# Patient Record
Sex: FEMALE | Race: BLACK OR AFRICAN AMERICAN | Employment: UNEMPLOYED | URBAN - METROPOLITAN AREA
[De-identification: names, ages, dates, MRNs, and addresses within clinical notes are randomized per-mention and may not be internally consistent; named-entity substitution may affect disease eponyms.]

---

## 2018-05-16 ENCOUNTER — APPOINTMENT (OUTPATIENT)
Dept: GENERAL RADIOLOGY | Age: 58
End: 2018-05-16

## 2018-05-16 ENCOUNTER — HOSPITAL ENCOUNTER (EMERGENCY)
Age: 58
Discharge: HOME | End: 2018-05-16
Attending: EMERGENCY MEDICINE

## 2018-05-16 VITALS
HEART RATE: 103 BPM | TEMPERATURE: 98.2 F | DIASTOLIC BLOOD PRESSURE: 99 MMHG | WEIGHT: 190 LBS | BODY MASS INDEX: 31.65 KG/M2 | SYSTOLIC BLOOD PRESSURE: 176 MMHG | RESPIRATION RATE: 18 BRPM | HEIGHT: 65 IN | OXYGEN SATURATION: 97 %

## 2018-05-16 DIAGNOSIS — J06.9 UPPER RESPIRATORY TRACT INFECTION, UNSPECIFIED TYPE: Primary | ICD-10-CM

## 2018-05-16 DIAGNOSIS — R05.9 COUGH: ICD-10-CM

## 2018-05-16 LAB
ANION GAP SERPL CALCULATED.3IONS-SCNC: 11 MMOL/L (ref 7–16)
BUN BLDV-MCNC: 20 MG/DL (ref 6–20)
CALCIUM SERPL-MCNC: 8.6 MG/DL (ref 8.6–10.2)
CHLORIDE BLD-SCNC: 102 MMOL/L (ref 98–107)
CO2: 25 MMOL/L (ref 22–29)
CREAT SERPL-MCNC: 0.9 MG/DL (ref 0.5–1)
GFR AFRICAN AMERICAN: >60
GFR NON-AFRICAN AMERICAN: >60 ML/MIN/1.73
GLUCOSE BLD-MCNC: 114 MG/DL (ref 74–109)
HCT VFR BLD CALC: 32.4 % (ref 34–48)
HEMOGLOBIN: 8.5 G/DL (ref 11.5–15.5)
LACTIC ACID: 1.2 MMOL/L (ref 0.5–2.2)
MCH RBC QN AUTO: 16.5 PG (ref 26–35)
MCHC RBC AUTO-ENTMCNC: 26.2 % (ref 32–34.5)
MCV RBC AUTO: 63 FL (ref 80–99.9)
PDW BLD-RTO: 23 FL (ref 11.5–15)
PLATELET # BLD: 413 E9/L (ref 130–450)
PMV BLD AUTO: 9.6 FL (ref 7–12)
POTASSIUM SERPL-SCNC: 3.8 MMOL/L (ref 3.5–5)
RBC # BLD: 5.14 E12/L (ref 3.5–5.5)
SODIUM BLD-SCNC: 138 MMOL/L (ref 132–146)
WBC # BLD: 6 E9/L (ref 4.5–11.5)

## 2018-05-16 PROCEDURE — 71045 X-RAY EXAM CHEST 1 VIEW: CPT

## 2018-05-16 PROCEDURE — 94664 DEMO&/EVAL PT USE INHALER: CPT

## 2018-05-16 PROCEDURE — 2580000003 HC RX 258: Performed by: PREVENTIVE MEDICINE

## 2018-05-16 PROCEDURE — 83605 ASSAY OF LACTIC ACID: CPT

## 2018-05-16 PROCEDURE — 85027 COMPLETE CBC AUTOMATED: CPT

## 2018-05-16 PROCEDURE — 6370000000 HC RX 637 (ALT 250 FOR IP): Performed by: PREVENTIVE MEDICINE

## 2018-05-16 PROCEDURE — 99284 EMERGENCY DEPT VISIT MOD MDM: CPT

## 2018-05-16 PROCEDURE — 36415 COLL VENOUS BLD VENIPUNCTURE: CPT

## 2018-05-16 PROCEDURE — 6360000002 HC RX W HCPCS: Performed by: PREVENTIVE MEDICINE

## 2018-05-16 PROCEDURE — 94640 AIRWAY INHALATION TREATMENT: CPT

## 2018-05-16 PROCEDURE — 80048 BASIC METABOLIC PNL TOTAL CA: CPT

## 2018-05-16 PROCEDURE — 96374 THER/PROPH/DIAG INJ IV PUSH: CPT

## 2018-05-16 RX ORDER — METHYLPREDNISOLONE SODIUM SUCCINATE 125 MG/2ML
125 INJECTION, POWDER, LYOPHILIZED, FOR SOLUTION INTRAMUSCULAR; INTRAVENOUS ONCE
Status: COMPLETED | OUTPATIENT
Start: 2018-05-16 | End: 2018-05-16

## 2018-05-16 RX ORDER — DOXYCYCLINE 100 MG/1
100 CAPSULE ORAL 2 TIMES DAILY
Qty: 20 CAPSULE | Refills: 0 | Status: SHIPPED | OUTPATIENT
Start: 2018-05-16 | End: 2018-05-26

## 2018-05-16 RX ORDER — BENZONATATE 100 MG/1
100 CAPSULE ORAL ONCE
Status: COMPLETED | OUTPATIENT
Start: 2018-05-16 | End: 2018-05-16

## 2018-05-16 RX ORDER — IPRATROPIUM BROMIDE AND ALBUTEROL SULFATE 2.5; .5 MG/3ML; MG/3ML
3 SOLUTION RESPIRATORY (INHALATION) ONCE
Status: COMPLETED | OUTPATIENT
Start: 2018-05-16 | End: 2018-05-16

## 2018-05-16 RX ORDER — 0.9 % SODIUM CHLORIDE 0.9 %
1000 INTRAVENOUS SOLUTION INTRAVENOUS ONCE
Status: COMPLETED | OUTPATIENT
Start: 2018-05-16 | End: 2018-05-16

## 2018-05-16 RX ORDER — PREDNISONE 50 MG/1
TABLET ORAL
Qty: 5 TABLET | Refills: 0 | Status: SHIPPED | OUTPATIENT
Start: 2018-05-16 | End: 2021-07-05

## 2018-05-16 RX ORDER — BENZONATATE 100 MG/1
100 CAPSULE ORAL 3 TIMES DAILY PRN
Qty: 21 CAPSULE | Refills: 0 | Status: SHIPPED | OUTPATIENT
Start: 2018-05-16 | End: 2018-05-23

## 2018-05-16 RX ADMIN — SODIUM CHLORIDE 1000 ML: 9 INJECTION, SOLUTION INTRAVENOUS at 01:48

## 2018-05-16 RX ADMIN — METHYLPREDNISOLONE SODIUM SUCCINATE 125 MG: 125 INJECTION, POWDER, FOR SOLUTION INTRAMUSCULAR; INTRAVENOUS at 01:48

## 2018-05-16 RX ADMIN — IPRATROPIUM BROMIDE AND ALBUTEROL SULFATE 3 AMPULE: 2.5; .5 SOLUTION RESPIRATORY (INHALATION) at 01:18

## 2018-05-16 RX ADMIN — BENZONATATE 100 MG: 100 CAPSULE ORAL at 02:35

## 2018-05-16 ASSESSMENT — ENCOUNTER SYMPTOMS
DIARRHEA: 0
CHEST TIGHTNESS: 1
NAUSEA: 0
SHORTNESS OF BREATH: 1
CONSTIPATION: 0
COUGH: 1
ABDOMINAL PAIN: 0
WHEEZING: 1
ALLERGIC/IMMUNOLOGIC NEGATIVE: 1
VOMITING: 0

## 2019-03-10 ENCOUNTER — HOSPITAL ENCOUNTER (EMERGENCY)
Age: 59
Discharge: HOME | End: 2019-03-10
Attending: EMERGENCY MEDICINE
Payer: MEDICAID

## 2019-03-10 ENCOUNTER — APPOINTMENT (OUTPATIENT)
Dept: GENERAL RADIOLOGY | Age: 59
End: 2019-03-10
Payer: MEDICAID

## 2019-03-10 VITALS
WEIGHT: 190 LBS | RESPIRATION RATE: 18 BRPM | BODY MASS INDEX: 31.65 KG/M2 | TEMPERATURE: 98 F | OXYGEN SATURATION: 96 % | HEART RATE: 86 BPM | SYSTOLIC BLOOD PRESSURE: 142 MMHG | HEIGHT: 65 IN | DIASTOLIC BLOOD PRESSURE: 124 MMHG

## 2019-03-10 DIAGNOSIS — N30.00 ACUTE CYSTITIS WITHOUT HEMATURIA: Primary | ICD-10-CM

## 2019-03-10 DIAGNOSIS — R06.2 WHEEZING: ICD-10-CM

## 2019-03-10 DIAGNOSIS — M77.8 TENDINITIS OF THUMB: ICD-10-CM

## 2019-03-10 LAB
ALBUMIN SERPL-MCNC: 3.8 G/DL (ref 3.5–5.2)
ALP BLD-CCNC: 72 U/L (ref 35–104)
ALT SERPL-CCNC: 18 U/L (ref 0–32)
ANION GAP SERPL CALCULATED.3IONS-SCNC: 8 MMOL/L (ref 7–16)
ANISOCYTOSIS: ABNORMAL
AST SERPL-CCNC: 53 U/L (ref 0–31)
BACTERIA: ABNORMAL /HPF
BASOPHILS ABSOLUTE: 0.06 E9/L (ref 0–0.2)
BASOPHILS RELATIVE PERCENT: 0.9 % (ref 0–2)
BILIRUB SERPL-MCNC: <0.2 MG/DL (ref 0–1.2)
BILIRUBIN URINE: NEGATIVE
BLOOD, URINE: NEGATIVE
BUN BLDV-MCNC: 10 MG/DL (ref 6–20)
CALCIUM SERPL-MCNC: 8.8 MG/DL (ref 8.6–10.2)
CHLORIDE BLD-SCNC: 102 MMOL/L (ref 98–107)
CLARITY: ABNORMAL
CO2: 28 MMOL/L (ref 22–29)
COLOR: YELLOW
CREAT SERPL-MCNC: 0.8 MG/DL (ref 0.5–1)
EOSINOPHILS ABSOLUTE: 0.85 E9/L (ref 0.05–0.5)
EOSINOPHILS RELATIVE PERCENT: 12.6 % (ref 0–6)
EPITHELIAL CELLS, UA: ABNORMAL /HPF
GFR AFRICAN AMERICAN: >60
GFR NON-AFRICAN AMERICAN: >60 ML/MIN/1.73
GLUCOSE BLD-MCNC: 97 MG/DL (ref 74–99)
GLUCOSE URINE: NEGATIVE MG/DL
HCG, URINE, POC: NEGATIVE
HCT VFR BLD CALC: 33.4 % (ref 34–48)
HEMOGLOBIN: 9.1 G/DL (ref 11.5–15.5)
HYPOCHROMIA: ABNORMAL
IMMATURE GRANULOCYTES #: 0.02 E9/L
IMMATURE GRANULOCYTES %: 0.3 % (ref 0–5)
KETONES, URINE: NEGATIVE MG/DL
LEUKOCYTE ESTERASE, URINE: ABNORMAL
LYMPHOCYTES ABSOLUTE: 2.41 E9/L (ref 1.5–4)
LYMPHOCYTES RELATIVE PERCENT: 35.8 % (ref 20–42)
Lab: NORMAL
MCH RBC QN AUTO: 17.9 PG (ref 26–35)
MCHC RBC AUTO-ENTMCNC: 27.2 % (ref 32–34.5)
MCV RBC AUTO: 65.6 FL (ref 80–99.9)
MONOCYTES ABSOLUTE: 0.53 E9/L (ref 0.1–0.95)
MONOCYTES RELATIVE PERCENT: 7.9 % (ref 2–12)
NEGATIVE QC PASS/FAIL: NORMAL
NEUTROPHILS ABSOLUTE: 2.87 E9/L (ref 1.8–7.3)
NEUTROPHILS RELATIVE PERCENT: 42.5 % (ref 43–80)
NITRITE, URINE: POSITIVE
OVALOCYTES: ABNORMAL
PDW BLD-RTO: 21.5 FL (ref 11.5–15)
PH UA: 6.5 (ref 5–9)
PLATELET # BLD: 437 E9/L (ref 130–450)
PMV BLD AUTO: 9.8 FL (ref 7–12)
POIKILOCYTES: ABNORMAL
POLYCHROMASIA: ABNORMAL
POSITIVE QC PASS/FAIL: NORMAL
POTASSIUM SERPL-SCNC: 5.2 MMOL/L (ref 3.5–5)
PRO-BNP: 61 PG/ML (ref 0–125)
PROTEIN UA: ABNORMAL MG/DL
RBC # BLD: 5.09 E12/L (ref 3.5–5.5)
RBC UA: ABNORMAL /HPF (ref 0–2)
SODIUM BLD-SCNC: 138 MMOL/L (ref 132–146)
SPECIFIC GRAVITY UA: 1.02 (ref 1–1.03)
TARGET CELLS: ABNORMAL
TOTAL PROTEIN: 7.7 G/DL (ref 6.4–8.3)
TROPONIN: <0.01 NG/ML (ref 0–0.03)
UROBILINOGEN, URINE: 0.2 E.U./DL
WBC # BLD: 6.7 E9/L (ref 4.5–11.5)
WBC UA: >20 /HPF (ref 0–5)

## 2019-03-10 PROCEDURE — 36415 COLL VENOUS BLD VENIPUNCTURE: CPT

## 2019-03-10 PROCEDURE — 94664 DEMO&/EVAL PT USE INHALER: CPT

## 2019-03-10 PROCEDURE — 85025 COMPLETE CBC W/AUTO DIFF WBC: CPT

## 2019-03-10 PROCEDURE — 81001 URINALYSIS AUTO W/SCOPE: CPT

## 2019-03-10 PROCEDURE — 84484 ASSAY OF TROPONIN QUANT: CPT

## 2019-03-10 PROCEDURE — 71046 X-RAY EXAM CHEST 2 VIEWS: CPT

## 2019-03-10 PROCEDURE — 99285 EMERGENCY DEPT VISIT HI MDM: CPT

## 2019-03-10 PROCEDURE — 94640 AIRWAY INHALATION TREATMENT: CPT

## 2019-03-10 PROCEDURE — 87088 URINE BACTERIA CULTURE: CPT

## 2019-03-10 PROCEDURE — 83880 ASSAY OF NATRIURETIC PEPTIDE: CPT

## 2019-03-10 PROCEDURE — 80053 COMPREHEN METABOLIC PANEL: CPT

## 2019-03-10 PROCEDURE — 87186 SC STD MICRODIL/AGAR DIL: CPT

## 2019-03-10 PROCEDURE — 87077 CULTURE AEROBIC IDENTIFY: CPT

## 2019-03-10 PROCEDURE — 6370000000 HC RX 637 (ALT 250 FOR IP): Performed by: EMERGENCY MEDICINE

## 2019-03-10 RX ORDER — NITROFURANTOIN 25; 75 MG/1; MG/1
100 CAPSULE ORAL 2 TIMES DAILY
Qty: 10 CAPSULE | Refills: 0 | Status: SHIPPED | OUTPATIENT
Start: 2019-03-10 | End: 2019-03-15

## 2019-03-10 RX ORDER — DEXAMETHASONE SODIUM PHOSPHATE 10 MG/ML
10 INJECTION INTRAMUSCULAR; INTRAVENOUS ONCE
Status: DISCONTINUED | OUTPATIENT
Start: 2019-03-10 | End: 2019-03-10

## 2019-03-10 RX ORDER — IPRATROPIUM BROMIDE AND ALBUTEROL SULFATE 2.5; .5 MG/3ML; MG/3ML
3 SOLUTION RESPIRATORY (INHALATION) ONCE
Status: COMPLETED | OUTPATIENT
Start: 2019-03-10 | End: 2019-03-10

## 2019-03-10 RX ORDER — IBUPROFEN 800 MG/1
800 TABLET ORAL EVERY 6 HOURS PRN
Qty: 21 TABLET | Refills: 0 | Status: SHIPPED | OUTPATIENT
Start: 2019-03-10 | End: 2021-07-08

## 2019-03-10 RX ORDER — ALBUTEROL SULFATE 90 UG/1
2 AEROSOL, METERED RESPIRATORY (INHALATION) EVERY 4 HOURS PRN
Qty: 1 INHALER | Refills: 0 | Status: SHIPPED | OUTPATIENT
Start: 2019-03-10 | End: 2021-07-05 | Stop reason: SDUPTHER

## 2019-03-10 RX ORDER — DEXAMETHASONE SODIUM PHOSPHATE 10 MG/ML
10 INJECTION INTRAMUSCULAR; INTRAVENOUS ONCE
Status: DISCONTINUED | OUTPATIENT
Start: 2019-03-10 | End: 2019-03-11 | Stop reason: HOSPADM

## 2019-03-10 RX ADMIN — IPRATROPIUM BROMIDE AND ALBUTEROL SULFATE 3 AMPULE: .5; 3 SOLUTION RESPIRATORY (INHALATION) at 21:30

## 2019-03-10 ASSESSMENT — PAIN DESCRIPTION - DESCRIPTORS: DESCRIPTORS: BURNING

## 2019-03-10 ASSESSMENT — ENCOUNTER SYMPTOMS
ABDOMINAL PAIN: 1
COUGH: 0
NAUSEA: 0
ABDOMINAL DISTENTION: 0
EYE DISCHARGE: 0
VOMITING: 0
COLOR CHANGE: 0
RHINORRHEA: 0
DIARRHEA: 0
EYE ITCHING: 0
CHEST TIGHTNESS: 0
SINUS PRESSURE: 0
CHOKING: 0
WHEEZING: 0
EYE REDNESS: 0
VOICE CHANGE: 0
SORE THROAT: 0
STRIDOR: 0
SHORTNESS OF BREATH: 1

## 2019-03-10 ASSESSMENT — PAIN DESCRIPTION - PAIN TYPE: TYPE: ACUTE PAIN

## 2019-03-10 ASSESSMENT — PAIN SCALES - GENERAL: PAINLEVEL_OUTOF10: 5

## 2019-03-11 NOTE — ED NOTES
Pt given urine cup and instructed on need for sample. Pt states she is unable to void at this time.      Altagracia Rodriguez, MARK  22[birth year] 0876

## 2019-03-11 NOTE — ED PROVIDER NOTES
Patient is a 59-year-old female, past medical history of asthma, tobacco use, who presents for dysuria and shortness of breath. The patient reports for the past week she's had recurring episodes of lower abdominal \"pressure\" which occurs after urination. She states she feels like she is on urinating and has to go again shortly afterwards. She reports is similar to previous urinary tract infections she has had. She also reports the past several months she has had recurring episodes of shortness of breath after walking up a flight of stairs. She reports it tends to be worsened with weather changes. She states she appears to diagnose asthma and prescribed an inhaler which she has not been using. In addition, she reports some tenderness in her left thenar eminence and thumb which is been ongoing for several days as well. She denies any traumas or injuries. She reports associated \"clicking\" with flexion and extension of her thumb. She denies any numbness or color changes or rashes. She denies any associated fevers, chills, cough, chest pain or pressure, palpitations, nausea vomiting, diarrhea, vaginal discharge. LMP 1 week ago. The history is provided by the patient. Review of Systems   Constitutional: Negative for chills, fever and unexpected weight change. HENT: Negative for congestion, rhinorrhea, sinus pressure, sore throat and voice change. Eyes: Negative for discharge, redness and itching. Respiratory: Positive for shortness of breath. Negative for cough, choking, chest tightness, wheezing and stridor. Cardiovascular: Negative for chest pain, palpitations and leg swelling. Gastrointestinal: Positive for abdominal pain (pressure - suprapubic). Negative for abdominal distention, diarrhea, nausea and vomiting. Genitourinary: Positive for dysuria. Negative for flank pain, hematuria, vaginal bleeding, vaginal discharge and vaginal pain. Musculoskeletal: Positive for arthralgias (left thumb). Negative for joint swelling and myalgias. Skin: Negative for color change, rash and wound. Allergic/Immunologic: Negative for environmental allergies, food allergies and immunocompromised state. Neurological: Negative for dizziness, light-headedness and headaches. Hematological: Negative for adenopathy. Psychiatric/Behavioral: The patient is not nervous/anxious. Physical Exam   Constitutional: She is oriented to person, place, and time. She appears well-developed and well-nourished. No distress. HENT:   Head: Normocephalic and atraumatic. Mouth/Throat: Oropharynx is clear and moist. No oropharyngeal exudate. Gold capped incisors   Eyes: Pupils are equal, round, and reactive to light. Conjunctivae and EOM are normal. Right eye exhibits no discharge. Left eye exhibits no discharge. No scleral icterus. Neck: Normal range of motion. Neck supple. No JVD present. No tracheal deviation present. No thyromegaly present. Cardiovascular: Normal rate, regular rhythm, normal heart sounds and intact distal pulses. Exam reveals no gallop and no friction rub. No murmur heard. Pulmonary/Chest: Effort normal. No stridor. No respiratory distress. She has wheezes. She has no rhonchi. She has no rales. She exhibits no tenderness. DEBI wheezes, diminished air entry   Abdominal: Soft. Bowel sounds are normal. She exhibits no distension and no mass. There is tenderness (Mild suprapubic). There is no rebound and no guarding. No hernia. Musculoskeletal: Normal range of motion. She exhibits tenderness. She exhibits no edema or deformity. Mild tenderness over the palmar surface and thenar eminence of the left thumb, full active and passive range of motion in: Negative Finkelstein test; there is a palpable clicking with flexion and extension of the flexor tendon - there is no pain with palpation or passive extension, no erythema or swelling   Lymphadenopathy:     She has no cervical adenopathy.    Neurological: She is alert and oriented to person, place, and time. Skin: Skin is warm and dry. Capillary refill takes less than 2 seconds. No rash noted. She is not diaphoretic. No pallor. Nursing note and vitals reviewed. Procedures    MDM    ED Course as of Mar 10 2304   Richelle Casillas Mar 10, 2019   2204 The patient is resting comfortably. On repeat examination her breath sounds are improved with mild wheezing but much better air entry. She is in no respiratory distress. She doesn't fact have a urinary tract infection. We'll send a urine culture, and based on previous urine cultures will treat with nitrofurantoin. Will discharge her with a refill for albuterol inhaler in addition to antibiotics and ibuprofen for her thumb pain. [JL]      ED Course User Index  [JL] Jose Alfredo Linder DO         EKG: This EKG is signed and interpreted by me. Rate: 83  Rhythm: Sinus  Interpretation: no acute changes  Comparison: stable as compared to patient's most recent EKG      --------------------------------------------- PAST HISTORY ---------------------------------------------  Past Medical History:  has no past medical history on file. Past Surgical History:  has a past surgical history that includes  section. Social History:  reports that she has been smoking cigarettes. She has been smoking about 0.50 packs per day. She has never used smokeless tobacco. She reports that she does not drink alcohol or use drugs. Family History: family history is not on file. The patients home medications have been reviewed.     Allergies: Ibuprofen    -------------------------------------------------- RESULTS -------------------------------------------------  Labs:  Results for orders placed or performed during the hospital encounter of 03/10/19   Troponin   Result Value Ref Range    Troponin <0.01 0.00 - 0.03 ng/mL   CBC Auto Differential   Result Value Ref Range    WBC 6.7 4.5 - 11.5 E9/L    RBC 5.09 3.50 - 5.50 E12/L Hemoglobin 9.1 (L) 11.5 - 15.5 g/dL    Hematocrit 33.4 (L) 34.0 - 48.0 %    MCV 65.6 (L) 80.0 - 99.9 fL    MCH 17.9 (L) 26.0 - 35.0 pg    MCHC 27.2 (L) 32.0 - 34.5 %    RDW 21.5 (H) 11.5 - 15.0 fL    Platelets 638 536 - 428 E9/L    MPV 9.8 7.0 - 12.0 fL    Neutrophils % 42.5 (L) 43.0 - 80.0 %    Immature Granulocytes % 0.3 0.0 - 5.0 %    Lymphocytes % 35.8 20.0 - 42.0 %    Monocytes % 7.9 2.0 - 12.0 %    Eosinophils % 12.6 (H) 0.0 - 6.0 %    Basophils % 0.9 0.0 - 2.0 %    Neutrophils # 2.87 1.80 - 7.30 E9/L    Immature Granulocytes # 0.02 E9/L    Lymphocytes # 2.41 1.50 - 4.00 E9/L    Monocytes # 0.53 0.10 - 0.95 E9/L    Eosinophils # 0.85 (H) 0.05 - 0.50 E9/L    Basophils # 0.06 0.00 - 0.20 E9/L    Anisocytosis 2+     Polychromasia 1+     Hypochromia 2+     Poikilocytes 1+     Ovalocytes 1+     Target Cells 1+    Comprehensive Metabolic Panel   Result Value Ref Range    Sodium 138 132 - 146 mmol/L    Potassium 5.2 (H) 3.5 - 5.0 mmol/L    Chloride 102 98 - 107 mmol/L    CO2 28 22 - 29 mmol/L    Anion Gap 8 7 - 16 mmol/L    Glucose 97 74 - 99 mg/dL    BUN 10 6 - 20 mg/dL    CREATININE 0.8 0.5 - 1.0 mg/dL    GFR Non-African American >60 >=60 mL/min/1.73    GFR African American >60     Calcium 8.8 8.6 - 10.2 mg/dL    Total Protein 7.7 6.4 - 8.3 g/dL    Alb 3.8 3.5 - 5.2 g/dL    Total Bilirubin <0.2 0.0 - 1.2 mg/dL    Alkaline Phosphatase 72 35 - 104 U/L    ALT 18 0 - 32 U/L    AST 53 (H) 0 - 31 U/L   Brain Natriuretic Peptide   Result Value Ref Range    Pro-BNP 61 0 - 125 pg/mL   Urinalysis   Result Value Ref Range    Color, UA Yellow Straw/Yellow    Clarity, UA SL CLOUDY Clear    Glucose, Ur Negative Negative mg/dL    Bilirubin Urine Negative Negative    Ketones, Urine Negative Negative mg/dL    Specific Gravity, UA 1.020 1.005 - 1.030    Blood, Urine Negative Negative    pH, UA 6.5 5.0 - 9.0    Protein, UA TRACE Negative mg/dL    Urobilinogen, Urine 0.2 <2.0 E.U./dL    Nitrite, Urine POSITIVE (A) Negative Leukocyte Esterase, Urine TRACE (A) Negative   Microscopic Urinalysis   Result Value Ref Range    WBC, UA >20 0 - 5 /HPF    RBC, UA NONE 0 - 2 /HPF    Epi Cells RARE /HPF    Bacteria, UA MANY (A) /HPF   POC Pregnancy Urine Qual   Result Value Ref Range    HCG, Urine, POC Negative Negative    Lot Number [id number]     Positive QC Pass/Fail Pass     Negative QC Pass/Fail Pass    EKG 12 Lead   Result Value Ref Range    Ventricular Rate 83 BPM    Atrial Rate 83 BPM    P-R Interval 142 ms    QRS Duration 80 ms    Q-T Interval 374 ms    QTc Calculation (Bazett) 439 ms    P Axis 37 degrees    R Axis -6 degrees    T Axis 38 degrees       Radiology:  XR CHEST STANDARD (2 VW)   Final Result   No acute cardiopulmonary pathology. ------------------------- NURSING NOTES AND VITALS REVIEWED ---------------------------  Date / Time Roomed:  3/10/2019  7:56 PM  ED Bed Assignment:  24/24    The nursing notes within the ED encounter and vital signs as below have been reviewed. BP (!) 142/124   Pulse 86   Temp 98 °F (36.7 °C)   Resp 18   Ht 5' 5\" (1.651 m)   Wt 190 lb (86.2 kg)   LMP 03/03/2019   SpO2 96%   BMI 31.62 kg/m²   Oxygen Saturation Interpretation: Normal      ------------------------------------------ PROGRESS NOTES ------------------------------------------  10:16 PM  I have spoken with the patient and discussed todays results, in addition to providing specific details for the plan of care and counseling regarding the diagnosis and prognosis. Their questions are answered at this time and they are agreeable with the plan. I discussed at length with them reasons for immediate return here for re evaluation. They will followup with their primary care physician by calling their office tomorrow.       --------------------------------- ADDITIONAL PROVIDER NOTES ---------------------------------  At this time the patient is without objective evidence of an acute process requiring hospitalization or inpatient management. They have remained hemodynamically stable throughout their entire ED visit and are stable for discharge with outpatient follow-up. The plan has been discussed in detail and they are aware of the specific conditions for emergent return, as well as the importance of follow-up. New Prescriptions    IBUPROFEN (ADVIL;MOTRIN) 800 MG TABLET    Take 1 tablet by mouth every 6 hours as needed for Pain    NITROFURANTOIN, MACROCRYSTAL-MONOHYDRATE, (MACROBID) 100 MG CAPSULE    Take 1 capsule by mouth 2 times daily for 5 days       Diagnosis:  1. Acute cystitis without hematuria    2. Wheezing    3. Tendinitis of thumb        Disposition:  Patient's disposition: Discharge to home  Patient's condition is stable.             Rosey Garcia,   Resident  03/10/19 1645

## 2019-03-12 LAB
ORGANISM: ABNORMAL
URINE CULTURE, ROUTINE: ABNORMAL
URINE CULTURE, ROUTINE: ABNORMAL

## 2019-03-15 LAB
EKG ATRIAL RATE: 83 BPM
EKG P AXIS: 37 DEGREES
EKG P-R INTERVAL: 142 MS
EKG Q-T INTERVAL: 374 MS
EKG QRS DURATION: 80 MS
EKG QTC CALCULATION (BAZETT): 439 MS
EKG R AXIS: -6 DEGREES
EKG T AXIS: 38 DEGREES
EKG VENTRICULAR RATE: 83 BPM

## 2019-04-19 ENCOUNTER — HOSPITAL ENCOUNTER (EMERGENCY)
Age: 59
Discharge: HOME | End: 2019-04-19
Attending: EMERGENCY MEDICINE
Payer: MEDICAID

## 2019-04-19 VITALS
BODY MASS INDEX: 32.44 KG/M2 | WEIGHT: 190 LBS | DIASTOLIC BLOOD PRESSURE: 98 MMHG | SYSTOLIC BLOOD PRESSURE: 147 MMHG | OXYGEN SATURATION: 98 % | HEIGHT: 64 IN | RESPIRATION RATE: 16 BRPM | TEMPERATURE: 96.9 F | HEART RATE: 98 BPM

## 2019-04-19 DIAGNOSIS — N39.0 URINARY TRACT INFECTION IN FEMALE: Primary | ICD-10-CM

## 2019-04-19 LAB
ALBUMIN SERPL-MCNC: 4 G/DL (ref 3.5–5.2)
ALP BLD-CCNC: 79 U/L (ref 35–104)
ALT SERPL-CCNC: 15 U/L (ref 0–32)
ANION GAP SERPL CALCULATED.3IONS-SCNC: 9 MMOL/L (ref 7–16)
AST SERPL-CCNC: 19 U/L (ref 0–31)
BACTERIA: ABNORMAL /HPF
BASOPHILS ABSOLUTE: 0.05 E9/L (ref 0–0.2)
BASOPHILS RELATIVE PERCENT: 0.7 % (ref 0–2)
BILIRUB SERPL-MCNC: 0.6 MG/DL (ref 0–1.2)
BILIRUBIN URINE: NEGATIVE
BLOOD, URINE: ABNORMAL
BUN BLDV-MCNC: 9 MG/DL (ref 6–20)
CALCIUM SERPL-MCNC: 8.9 MG/DL (ref 8.6–10.2)
CHLORIDE BLD-SCNC: 103 MMOL/L (ref 98–107)
CLARITY: CLEAR
CO2: 26 MMOL/L (ref 22–29)
COLOR: YELLOW
CREAT SERPL-MCNC: 0.7 MG/DL (ref 0.5–1)
EOSINOPHILS ABSOLUTE: 0.1 E9/L (ref 0.05–0.5)
EOSINOPHILS RELATIVE PERCENT: 1.3 % (ref 0–6)
EPITHELIAL CELLS, UA: ABNORMAL /HPF
GFR AFRICAN AMERICAN: >60
GFR NON-AFRICAN AMERICAN: >60 ML/MIN/1.73
GLUCOSE BLD-MCNC: 105 MG/DL (ref 74–99)
GLUCOSE URINE: NEGATIVE MG/DL
HCT VFR BLD CALC: 33.5 % (ref 34–48)
HEMOGLOBIN: 9.1 G/DL (ref 11.5–15.5)
IMMATURE GRANULOCYTES #: 0.03 E9/L
IMMATURE GRANULOCYTES %: 0.4 % (ref 0–5)
KETONES, URINE: NEGATIVE MG/DL
LACTIC ACID: 1 MMOL/L (ref 0.5–2.2)
LEUKOCYTE ESTERASE, URINE: ABNORMAL
LIPASE: 17 U/L (ref 13–60)
LYMPHOCYTES ABSOLUTE: 1.57 E9/L (ref 1.5–4)
LYMPHOCYTES RELATIVE PERCENT: 20.5 % (ref 20–42)
MCH RBC QN AUTO: 17.5 PG (ref 26–35)
MCHC RBC AUTO-ENTMCNC: 27.2 % (ref 32–34.5)
MCV RBC AUTO: 64.5 FL (ref 80–99.9)
MONOCYTES ABSOLUTE: 0.7 E9/L (ref 0.1–0.95)
MONOCYTES RELATIVE PERCENT: 9.1 % (ref 2–12)
NEUTROPHILS ABSOLUTE: 5.22 E9/L (ref 1.8–7.3)
NEUTROPHILS RELATIVE PERCENT: 68 % (ref 43–80)
NITRITE, URINE: POSITIVE
PDW BLD-RTO: 20.2 FL (ref 11.5–15)
PH UA: 6 (ref 5–9)
PLATELET # BLD: 336 E9/L (ref 130–450)
PMV BLD AUTO: 10.4 FL (ref 7–12)
POTASSIUM SERPL-SCNC: 3.9 MMOL/L (ref 3.5–5)
PROTEIN UA: ABNORMAL MG/DL
RBC # BLD: 5.19 E12/L (ref 3.5–5.5)
RBC UA: ABNORMAL /HPF (ref 0–2)
SODIUM BLD-SCNC: 138 MMOL/L (ref 132–146)
SPECIFIC GRAVITY UA: <=1.005 (ref 1–1.03)
TOTAL PROTEIN: 7.9 G/DL (ref 6.4–8.3)
UROBILINOGEN, URINE: 0.2 E.U./DL
WBC # BLD: 7.7 E9/L (ref 4.5–11.5)
WBC UA: ABNORMAL /HPF (ref 0–5)

## 2019-04-19 PROCEDURE — 36415 COLL VENOUS BLD VENIPUNCTURE: CPT

## 2019-04-19 PROCEDURE — 85025 COMPLETE CBC W/AUTO DIFF WBC: CPT

## 2019-04-19 PROCEDURE — 6370000000 HC RX 637 (ALT 250 FOR IP): Performed by: EMERGENCY MEDICINE

## 2019-04-19 PROCEDURE — 87186 SC STD MICRODIL/AGAR DIL: CPT

## 2019-04-19 PROCEDURE — 81001 URINALYSIS AUTO W/SCOPE: CPT

## 2019-04-19 PROCEDURE — 80053 COMPREHEN METABOLIC PANEL: CPT

## 2019-04-19 PROCEDURE — 87088 URINE BACTERIA CULTURE: CPT

## 2019-04-19 PROCEDURE — 83690 ASSAY OF LIPASE: CPT

## 2019-04-19 PROCEDURE — 99283 EMERGENCY DEPT VISIT LOW MDM: CPT

## 2019-04-19 PROCEDURE — 83605 ASSAY OF LACTIC ACID: CPT

## 2019-04-19 RX ORDER — NITROFURANTOIN 25; 75 MG/1; MG/1
100 CAPSULE ORAL 2 TIMES DAILY
Qty: 10 CAPSULE | Refills: 0 | Status: SHIPPED | OUTPATIENT
Start: 2019-04-19 | End: 2019-04-24

## 2019-04-19 RX ORDER — NITROFURANTOIN 25; 75 MG/1; MG/1
100 CAPSULE ORAL ONCE
Status: COMPLETED | OUTPATIENT
Start: 2019-04-19 | End: 2019-04-19

## 2019-04-19 RX ADMIN — NITROFURANTOIN MONOHYDRATE/MACROCRYSTALLINE 100 MG: 25; 75 CAPSULE ORAL at 21:47

## 2019-04-19 ASSESSMENT — PAIN SCALES - GENERAL: PAINLEVEL_OUTOF10: 9

## 2019-04-20 NOTE — ED PROVIDER NOTES
gallops, or rubs. 2+ distal pulses. Abdomen: Soft, non distended, mild suprapubic tenderness on palpation. No evidence of pyelonephritis. Extremities: Moves all extremities x 4. Warm and well perfused. Skin: warm and dry without rash. Neurologic: GCS 15, CN 2-12 grossly intact, no focal deficits, no meningeal signs  Psych: Normal Affect.  No signs or symptoms of depression or psychosis.    -------------------------------------------------- RESULTS -------------------------------------------------  All laboratory and radiology results have been personally reviewed by myself   LABS:  Results for orders placed or performed during the hospital encounter of 04/19/19   Urine Culture   Result Value Ref Range    Urine Culture, Routine      Organism Escherichia coli (A)     Urine Culture, Routine >100,000 CFU/ml  Sensitivity to follow      Comprehensive Metabolic Panel   Result Value Ref Range    Sodium 138 132 - 146 mmol/L    Potassium 3.9 3.5 - 5.0 mmol/L    Chloride 103 98 - 107 mmol/L    CO2 26 22 - 29 mmol/L    Anion Gap 9 7 - 16 mmol/L    Glucose 105 (H) 74 - 99 mg/dL    BUN 9 6 - 20 mg/dL    CREATININE 0.7 0.5 - 1.0 mg/dL    GFR Non-African American >60 >=60 mL/min/1.73    GFR African American >60     Calcium 8.9 8.6 - 10.2 mg/dL    Total Protein 7.9 6.4 - 8.3 g/dL    Alb 4.0 3.5 - 5.2 g/dL    Total Bilirubin 0.6 0.0 - 1.2 mg/dL    Alkaline Phosphatase 79 35 - 104 U/L    ALT 15 0 - 32 U/L    AST 19 0 - 31 U/L   CBC Auto Differential   Result Value Ref Range    WBC 7.7 4.5 - 11.5 E9/L    RBC 5.19 3.50 - 5.50 E12/L    Hemoglobin 9.1 (L) 11.5 - 15.5 g/dL    Hematocrit 33.5 (L) 34.0 - 48.0 %    MCV 64.5 (L) 80.0 - 99.9 fL    MCH 17.5 (L) 26.0 - 35.0 pg    MCHC 27.2 (L) 32.0 - 34.5 %    RDW 20.2 (H) 11.5 - 15.0 fL    Platelets 447 768 - 235 E9/L    MPV 10.4 7.0 - 12.0 fL    Neutrophils % 68.0 43.0 - 80.0 %    Immature Granulocytes % 0.4 0.0 - 5.0 %    Lymphocytes % 20.5 20.0 - 42.0 %    Monocytes % 9.1 2.0 - 12.0 % Eosinophils % 1.3 0.0 - 6.0 %    Basophils % 0.7 0.0 - 2.0 %    Neutrophils # 5.22 1.80 - 7.30 E9/L    Immature Granulocytes # 0.03 E9/L    Lymphocytes # 1.57 1.50 - 4.00 E9/L    Monocytes # 0.70 0.10 - 0.95 E9/L    Eosinophils # 0.10 0.05 - 0.50 E9/L    Basophils # 0.05 0.00 - 0.20 E9/L   Lipase   Result Value Ref Range    Lipase 17 13 - 60 U/L   Urinalysis   Result Value Ref Range    Color, UA Yellow Straw/Yellow    Clarity, UA Clear Clear    Glucose, Ur Negative Negative mg/dL    Bilirubin Urine Negative Negative    Ketones, Urine Negative Negative mg/dL    Specific Gravity, UA <=1.005 1.005 - 1.030    Blood, Urine MODERATE (A) Negative    pH, UA 6.0 5.0 - 9.0    Protein, UA TRACE Negative mg/dL    Urobilinogen, Urine 0.2 <2.0 E.U./dL    Nitrite, Urine POSITIVE (A) Negative    Leukocyte Esterase, Urine LARGE (A) Negative   Lactic acid, plasma   Result Value Ref Range    Lactic Acid 1.0 0.5 - 2.2 mmol/L   Microscopic Urinalysis   Result Value Ref Range    WBC, UA PACKED 0 - 5 /HPF    RBC, UA 1-3 0 - 2 /HPF    Epi Cells FEW /HPF    Bacteria, UA MANY (A) /HPF       RADIOLOGY:  Interpreted by Radiologist.  No orders to display       ------------------------- NURSING NOTES AND VITALS REVIEWED ---------------------------  The nursing notes within the ED encounter and vital signs as below have been reviewed. BP (!) 147/98   Pulse 98   Temp 96.9 °F (36.1 °C) (Temporal)   Resp 16   Ht 5' 4\" (1.626 m)   Wt 190 lb (86.2 kg)   LMP 04/11/2019   SpO2 98%   BMI 32.61 kg/m²   Oxygen Saturation Interpretation: Normal    ------------------------------- ED COURSE/MEDICAL DECISION MAKING----------------------  Medications   nitrofurantoin (macrocrystal-monohydrate) (MACROBID) capsule 100 mg (100 mg Oral Given 4/19/19 2147)     Medical Decision Making:   Pt presents with her typical UTI sx. Per previous labs, infection is sensitive to Macrobid (Enterococcus). Labs obtained and Macrobid given in the ED prior to discharge. All results reviewed and discussed with the pt. The patient is stable for discharge. The results of today's workup were reviewed and discussed with the patient, and any questions or concerns were answered. Emergency provider has shared the specific conditions for return, as well as the importance of follow-up with PCP. Pt discharged home with Rx Macrobid. Pt informed if sx persist and continue to return despite abx intervention, she may require follow up with Urology. Counseling: The emergency provider has spoken with the patient and discussed todays results, in addition to providing specific details for the plan of care and counseling regarding the diagnosis and prognosis. Questions are answered at this time and they are agreeable with the plan.      --------------------------------- IMPRESSION AND DISPOSITION ---------------------------------    IMPRESSION  1. Urinary tract infection in female        DISPOSITION  Disposition: Discharge to home  Patient condition is stable    SCRIBE ATTESTATION    4/19/19, 9:16 PM.    This note is prepared by Yonis Reyes, acting as Scribe for Marcio Hester MD.    Marcio Hester MD:  The scribe's documentation has been prepared under my direction and personally reviewed by me in its entirety. I confirm that the note above accurately reflects all work, treatment, procedures, and medical decision making performed by me. NOTE: This report was edited using voice recognition software. Every effort was made to ensure accuracy; however, inadvertent computerized transcription errors may be present.             Marcio Hester MD  04/21/19 2294

## 2019-04-21 LAB
ORGANISM: ABNORMAL
URINE CULTURE, ROUTINE: ABNORMAL
URINE CULTURE, ROUTINE: ABNORMAL

## 2019-05-26 ENCOUNTER — APPOINTMENT (OUTPATIENT)
Dept: CT IMAGING | Age: 59
End: 2019-05-26
Payer: MEDICAID

## 2019-05-26 ENCOUNTER — HOSPITAL ENCOUNTER (EMERGENCY)
Age: 59
Discharge: HOME | End: 2019-05-27
Attending: EMERGENCY MEDICINE
Payer: MEDICAID

## 2019-05-26 DIAGNOSIS — R05.9 COUGH: ICD-10-CM

## 2019-05-26 DIAGNOSIS — N39.0 URINARY TRACT INFECTION IN FEMALE: Primary | ICD-10-CM

## 2019-05-26 LAB
ALBUMIN SERPL-MCNC: 3.9 G/DL (ref 3.5–5.2)
ALP BLD-CCNC: 73 U/L (ref 35–104)
ALT SERPL-CCNC: <5 U/L (ref 0–32)
ANION GAP SERPL CALCULATED.3IONS-SCNC: 9 MMOL/L (ref 7–16)
ANISOCYTOSIS: ABNORMAL
AST SERPL-CCNC: 16 U/L (ref 0–31)
BACTERIA: ABNORMAL /HPF
BASOPHILS ABSOLUTE: 0.17 E9/L (ref 0–0.2)
BASOPHILS RELATIVE PERCENT: 2.6 % (ref 0–2)
BILIRUB SERPL-MCNC: 0.2 MG/DL (ref 0–1.2)
BILIRUBIN URINE: NEGATIVE
BLOOD, URINE: ABNORMAL
BUN BLDV-MCNC: 11 MG/DL (ref 6–20)
CALCIUM SERPL-MCNC: 8.8 MG/DL (ref 8.6–10.2)
CHLORIDE BLD-SCNC: 106 MMOL/L (ref 98–107)
CLARITY: ABNORMAL
CO2: 26 MMOL/L (ref 22–29)
COLOR: YELLOW
CREAT SERPL-MCNC: 0.7 MG/DL (ref 0.5–1)
EOSINOPHILS ABSOLUTE: 0.7 E9/L (ref 0.05–0.5)
EOSINOPHILS RELATIVE PERCENT: 10.4 % (ref 0–6)
EPITHELIAL CELLS, UA: ABNORMAL /HPF
GFR AFRICAN AMERICAN: >60
GFR NON-AFRICAN AMERICAN: >60 ML/MIN/1.73
GLUCOSE BLD-MCNC: 156 MG/DL (ref 74–99)
GLUCOSE URINE: NEGATIVE MG/DL
HCG, URINE, POC: NEGATIVE
HCT VFR BLD CALC: 32.7 % (ref 34–48)
HEMOGLOBIN: 8.8 G/DL (ref 11.5–15.5)
HYPOCHROMIA: ABNORMAL
KETONES, URINE: NEGATIVE MG/DL
LACTIC ACID: 1.5 MMOL/L (ref 0.5–2.2)
LEUKOCYTE ESTERASE, URINE: ABNORMAL
LIPASE: 25 U/L (ref 13–60)
LYMPHOCYTES ABSOLUTE: 2.35 E9/L (ref 1.5–4)
LYMPHOCYTES RELATIVE PERCENT: 34.8 % (ref 20–42)
Lab: NORMAL
MCH RBC QN AUTO: 17.1 PG (ref 26–35)
MCHC RBC AUTO-ENTMCNC: 26.9 % (ref 32–34.5)
MCV RBC AUTO: 63.4 FL (ref 80–99.9)
MONOCYTES ABSOLUTE: 0.34 E9/L (ref 0.1–0.95)
MONOCYTES RELATIVE PERCENT: 5.2 % (ref 2–12)
NEGATIVE QC PASS/FAIL: NORMAL
NEUTROPHILS ABSOLUTE: 3.15 E9/L (ref 1.8–7.3)
NEUTROPHILS RELATIVE PERCENT: 47 % (ref 43–80)
NITRITE, URINE: POSITIVE
OVALOCYTES: ABNORMAL
PDW BLD-RTO: 21.3 FL (ref 11.5–15)
PH UA: 6 (ref 5–9)
PLATELET # BLD: 417 E9/L (ref 130–450)
PMV BLD AUTO: 10.2 FL (ref 7–12)
POIKILOCYTES: ABNORMAL
POLYCHROMASIA: ABNORMAL
POSITIVE QC PASS/FAIL: NORMAL
POTASSIUM SERPL-SCNC: 3.9 MMOL/L (ref 3.5–5)
PROTEIN UA: ABNORMAL MG/DL
RBC # BLD: 5.16 E12/L (ref 3.5–5.5)
RBC UA: ABNORMAL /HPF (ref 0–2)
SCHISTOCYTES: ABNORMAL
SODIUM BLD-SCNC: 141 MMOL/L (ref 132–146)
SPECIFIC GRAVITY UA: 1.02 (ref 1–1.03)
TARGET CELLS: ABNORMAL
TEAR DROP CELLS: ABNORMAL
TOTAL PROTEIN: 7.5 G/DL (ref 6.4–8.3)
UROBILINOGEN, URINE: 0.2 E.U./DL
WBC # BLD: 6.7 E9/L (ref 4.5–11.5)
WBC UA: >20 /HPF (ref 0–5)

## 2019-05-26 PROCEDURE — 85025 COMPLETE CBC W/AUTO DIFF WBC: CPT

## 2019-05-26 PROCEDURE — 74176 CT ABD & PELVIS W/O CONTRAST: CPT

## 2019-05-26 PROCEDURE — 80053 COMPREHEN METABOLIC PANEL: CPT

## 2019-05-26 PROCEDURE — 83690 ASSAY OF LIPASE: CPT

## 2019-05-26 PROCEDURE — 83605 ASSAY OF LACTIC ACID: CPT

## 2019-05-26 PROCEDURE — 36415 COLL VENOUS BLD VENIPUNCTURE: CPT

## 2019-05-26 PROCEDURE — 99284 EMERGENCY DEPT VISIT MOD MDM: CPT

## 2019-05-26 PROCEDURE — 81001 URINALYSIS AUTO W/SCOPE: CPT

## 2019-05-26 RX ORDER — 0.9 % SODIUM CHLORIDE 0.9 %
1000 INTRAVENOUS SOLUTION INTRAVENOUS ONCE
Status: DISCONTINUED | OUTPATIENT
Start: 2019-05-26 | End: 2019-05-27 | Stop reason: HOSPADM

## 2019-05-26 ASSESSMENT — PAIN SCALES - GENERAL: PAINLEVEL_OUTOF10: 6

## 2019-05-26 ASSESSMENT — PAIN DESCRIPTION - LOCATION: LOCATION: FLANK

## 2019-05-27 VITALS
DIASTOLIC BLOOD PRESSURE: 81 MMHG | HEART RATE: 95 BPM | SYSTOLIC BLOOD PRESSURE: 164 MMHG | OXYGEN SATURATION: 97 % | TEMPERATURE: 98.8 F | RESPIRATION RATE: 16 BRPM

## 2019-05-27 PROCEDURE — 6370000000 HC RX 637 (ALT 250 FOR IP): Performed by: EMERGENCY MEDICINE

## 2019-05-27 PROCEDURE — 6360000002 HC RX W HCPCS: Performed by: EMERGENCY MEDICINE

## 2019-05-27 PROCEDURE — 94664 DEMO&/EVAL PT USE INHALER: CPT

## 2019-05-27 RX ORDER — CEPHALEXIN 500 MG/1
500 CAPSULE ORAL ONCE
Status: COMPLETED | OUTPATIENT
Start: 2019-05-27 | End: 2019-05-27

## 2019-05-27 RX ORDER — ALBUTEROL SULFATE 2.5 MG/3ML
2.5 SOLUTION RESPIRATORY (INHALATION) ONCE
Status: COMPLETED | OUTPATIENT
Start: 2019-05-27 | End: 2019-05-27

## 2019-05-27 RX ORDER — CEPHALEXIN 500 MG/1
500 CAPSULE ORAL 2 TIMES DAILY
Qty: 28 CAPSULE | Refills: 0 | Status: SHIPPED | OUTPATIENT
Start: 2019-05-27 | End: 2019-06-10

## 2019-05-27 RX ORDER — BENZONATATE 100 MG/1
200 CAPSULE ORAL 3 TIMES DAILY PRN
Qty: 21 CAPSULE | Refills: 0 | Status: SHIPPED | OUTPATIENT
Start: 2019-05-27 | End: 2019-06-03

## 2019-05-27 RX ADMIN — CEPHALEXIN 500 MG: 500 CAPSULE ORAL at 00:55

## 2019-05-27 RX ADMIN — ALBUTEROL SULFATE 2.5 MG: 2.5 SOLUTION RESPIRATORY (INHALATION) at 00:40

## 2019-05-27 NOTE — ED NOTES
Bed:  HA  Expected date:   Expected time:   Means of arrival:   Comments:  Triage       Jordy Garcia RN  05/26/19 8138

## 2019-05-27 NOTE — ED PROVIDER NOTES
HPI:   Warden Elkins is a 62 y.o. female presenting to the ED for right flank pain, beginning 1 month ago. The complaint has been persistent, moderate in severity, and worsened by nothing. Patient reports that she was diagnosed with a UTI on 2019. She was discharged from the Ed and was placed on Macrobid. She reports that her flank pain has not subsided and that her urine still smells foul. Patient denies any history on kidney stones. Patient denies dysuria, hematuria, nausea, emesis, fever, chills, diarrhea, CP, SOB, dizziness, trauma, abdominal pain, or any other pertinent complaints. ROS:   Pertinent positives and negatives are stated within HPI, all other systems reviewed and are negative.    --------------------------------------------- PAST HISTORY ---------------------------------------------  Past Medical History:  has no past medical history on file. Past Surgical History:  has a past surgical history that includes  section. Social History:  reports that she has been smoking cigarettes. She has been smoking about 0.50 packs per day. She has never used smokeless tobacco. She reports that she does not drink alcohol or use drugs. Family History: family history is not on file. The patients home medications have been reviewed. Allergies: Patient has no known allergies.     -------------------------------------------------- RESULTS -------------------------------------------------  All laboratory and radiology results have been personally reviewed by myself   LABS:  Results for orders placed or performed during the hospital encounter of 19   CBC Auto Differential   Result Value Ref Range    WBC 6.7 4.5 - 11.5 E9/L    RBC 5.16 3.50 - 5.50 E12/L    Hemoglobin 8.8 (L) 11.5 - 15.5 g/dL    Hematocrit 32.7 (L) 34.0 - 48.0 %    MCV 63.4 (L) 80.0 - 99.9 fL    MCH 17.1 (L) 26.0 - 35.0 pg    MCHC 26.9 (L) 32.0 - 34.5 %    RDW 21.3 (H) 11.5 - 15.0 fL    Platelets 223 775 - 376 E9/L MPV 10.2 7.0 - 12.0 fL    Neutrophils % 47.0 43.0 - 80.0 %    Lymphocytes % 34.8 20.0 - 42.0 %    Monocytes % 5.2 2.0 - 12.0 %    Eosinophils % 10.4 (H) 0.0 - 6.0 %    Basophils % 2.6 (H) 0.0 - 2.0 %    Neutrophils # 3.15 1.80 - 7.30 E9/L    Lymphocytes # 2.35 1.50 - 4.00 E9/L    Monocytes # 0.34 0.10 - 0.95 E9/L    Eosinophils # 0.70 (H) 0.05 - 0.50 E9/L    Basophils # 0.17 0.00 - 0.20 E9/L    Anisocytosis 2+     Polychromasia 1+     Hypochromia 1+     Poikilocytes 1+     Schistocytes 1+     Ovalocytes 1+     Target Cells 1+     Tear Drop Cells 1+    Comprehensive Metabolic Panel   Result Value Ref Range    Sodium 141 132 - 146 mmol/L    Potassium 3.9 3.5 - 5.0 mmol/L    Chloride 106 98 - 107 mmol/L    CO2 26 22 - 29 mmol/L    Anion Gap 9 7 - 16 mmol/L    Glucose 156 (H) 74 - 99 mg/dL    BUN 11 6 - 20 mg/dL    CREATININE 0.7 0.5 - 1.0 mg/dL    GFR Non-African American >60 >=60 mL/min/1.73    GFR African American >60     Calcium 8.8 8.6 - 10.2 mg/dL    Total Protein 7.5 6.4 - 8.3 g/dL    Alb 3.9 3.5 - 5.2 g/dL    Total Bilirubin 0.2 0.0 - 1.2 mg/dL    Alkaline Phosphatase 73 35 - 104 U/L    ALT <5 0 - 32 U/L    AST 16 0 - 31 U/L   Lactic Acid, Plasma   Result Value Ref Range    Lactic Acid 1.5 0.5 - 2.2 mmol/L   Lipase   Result Value Ref Range    Lipase 25 13 - 60 U/L   Urinalysis   Result Value Ref Range    Color, UA Yellow Straw/Yellow    Clarity, UA SL CLOUDY Clear    Glucose, Ur Negative Negative mg/dL    Bilirubin Urine Negative Negative    Ketones, Urine Negative Negative mg/dL    Specific Gravity, UA 1.025 1.005 - 1.030    Blood, Urine TRACE-INTACT Negative    pH, UA 6.0 5.0 - 9.0    Protein, UA TRACE Negative mg/dL    Urobilinogen, Urine 0.2 <2.0 E.U./dL    Nitrite, Urine POSITIVE (A) Negative    Leukocyte Esterase, Urine MODERATE (A) Negative   Microscopic Urinalysis   Result Value Ref Range    WBC, UA >20 0 - 5 /HPF    RBC, UA 1-3 0 - 2 /HPF    Epi Cells FEW /HPF    Bacteria, UA MANY (A) /HPF   POC Pregnancy Urine   Result Value Ref Range    HCG, Urine, POC Negative Negative    Lot Number [id number]     Positive QC Pass/Fail Pass     Negative QC Pass/Fail Pass        RADIOLOGY:  Interpreted by Radiologist.  CT ABDOMEN PELVIS WO CONTRAST Additional Contrast? None   Final Result     Questionable 3 mm stone dependently in the urinary bladder. This report has been electronically signed by Naty Eaton MD.          ------------------------- NURSING NOTES AND VITALS REVIEWED ---------------------------   The nursing notes within the ED encounter and vital signs as below have been reviewed. BP (!) 164/81   Pulse 95   Temp 98.8 °F (37.1 °C) (Oral)   Resp 16   LMP 05/01/2019   SpO2 97%   Oxygen Saturation Interpretation: Normal    ---------------------------------------------------PHYSICAL EXAM--------------------------------------    Constitutional/General: Alert and oriented x3, well appearing, non toxic in NAD  Head: NC/AT  Eyes: PERRL, EOMI  Mouth: Oropharynx clear, handling secretions, no trismus  Neck: Supple, full ROM  Pulmonary: Lungs clear to auscultation bilaterally, no wheezes, rales, or rhonchi. Not in respiratory distress  Cardiovascular:  Regular rate and rhythm, no murmurs, gallops, or rubs. 2+ distal pulses  Abdomen: Soft, non tender, non distended   Extremities: Moves all extremities x 4. Warm and well perfused  Skin: warm and dry without rash  : right CVA tenderness  Neurologic: GCS 15,  Psych: Normal Affect      ------------------------------ ED COURSE/MEDICAL DECISION MAKING----------------------  Medications   0.9 % sodium chloride bolus (1,000 mLs Intravenous Not Given 5/27/19 0056)   albuterol (PROVENTIL) nebulizer solution 2.5 mg (2.5 mg Nebulization Given 5/27/19 0040)   cephALEXin (KEFLEX) capsule 500 mg (500 mg Oral Given 5/27/19 0055)       Medical Decision Making:      Patient presents to the ED for right CVA pain. Patient examined.  Patient was diagnosed with a UTI last month

## 2019-10-13 ENCOUNTER — HOSPITAL ENCOUNTER (EMERGENCY)
Age: 59
Discharge: HOME | End: 2019-10-13
Payer: MEDICAID

## 2019-10-13 VITALS
OXYGEN SATURATION: 96 % | WEIGHT: 190 LBS | TEMPERATURE: 98.1 F | DIASTOLIC BLOOD PRESSURE: 98 MMHG | BODY MASS INDEX: 32.44 KG/M2 | HEART RATE: 88 BPM | SYSTOLIC BLOOD PRESSURE: 180 MMHG | HEIGHT: 64 IN | RESPIRATION RATE: 16 BRPM

## 2019-10-13 DIAGNOSIS — J40 BRONCHITIS: Primary | ICD-10-CM

## 2019-10-13 DIAGNOSIS — N30.00 ACUTE CYSTITIS WITHOUT HEMATURIA: ICD-10-CM

## 2019-10-13 LAB
BACTERIA: ABNORMAL /HPF
BILIRUBIN URINE: NEGATIVE
BLOOD, URINE: NEGATIVE
CLARITY: NORMAL
COLOR: YELLOW
GLUCOSE URINE: NEGATIVE MG/DL
KETONES, URINE: NEGATIVE MG/DL
LEUKOCYTE ESTERASE, URINE: NEGATIVE
NITRITE, URINE: NEGATIVE
PH UA: 8 (ref 5–9)
PROTEIN UA: NORMAL MG/DL
RBC UA: ABNORMAL /HPF (ref 0–2)
SPECIFIC GRAVITY UA: 1.01 (ref 1–1.03)
UROBILINOGEN, URINE: 1 E.U./DL
WBC UA: ABNORMAL /HPF (ref 0–5)

## 2019-10-13 PROCEDURE — 81001 URINALYSIS AUTO W/SCOPE: CPT

## 2019-10-13 PROCEDURE — 94664 DEMO&/EVAL PT USE INHALER: CPT

## 2019-10-13 PROCEDURE — 6370000000 HC RX 637 (ALT 250 FOR IP): Performed by: PHYSICIAN ASSISTANT

## 2019-10-13 PROCEDURE — 87088 URINE BACTERIA CULTURE: CPT

## 2019-10-13 PROCEDURE — 99283 EMERGENCY DEPT VISIT LOW MDM: CPT

## 2019-10-13 RX ORDER — NITROFURANTOIN 25; 75 MG/1; MG/1
100 CAPSULE ORAL ONCE
Status: DISCONTINUED | OUTPATIENT
Start: 2019-10-13 | End: 2019-10-13

## 2019-10-13 RX ORDER — CEPHALEXIN 500 MG/1
500 CAPSULE ORAL ONCE
Status: COMPLETED | OUTPATIENT
Start: 2019-10-13 | End: 2019-10-13

## 2019-10-13 RX ORDER — NITROFURANTOIN 25; 75 MG/1; MG/1
100 CAPSULE ORAL EVERY 12 HOURS SCHEDULED
Status: DISCONTINUED | OUTPATIENT
Start: 2019-10-13 | End: 2019-10-13

## 2019-10-13 RX ORDER — IPRATROPIUM BROMIDE AND ALBUTEROL SULFATE 2.5; .5 MG/3ML; MG/3ML
1 SOLUTION RESPIRATORY (INHALATION) ONCE
Status: COMPLETED | OUTPATIENT
Start: 2019-10-13 | End: 2019-10-13

## 2019-10-13 RX ORDER — CEPHALEXIN 500 MG/1
500 CAPSULE ORAL 2 TIMES DAILY
Qty: 14 CAPSULE | Refills: 0 | Status: SHIPPED | OUTPATIENT
Start: 2019-10-13 | End: 2019-10-20

## 2019-10-13 RX ORDER — NITROFURANTOIN 25; 75 MG/1; MG/1
100 CAPSULE ORAL 2 TIMES DAILY
Qty: 10 CAPSULE | Refills: 0 | Status: SHIPPED | OUTPATIENT
Start: 2019-10-13 | End: 2019-10-13

## 2019-10-13 RX ORDER — NITROFURANTOIN 25; 75 MG/1; MG/1
100 CAPSULE ORAL 2 TIMES DAILY
Qty: 10 CAPSULE | Refills: 0 | Status: SHIPPED | OUTPATIENT
Start: 2019-10-13 | End: 2019-10-13 | Stop reason: SDUPTHER

## 2019-10-13 RX ADMIN — CEPHALEXIN 500 MG: 500 CAPSULE ORAL at 21:03

## 2019-10-13 RX ADMIN — IPRATROPIUM BROMIDE AND ALBUTEROL SULFATE 1 AMPULE: .5; 3 SOLUTION RESPIRATORY (INHALATION) at 19:56

## 2019-10-15 LAB — URINE CULTURE, ROUTINE: NORMAL

## 2019-10-27 ENCOUNTER — APPOINTMENT (OUTPATIENT)
Dept: GENERAL RADIOLOGY | Age: 59
End: 2019-10-27
Payer: MEDICAID

## 2019-10-27 ENCOUNTER — APPOINTMENT (OUTPATIENT)
Dept: CT IMAGING | Age: 59
End: 2019-10-27
Payer: MEDICAID

## 2019-10-27 LAB
ALBUMIN SERPL-MCNC: 4 G/DL (ref 3.5–5.2)
ALP BLD-CCNC: 72 U/L (ref 35–104)
ALT SERPL-CCNC: 14 U/L (ref 0–32)
ANION GAP SERPL CALCULATED.3IONS-SCNC: 7 MMOL/L (ref 7–16)
ANISOCYTOSIS: ABNORMAL
AST SERPL-CCNC: 17 U/L (ref 0–31)
BASOPHILS ABSOLUTE: 0.06 E9/L (ref 0–0.2)
BASOPHILS RELATIVE PERCENT: 0.9 % (ref 0–2)
BILIRUB SERPL-MCNC: 0.2 MG/DL (ref 0–1.2)
BUN BLDV-MCNC: 10 MG/DL (ref 6–20)
CALCIUM SERPL-MCNC: 9.4 MG/DL (ref 8.6–10.2)
CHLORIDE BLD-SCNC: 103 MMOL/L (ref 98–107)
CO2: 30 MMOL/L (ref 22–29)
CREAT SERPL-MCNC: 0.8 MG/DL (ref 0.5–1)
EOSINOPHILS ABSOLUTE: 0.68 E9/L (ref 0.05–0.5)
EOSINOPHILS RELATIVE PERCENT: 10 % (ref 0–6)
GFR AFRICAN AMERICAN: >60
GFR NON-AFRICAN AMERICAN: >60 ML/MIN/1.73
GLUCOSE BLD-MCNC: 110 MG/DL (ref 74–99)
HCT VFR BLD CALC: 34.7 % (ref 34–48)
HEMOGLOBIN: 9 G/DL (ref 11.5–15.5)
HYPOCHROMIA: ABNORMAL
IMMATURE GRANULOCYTES #: 0.02 E9/L
IMMATURE GRANULOCYTES %: 0.3 % (ref 0–5)
LYMPHOCYTES ABSOLUTE: 2.39 E9/L (ref 1.5–4)
LYMPHOCYTES RELATIVE PERCENT: 35.2 % (ref 20–42)
MCH RBC QN AUTO: 16.7 PG (ref 26–35)
MCHC RBC AUTO-ENTMCNC: 25.9 % (ref 32–34.5)
MCV RBC AUTO: 64.5 FL (ref 80–99.9)
MONOCYTES ABSOLUTE: 0.59 E9/L (ref 0.1–0.95)
MONOCYTES RELATIVE PERCENT: 8.7 % (ref 2–12)
NEUTROPHILS ABSOLUTE: 3.05 E9/L (ref 1.8–7.3)
NEUTROPHILS RELATIVE PERCENT: 44.9 % (ref 43–80)
OVALOCYTES: ABNORMAL
PDW BLD-RTO: 22 FL (ref 11.5–15)
PLATELET # BLD: 299 E9/L (ref 130–450)
PMV BLD AUTO: 9.9 FL (ref 7–12)
POIKILOCYTES: ABNORMAL
POLYCHROMASIA: ABNORMAL
POTASSIUM SERPL-SCNC: 3.7 MMOL/L (ref 3.5–5)
RBC # BLD: 5.38 E12/L (ref 3.5–5.5)
SODIUM BLD-SCNC: 140 MMOL/L (ref 132–146)
TOTAL PROTEIN: 7.7 G/DL (ref 6.4–8.3)
TROPONIN: <0.01 NG/ML (ref 0–0.03)
WBC # BLD: 6.8 E9/L (ref 4.5–11.5)

## 2019-10-27 PROCEDURE — 71046 X-RAY EXAM CHEST 2 VIEWS: CPT

## 2019-10-27 PROCEDURE — 84484 ASSAY OF TROPONIN QUANT: CPT

## 2019-10-27 PROCEDURE — 85025 COMPLETE CBC W/AUTO DIFF WBC: CPT

## 2019-10-27 PROCEDURE — 80053 COMPREHEN METABOLIC PANEL: CPT

## 2019-10-27 PROCEDURE — 36415 COLL VENOUS BLD VENIPUNCTURE: CPT

## 2019-10-27 PROCEDURE — 70450 CT HEAD/BRAIN W/O DYE: CPT

## 2019-10-27 PROCEDURE — 93005 ELECTROCARDIOGRAM TRACING: CPT | Performed by: NURSE PRACTITIONER

## 2019-10-27 ASSESSMENT — PAIN DESCRIPTION - LOCATION: LOCATION: CHEST

## 2019-10-27 ASSESSMENT — PAIN SCALES - GENERAL: PAINLEVEL_OUTOF10: 10

## 2019-10-28 ENCOUNTER — HOSPITAL ENCOUNTER (EMERGENCY)
Age: 59
Discharge: HOME | End: 2019-10-28
Attending: EMERGENCY MEDICINE
Payer: MEDICAID

## 2019-10-28 VITALS
WEIGHT: 180 LBS | BODY MASS INDEX: 30.73 KG/M2 | HEART RATE: 80 BPM | TEMPERATURE: 98 F | SYSTOLIC BLOOD PRESSURE: 162 MMHG | HEIGHT: 64 IN | OXYGEN SATURATION: 98 % | DIASTOLIC BLOOD PRESSURE: 89 MMHG | RESPIRATION RATE: 18 BRPM

## 2019-10-28 DIAGNOSIS — R51.9 NONINTRACTABLE HEADACHE, UNSPECIFIED CHRONICITY PATTERN, UNSPECIFIED HEADACHE TYPE: ICD-10-CM

## 2019-10-28 DIAGNOSIS — I16.0 HYPERTENSIVE URGENCY: Primary | ICD-10-CM

## 2019-10-28 DIAGNOSIS — R07.9 CHEST PAIN, UNSPECIFIED TYPE: ICD-10-CM

## 2019-10-28 LAB
EKG ATRIAL RATE: 91 BPM
EKG P AXIS: 70 DEGREES
EKG P-R INTERVAL: 144 MS
EKG Q-T INTERVAL: 358 MS
EKG QRS DURATION: 76 MS
EKG QTC CALCULATION (BAZETT): 440 MS
EKG R AXIS: -12 DEGREES
EKG T AXIS: 52 DEGREES
EKG VENTRICULAR RATE: 91 BPM

## 2019-10-28 PROCEDURE — 96375 TX/PRO/DX INJ NEW DRUG ADDON: CPT

## 2019-10-28 PROCEDURE — 94664 DEMO&/EVAL PT USE INHALER: CPT

## 2019-10-28 PROCEDURE — 2580000003 HC RX 258: Performed by: STUDENT IN AN ORGANIZED HEALTH CARE EDUCATION/TRAINING PROGRAM

## 2019-10-28 PROCEDURE — 99285 EMERGENCY DEPT VISIT HI MDM: CPT

## 2019-10-28 PROCEDURE — 6370000000 HC RX 637 (ALT 250 FOR IP): Performed by: STUDENT IN AN ORGANIZED HEALTH CARE EDUCATION/TRAINING PROGRAM

## 2019-10-28 PROCEDURE — 93010 ELECTROCARDIOGRAM REPORT: CPT | Performed by: INTERNAL MEDICINE

## 2019-10-28 PROCEDURE — 94640 AIRWAY INHALATION TREATMENT: CPT

## 2019-10-28 PROCEDURE — 6360000002 HC RX W HCPCS: Performed by: STUDENT IN AN ORGANIZED HEALTH CARE EDUCATION/TRAINING PROGRAM

## 2019-10-28 PROCEDURE — 96374 THER/PROPH/DIAG INJ IV PUSH: CPT

## 2019-10-28 RX ORDER — METOCLOPRAMIDE HYDROCHLORIDE 5 MG/ML
10 INJECTION INTRAMUSCULAR; INTRAVENOUS ONCE
Status: COMPLETED | OUTPATIENT
Start: 2019-10-28 | End: 2019-10-28

## 2019-10-28 RX ORDER — IPRATROPIUM BROMIDE AND ALBUTEROL SULFATE 2.5; .5 MG/3ML; MG/3ML
1 SOLUTION RESPIRATORY (INHALATION)
Status: COMPLETED | OUTPATIENT
Start: 2019-10-28 | End: 2019-10-28

## 2019-10-28 RX ORDER — 0.9 % SODIUM CHLORIDE 0.9 %
500 INTRAVENOUS SOLUTION INTRAVENOUS ONCE
Status: COMPLETED | OUTPATIENT
Start: 2019-10-28 | End: 2019-10-28

## 2019-10-28 RX ORDER — DIPHENHYDRAMINE HYDROCHLORIDE 50 MG/ML
50 INJECTION INTRAMUSCULAR; INTRAVENOUS ONCE
Status: COMPLETED | OUTPATIENT
Start: 2019-10-28 | End: 2019-10-28

## 2019-10-28 RX ORDER — NITROGLYCERIN 0.4 MG/1
0.4 TABLET SUBLINGUAL EVERY 5 MIN PRN
Status: DISCONTINUED | OUTPATIENT
Start: 2019-10-28 | End: 2019-10-28 | Stop reason: HOSPADM

## 2019-10-28 RX ORDER — KETOROLAC TROMETHAMINE 30 MG/ML
15 INJECTION, SOLUTION INTRAMUSCULAR; INTRAVENOUS ONCE
Status: COMPLETED | OUTPATIENT
Start: 2019-10-28 | End: 2019-10-28

## 2019-10-28 RX ADMIN — KETOROLAC TROMETHAMINE 15 MG: 30 INJECTION, SOLUTION INTRAMUSCULAR; INTRAVENOUS at 00:58

## 2019-10-28 RX ADMIN — IPRATROPIUM BROMIDE AND ALBUTEROL SULFATE 1 AMPULE: .5; 3 SOLUTION RESPIRATORY (INHALATION) at 01:45

## 2019-10-28 RX ADMIN — DIPHENHYDRAMINE HYDROCHLORIDE 50 MG: 50 INJECTION, SOLUTION INTRAMUSCULAR; INTRAVENOUS at 00:58

## 2019-10-28 RX ADMIN — IPRATROPIUM BROMIDE AND ALBUTEROL SULFATE 1 AMPULE: .5; 3 SOLUTION RESPIRATORY (INHALATION) at 01:43

## 2019-10-28 RX ADMIN — SODIUM CHLORIDE 500 ML: 9 INJECTION, SOLUTION INTRAVENOUS at 00:56

## 2019-10-28 RX ADMIN — METOCLOPRAMIDE 10 MG: 5 INJECTION, SOLUTION INTRAMUSCULAR; INTRAVENOUS at 00:58

## 2019-10-28 RX ADMIN — IPRATROPIUM BROMIDE AND ALBUTEROL SULFATE 1 AMPULE: .5; 3 SOLUTION RESPIRATORY (INHALATION) at 01:42

## 2019-10-28 ASSESSMENT — ENCOUNTER SYMPTOMS
DIARRHEA: 0
CONSTIPATION: 0
NAUSEA: 0
COUGH: 0
WHEEZING: 0
COLOR CHANGE: 0
ABDOMINAL PAIN: 0
VOMITING: 0
SHORTNESS OF BREATH: 1

## 2019-10-28 ASSESSMENT — PAIN SCALES - GENERAL: PAINLEVEL_OUTOF10: 10

## 2020-02-06 ENCOUNTER — HOSPITAL ENCOUNTER (EMERGENCY)
Age: 60
Discharge: HOME | End: 2020-02-06
Attending: EMERGENCY MEDICINE
Payer: MEDICAID

## 2020-02-06 VITALS
TEMPERATURE: 98.1 F | RESPIRATION RATE: 18 BRPM | DIASTOLIC BLOOD PRESSURE: 94 MMHG | HEIGHT: 64 IN | OXYGEN SATURATION: 97 % | HEART RATE: 94 BPM | SYSTOLIC BLOOD PRESSURE: 164 MMHG | BODY MASS INDEX: 30.73 KG/M2 | WEIGHT: 180 LBS

## 2020-02-06 LAB
BACTERIA: ABNORMAL /HPF
BILIRUBIN URINE: NEGATIVE
BLOOD, URINE: ABNORMAL
CLARITY: CLEAR
COLOR: YELLOW
EPITHELIAL CELLS, UA: ABNORMAL /HPF
GLUCOSE URINE: NEGATIVE MG/DL
KETONES, URINE: NEGATIVE MG/DL
LEUKOCYTE ESTERASE, URINE: ABNORMAL
NITRITE, URINE: POSITIVE
PH UA: 5.5 (ref 5–9)
PROTEIN UA: ABNORMAL MG/DL
RBC UA: ABNORMAL /HPF (ref 0–2)
SPECIFIC GRAVITY UA: >=1.03 (ref 1–1.03)
TRICHOMONAS: ABNORMAL /HPF
UROBILINOGEN, URINE: 0.2 E.U./DL
WBC UA: ABNORMAL /HPF (ref 0–5)

## 2020-02-06 PROCEDURE — 87186 SC STD MICRODIL/AGAR DIL: CPT

## 2020-02-06 PROCEDURE — 99283 EMERGENCY DEPT VISIT LOW MDM: CPT

## 2020-02-06 PROCEDURE — 87077 CULTURE AEROBIC IDENTIFY: CPT

## 2020-02-06 PROCEDURE — 81001 URINALYSIS AUTO W/SCOPE: CPT

## 2020-02-06 PROCEDURE — 87088 URINE BACTERIA CULTURE: CPT

## 2020-02-06 RX ORDER — CEPHALEXIN 500 MG/1
500 CAPSULE ORAL 3 TIMES DAILY
Qty: 21 CAPSULE | Refills: 0 | Status: SHIPPED | OUTPATIENT
Start: 2020-02-06 | End: 2020-02-13

## 2020-02-06 RX ORDER — PHENAZOPYRIDINE HYDROCHLORIDE 100 MG/1
100 TABLET, FILM COATED ORAL 3 TIMES DAILY PRN
Qty: 9 TABLET | Refills: 0 | Status: SHIPPED | OUTPATIENT
Start: 2020-02-06 | End: 2020-02-09

## 2020-02-06 ASSESSMENT — PAIN DESCRIPTION - PAIN TYPE: TYPE: ACUTE PAIN

## 2020-02-06 ASSESSMENT — PAIN SCALES - GENERAL: PAINLEVEL_OUTOF10: 3

## 2020-02-06 ASSESSMENT — PAIN DESCRIPTION - DESCRIPTORS: DESCRIPTORS: PRESSURE

## 2020-02-06 NOTE — ED PROVIDER NOTES
HPI:  20,   Time: 7:44 AM       Megan Morin is a 61 y.o. female presenting to the ED for dyuria/pressure, beginning 1 day ago. The complaint has been intermittent, mild in severity, and worsened by urination. Hx uti, one recent, states didn't take abx exactly as directed, now with mild dysuria/pressure. No inc freq, no back pain, no n/v/d. No fever/chills/sweat. No relieving sx    Review of Systems:   Pertinent positives and negatives are stated within HPI, all other systems reviewed and are negative.          --------------------------------------------- PAST HISTORY ---------------------------------------------  Past Medical History:  has no past medical history on file. Past Surgical History:  has a past surgical history that includes  section. Social History:  reports that she has been smoking cigarettes. She has been smoking about 0.50 packs per day. She has never used smokeless tobacco. She reports that she does not drink alcohol or use drugs. Family History: family history is not on file. The patients home medications have been reviewed. Allergies: Patient has no known allergies. ---------------------------------------------------PHYSICAL EXAM--------------------------------------    Constitutional/General: Alert and oriented x3, well appearing, non toxic in NAD  Head: Normocephalic and atraumatic  Eyes: PERRL, EOMI, conjunctive normal, sclera non icteric  Mouth: Oropharynx clear, handling secretions, no trismus, no asymmetry of the posterior oropharynx or uvular edema  Neck: Supple, full ROM, non tender to palpation in the midline, no stridor, no crepitus, no meningeal signs  Respiratory: Lungs clear to auscultation bilaterally, no wheezes, rales, or rhonchi. Not in respiratory distress  Cardiovascular:  Regular rate. Regular rhythm. No murmurs, gallops, or rubs. 2+ distal pulses  Chest: No chest wall tenderness  GI:  Abdomen Soft, Non tender, Non distended. +BS.  No organomegaly, no palpable masses,  No rebound, guarding, or rigidity. Musculoskeletal: Moves all extremities x 4. Warm and well perfused, no clubbing, cyanosis, or edema. Capillary refill <3 seconds  Integument: skin warm and dry. No rashes. Lymphatic: no lymphadenopathy noted  Neurologic: GCS 15, no focal deficits, symmetric strength 5/5 in the upper and lower extremities bilaterally  Psychiatric: Normal Affect    -------------------------------------------------- RESULTS -------------------------------------------------  I have personally reviewed all laboratory and imaging results for this patient. Results are listed below. LABS:  Results for orders placed or performed during the hospital encounter of 02/06/20   Urinalysis   Result Value Ref Range    Color, UA Yellow Straw/Yellow    Clarity, UA Clear Clear    Glucose, Ur Negative Negative mg/dL    Bilirubin Urine Negative Negative    Ketones, Urine Negative Negative mg/dL    Specific Gravity, UA >=1.030 1.005 - 1.030    Blood, Urine MODERATE (A) Negative    pH, UA 5.5 5.0 - 9.0    Protein, UA TRACE Negative mg/dL    Urobilinogen, Urine 0.2 <2.0 E.U./dL    Nitrite, Urine POSITIVE (A) Negative    Leukocyte Esterase, Urine TRACE (A) Negative   Microscopic Urinalysis   Result Value Ref Range    WBC, UA 10-20 (A) 0 - 5 /HPF    RBC, UA 5-10 (A) 0 - 2 /HPF    Epi Cells MODERATE /HPF    Bacteria, UA MANY (A) /HPF    Trichomonas, UA RARE /HPF       RADIOLOGY:  Interpreted by Radiologist.  No orders to display             ------------------------- NURSING NOTES AND VITALS REVIEWED ---------------------------   The nursing notes within the ED encounter and vital signs as below have been reviewed by myself.   BP (!) 164/94   Pulse 94   Temp 98.1 °F (36.7 °C)   Resp 18   Ht 5' 4\" (1.626 m)   Wt 180 lb (81.6 kg)   SpO2 97%   BMI 30.90 kg/m²   Oxygen Saturation Interpretation: Normal    The patients available past medical records and past encounters were reviewed. ------------------------------ ED COURSE/MEDICAL DECISION MAKING----------------------  Medications - No data to display      ED COURSE:       Medical Decision Making:    uti on exam, pt non toxic, bp noted, pcp referals given, pt told to fu for such      This patient's ED course included: a personal history and physicial examination    This patient has remained hemodynamically stable during their ED course. Re-Evaluations:             Re-evaluation. Patients symptoms are improving    Re-examination  2/6/20   7:44 AM          Vital Signs:   Vitals:    02/06/20 0711 02/06/20 0729   BP:  (!) 164/94   Pulse: 94    Resp:  18   Temp:  98.1 °F (36.7 °C)   SpO2: 97%    Weight:  180 lb (81.6 kg)   Height:  5' 4\" (1.626 m)     Card/Pulm:  Rhythm: normal rate. Heart Sounds: no murmurs, gallops, or rubs. clear to auscultation, no wheezes or rales and unlabored breathing. Capillary Refill: normal.  Radial Pulse:  equal.  Skin:  Warm. Consultations:                 Critical Care:         Counseling: The emergency provider has spoken with the patient and discussed todays results, in addition to providing specific details for the plan of care and counseling regarding the diagnosis and prognosis. Questions are answered at this time and they are agreeable with the plan.       --------------------------------- IMPRESSION AND DISPOSITION ---------------------------------    IMPRESSION  1. Acute cystitis with hematuria        DISPOSITION  Disposition: Discharge to home  Patient condition is stable    NOTE: This report was transcribed using voice recognition software.  Every effort was made to ensure accuracy; however, inadvertent computerized transcription errors may be present        Papa Cash MD  02/06/20 4729

## 2020-02-08 LAB
ORGANISM: ABNORMAL
URINE CULTURE, ROUTINE: ABNORMAL

## 2021-04-14 ENCOUNTER — HOSPITAL ENCOUNTER (EMERGENCY)
Dept: HOSPITAL 87 - ER | Age: 61
Discharge: HOME | End: 2021-04-14
Payer: COMMERCIAL

## 2021-04-14 VITALS — SYSTOLIC BLOOD PRESSURE: 153 MMHG | DIASTOLIC BLOOD PRESSURE: 82 MMHG

## 2021-04-14 VITALS — BODY MASS INDEX: 36.73 KG/M2 | HEIGHT: 65 IN | WEIGHT: 220.46 LBS

## 2021-04-14 DIAGNOSIS — A59.9: ICD-10-CM

## 2021-04-14 DIAGNOSIS — N30.00: Primary | ICD-10-CM

## 2021-04-14 LAB
APPEARANCE UR: (no result)
COLOR UR: YELLOW
HGB UR QL STRIP: NEGATIVE
KETONES UR STRIP-MCNC: NEGATIVE MG/DL
LEUKOCYTE ESTERASE UR QL STRIP: (no result)
NITRITE UR QL STRIP: NEGATIVE
PH UR STRIP: 5.5 [PH] (ref 4.5–8)
PROT UR QL STRIP: (no result)
SP GR UR STRIP: 1.02 (ref 1–1.03)
UROBILINOGEN UR STRIP-MCNC: 0.2 E.U./DL (ref 0.2–1)

## 2021-04-14 PROCEDURE — 99283 EMERGENCY DEPT VISIT LOW MDM: CPT

## 2021-04-14 PROCEDURE — 87077 CULTURE AEROBIC IDENTIFY: CPT

## 2021-04-14 PROCEDURE — 87186 SC STD MICRODIL/AGAR DIL: CPT

## 2021-04-14 PROCEDURE — 81003 URINALYSIS AUTO W/O SCOPE: CPT

## 2021-07-05 ENCOUNTER — HOSPITAL ENCOUNTER (EMERGENCY)
Age: 61
Discharge: HOME | End: 2021-07-05
Payer: MEDICAID

## 2021-07-05 ENCOUNTER — APPOINTMENT (OUTPATIENT)
Dept: GENERAL RADIOLOGY | Age: 61
End: 2021-07-05
Payer: MEDICAID

## 2021-07-05 VITALS
HEART RATE: 92 BPM | OXYGEN SATURATION: 92 % | SYSTOLIC BLOOD PRESSURE: 169 MMHG | TEMPERATURE: 98 F | RESPIRATION RATE: 16 BRPM | DIASTOLIC BLOOD PRESSURE: 103 MMHG

## 2021-07-05 DIAGNOSIS — J40 BRONCHITIS: Primary | ICD-10-CM

## 2021-07-05 LAB
ALBUMIN SERPL-MCNC: 3.9 G/DL (ref 3.5–5.2)
ALP BLD-CCNC: 73 U/L (ref 35–104)
ALT SERPL-CCNC: 12 U/L (ref 0–32)
ANION GAP SERPL CALCULATED.3IONS-SCNC: 4 MMOL/L (ref 7–16)
ANISOCYTOSIS: ABNORMAL
AST SERPL-CCNC: 16 U/L (ref 0–31)
BACTERIA: ABNORMAL /HPF
BASOPHILS ABSOLUTE: 0.1 E9/L (ref 0–0.2)
BASOPHILS RELATIVE PERCENT: 1.7 % (ref 0–2)
BILIRUB SERPL-MCNC: 0.3 MG/DL (ref 0–1.2)
BILIRUBIN URINE: NEGATIVE
BLOOD, URINE: NEGATIVE
BUN BLDV-MCNC: 13 MG/DL (ref 6–23)
CALCIUM SERPL-MCNC: 8.9 MG/DL (ref 8.6–10.2)
CHLORIDE BLD-SCNC: 107 MMOL/L (ref 98–107)
CLARITY: CLEAR
CO2: 29 MMOL/L (ref 22–29)
COLOR: YELLOW
CREAT SERPL-MCNC: 0.8 MG/DL (ref 0.5–1)
EOSINOPHILS ABSOLUTE: 0.64 E9/L (ref 0.05–0.5)
EOSINOPHILS RELATIVE PERCENT: 11.3 % (ref 0–6)
GFR AFRICAN AMERICAN: >60
GFR NON-AFRICAN AMERICAN: >60 ML/MIN/1.73
GLUCOSE BLD-MCNC: 112 MG/DL (ref 74–99)
GLUCOSE URINE: NEGATIVE MG/DL
HCT VFR BLD CALC: 37.3 % (ref 34–48)
HEMOGLOBIN: 9.7 G/DL (ref 11.5–15.5)
HYPOCHROMIA: ABNORMAL
KETONES, URINE: NEGATIVE MG/DL
LEUKOCYTE ESTERASE, URINE: NEGATIVE
LYMPHOCYTES ABSOLUTE: 1.77 E9/L (ref 1.5–4)
LYMPHOCYTES RELATIVE PERCENT: 31.3 % (ref 20–42)
MCH RBC QN AUTO: 16.9 PG (ref 26–35)
MCHC RBC AUTO-ENTMCNC: 26 % (ref 32–34.5)
MCV RBC AUTO: 64.9 FL (ref 80–99.9)
MONOCYTES ABSOLUTE: 0.46 E9/L (ref 0.1–0.95)
MONOCYTES RELATIVE PERCENT: 7.8 % (ref 2–12)
NEUTROPHILS ABSOLUTE: 2.74 E9/L (ref 1.8–7.3)
NEUTROPHILS RELATIVE PERCENT: 47.8 % (ref 43–80)
NITRITE, URINE: NEGATIVE
OVALOCYTES: ABNORMAL
PDW BLD-RTO: 22.5 FL (ref 11.5–15)
PH UA: 7.5 (ref 5–9)
PLATELET # BLD: 349 E9/L (ref 130–450)
PMV BLD AUTO: 10 FL (ref 7–12)
POIKILOCYTES: ABNORMAL
POLYCHROMASIA: ABNORMAL
POTASSIUM SERPL-SCNC: 3.9 MMOL/L (ref 3.5–5)
PROTEIN UA: 30 MG/DL
RBC # BLD: 5.75 E12/L (ref 3.5–5.5)
RBC UA: ABNORMAL /HPF (ref 0–2)
SODIUM BLD-SCNC: 140 MMOL/L (ref 132–146)
SPECIFIC GRAVITY UA: 1.02 (ref 1–1.03)
TOTAL PROTEIN: 7.8 G/DL (ref 6.4–8.3)
UROBILINOGEN, URINE: 0.2 E.U./DL
WBC # BLD: 5.7 E9/L (ref 4.5–11.5)
WBC UA: ABNORMAL /HPF (ref 0–5)

## 2021-07-05 PROCEDURE — 80053 COMPREHEN METABOLIC PANEL: CPT

## 2021-07-05 PROCEDURE — 85025 COMPLETE CBC W/AUTO DIFF WBC: CPT

## 2021-07-05 PROCEDURE — 81001 URINALYSIS AUTO W/SCOPE: CPT

## 2021-07-05 PROCEDURE — 94664 DEMO&/EVAL PT USE INHALER: CPT

## 2021-07-05 PROCEDURE — 6370000000 HC RX 637 (ALT 250 FOR IP): Performed by: NURSE PRACTITIONER

## 2021-07-05 PROCEDURE — 71045 X-RAY EXAM CHEST 1 VIEW: CPT

## 2021-07-05 PROCEDURE — 99283 EMERGENCY DEPT VISIT LOW MDM: CPT

## 2021-07-05 PROCEDURE — 94640 AIRWAY INHALATION TREATMENT: CPT

## 2021-07-05 RX ORDER — METHYLPREDNISOLONE SODIUM SUCCINATE 125 MG/2ML
125 INJECTION, POWDER, LYOPHILIZED, FOR SOLUTION INTRAMUSCULAR; INTRAVENOUS ONCE
Status: DISCONTINUED | OUTPATIENT
Start: 2021-07-05 | End: 2021-07-05

## 2021-07-05 RX ORDER — BROMPHENIRAMINE MALEATE, PSEUDOEPHEDRINE HYDROCHLORIDE, AND DEXTROMETHORPHAN HYDROBROMIDE 2; 30; 10 MG/5ML; MG/5ML; MG/5ML
5 SYRUP ORAL 4 TIMES DAILY PRN
Qty: 118 ML | Refills: 0 | Status: SHIPPED | OUTPATIENT
Start: 2021-07-05 | End: 2021-11-16

## 2021-07-05 RX ORDER — PREDNISONE 20 MG/1
TABLET ORAL
Qty: 18 TABLET | Refills: 0 | Status: SHIPPED | OUTPATIENT
Start: 2021-07-05 | End: 2021-07-15

## 2021-07-05 RX ORDER — IPRATROPIUM BROMIDE AND ALBUTEROL SULFATE 2.5; .5 MG/3ML; MG/3ML
3 SOLUTION RESPIRATORY (INHALATION) ONCE
Status: COMPLETED | OUTPATIENT
Start: 2021-07-05 | End: 2021-07-05

## 2021-07-05 RX ORDER — ALBUTEROL SULFATE 90 UG/1
2 AEROSOL, METERED RESPIRATORY (INHALATION) EVERY 4 HOURS PRN
Qty: 1 INHALER | Refills: 0 | Status: SHIPPED | OUTPATIENT
Start: 2021-07-05 | End: 2021-07-08 | Stop reason: SDUPTHER

## 2021-07-05 RX ORDER — PREDNISONE 20 MG/1
60 TABLET ORAL ONCE
Status: COMPLETED | OUTPATIENT
Start: 2021-07-05 | End: 2021-07-05

## 2021-07-05 RX ADMIN — PREDNISONE 60 MG: 20 TABLET ORAL at 22:23

## 2021-07-05 RX ADMIN — IPRATROPIUM BROMIDE AND ALBUTEROL SULFATE 3 AMPULE: .5; 3 SOLUTION RESPIRATORY (INHALATION) at 21:37

## 2021-07-05 NOTE — ED NOTES
FIRST PROVIDER CONTACT ASSESSMENT NOTE        Department of Emergency Medicine            ED  First Provider Note            7/5/21  7:14 PM EDT    Chief Complaint: Urinary Frequency (PT STATES SHE FEELS LIKE IF SHE DOESNT GO TO THE RESTROOM RIGHT AWAY SHE WONT MAKE IT. ) and Shortness of Breath (PT STATES SHE FEELS LIKE SHE IS OUT OF HER BREATHING TX)      History of Present Illness:    Shannen Corrigan is a 61 y.o. female who presents to the emergency department for wheezing, patient presents emergency department states that he has been wheezing over the last 2 to 3 days. Patient reports that she does not have a primary care physician. She also reports a cough. Patient also reports urinary frequency and pressure. No fevers. Focused Screening Exam:  Constitutional:  Alert, appears stated age and is in no distress. *ALLERGIES*     Patient has no known allergies.      ED Triage Vitals   BP Temp Temp src Pulse Resp SpO2 Height Weight   07/05/21 1913 07/05/21 1913 -- 07/05/21 1848 07/05/21 1848 07/05/21 1848 -- --   (!) 169/103 98 °F (36.7 °C)  92 16 92 %          Initial Plan of Care:  Initiate Treatment-Testing, Proceed toTreatment Area When Bed Available for ED Attending/MLP to Continue Care    -----------------END OF FIRST PROVIDER CONTACT ASSESSMENT NOTE--------------  Electronically signed by MATT Stone CNP   DD: 7/5/21         MATT Stone CNP  07/05/21 1914

## 2021-07-06 NOTE — ED PROVIDER NOTES
Independent    HPI:  21, Time: 9:40 PM EDT         Leelee Henning is a 61 y.o. female presenting to the ED for wheezing, patient presents emergency department states that he has been wheezing over the last 2 to 3 days. Patient reports that she does not have a primary care physician. She also reports a cough. Patient also reports urinary frequency and pressure. No fevers. Patient reports that she does not have any history of asthma or even COPD just reports she has been diagnosed before the bronchitis. She admits she does not have a primary care physician. States that she does not like to use an albuterol inhaler because she does not know how to utilize it and only wants a prescription for albuterol nebulizer. Patient also reports that she has been having urinary incontinence because she cannot hold her urine fast enough to make it to the bathroom and would like meds to be treated for that. Patient otherwise reports normal state of health she denies any actual shortness of breath or chest pain. Denies any back or flank pain. Denies any fevers. Does report having a cough but states that it is harsh. Patient is a smoker. Symptoms mild in severity but persistent. Focused Screening Exam:    Review of Systems:   A complete review of systems was performed and pertinent positives and negatives are stated within HPI, all other systems reviewed and are negative.          --------------------------------------------- PAST HISTORY ---------------------------------------------  Past Medical History:  has no past medical history on file. Past Surgical History:  has a past surgical history that includes  section. Social History:  reports that she has been smoking cigarettes. She has been smoking about 0.50 packs per day. She has never used smokeless tobacco. She reports that she does not drink alcohol and does not use drugs. Family History: family history is not on file.      The patients home Negative mg/dL    Bilirubin Urine Negative Negative    Ketones, Urine Negative Negative mg/dL    Specific Gravity, UA 1.020 1.005 - 1.030    Blood, Urine Negative Negative    pH, UA 7.5 5.0 - 9.0    Protein, UA 30 (A) Negative mg/dL    Urobilinogen, Urine 0.2 <2.0 E.U./dL    Nitrite, Urine Negative Negative    Leukocyte Esterase, Urine Negative Negative   Microscopic Urinalysis   Result Value Ref Range    WBC, UA 0-1 0 - 5 /HPF    RBC, UA NONE 0 - 2 /HPF    Bacteria, UA FEW (A) None Seen /HPF       RADIOLOGY:  Interpreted by Radiologist.  XR CHEST PORTABLE   Final Result   No pneumonia or pleural effusion.             ------------------------- NURSING NOTES AND VITALS REVIEWED ---------------------------   The nursing notes within the ED encounter and vital signs as below have been reviewed. BP (!) 169/103   Pulse 92   Temp 98 °F (36.7 °C)   Resp 16   SpO2 92%   Oxygen Saturation Interpretation: Normal      ---------------------------------------------------PHYSICAL EXAM--------------------------------------      Constitutional/General: Alert and oriented x3, well appearing, non toxic in NAD  Head: Normocephalic and atraumatic  Eyes: PERRL, EOMI  Mouth: Oropharynx clear, handling secretions, no trismus  Neck: Supple, full ROM,   Pulmonary: Lungs with expiratory wheezing noted throughout all fields posteriorly. Provided with DuoNeb treatments, lungs now clear but diminished. Not in respiratory distress  Cardiovascular:  Regular rate and rhythm, no murmurs, gallops, or rubs. 2+ distal pulses  Abdomen: Soft, non tender, non distended,   Extremities: Moves all extremities x 4.  Warm and well perfused  Skin: warm and dry without rash  Neurologic: GCS 15,  Psych: Normal Affect      ------------------------------ ED COURSE/MEDICAL DECISION MAKING----------------------  Medications   ipratropium-albuterol (DUONEB) nebulizer solution 3 ampule (3 ampules Inhalation Given 7/5/21 5583)   predniSONE (DELTASONE) tablet 60 mg (60 mg Oral Given 7/5/21 2223)         ED COURSE:       Medical Decision Making:    Plan be for labs will also medicate for symptom relief will obtain imaging will out infectious etiology. CBC negative, chemistry panel negative, urinalysis negative, chest x-ray negative. Patient did receive 3 DuoNeb treatments. Lungs now clear. Patient will be discharged home with prescription for albuterol nebulized treatment, Bromfed cough syrup, prednisone and I also did provide her with an albuterol inhaler. Patient reports that she does not want an inhaler as she does not know how to utilize this. I did have respiratory come to patient's bedside and educate her on the use of an inhaler as well as the use of a spacer. Patient also requesting meds to help with bladder control. I did make her aware that she will need to see a primary care physician and/or urologist.  That she does not have any concerning infectious etiology today but I will not start her on any routine meds as she will need close follow-up. Patient does not appear to be a very reliable, she has never made attempts to get a primary care physician and during her visits routinely will get all of her prescriptions completed by the emergency department. I did spend extra time with patient making her aware that she must obtain a primary care physician and that she needs continued medical management regarding her bronchitis which is truly actually asthma or COPD as she is a smoker. Patient otherwise nontoxic, neurovascular intact, airway intact. Patient expressed understanding will discharge home. Patient provided with referral information for new primary care physician. Counseling: The emergency provider has spoken with the patient and discussed todays results, in addition to providing specific details for the plan of care and counseling regarding the diagnosis and prognosis.   Questions are answered at this time and they are agreeable with the plan.      --------------------------------- IMPRESSION AND DISPOSITION ---------------------------------    IMPRESSION  1. Bronchitis        DISPOSITION  Disposition: Discharge to home  Patient condition is good      NOTE: This report was transcribed using voice recognition software.  Every effort was made to ensure accuracy; however, inadvertent computerized transcription errors may be present     MATT Tafoya - ALEXIS  07/06/21 2478

## 2021-07-08 ENCOUNTER — OFFICE VISIT (OUTPATIENT)
Dept: FAMILY MEDICINE CLINIC | Age: 61
End: 2021-07-08
Payer: MEDICAID

## 2021-07-08 VITALS
SYSTOLIC BLOOD PRESSURE: 138 MMHG | HEART RATE: 95 BPM | TEMPERATURE: 97.9 F | OXYGEN SATURATION: 96 % | RESPIRATION RATE: 18 BRPM | WEIGHT: 232 LBS | BODY MASS INDEX: 38.65 KG/M2 | HEIGHT: 65 IN | DIASTOLIC BLOOD PRESSURE: 80 MMHG

## 2021-07-08 DIAGNOSIS — R35.0 INCREASED URINARY FREQUENCY: ICD-10-CM

## 2021-07-08 DIAGNOSIS — J40 BRONCHITIS: Primary | ICD-10-CM

## 2021-07-08 DIAGNOSIS — J44.9 CHRONIC OBSTRUCTIVE PULMONARY DISEASE, UNSPECIFIED COPD TYPE (HCC): ICD-10-CM

## 2021-07-08 LAB
BILIRUBIN, POC: NORMAL
BLOOD URINE, POC: NORMAL
CLARITY, POC: CLEAR
COLOR, POC: YELLOW
GLUCOSE URINE, POC: NORMAL
KETONES, POC: NORMAL
LEUKOCYTE EST, POC: NORMAL
NITRITE, POC: NORMAL
PH, POC: 5.5
PROTEIN, POC: NORMAL
SPECIFIC GRAVITY, POC: >=1.03
UROBILINOGEN, POC: NORMAL

## 2021-07-08 PROCEDURE — G8926 SPIRO NO PERF OR DOC: HCPCS | Performed by: INTERNAL MEDICINE

## 2021-07-08 PROCEDURE — G8427 DOCREV CUR MEDS BY ELIG CLIN: HCPCS | Performed by: INTERNAL MEDICINE

## 2021-07-08 PROCEDURE — G8417 CALC BMI ABV UP PARAM F/U: HCPCS | Performed by: INTERNAL MEDICINE

## 2021-07-08 PROCEDURE — 3023F SPIROM DOC REV: CPT | Performed by: INTERNAL MEDICINE

## 2021-07-08 PROCEDURE — 81002 URINALYSIS NONAUTO W/O SCOPE: CPT | Performed by: INTERNAL MEDICINE

## 2021-07-08 PROCEDURE — 3017F COLORECTAL CA SCREEN DOC REV: CPT | Performed by: INTERNAL MEDICINE

## 2021-07-08 PROCEDURE — 99203 OFFICE O/P NEW LOW 30 MIN: CPT | Performed by: INTERNAL MEDICINE

## 2021-07-08 PROCEDURE — 4004F PT TOBACCO SCREEN RCVD TLK: CPT | Performed by: INTERNAL MEDICINE

## 2021-07-08 RX ORDER — PHENAZOPYRIDINE HYDROCHLORIDE 200 MG/1
200 TABLET, FILM COATED ORAL 3 TIMES DAILY PRN
Qty: 20 TABLET | Refills: 0 | Status: SHIPPED | OUTPATIENT
Start: 2021-07-08 | End: 2021-07-15

## 2021-07-08 RX ORDER — ALBUTEROL SULFATE 90 UG/1
2 AEROSOL, METERED RESPIRATORY (INHALATION) EVERY 4 HOURS PRN
Qty: 1 INHALER | Refills: 5 | Status: SHIPPED | OUTPATIENT
Start: 2021-07-08 | End: 2022-07-14

## 2021-07-08 RX ORDER — ALBUTEROL SULFATE 2.5 MG/3ML
2.5 SOLUTION RESPIRATORY (INHALATION) EVERY 6 HOURS PRN
Qty: 1 PACKAGE | Refills: 5 | Status: SHIPPED
Start: 2021-07-08 | End: 2022-03-28 | Stop reason: SDUPTHER

## 2021-07-08 SDOH — ECONOMIC STABILITY: FOOD INSECURITY: WITHIN THE PAST 12 MONTHS, THE FOOD YOU BOUGHT JUST DIDN'T LAST AND YOU DIDN'T HAVE MONEY TO GET MORE.: NEVER TRUE

## 2021-07-08 SDOH — ECONOMIC STABILITY: FOOD INSECURITY: WITHIN THE PAST 12 MONTHS, YOU WORRIED THAT YOUR FOOD WOULD RUN OUT BEFORE YOU GOT MONEY TO BUY MORE.: NEVER TRUE

## 2021-07-08 ASSESSMENT — PATIENT HEALTH QUESTIONNAIRE - PHQ9
2. FEELING DOWN, DEPRESSED OR HOPELESS: 1
1. LITTLE INTEREST OR PLEASURE IN DOING THINGS: 1
SUM OF ALL RESPONSES TO PHQ9 QUESTIONS 1 & 2: 2
SUM OF ALL RESPONSES TO PHQ QUESTIONS 1-9: 2

## 2021-07-08 ASSESSMENT — SOCIAL DETERMINANTS OF HEALTH (SDOH): HOW HARD IS IT FOR YOU TO PAY FOR THE VERY BASICS LIKE FOOD, HOUSING, MEDICAL CARE, AND HEATING?: NOT HARD AT ALL

## 2021-07-08 NOTE — PATIENT INSTRUCTIONS
Start taking an iron supplement. You can find these over the counter at any pharmacy.      [email or web address]

## 2021-07-08 NOTE — PROGRESS NOTES
[facility]  [address] [place] [address]  [place] [address]  Dept: [phone]  Dept Fax: [phone]     NAME: Stef Walsh        :  1960        MRN:  <[id number]>    Chief Complaint   Patient presents with   Giana Malave Patient    ED Follow-up     Bronchitis / feeling much better / requesting aerosol machine    Vaginal Bleeding     questionable vag bleeding / noticed x 2 weeks ago at home ater walking up some steps / patient states that the ER could not do procedure and referred her to PCP. History of Present Illness  Stef Walsh is a 61 y.o. female who presents today to [facility]. Patient did not previously have a PCP and has been seen in the ED several times. She was recently treated for bronchitis and is feeling much better. She is requesting a nebulizer and the albuterol treatments as she feels her breathing is much better when she has the nebulized treatments to use as needed. She also has a long history of smoking and currently smokes about 0.5 ppd. There is likely a component of emphysema and COPD present. Patient has a history of anemia per review of lab work. Patient admits to chewing ice and eating baking soda frequently. She knows she is iron deficient but relates that she would prefer to treat this with diet before taking any supplements or medications. Patient also complains of recurrent symptoms of a UTI. She describes some pressure and increased frequency of urination. She also reports some burning present on her inner labia while urinating. She states that these have been fairly common symptoms for her and the pyridium does relieve them. Patient has never had and is refusing any routine health screenings such as mammograms, colonoscopy, or pap smear. Review of Systems  Please see HPI above. All bolded are positive.   Gen: fever, chills, fatigue, weakness, diaphoresis, or unintentional weight change  Head: headache, vision change, hearing loss  Chest: chest pain/heaviness, palpitations  Lungs: shortness of breath, wheezing, coughing, hemoptysis  Abdomen: abdominal pain, nausea, vomiting, diarrhea, constipation, melena, hematochezia, hematemesis, or loss of appetite  Extremities: lower extremity edema, myalgias, arthralgias  Urinary: dysuria, hematuria, weak flow, or increase in frequency  Neurologic: lightheadedness, dizziness, confusion, syncope  Endocrine: polydipsia, polyuria, heat or cold intolerance  Psychiatric: depression, suicidal ideation, or anxiety  Derm: Rashes, ulcers, burns    Medical History   History reviewed. No pertinent past medical history. Surgical History   Past Surgical History:   Procedure Laterality Date     SECTION         Family History  History reviewed. No pertinent family history. Social History  Social History     Tobacco Use    Smoking status: Current Some Day Smoker     Packs/day: 0.50     Years: 13.00     Pack years: 6.50     Types: Cigarettes    Smokeless tobacco: Never Used    Tobacco comment: quit x 14 yrs and started up again    Substance Use Topics    Alcohol use: No       Home Medications  Current Outpatient Medications   Medication Sig Dispense Refill    albuterol (PROVENTIL) (2.5 MG/3ML) 0.083% nebulizer solution Take 3 mLs by nebulization every 6 hours as needed for Wheezing or Shortness of Breath 1 Package 5    albuterol sulfate HFA (PROVENTIL HFA) 108 (90 Base) MCG/ACT inhaler Inhale 2 puffs into the lungs every 4 hours as needed for Wheezing 1 Inhaler 5    phenazopyridine (PYRIDIUM) 200 MG tablet Take 1 tablet by mouth 3 times daily as needed for Pain 20 tablet 0    brompheniramine-pseudoephedrine-DM (BROMFED DM) 2-30-10 MG/5ML syrup Take 5 mLs by mouth 4 times daily as needed for Cough 118 mL 0    predniSONE (DELTASONE) 20 MG tablet Sig.: Take 60mg  Po qd x 3 days, then 40mg po qd x3 days, then 20mg po qd x 3 days.   QS x 9 days 18 tablet 0    Spacer/Aero-Holding Zeb Godoy 1 Device by Does not apply route daily 1 Device 0    Compression Stockings MISC by Does not apply route 2 each 0     No current facility-administered medications for this visit. Allergies  No Known Allergies    Objective  Vitals:    07/08/21 1414   BP: 138/80   Pulse: 95   Resp: 18   Temp: 97.9 °F (36.6 °C)   TempSrc: Temporal   SpO2: 96%   Weight: 232 lb (105.2 kg)   Height: 5' 4.75\" (1.645 m)        Physical Exam:  General: Awake, alert, and oriented to person, place, time, and purpose, appears stated age and cooperative, No acute distress  Head: Normocephalic, atraumatic  Eyes: conjunctivae/corneas clear, EOM's intact. Mouth: Mucous membranes moist with no pharyngeal exudate or erythema  Neck: no JVD, no adenopathy, no carotid bruit, supple, symmetrical, trachea midline  Back: symmetric, ROM normal, No CVA tenderness. Lungs: diminished bilaterally with expiratory wheezes bilaterally, no rales, or rhonchi  Heart: regular rate and rhythm, S1, S2 normal, no murmur, click, rub or gallop  Abdomen: soft, non-tender; bowel sounds normal; no masses,  no organomegaly  Extremities: atraumatic, no cyanosis, no edema, 2+ pulses palpated in all 4 extremities  Skin: color, texture, turgor within normal limits. No rashes or lesions or normal  Neurologic: speech appropriate, moves all 4 extremities, normal muscle strength and tone, CN 2-12 grossly intact    Labs  Lab Results   Component Value Date    WBC 5.7 07/05/2021    HGB 9.7 (L) 07/05/2021    HCT 37.3 07/05/2021     07/05/2021     07/05/2021    K 3.9 07/05/2021     07/05/2021    CREATININE 0.8 07/05/2021    BUN 13 07/05/2021    CO2 29 07/05/2021    GLUCOSE 112 (H) 07/05/2021    ALT 12 07/05/2021    AST 16 07/05/2021     No results found for: TSH  No results found for: TRIG  No results found for: HDL  No results found for: LDLCALC  No results found for: LABA1C  No results found for: INR, PROTIME   *All recent labs were reviewed. Please see electronic chart for a more comprehensive set of labs    Radiology  XR CHEST PORTABLE    Result Date: 7/5/2021  EXAMINATION: ONE XRAY VIEW OF THE CHEST 7/5/2021 7:39 pm COMPARISON: October 27, 2019 HISTORY: ORDERING SYSTEM PROVIDED HISTORY: cough, wheezing TECHNOLOGIST PROVIDED HISTORY: Reason for exam:->cough, wheezing What reading provider will be dictating this exam?->CRC FINDINGS: No airspace opacity or pleural effusion. The heart is normal size. No pneumothorax. No free air beneath the hemidiaphragms. No pneumonia or pleural effusion. Health Maintenance Due   Topic Date Due    Hepatitis C screen  Never done    Pneumococcal 0-64 years Vaccine (1 of 2 - PPSV23) Never done    COVID-19 Vaccine (1) Never done    HIV screen  Never done    DTaP/Tdap/Td vaccine (1 - Tdap) Never done    Cervical cancer screen  Never done    Lipid screen  Never done    Diabetes screen  Never done    Colon cancer screen colonoscopy  Never done    Breast cancer screen  Never done    Shingles Vaccine (1 of 2) Never done         Assessment and Plan  Shannen Corrigan presents today to establish care. Layla Carson was seen today for new patient, ed follow-up and vaginal bleeding. Diagnoses and all orders for this visit:    Bronchitis  -     DME Order for Nebulizer as OP    Chronic obstructive pulmonary disease, unspecified COPD type ([facility] [address]  -     DME Order for Nebulizer as OP  -     albuterol (PROVENTIL) (2.5 MG/3ML) 0.083% nebulizer solution; Take 3 mLs by nebulization every 6 hours as needed for Wheezing or Shortness of Breath  -     albuterol sulfate HFA (PROVENTIL HFA) 108 (90 Base) MCG/ACT inhaler; Inhale 2 puffs into the lungs every 4 hours as needed for Wheezing    Increased urinary frequency  -     POCT Urinalysis no Micro  -     phenazopyridine (PYRIDIUM) 200 MG tablet;  Take 1 tablet by mouth 3 times daily as needed for Pain    Patient was unable to provide a urine sample today.    Educational materials and/or home exercises printed for patient's review and were included in patient instructions on his/her After Visit Summary and given to patient at the end of visit. Counseled regarding above diagnosis, including possible risks and complications, especially if left uncontrolled. Counseled regarding the possible side effects, risks, benefits and alternatives to treatment; patient and/or guardian verbalizes understanding, agrees, feels comfortable with and wishes to proceed with above treatment plan. Advised patient to call [facility] Cordon new medication issues, and read all Rx info from pharmacy to assure aware of all possible risks and side effects of medication before taking. Reviewed age and gender appropriate health screening exams and vaccinations. Advised patient regarding importance of keeping up with recommended health maintenance and to schedule as soon as possible if overdue, as this is important in assessing for undiagnosed pathology, especially cancer, as well as protecting against potentially harmful/life threatening disease. Patient verbalizes understanding and agrees with above counseling, assessment and plan. All questions answered.     Charan Carroll DO  7/15/2021  11:02 AM

## 2021-07-14 ENCOUNTER — OFFICE VISIT (OUTPATIENT)
Dept: FAMILY MEDICINE CLINIC | Age: 61
End: 2021-07-14
Payer: MEDICAID

## 2021-07-14 VITALS
HEART RATE: 85 BPM | WEIGHT: 234.6 LBS | OXYGEN SATURATION: 97 % | RESPIRATION RATE: 16 BRPM | DIASTOLIC BLOOD PRESSURE: 80 MMHG | TEMPERATURE: 98 F | HEIGHT: 64 IN | BODY MASS INDEX: 40.05 KG/M2 | SYSTOLIC BLOOD PRESSURE: 130 MMHG

## 2021-07-14 DIAGNOSIS — R60.0 BILATERAL LOWER EXTREMITY EDEMA: Primary | ICD-10-CM

## 2021-07-14 PROCEDURE — 4004F PT TOBACCO SCREEN RCVD TLK: CPT | Performed by: INTERNAL MEDICINE

## 2021-07-14 PROCEDURE — G8427 DOCREV CUR MEDS BY ELIG CLIN: HCPCS | Performed by: INTERNAL MEDICINE

## 2021-07-14 PROCEDURE — 99213 OFFICE O/P EST LOW 20 MIN: CPT | Performed by: INTERNAL MEDICINE

## 2021-07-14 PROCEDURE — G8417 CALC BMI ABV UP PARAM F/U: HCPCS | Performed by: INTERNAL MEDICINE

## 2021-07-14 PROCEDURE — 3017F COLORECTAL CA SCREEN DOC REV: CPT | Performed by: INTERNAL MEDICINE

## 2021-07-14 NOTE — PROGRESS NOTES
solution Take 3 mLs by nebulization every 6 hours as needed for Wheezing or Shortness of Breath 1 Package 5    albuterol sulfate HFA (PROVENTIL HFA) 108 (90 Base) MCG/ACT inhaler Inhale 2 puffs into the lungs every 4 hours as needed for Wheezing 1 Inhaler 5    brompheniramine-pseudoephedrine-DM (BROMFED DM) 2-30-10 MG/5ML syrup Take 5 mLs by mouth 4 times daily as needed for Cough 118 mL 0    Spacer/Aero-Holding Chambers ABHIJIT 1 Device by Does not apply route daily 1 Device 0     No current facility-administered medications for this visit. Allergies:  No Known Allergies    Objective     Vitals:    07/14/21 1605   BP: 130/80   Site: Left Upper Arm   Pulse: 85   Resp: 16   Temp: 98 °F (36.7 °C)   TempSrc: Temporal   SpO2: 97%   Weight: 234 lb 9.6 oz (106.4 kg)   Height: 5' 4\" (1.626 m)        Physical Exam  General: Awake, alert, and oriented to person, place, time, and purpose, appears stated age and cooperative, No acute distress  Head: Normocephalic, atraumatic  Eyes: conjunctivae/corneas clear, EOMI  Mouth: Mucous membranes moist with no pharyngeal exudate or erythema  Neck: no JVD, no adenopathy, no carotid bruit, supple, symmetrical, trachea midline  Back: symmetric, ROM normal, No CVA tenderness. Lungs: clear to auscultation bilaterally without wheezes, rales, or rhonchi  Heart: regular rate and rhythm, S1, S2 normal, no murmur, click, rub or gallop  Abdomen: soft, non-tender; bowel sounds normal; no masses,  no organomegaly  Extremities: atraumatic, no cyanosis, 1+ pitting edema to bilateral feet, 2+ pulses palpated in all 4 extremities  Skin: color, texture, turgor within normal limits. No rashes or lesions or normal  Neurologic:speech appropriate, moves all 4 extremities, normal muscle strength and tone, CN 2-12 grossly intact      Assessment and Plan     Patient is a 61 y.o. female who presented to the office with an acute complaint of foot swelling bilaterally.      Jayla Triana was seen today for edema.    Diagnoses and all orders for this visit:    Bilateral lower extremity edema    Other orders  -     Compression Stockings MISC; by Does not apply route        Educational materials and/or home exercises printed for patient's review and were included in patient instructions on his/her After Visit Summary and given to patient at the end of visit. Counseled regarding above diagnosis, including possible risks and complications, especially if left uncontrolled or untreated. Advised to present to the Emergency Department if symptoms worsen. Counseled regarding the possible side effects, risks, benefits and alternatives to treatment; patient and/or guardian verbalizes understanding, agrees, feels comfortable with and wishes to proceed with above treatment plan. Advised patient to call Tushar Wells new medication issues, and read all Rx info from pharmacy to assure aware of all possible risks and side effects of any medication before taking. Patient verbalizes understanding and agrees with above counseling, assessment and plan. All questions answered.     Tanya Moss DO   7/16/2021  4:44 PM

## 2021-07-14 NOTE — PATIENT INSTRUCTIONS
[facility] [address] [place] [address]. [address]  Phone: [phone]    Showroom Hours:  Mon-Fri: 9:00 A. M. - 5:00 P. M.   Saturday: Closed  Sunday: Closed

## 2021-08-13 ENCOUNTER — HOSPITAL ENCOUNTER (EMERGENCY)
Age: 61
Discharge: HOME | End: 2021-08-13
Attending: EMERGENCY MEDICINE
Payer: MEDICAID

## 2021-08-13 ENCOUNTER — APPOINTMENT (OUTPATIENT)
Dept: GENERAL RADIOLOGY | Age: 61
End: 2021-08-13
Payer: MEDICAID

## 2021-08-13 VITALS
BODY MASS INDEX: 40.12 KG/M2 | SYSTOLIC BLOOD PRESSURE: 163 MMHG | WEIGHT: 235 LBS | RESPIRATION RATE: 20 BRPM | DIASTOLIC BLOOD PRESSURE: 108 MMHG | HEART RATE: 107 BPM | OXYGEN SATURATION: 95 % | TEMPERATURE: 97.5 F | HEIGHT: 64 IN

## 2021-08-13 DIAGNOSIS — M79.601 RIGHT ARM PAIN: ICD-10-CM

## 2021-08-13 DIAGNOSIS — S52.091A OTHER CLOSED FRACTURE OF PROXIMAL END OF RIGHT ULNA, INITIAL ENCOUNTER: Primary | ICD-10-CM

## 2021-08-13 PROCEDURE — 73090 X-RAY EXAM OF FOREARM: CPT

## 2021-08-13 PROCEDURE — 99283 EMERGENCY DEPT VISIT LOW MDM: CPT

## 2021-08-13 RX ORDER — IBUPROFEN 400 MG/1
600 TABLET ORAL ONCE
Status: DISCONTINUED | OUTPATIENT
Start: 2021-08-13 | End: 2021-08-13 | Stop reason: HOSPADM

## 2021-08-13 RX ORDER — NAPROXEN 500 MG/1
500 TABLET ORAL 2 TIMES DAILY
Qty: 14 TABLET | Refills: 0 | Status: ON HOLD | OUTPATIENT
Start: 2021-08-13 | End: 2021-11-18 | Stop reason: ALTCHOICE

## 2021-08-13 ASSESSMENT — ENCOUNTER SYMPTOMS
EYE DISCHARGE: 0
WHEEZING: 0
EYE PAIN: 0
SHORTNESS OF BREATH: 0
DIARRHEA: 0
SORE THROAT: 0
NAUSEA: 0
EYE REDNESS: 0
VOMITING: 0
SINUS PRESSURE: 0
BACK PAIN: 0
ABDOMINAL DISTENTION: 0
COUGH: 0

## 2021-08-13 ASSESSMENT — PAIN DESCRIPTION - LOCATION: LOCATION: ARM

## 2021-08-13 ASSESSMENT — PAIN DESCRIPTION - ORIENTATION: ORIENTATION: RIGHT

## 2021-08-13 ASSESSMENT — PAIN SCALES - GENERAL: PAINLEVEL_OUTOF10: 10

## 2021-08-13 ASSESSMENT — PAIN DESCRIPTION - PAIN TYPE: TYPE: ACUTE PAIN
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
## 2021-10-07 ENCOUNTER — TELEPHONE (OUTPATIENT)
Dept: FAMILY MEDICINE CLINIC | Age: 61
End: 2021-10-07

## 2021-10-07 NOTE — TELEPHONE ENCOUNTER
----- Message from Veronicasolangeasiya Darling sent at 10/5/2021  3:55 PM EDT -----  Subject: Refill Request    QUESTIONS  Name of Medication? albuterol sulfate HFA (PROVENTIL HFA) 108 (90 Base)   MCG/ACT inhaler  Patient-reported dosage and instructions? Inhale 2 puffs into the lungs   every 4 hours as needed for Wheezing  How many days do you have left? 0  [facility] [address]  phone number (if available)? [phone]  ---------------------------------------------------------------------------  --------------  CALL BACK INFO  What is the best way for the office to contact you? OK to leave message on   voicemail  Preferred Call Back Phone Number?  [phone]

## 2021-11-10 ENCOUNTER — HOSPITAL ENCOUNTER (OUTPATIENT)
Age: 61
Discharge: HOME | End: 2021-11-12
Payer: MEDICAID

## 2021-11-10 ENCOUNTER — OFFICE VISIT (OUTPATIENT)
Dept: FAMILY MEDICINE CLINIC | Age: 61
End: 2021-11-10
Payer: MEDICAID

## 2021-11-10 ENCOUNTER — HOSPITAL ENCOUNTER (OUTPATIENT)
Dept: GENERAL RADIOLOGY | Age: 61
Discharge: HOME | End: 2021-11-12
Payer: MEDICAID

## 2021-11-10 VITALS
DIASTOLIC BLOOD PRESSURE: 92 MMHG | TEMPERATURE: 98.2 F | WEIGHT: 227 LBS | OXYGEN SATURATION: 95 % | HEART RATE: 95 BPM | RESPIRATION RATE: 16 BRPM | HEIGHT: 64 IN | BODY MASS INDEX: 38.76 KG/M2 | SYSTOLIC BLOOD PRESSURE: 160 MMHG

## 2021-11-10 DIAGNOSIS — I10 PRIMARY HYPERTENSION: Primary | ICD-10-CM

## 2021-11-10 DIAGNOSIS — J43.8 OTHER EMPHYSEMA (HCC): ICD-10-CM

## 2021-11-10 DIAGNOSIS — M79.602 LEFT ARM PAIN: ICD-10-CM

## 2021-11-10 PROCEDURE — 73060 X-RAY EXAM OF HUMERUS: CPT

## 2021-11-10 PROCEDURE — G8427 DOCREV CUR MEDS BY ELIG CLIN: HCPCS | Performed by: INTERNAL MEDICINE

## 2021-11-10 PROCEDURE — 99214 OFFICE O/P EST MOD 30 MIN: CPT | Performed by: INTERNAL MEDICINE

## 2021-11-10 PROCEDURE — G8926 SPIRO NO PERF OR DOC: HCPCS | Performed by: INTERNAL MEDICINE

## 2021-11-10 PROCEDURE — 3023F SPIROM DOC REV: CPT | Performed by: INTERNAL MEDICINE

## 2021-11-10 PROCEDURE — G8417 CALC BMI ABV UP PARAM F/U: HCPCS | Performed by: INTERNAL MEDICINE

## 2021-11-10 PROCEDURE — 4004F PT TOBACCO SCREEN RCVD TLK: CPT | Performed by: INTERNAL MEDICINE

## 2021-11-10 PROCEDURE — 73090 X-RAY EXAM OF FOREARM: CPT

## 2021-11-10 PROCEDURE — 3017F COLORECTAL CA SCREEN DOC REV: CPT | Performed by: INTERNAL MEDICINE

## 2021-11-10 PROCEDURE — G8484 FLU IMMUNIZE NO ADMIN: HCPCS | Performed by: INTERNAL MEDICINE

## 2021-11-10 RX ORDER — AMLODIPINE BESYLATE 5 MG/1
5 TABLET ORAL DAILY
Qty: 30 TABLET | Refills: 3 | Status: ON HOLD
Start: 2021-11-10 | End: 2021-11-18 | Stop reason: HOSPADM

## 2021-11-10 RX ORDER — BLOOD PRESSURE TEST KIT
1 KIT MISCELLANEOUS DAILY
Qty: 1 KIT | Refills: 0 | Status: SHIPPED
Start: 2021-11-10 | End: 2021-11-11 | Stop reason: SDUPTHER
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
## 2021-11-10 NOTE — PATIENT INSTRUCTIONS
Choose fruit more often than fruit juice. · Eat 4 to 5 servings of vegetables each day. A serving is 1 cup of lettuce or raw leafy vegetables, ½ cup of chopped or cooked vegetables, or 4 ounces (½ cup) of vegetable juice. Choose vegetables more often than vegetable juice. · Get 2 to 3 servings of low-fat and fat-free dairy each day. A serving is 8 ounces of milk, 1 cup of yogurt, or 1 ½ ounces of cheese. · Eat 6 to 8 servings of grains each day. A serving is 1 slice of bread, 1 ounce of dry cereal, or ½ cup of cooked rice, pasta, or cooked cereal. Try to choose whole-grain products as much as possible. · Limit lean meat, poultry, and fish to 2 servings each day. A serving is 3 ounces, about the size of a deck of cards. · Eat 4 to 5 servings of nuts, seeds, and legumes (cooked dried beans, lentils, and split peas) each week. A serving is 1/3 cup of nuts, 2 tablespoons of seeds, or ½ cup of cooked beans or peas. · Limit fats and oils to 2 to 3 servings each day. A serving is 1 teaspoon of vegetable oil or 2 tablespoons of salad dressing. · Limit sweets and added sugars to 5 servings or less a week. A serving is 1 tablespoon jelly or jam, ½ cup sorbet, or 1 cup of lemonade. · Eat less than 2,300 milligrams (mg) of sodium a day. If you limit your sodium to 1,500 mg a day, you can lower your blood pressure even more. · Be aware that all of these are the suggested number of servings for people who eat 1,800 to 2,000 calories a day. Your recommended number of servings may be different if you need more or fewer calories. Tips for success  · Start small. Do not try to make dramatic changes to your diet all at once. You might feel that you are missing out on your favorite foods and then be more likely to not follow the plan. Make small changes, and stick with them. Once those changes become habit, add a few more changes. · Try some of the following:  ?  Make it a goal to eat a fruit or vegetable at every meal and at snacks. This will make it easy to get the recommended amount of fruits and vegetables each day. ? Try yogurt topped with fruit and nuts for a snack or healthy dessert. ? Add lettuce, tomato, cucumber, and onion to sandwiches. ? Combine a ready-made pizza crust with low-fat mozzarella cheese and lots of vegetable toppings. Try using tomatoes, squash, spinach, broccoli, carrots, cauliflower, and onions. ? Have a variety of cut-up vegetables with a low-fat dip as an appetizer instead of chips and dip. ? Sprinkle sunflower seeds or chopped almonds over salads. Or try adding chopped walnuts or almonds to cooked vegetables. ? Try some vegetarian meals using beans and peas. Add garbanzo or kidney beans to salads. Make burritos and tacos with mashed rush beans or black beans. Where can you learn more? Go to https://[employer] - The Simple, Fast and Elegant Tablet Sales App".[employer]. org and sign in to your [employer] account. Enter K605 in the [facility] box to learn more about \"DASH Diet: Care Instructions. \"     If you do not have an account, please click on the \"Sign Up Now\" link. Current as of: April 29, 2021               Content Version: 13.0  © 5220-1074 Healthwise, Incorporated. Care instructions adapted under license by [facility] ([facility]). If you have questions about a medical condition or this instruction, always ask your healthcare professional. [place] [address] any warranty or liability for your use of this information.

## 2021-11-11 ENCOUNTER — TELEPHONE (OUTPATIENT)
Dept: FAMILY MEDICINE CLINIC | Age: 61
End: 2021-11-11

## 2021-11-11 DIAGNOSIS — I10 HYPERTENSION, UNSPECIFIED TYPE: Primary | ICD-10-CM

## 2021-11-11 RX ORDER — MEDICAL SUPPLY, MISCELLANEOUS
1 EACH MISCELLANEOUS 2 TIMES DAILY
Qty: 1 EACH | Refills: 0 | Status: CANCELLED | OUTPATIENT
Start: 2021-11-11

## 2021-11-11 RX ORDER — BLOOD PRESSURE TEST KIT
1 KIT MISCELLANEOUS DAILY
Qty: 1 KIT | Refills: 0 | Status: SHIPPED | OUTPATIENT
Start: 2021-11-11

## 2021-11-16 PROBLEM — J43.8 OTHER EMPHYSEMA (HCC): Status: ACTIVE | Noted: 2021-11-16

## 2021-11-17 ENCOUNTER — APPOINTMENT (OUTPATIENT)
Dept: GENERAL RADIOLOGY | Age: 61
End: 2021-11-17
Payer: MEDICAID

## 2021-11-17 ENCOUNTER — TELEPHONE (OUTPATIENT)
Dept: FAMILY MEDICINE CLINIC | Age: 61
End: 2021-11-17

## 2021-11-17 ENCOUNTER — HOSPITAL ENCOUNTER (OUTPATIENT)
Age: 61
Setting detail: OBSERVATION
Discharge: HOME | End: 2021-11-18
Attending: STUDENT IN AN ORGANIZED HEALTH CARE EDUCATION/TRAINING PROGRAM | Admitting: FAMILY MEDICINE
Payer: MEDICAID

## 2021-11-17 ENCOUNTER — APPOINTMENT (OUTPATIENT)
Dept: CT IMAGING | Age: 61
End: 2021-11-17
Payer: MEDICAID

## 2021-11-17 DIAGNOSIS — R51.9 ACUTE NONINTRACTABLE HEADACHE, UNSPECIFIED HEADACHE TYPE: ICD-10-CM

## 2021-11-17 DIAGNOSIS — R07.9 CHEST PAIN, UNSPECIFIED TYPE: Primary | ICD-10-CM

## 2021-11-17 DIAGNOSIS — R94.31 ACUTE ELECTROCARDIOGRAM CHANGES: ICD-10-CM

## 2021-11-17 LAB
ALBUMIN SERPL-MCNC: 4.1 G/DL (ref 3.5–5.2)
ALP BLD-CCNC: 86 U/L (ref 35–104)
ALT SERPL-CCNC: 14 U/L (ref 0–32)
ANION GAP SERPL CALCULATED.3IONS-SCNC: 12 MMOL/L (ref 7–16)
ANISOCYTOSIS: ABNORMAL
AST SERPL-CCNC: 17 U/L (ref 0–31)
ATYPICAL LYMPHOCYTE RELATIVE PERCENT: 0.9 % (ref 0–4)
BASOPHILS ABSOLUTE: 0.12 E9/L (ref 0–0.2)
BASOPHILS RELATIVE PERCENT: 1.7 % (ref 0–2)
BILIRUB SERPL-MCNC: 0.3 MG/DL (ref 0–1.2)
BUN BLDV-MCNC: 12 MG/DL (ref 6–23)
CALCIUM SERPL-MCNC: 9.5 MG/DL (ref 8.6–10.2)
CHLORIDE BLD-SCNC: 104 MMOL/L (ref 98–107)
CO2: 25 MMOL/L (ref 22–29)
CREAT SERPL-MCNC: 0.8 MG/DL (ref 0.5–1)
EOSINOPHILS ABSOLUTE: 0.18 E9/L (ref 0.05–0.5)
EOSINOPHILS RELATIVE PERCENT: 2.6 % (ref 0–6)
GFR AFRICAN AMERICAN: >60
GFR NON-AFRICAN AMERICAN: >60 ML/MIN/1.73
GLUCOSE BLD-MCNC: 112 MG/DL (ref 74–99)
HCT VFR BLD CALC: 42.7 % (ref 34–48)
HEMOGLOBIN: 12 G/DL (ref 11.5–15.5)
LYMPHOCYTES ABSOLUTE: 2.79 E9/L (ref 1.5–4)
LYMPHOCYTES RELATIVE PERCENT: 40 % (ref 20–42)
MCH RBC QN AUTO: 19.1 PG (ref 26–35)
MCHC RBC AUTO-ENTMCNC: 28.1 % (ref 32–34.5)
MCV RBC AUTO: 67.9 FL (ref 80–99.9)
MONOCYTES ABSOLUTE: 0.27 E9/L (ref 0.1–0.95)
MONOCYTES RELATIVE PERCENT: 3.5 % (ref 2–12)
NEUTROPHILS ABSOLUTE: 3.47 E9/L (ref 1.8–7.3)
NEUTROPHILS RELATIVE PERCENT: 51.3 % (ref 43–80)
OVALOCYTES: ABNORMAL
PDW BLD-RTO: 22.2 FL (ref 11.5–15)
PLATELET # BLD: 384 E9/L (ref 130–450)
PMV BLD AUTO: 10.1 FL (ref 7–12)
POIKILOCYTES: ABNORMAL
POTASSIUM SERPL-SCNC: 4.1 MMOL/L (ref 3.5–5)
RBC # BLD: 6.29 E12/L (ref 3.5–5.5)
SODIUM BLD-SCNC: 141 MMOL/L (ref 132–146)
TOTAL PROTEIN: 7.8 G/DL (ref 6.4–8.3)
TROPONIN, HIGH SENSITIVITY: <6 NG/L (ref 0–9)
TROPONIN, HIGH SENSITIVITY: <6 NG/L (ref 0–9)
WBC # BLD: 6.8 E9/L (ref 4.5–11.5)

## 2021-11-17 PROCEDURE — 6370000000 HC RX 637 (ALT 250 FOR IP): Performed by: PHYSICIAN ASSISTANT

## 2021-11-17 PROCEDURE — 99284 EMERGENCY DEPT VISIT MOD MDM: CPT

## 2021-11-17 PROCEDURE — 70450 CT HEAD/BRAIN W/O DYE: CPT

## 2021-11-17 PROCEDURE — 6360000002 HC RX W HCPCS: Performed by: STUDENT IN AN ORGANIZED HEALTH CARE EDUCATION/TRAINING PROGRAM

## 2021-11-17 PROCEDURE — 94664 DEMO&/EVAL PT USE INHALER: CPT

## 2021-11-17 PROCEDURE — 93005 ELECTROCARDIOGRAM TRACING: CPT | Performed by: PHYSICIAN ASSISTANT

## 2021-11-17 PROCEDURE — 71046 X-RAY EXAM CHEST 2 VIEWS: CPT

## 2021-11-17 PROCEDURE — 84484 ASSAY OF TROPONIN QUANT: CPT

## 2021-11-17 PROCEDURE — 80053 COMPREHEN METABOLIC PANEL: CPT

## 2021-11-17 PROCEDURE — 6370000000 HC RX 637 (ALT 250 FOR IP): Performed by: STUDENT IN AN ORGANIZED HEALTH CARE EDUCATION/TRAINING PROGRAM

## 2021-11-17 PROCEDURE — G0378 HOSPITAL OBSERVATION PER HR: HCPCS

## 2021-11-17 PROCEDURE — 85025 COMPLETE CBC W/AUTO DIFF WBC: CPT

## 2021-11-17 PROCEDURE — 96374 THER/PROPH/DIAG INJ IV PUSH: CPT

## 2021-11-17 PROCEDURE — 6370000000 HC RX 637 (ALT 250 FOR IP): Performed by: FAMILY MEDICINE

## 2021-11-17 RX ORDER — HYDRALAZINE HYDROCHLORIDE 20 MG/ML
10 INJECTION INTRAMUSCULAR; INTRAVENOUS EVERY 4 HOURS PRN
Status: DISCONTINUED | OUTPATIENT
Start: 2021-11-17 | End: 2021-11-18 | Stop reason: HOSPADM

## 2021-11-17 RX ORDER — AMLODIPINE BESYLATE 5 MG/1
5 TABLET ORAL DAILY
Status: DISCONTINUED | OUTPATIENT
Start: 2021-11-18 | End: 2021-11-18 | Stop reason: HOSPADM

## 2021-11-17 RX ORDER — SODIUM CHLORIDE 0.9 % (FLUSH) 0.9 %
5-40 SYRINGE (ML) INJECTION EVERY 12 HOURS SCHEDULED
Status: DISCONTINUED | OUTPATIENT
Start: 2021-11-17 | End: 2021-11-18 | Stop reason: HOSPADM

## 2021-11-17 RX ORDER — SODIUM CHLORIDE 0.9 % (FLUSH) 0.9 %
5-40 SYRINGE (ML) INJECTION PRN
Status: DISCONTINUED | OUTPATIENT
Start: 2021-11-17 | End: 2021-11-18 | Stop reason: HOSPADM

## 2021-11-17 RX ORDER — ONDANSETRON 4 MG/1
4 TABLET, ORALLY DISINTEGRATING ORAL EVERY 8 HOURS PRN
Status: DISCONTINUED | OUTPATIENT
Start: 2021-11-17 | End: 2021-11-18 | Stop reason: HOSPADM

## 2021-11-17 RX ORDER — POLYETHYLENE GLYCOL 3350 17 G/17G
17 POWDER, FOR SOLUTION ORAL DAILY PRN
Status: DISCONTINUED | OUTPATIENT
Start: 2021-11-17 | End: 2021-11-18 | Stop reason: HOSPADM

## 2021-11-17 RX ORDER — SODIUM CHLORIDE 9 MG/ML
25 INJECTION, SOLUTION INTRAVENOUS PRN
Status: DISCONTINUED | OUTPATIENT
Start: 2021-11-17 | End: 2021-11-18 | Stop reason: HOSPADM

## 2021-11-17 RX ORDER — IPRATROPIUM BROMIDE AND ALBUTEROL SULFATE 2.5; .5 MG/3ML; MG/3ML
1 SOLUTION RESPIRATORY (INHALATION) ONCE
Status: COMPLETED | OUTPATIENT
Start: 2021-11-17 | End: 2021-11-17

## 2021-11-17 RX ORDER — ALBUTEROL SULFATE 2.5 MG/3ML
2.5 SOLUTION RESPIRATORY (INHALATION) EVERY 6 HOURS PRN
Status: DISCONTINUED | OUTPATIENT
Start: 2021-11-17 | End: 2021-11-18 | Stop reason: HOSPADM

## 2021-11-17 RX ORDER — ASPIRIN 81 MG/1
81 TABLET, CHEWABLE ORAL DAILY
Status: DISCONTINUED | OUTPATIENT
Start: 2021-11-18 | End: 2021-11-18 | Stop reason: HOSPADM

## 2021-11-17 RX ORDER — ACETAMINOPHEN 650 MG/1
650 SUPPOSITORY RECTAL EVERY 6 HOURS PRN
Status: DISCONTINUED | OUTPATIENT
Start: 2021-11-17 | End: 2021-11-18 | Stop reason: HOSPADM

## 2021-11-17 RX ORDER — ACETAMINOPHEN 325 MG/1
650 TABLET ORAL EVERY 6 HOURS PRN
Status: DISCONTINUED | OUTPATIENT
Start: 2021-11-17 | End: 2021-11-18 | Stop reason: HOSPADM

## 2021-11-17 RX ORDER — HYDRALAZINE HYDROCHLORIDE 20 MG/ML
10 INJECTION INTRAMUSCULAR; INTRAVENOUS ONCE
Status: COMPLETED | OUTPATIENT
Start: 2021-11-17 | End: 2021-11-17

## 2021-11-17 RX ORDER — ATORVASTATIN CALCIUM 40 MG/1
40 TABLET, FILM COATED ORAL NIGHTLY
Status: DISCONTINUED | OUTPATIENT
Start: 2021-11-17 | End: 2021-11-18 | Stop reason: HOSPADM

## 2021-11-17 RX ORDER — ONDANSETRON 2 MG/ML
4 INJECTION INTRAMUSCULAR; INTRAVENOUS EVERY 6 HOURS PRN
Status: DISCONTINUED | OUTPATIENT
Start: 2021-11-17 | End: 2021-11-18 | Stop reason: HOSPADM

## 2021-11-17 RX ORDER — BUTALBITAL, ACETAMINOPHEN AND CAFFEINE 50; 325; 40 MG/1; MG/1; MG/1
1 TABLET ORAL EVERY 4 HOURS PRN
Status: DISCONTINUED | OUTPATIENT
Start: 2021-11-17 | End: 2021-11-18 | Stop reason: HOSPADM

## 2021-11-17 RX ADMIN — IPRATROPIUM BROMIDE AND ALBUTEROL SULFATE 1 AMPULE: .5; 2.5 SOLUTION RESPIRATORY (INHALATION) at 15:48

## 2021-11-17 RX ADMIN — BUTALBITAL, ACETAMINOPHEN, AND CAFFEINE 1 TABLET: 50; 325; 40 TABLET ORAL at 22:18

## 2021-11-17 RX ADMIN — HYDRALAZINE HYDROCHLORIDE 10 MG: 20 INJECTION INTRAMUSCULAR; INTRAVENOUS at 21:23

## 2021-11-17 RX ADMIN — ASPIRIN 325 MG: 325 TABLET, COATED ORAL at 22:18

## 2021-11-17 ASSESSMENT — PAIN SCALES - GENERAL
PAINLEVEL_OUTOF10: 10
PAINLEVEL_OUTOF10: 10

## 2021-11-17 ASSESSMENT — PAIN DESCRIPTION - FREQUENCY: FREQUENCY: CONTINUOUS

## 2021-11-17 ASSESSMENT — PAIN DESCRIPTION - DESCRIPTORS: DESCRIPTORS: ACHING;THROBBING

## 2021-11-17 ASSESSMENT — PAIN DESCRIPTION - PAIN TYPE: TYPE: ACUTE PAIN

## 2021-11-17 ASSESSMENT — PAIN DESCRIPTION - LOCATION: LOCATION: HEAD

## 2021-11-17 NOTE — TELEPHONE ENCOUNTER
Have her take a second one today for a total of 10 mg. Have her recheck her blood pressure in a couple hours. If the headache is severe she should go to the ED.

## 2021-11-17 NOTE — ED PROVIDER NOTES
Department of Emergency Medicine   ED  Provider Note  Admit Date/RoomTime: 2021  4:03 PM  ED Room: 8210/8210-B          History of Present Illness:  21, Time: 4:15 PM EST  Chief Complaint   Patient presents with    Hypertension     181/104 at home, ERIBERTO Keene is a 64 y.o. female presenting to the ED for hypertension, beginning today. The complaint has been persistent, moderate in severity, and worsened by nothing. The patient is a 80-year-old female with a history of hypertension who presents to the emergency department complaining of elevated blood pressure and headache. The patient symptoms were sudden in onset today, has been persistent, moderate in severity, and nothing makes it better or worse. She states that she has been taking her medications as prescribed. She states that her blood pressure was 181/104 at home. She also complains of a headache. Patient does complain of pain on the left side of her chest that she states is going down her left arm. She describes as an aching sensation and nonradiating. Denies any blurry vision, double vision, numbness, tingling, shortness of breath, nausea, vomiting, abdominal pain, recent change in medications, recent hospitalization, recent illness, or other acute symptoms or concerns. She states that she does not follow-up with a cardiologist regularly. Review of Systems:   A complete review of systems was performed and pertinent positives and negatives are stated within HPI, all other systems reviewed and are negative.        --------------------------------------------- PAST HISTORY ---------------------------------------------  Past Medical History:  has no past medical history on file. Past Surgical History:  has a past surgical history that includes  section. Social History:  reports that she has been smoking cigarettes. She has a 6.50 pack-year smoking history.  She has never used smokeless tobacco. She reports that she does not drink alcohol and does not use drugs. Family History: family history is not on file. . Unless otherwise noted, family history is non contributory    The patients home medications have been reviewed. Allergies: Patient has no known allergies. I have reviewed the past medical history, past surgical history, social history, and family history    ---------------------------------------------------PHYSICAL EXAM--------------------------------------    Constitutional/General: Alert and oriented x3, obese and chronically ill-appearing but no acute distress  Head: Normocephalic and atraumatic  Eyes:  EOMI, sclera non icteric  ENT: Oropharynx clear, handling secretions, no trismus, no asymmetry of the posterior oropharynx or uvular edema  Neck: Supple, full ROM, no stridor, no meningeal signs  Respiratory: Lungs clear to auscultation bilaterally, no wheezes, rales, or rhonchi. Not in respiratory distress  Cardiovascular:  Regular rate. Regular rhythm. No murmurs, no gallops, no rubs. Gastrointestinal:  Abdomen Soft, Non tender, Non distended. No rebound, guarding, or rigidity. No pulsatile masses. Musculoskeletal: Moves all extremities x 4. Warm and well perfused, no clubbing, no cyanosis, no edema. Capillary refill <3 seconds  Skin: skin warm and dry. No rashes. Neurologic: GCS 15, no focal deficits, symmetric strength 5/5 in the upper and lower extremities bilaterally, sensation intact and equal to bilateral upper and lower extremities, no ataxia  Psychiatric: Normal Affect    -------------------------------------------------- RESULTS -------------------------------------------------  I have personally reviewed all laboratory and imaging results for this patient. Results are listed below.      LABS: (Lab results interpreted by me)  Results for orders placed or performed during the hospital encounter of 11/17/21   CBC Auto Differential   Result Value Ref Range    WBC 6.8 4.5 - 11.5 E9/L    RBC 6.29 (H) 3.50 - 5.50 E12/L    Hemoglobin 12.0 11.5 - 15.5 g/dL    Hematocrit 42.7 34.0 - 48.0 %    MCV 67.9 (L) 80.0 - 99.9 fL    MCH 19.1 (L) 26.0 - 35.0 pg    MCHC 28.1 (L) 32.0 - 34.5 %    RDW 22.2 (H) 11.5 - 15.0 fL    Platelets 240 508 - 777 E9/L    MPV 10.1 7.0 - 12.0 fL    Neutrophils % 51.3 43.0 - 80.0 %    Lymphocytes % 40.0 20.0 - 42.0 %    Monocytes % 3.5 2.0 - 12.0 %    Eosinophils % 2.6 0.0 - 6.0 %    Basophils % 1.7 0.0 - 2.0 %    Neutrophils Absolute 3.47 1.80 - 7.30 E9/L    Lymphocytes Absolute 2.79 1.50 - 4.00 E9/L    Monocytes Absolute 0.27 0.10 - 0.95 E9/L    Eosinophils Absolute 0.18 0.05 - 0.50 E9/L    Basophils Absolute 0.12 0.00 - 0.20 E9/L    Atypical Lymphocytes Relative 0.9 0.0 - 4.0 %    Anisocytosis 3+     Poikilocytes 1+     Ovalocytes 1+    Comprehensive Metabolic Panel   Result Value Ref Range    Sodium 141 132 - 146 mmol/L    Potassium 4.1 3.5 - 5.0 mmol/L    Chloride 104 98 - 107 mmol/L    CO2 25 22 - 29 mmol/L    Anion Gap 12 7 - 16 mmol/L    Glucose 112 (H) 74 - 99 mg/dL    BUN 12 6 - 23 mg/dL    CREATININE 0.8 0.5 - 1.0 mg/dL    GFR Non-African American >60 >=60 mL/min/1.73    GFR African American >60     Calcium 9.5 8.6 - 10.2 mg/dL    Total Protein 7.8 6.4 - 8.3 g/dL    Albumin 4.1 3.5 - 5.2 g/dL    Total Bilirubin 0.3 0.0 - 1.2 mg/dL    Alkaline Phosphatase 86 35 - 104 U/L    ALT 14 0 - 32 U/L    AST 17 0 - 31 U/L   Troponin   Result Value Ref Range    Troponin, High Sensitivity <6 0 - 9 ng/L   Troponin   Result Value Ref Range    Troponin, High Sensitivity <6 0 - 9 ng/L   Troponin   Result Value Ref Range    Troponin, High Sensitivity <6 0 - 9 ng/L   Troponin   Result Value Ref Range    Troponin, High Sensitivity <6 0 - 9 ng/L   CBC   Result Value Ref Range    WBC 5.8 4.5 - 11.5 E9/L    RBC 5.66 (H) 3.50 - 5.50 E12/L    Hemoglobin 10.7 (L) 11.5 - 15.5 g/dL    Hematocrit 37.6 34.0 - 48.0 %    MCV 66.4 (L) 80.0 - 99.9 fL    MCH 18.9 (L) 26.0 - 35.0 pg    MCHC 28.5 (L) 32.0 - 34.5 % RDW 21.8 (H) 11.5 - 15.0 fL    Platelets 543 176 - 778 E9/L    MPV 10.4 7.0 - 12.0 fL   Comprehensive Metabolic Panel w/ Reflex to MG   Result Value Ref Range    Sodium 139 132 - 146 mmol/L    Potassium reflex Magnesium 3.9 3.5 - 5.0 mmol/L    Chloride 104 98 - 107 mmol/L    CO2 24 22 - 29 mmol/L    Anion Gap 11 7 - 16 mmol/L    Glucose 100 (H) 74 - 99 mg/dL    BUN 16 6 - 23 mg/dL    CREATININE 0.9 0.5 - 1.0 mg/dL    GFR Non-African American >60 >=60 mL/min/1.73    GFR African American >60     Calcium 9.1 8.6 - 10.2 mg/dL    Total Protein 7.2 6.4 - 8.3 g/dL    Albumin 4.0 3.5 - 5.2 g/dL    Total Bilirubin <0.2 0.0 - 1.2 mg/dL    Alkaline Phosphatase 75 35 - 104 U/L    ALT 16 0 - 32 U/L    AST 21 0 - 31 U/L   Lipid panel - fasting   Result Value Ref Range    Cholesterol, Total 140 0 - 199 mg/dL    Triglycerides 81 0 - 149 mg/dL    HDL 37 >40 mg/dL    LDL Calculated 87 0 - 99 mg/dL    VLDL Cholesterol Calculated 16 mg/dL   D-dimer, quantitative   Result Value Ref Range    D-Dimer, Quant <200 ng/mL DDU   EKG 12 Lead   Result Value Ref Range    Ventricular Rate 88 BPM    Atrial Rate 88 BPM    P-R Interval 138 ms    QRS Duration 80 ms    Q-T Interval 376 ms    QTc Calculation (Bazett) 454 ms    P Axis 81 degrees    R Axis 24 degrees    T Axis -22 degrees   EKG 12 lead   Result Value Ref Range    Ventricular Rate 65 BPM    Atrial Rate 65 BPM    P-R Interval 156 ms    QRS Duration 86 ms    Q-T Interval 416 ms    QTc Calculation (Bazett) 432 ms    P Axis 76 degrees    R Axis -25 degrees    T Axis 27 degrees   ,       RADIOLOGY:  Interpreted by Radiologist unless otherwise specified  NM Cardiac Stress Test Nuclear Imaging   Final Result   The myocardial perfusion imaging was probably normal showing   attenuation artifact involving the inferior wall but with no   convincing evidence of scars or stress-induced ischemia   Overall left ventricular systolic function was normal with no regional   wall motion abnormality   Low risk pharmacologic stress  test.                  CT HEAD WO CONTRAST   Final Result   No acute intracranial abnormality. XR CHEST (2 VW)   Final Result   No acute process. NM Cardiac Stress Test Nuclear Imaging    (Results Pending)         EKG Interpretation  Interpreted by emergency department physician, Dr. Amber Pelayo    EKG: This EKG is signed and interpreted by me. Rate: 88  Rhythm: Sinus  Interpretation: Normal sinus rhythm, normal axis, T wave inversions in the high lateral leads, no acute elevations, QTC is 454  Comparison: changes compared to previous EKG       ------------------------- NURSING NOTES AND VITALS REVIEWED ---------------------------   The nursing notes within the ED encounter and vital signs as below have been reviewed by myself  BP (!) 152/78   Pulse 88   Temp 97 °F (36.1 °C) (Temporal)   Resp 18   Ht 5' 4\" (1.626 m)   Wt 230 lb (104.3 kg)   SpO2 97%   BMI 39.48 kg/m²     Oxygen Saturation Interpretation: Normal    The patients available past medical records and past encounters were reviewed.         ------------------------------ ED COURSE/MEDICAL DECISION MAKING----------------------  Medications   ipratropium-albuterol (DUONEB) nebulizer solution 1 ampule (1 ampule Inhalation Given 11/17/21 1548)   hydrALAZINE (APRESOLINE) injection 10 mg (10 mg IntraVENous Given 11/17/21 2123)   aspirin EC tablet 325 mg (325 mg Oral Given 11/17/21 2218)   technetium sestamibi (CARDIOLITE) injection 12 millicurie (12 millicuries IntraVENous Given 11/18/21 0832)   technetium sestamibi (CARDIOLITE) injection 35 millicurie (35 millicuries IntraVENous Given 11/18/21 1045)   regadenoson (LEXISCAN) injection 0.4 mg (0.4 mg IntraVENous Given 11/18/21 1042)   morphine (PF) injection 1 mg (1 mg IntraVENous Given 11/18/21 1130)   hydrALAZINE (APRESOLINE) injection 5 mg (5 mg IntraVENous Given 11/18/21 1130)           The cardiac monitor revealed NSR with a heart rate in the 90s as interpreted emergency provider has spoken with the patient and discussed todays results, in addition to providing specific details for the plan of care and counseling regarding the diagnosis and prognosis. Questions are answered at this time and they are agreeable with the plan.       --------------------------------- IMPRESSION AND DISPOSITION ---------------------------------    IMPRESSION  1. Chest pain, unspecified type    2. Acute electrocardiogram changes    3. Acute nonintractable headache, unspecified headache type        DISPOSITION  Disposition: Admit to telemetry  Patient condition is stable        NOTE: This report was transcribed using voice recognition software.  Every effort was made to ensure accuracy; however, inadvertent computerized transcription errors may be present       Anna Maza DO  11/24/21 0134

## 2021-11-17 NOTE — ED NOTES
FIRST PROVIDER CONTACT ASSESSMENT NOTE      Department of Emergency Medicine   11/17/21  2:52 PM EST    Chief Complaint: No chief complaint on file. History of Present Illness:    Rosario Kemp is a 64 y.o. female who presents to the ED by private car for HA, HTN. Pt states she also needs a breathing tx due to increased wheezing. Focused Screening Exam:  Constitutional:  Alert, appears stated age and is in no distress. *ALLERGIES*     Patient has no known allergies.      ED Triage Vitals [11/17/21 1425]   BP Temp Temp src Pulse Resp SpO2 Height Weight   -- -- -- 92 -- 95 % -- --        Initial Plan of Care:  Initiate Treatment-Testing, Proceed toTreatment Area When Bed Available for ED Attending/MLP to Continue Care    -----------------[address] ASSESSMENT NOTE--------------  Electronically signed by Mario Haddad PA-C   DD: 11/17/21     Mario Haddad PA-C  11/17/21 6363 Matias Mosley PA-C  11/17/21 4114

## 2021-11-17 NOTE — TELEPHONE ENCOUNTER
Araceli Myers called and said that she took her BP today and it was 181/104 she says that she was seated for a little while prior to taking it. She says she has a bad headache. Don't know what to do should she be worried?

## 2021-11-18 ENCOUNTER — APPOINTMENT (OUTPATIENT)
Dept: NUCLEAR MEDICINE | Age: 61
End: 2021-11-18
Payer: MEDICAID

## 2021-11-18 ENCOUNTER — APPOINTMENT (OUTPATIENT)
Dept: NON INVASIVE DIAGNOSTICS | Age: 61
End: 2021-11-18
Payer: MEDICAID

## 2021-11-18 VITALS
SYSTOLIC BLOOD PRESSURE: 152 MMHG | HEART RATE: 88 BPM | BODY MASS INDEX: 39.27 KG/M2 | HEIGHT: 64 IN | DIASTOLIC BLOOD PRESSURE: 78 MMHG | WEIGHT: 230 LBS | RESPIRATION RATE: 18 BRPM | OXYGEN SATURATION: 97 % | TEMPERATURE: 97 F

## 2021-11-18 LAB
ALBUMIN SERPL-MCNC: 4 G/DL (ref 3.5–5.2)
ALP BLD-CCNC: 75 U/L (ref 35–104)
ALT SERPL-CCNC: 16 U/L (ref 0–32)
ANION GAP SERPL CALCULATED.3IONS-SCNC: 11 MMOL/L (ref 7–16)
AST SERPL-CCNC: 21 U/L (ref 0–31)
BILIRUB SERPL-MCNC: <0.2 MG/DL (ref 0–1.2)
BUN BLDV-MCNC: 16 MG/DL (ref 6–23)
CALCIUM SERPL-MCNC: 9.1 MG/DL (ref 8.6–10.2)
CHLORIDE BLD-SCNC: 104 MMOL/L (ref 98–107)
CHOLESTEROL, TOTAL: 140 MG/DL (ref 0–199)
CO2: 24 MMOL/L (ref 22–29)
CREAT SERPL-MCNC: 0.9 MG/DL (ref 0.5–1)
D DIMER: <200 NG/ML DDU
EKG ATRIAL RATE: 65 BPM
EKG ATRIAL RATE: 88 BPM
EKG P AXIS: 76 DEGREES
EKG P AXIS: 81 DEGREES
EKG P-R INTERVAL: 138 MS
EKG P-R INTERVAL: 156 MS
EKG Q-T INTERVAL: 376 MS
EKG Q-T INTERVAL: 416 MS
EKG QRS DURATION: 80 MS
EKG QRS DURATION: 86 MS
EKG QTC CALCULATION (BAZETT): 432 MS
EKG QTC CALCULATION (BAZETT): 454 MS
EKG R AXIS: -25 DEGREES
EKG R AXIS: 24 DEGREES
EKG T AXIS: -22 DEGREES
EKG T AXIS: 27 DEGREES
EKG VENTRICULAR RATE: 65 BPM
EKG VENTRICULAR RATE: 88 BPM
GFR AFRICAN AMERICAN: >60
GFR NON-AFRICAN AMERICAN: >60 ML/MIN/1.73
GLUCOSE BLD-MCNC: 100 MG/DL (ref 74–99)
HCT VFR BLD CALC: 37.6 % (ref 34–48)
HDLC SERPL-MCNC: 37 MG/DL
HEMOGLOBIN: 10.7 G/DL (ref 11.5–15.5)
LDL CHOLESTEROL CALCULATED: 87 MG/DL (ref 0–99)
LV EF: 77 %
LVEF MODALITY: NORMAL
MCH RBC QN AUTO: 18.9 PG (ref 26–35)
MCHC RBC AUTO-ENTMCNC: 28.5 % (ref 32–34.5)
MCV RBC AUTO: 66.4 FL (ref 80–99.9)
PDW BLD-RTO: 21.8 FL (ref 11.5–15)
PLATELET # BLD: 324 E9/L (ref 130–450)
PMV BLD AUTO: 10.4 FL (ref 7–12)
POTASSIUM REFLEX MAGNESIUM: 3.9 MMOL/L (ref 3.5–5)
RBC # BLD: 5.66 E12/L (ref 3.5–5.5)
SODIUM BLD-SCNC: 139 MMOL/L (ref 132–146)
TOTAL PROTEIN: 7.2 G/DL (ref 6.4–8.3)
TRIGL SERPL-MCNC: 81 MG/DL (ref 0–149)
TROPONIN, HIGH SENSITIVITY: <6 NG/L (ref 0–9)
TROPONIN, HIGH SENSITIVITY: <6 NG/L (ref 0–9)
VLDLC SERPL CALC-MCNC: 16 MG/DL
WBC # BLD: 5.8 E9/L (ref 4.5–11.5)

## 2021-11-18 PROCEDURE — 6360000002 HC RX W HCPCS: Performed by: INTERNAL MEDICINE

## 2021-11-18 PROCEDURE — G0378 HOSPITAL OBSERVATION PER HR: HCPCS

## 2021-11-18 PROCEDURE — 80061 LIPID PANEL: CPT

## 2021-11-18 PROCEDURE — 93010 ELECTROCARDIOGRAM REPORT: CPT | Performed by: INTERNAL MEDICINE

## 2021-11-18 PROCEDURE — A9500 TC99M SESTAMIBI: HCPCS | Performed by: RADIOLOGY

## 2021-11-18 PROCEDURE — 3430000000 HC RX DIAGNOSTIC RADIOPHARMACEUTICAL: Performed by: RADIOLOGY

## 2021-11-18 PROCEDURE — 85027 COMPLETE CBC AUTOMATED: CPT

## 2021-11-18 PROCEDURE — 78452 HT MUSCLE IMAGE SPECT MULT: CPT

## 2021-11-18 PROCEDURE — 6370000000 HC RX 637 (ALT 250 FOR IP): Performed by: FAMILY MEDICINE

## 2021-11-18 PROCEDURE — 84484 ASSAY OF TROPONIN QUANT: CPT

## 2021-11-18 PROCEDURE — 36415 COLL VENOUS BLD VENIPUNCTURE: CPT

## 2021-11-18 PROCEDURE — 6360000002 HC RX W HCPCS: Performed by: FAMILY MEDICINE

## 2021-11-18 PROCEDURE — 93018 CV STRESS TEST I&R ONLY: CPT | Performed by: INTERNAL MEDICINE

## 2021-11-18 PROCEDURE — 85378 FIBRIN DEGRADE SEMIQUANT: CPT

## 2021-11-18 PROCEDURE — 93016 CV STRESS TEST SUPVJ ONLY: CPT | Performed by: INTERNAL MEDICINE

## 2021-11-18 PROCEDURE — 93005 ELECTROCARDIOGRAM TRACING: CPT | Performed by: FAMILY MEDICINE

## 2021-11-18 PROCEDURE — 96375 TX/PRO/DX INJ NEW DRUG ADDON: CPT

## 2021-11-18 PROCEDURE — 96376 TX/PRO/DX INJ SAME DRUG ADON: CPT

## 2021-11-18 PROCEDURE — 80053 COMPREHEN METABOLIC PANEL: CPT

## 2021-11-18 PROCEDURE — 93017 CV STRESS TEST TRACING ONLY: CPT

## 2021-11-18 PROCEDURE — 99999 PR OFFICE/OUTPT VISIT,PROCEDURE ONLY: CPT | Performed by: INTERNAL MEDICINE

## 2021-11-18 PROCEDURE — 2580000003 HC RX 258: Performed by: FAMILY MEDICINE

## 2021-11-18 PROCEDURE — 78452 HT MUSCLE IMAGE SPECT MULT: CPT | Performed by: INTERNAL MEDICINE

## 2021-11-18 RX ORDER — MORPHINE SULFATE 2 MG/ML
1 INJECTION, SOLUTION INTRAMUSCULAR; INTRAVENOUS ONCE
Status: COMPLETED | OUTPATIENT
Start: 2021-11-18 | End: 2021-11-18

## 2021-11-18 RX ORDER — HYDROCODONE BITARTRATE AND ACETAMINOPHEN 5; 325 MG/1; MG/1
1 TABLET ORAL EVERY 6 HOURS PRN
Qty: 8 TABLET | Refills: 0 | Status: SHIPPED | OUTPATIENT
Start: 2021-11-18 | End: 2021-11-20

## 2021-11-18 RX ORDER — ATORVASTATIN CALCIUM 40 MG/1
40 TABLET, FILM COATED ORAL NIGHTLY
Qty: 30 TABLET | Refills: 0 | Status: SHIPPED | OUTPATIENT
Start: 2021-11-18 | End: 2022-02-01 | Stop reason: SDUPTHER

## 2021-11-18 RX ORDER — LABETALOL HYDROCHLORIDE 5 MG/ML
10 INJECTION, SOLUTION INTRAVENOUS EVERY 4 HOURS PRN
Status: DISCONTINUED | OUTPATIENT
Start: 2021-11-18 | End: 2021-11-18 | Stop reason: HOSPADM

## 2021-11-18 RX ORDER — HYDRALAZINE HYDROCHLORIDE 20 MG/ML
5 INJECTION INTRAMUSCULAR; INTRAVENOUS ONCE
Status: COMPLETED | OUTPATIENT
Start: 2021-11-18 | End: 2021-11-18

## 2021-11-18 RX ORDER — ASPIRIN 81 MG/1
81 TABLET, CHEWABLE ORAL DAILY
Qty: 30 TABLET | Refills: 0 | Status: SHIPPED | OUTPATIENT
Start: 2021-11-19 | End: 2022-02-01 | Stop reason: SDUPTHER

## 2021-11-18 RX ADMIN — AMLODIPINE BESYLATE 5 MG: 5 TABLET ORAL at 12:38

## 2021-11-18 RX ADMIN — Medication 35 MILLICURIE: at 10:45

## 2021-11-18 RX ADMIN — Medication 12 MILLICURIE: at 08:32

## 2021-11-18 RX ADMIN — REGADENOSON 0.4 MG: 0.08 INJECTION, SOLUTION INTRAVENOUS at 10:42

## 2021-11-18 RX ADMIN — ASPIRIN 81 MG: 81 TABLET, CHEWABLE ORAL at 12:38

## 2021-11-18 RX ADMIN — MORPHINE SULFATE 1 MG: 2 INJECTION, SOLUTION INTRAMUSCULAR; INTRAVENOUS at 11:30

## 2021-11-18 RX ADMIN — ACETAMINOPHEN 650 MG: 325 TABLET ORAL at 01:10

## 2021-11-18 RX ADMIN — Medication 10 ML: at 10:43

## 2021-11-18 RX ADMIN — HYDRALAZINE HYDROCHLORIDE 5 MG: 20 INJECTION INTRAMUSCULAR; INTRAVENOUS at 11:30

## 2021-11-18 RX ADMIN — ATORVASTATIN CALCIUM 40 MG: 40 TABLET, FILM COATED ORAL at 01:10

## 2021-11-18 ASSESSMENT — PAIN DESCRIPTION - DESCRIPTORS: DESCRIPTORS: HEADACHE

## 2021-11-18 ASSESSMENT — PAIN SCALES - GENERAL
PAINLEVEL_OUTOF10: 7
PAINLEVEL_OUTOF10: 0
PAINLEVEL_OUTOF10: 6
PAINLEVEL_OUTOF10: 10

## 2021-11-18 ASSESSMENT — PAIN DESCRIPTION - PAIN TYPE: TYPE: ACUTE PAIN

## 2021-11-18 NOTE — CARE COORDINATION
Patient presented to the emergency department with complaints of headache and high blood pressure. Reviewing the ER documentation the highest her pressure recorded was 195/99. Attempted to meet with patient who is off unit for stress test. Cm/sw to follow. Electronically signed by Ryann Suarez RN on 11/18/2021 at 10:51 AM    Discharge order noted. Met with patient with lives with her family in 2 story home with 4 steps to enter. She does drive. Her PCP is  Dr Morgan Modi and uses [facility]. Has a \"breathing machine\" but no other DME. No History of HHC or Rehab. Patient has her car in the parking lot. Electronically signed by Ryann Suarez RN on 11/18/2021 at 3:14 PM

## 2021-11-18 NOTE — DISCHARGE INSTR - COC
Continuity of Care Form    Patient Name: Daniel Higginbotham   :  1960  MRN:  [id number]    Admit date:  2021  Discharge date:  ***    Code Status Order: Full Code   Advance Directives:      Admitting Physician:  Kalyan Mares DO  PCP: Tere High DO    Discharging Nurse: [facility] Unit/Room#: 8210/8210-B  Discharging Unit Phone Number: ***    Emergency Contact:   Extended Emergency Contact Information  Primary Emergency Contact: Princess Chance  Address: [address] [place] [address] [address]  Mobile Phone: [phone]  Relation: Child    Past Surgical History:  Past Surgical History:   Procedure Laterality Date     SECTION         Immunization History: There is no immunization history on file for this patient. Active Problems:  Patient Active Problem List   Diagnosis Code    Primary hypertension I10    Other emphysema ([facility] [address] J43.8    Chest pain R07.9       Isolation/Infection:   Isolation            No Isolation          Patient Infection Status       None to display            Nurse Assessment:  Last Vital Signs: BP (!) 152/78   Pulse 88   Temp 97 °F (36.1 °C) (Temporal)   Resp 18   Ht 5' 4\" (1.626 m)   Wt 230 lb (104.3 kg)   SpO2 97%   BMI 39.48 kg/m²     Last documented pain score (0-10 scale): Pain Level: 6  Last Weight:   Wt Readings from Last 1 Encounters:   21 230 lb (104.3 kg)     Mental Status:  {IP PT MENTAL STATUS:23353}    IV Access:  { CHANDA IV ACCESS:457289293}    Nursing Mobility/ADLs:  Walking   {CHP DME PIRY:855879810}  Transfer  {CHP DME BHYP:821753564}  Bathing  {CHP DME MVAW:159155110}  Dressing  {CHP DME ULIU:472936856}  Toileting  {CHP DME YVXH:715159929}  Feeding  {CHP DME COIW:887267784}  Med Admin  {CHP DME QKPH:080956752}  Med Delivery   { CHANDA MED Delivery:508973228}    Wound Care Documentation and Therapy:        Elimination:  Continence:    Bowel: {YES / IM:55143}  Bladder: {YES / OL:75199}  Urinary Catheter: {Urinary Catheter:224288233}   Colostomy/Ileostomy/Ileal Conduit: {YES / HS:51147}       Date of Last BM: ***  No intake or output data in the 24 hours ending 21 1326  No intake/output data recorded.     Safety Concerns:     [address] Safety Concerns:358718350}    Impairments/Disabilities:      [address] CHANDA Impairments/Disabilities:414193185}    Nutrition Therapy:  Current Nutrition Therapy:   [address] CHANDA Diet List:844779588}    Routes of Feeding: {CHP DME Other Feedings:281517433}  Liquids: {Slp liquid thickness:87907}  Daily Fluid Restriction: {CHP DME Yes amt example:556792641}  Last Modified Barium Swallow with Video (Video Swallowing Test): {Done Not Done KCWE:577583875}    Treatments at the Time of Hospital Discharge:   Respiratory Treatments: ***  Oxygen Therapy:  {Therapy; copd oxygen:88442}  Ventilator:    { CC Vent COJS:724164776}    Rehab Therapies: {THERAPEUTIC INTERVENTION:9809630373}  Weight Bearing Status/Restrictions: [address]  Weight Bearin}  Other Medical Equipment (for information only, NOT a DME order):  {EQUIPMENT:490980939}  Other Treatments: ***    Patient's personal belongings (please select all that are sent with patient):  {CHP DME Belongings:058129271}    RN SIGNATURE:  {Esignature:327660736}    CASE MANAGEMENT/SOCIAL WORK SECTION    Inpatient Status Date: ***    Readmission Risk Assessment Score:  Readmission Risk              Risk of Unplanned Readmission:  0           Discharging to Facility/ Agency   Name:   Address:  Phone:  Fax:    Dialysis Facility (if applicable)   Name:  Address:  Dialysis Schedule:  Phone:  Fax:    / signature: {Esignature:604124168}    PHYSICIAN SECTION    Prognosis: {Prognosis:5618355662}    Condition at Discharge: [address] Patient Condition:326353675}    Rehab Potential (if transferring to Rehab): {Prognosis:7921535169}    Recommended Labs or Other Treatments After Discharge: ***    Physician Certification: I certify the above information and transfer of Flora Mejia  is necessary for the continuing treatment of the diagnosis listed and that she requires {Admit to Appropriate Level of Care:46854} for {GREATER/LESS:958691281} 30 days.      Update Admission H&P: {VIKTORIYA DME Changes in [facility]:657853266}    PHYSICIAN SIGNATURE:  Electronically signed by Mily Scott MD on 11/18/21 at 1:26 PM EST

## 2021-11-18 NOTE — PROCEDURES
Lexiscan MPS ( treadmill stress test ordered but was converted to the pharmacologic stress test because of uncontrolled BP at baseline )  No CP  No ischemic ECG changes  Nuclear images reported separately    Electronically signed by Brenda Spangler MD on 11/18/2021 at 10:48 AM

## 2021-11-18 NOTE — DISCHARGE INSTR - DIET
Good nutrition is important when healing from an illness, injury, or surgery. Follow any nutrition recommendations given to you during your hospital stay. If you were given an oral nutrition supplement while in the hospital, continue to take this supplement at home. You can take it with meals, in-between meals, and/or before bedtime. These supplements can be purchased at most local grocery stores, pharmacies, and chain [employer]-stores. If you have any questions about your diet or nutrition, call the hospital and ask for the dietitian.     Cardiac diet

## 2021-11-18 NOTE — DISCHARGE SUMMARY
Hospitalist Discharge Summary    Patient ID: Chucho Martinez   Patient : 1960  Patient's PCP: Marty Jordan DO    Admit Date: 2021   Admitting Physician: Yehuda Adames DO    Discharge Date:  2021  Discharge Physician: Roddy Voss MD   Discharge Condition: Stable  Discharge Disposition: Home    History of presenting illness:  Ms. Chucho Martinez, a 64y.o. year old female  who  has no past medical history on file.      Patient presented to the emergency department with complaints of headache and high blood pressure. Reviewing the ER documentation the highest her pressure recorded was 195/99. EKG shows some nonspecific changes. Chest x-ray and laboratory studies including troponin is unremarkable. Apparently at some point the patient stated that she had chest pain. When I talked to her she cannot give me a specific complaint. She does state that she has a headache. She vaguely kind of mentions some chest discomfort but that is not her true concern. Patient was given hydralazine and pressure dropped to 143/64. Hospital course in brief:  (Please refer to daily progress notes for a comprehensive review of the hospitalization by requesting medical records)  Patient was admitted to the hospital with chest pain. She was evaluated by cardiology. Cardiac enzymes were negative. We are obtaining a D-dimer. She had a stress test which was unremarkable. She has headache which she attributes to being started on Norvasc. Head CT was negative for acute findings. Chest pain has resolved. She denies shortness of breath. If D-dimer is negative she will be discharged home to follow-up with PCP and cardiology. Norvasc was discontinued, metoprolol prescription was sent to outpatient pharmacy. Mitchell was prescribed for poorly controlled headache. Consults:   IP CONSULT TO HOSPITALIST    Discharge Diagnoses:  1. Chest pain unspecified etiology  2. Headache, unspecified etiology  3. Obesity BMI 39.4  4. Essential hypertension    Discharge Instructions / Follow up:    Continued appropriate risk factor modification of blood pressure, diabetes and serum lipids will remain essential to reducing risk of future atherosclerotic development    Activity: activity as tolerated    Significant labs:  CBC:   Recent Labs     11/17/21 1510 11/18/21 0423   WBC 6.8 5.8   RBC 6.29* 5.66*   HGB 12.0 10.7*   HCT 42.7 37.6   MCV 67.9* 66.4*   RDW 22.2* 21.8*    324     BMP:   Recent Labs     11/17/21  1510 11/18/21  0423    139   K 4.1 3.9    104   CO2 25 24   BUN 12 16   CREATININE 0.8 0.9     LFT:  Recent Labs     11/17/21 1510 11/18/21 0423   PROT 7.8 7.2   ALKPHOS 86 75   ALT 14 16   AST 17 21   BILITOT 0.3 <0.2     PT/INR: No results for input(s): INR, APTT in the last 72 hours. BNP: No results for input(s): BNP in the last 72 hours. Hgb A1C: No results found for: LABA1C  Folate and B12: No results found for: [id number], No results found for: FOLATE  Thyroid Studies: No results found for: TSH, X3BZLAP, A6EIDML, THYROIDAB    Urinalysis:    Lab Results   Component Value Date    NITRU Negative 07/05/2021    WBCUA 0-1 07/05/2021    BACTERIA FEW 07/05/2021    RBCUA NONE 07/05/2021    BLOODU neg 07/08/2021    BLOODU Negative 07/05/2021    SPECGRAV >=1.030 07/08/2021    SPECGRAV 1.020 07/05/2021    GLUCOSEU neg 07/08/2021    GLUCOSEU Negative 07/05/2021       Imaging:  XR CHEST (2 VW)    Result Date: 11/17/2021  EXAMINATION: TWO XRAY VIEWS OF THE CHEST 11/17/2021 4:13 pm COMPARISON: 07/05/2021 HISTORY: ORDERING SYSTEM PROVIDED HISTORY: HTN TECHNOLOGIST PROVIDED HISTORY: Reason for exam:->HTN What reading provider will be dictating this exam?->CRC FINDINGS: The lungs are without acute focal process. There is no effusion or pneumothorax. The cardiomediastinal silhouette is without acute process. The osseous structures are without acute process. No acute process.      XR HUMERUS LEFT (MIN 2 TECHNOLOGIST PROVIDED HISTORY: Has a \"code stroke\" or \"stroke alert\" been called? ->No Reason for exam:->HA Decision Support Exception - unselect if not a suspected or confirmed emergency medical condition->Emergency Medical Condition (MA) What reading provider will be dictating this exam?->CRC FINDINGS: BRAIN/VENTRICLES: There is no acute intracranial hemorrhage, mass effect or midline shift. No abnormal extra-axial fluid collection. The gray-white differentiation is maintained without evidence of an acute infarct. There is no evidence of hydrocephalus. ORBITS: The visualized portion of the orbits demonstrate no acute abnormality. SINUSES: The visualized paranasal sinuses and mastoid air cells demonstrate no acute abnormality. SOFT TISSUES/SKULL:  No acute abnormality of the visualized skull or soft tissues. No acute intracranial abnormality. NM Cardiac Stress Test Nuclear Imaging    Result Date: 11/18/2021  Indication:  Chest Pain. Clinical History:   Patient has no known previous historyof coronary artery disease. IMAGING: Myocardial perfusion imaging was performed at rest 30-35 minutes following the intravenous injection of 12 mCi of (Tc-Sestamibi) followed by 10 ml of Normal Saline. At peak exercise, the patient was injected intravenously with 35mCi of (Tc-Sestamibi) followed by 10 ml of Normal Saline. Gated post-stress tomographic imaging was performed 20-25 minutes after stress. FINDINGS: The overall quality of the study was good. Left ventricular cavity size was noted to be normal. Rotational analog analysis demonstrated increase in radiotracer uptake activity in the abdominal organs A a mild to moderatedefect was present in the inferior wall(s) that was small to moderatesized by quantification. The resting images showed no change Gated SPECT left ventricular ejection fraction was calculated to be 77%, with Myocardial wall motion.      The myocardial perfusion imaging was probably normal [id number]    Phone: [phone]   · aspirin 81 MG chewable tablet  · atorvastatin 40 MG tablet  · HYDROcodone-acetaminophen 5-325 MG per tablet  · metoprolol tartrate 25 MG tablet         Time Spent on discharge is more than 35 minutes in the examination, evaluation, counseling and review of medications and discharge plan.    +++++++++++++++++++++++++++++++++++++++++++++++++  Prasanna Hernandez MD  [address]  +++++++++++++++++++++++++++++++++++++++++++++++++  NOTE: This report was transcribed using voice recognition software. Every effort was made to ensure accuracy; however, inadvertent computerized transcription errors may be present.

## 2021-11-18 NOTE — PROGRESS NOTES
CLINICAL PHARMACY NOTE: MEDS TO BEDS    Total # of Prescriptions Filled: 4   The following medications were delivered to the patient:  · Aspirin low dose chew 81mg  · Metoprolol tart 25mg  · lipitor 40mg  · norco 5-325mg    Additional Documentation:    Delivered to patient 11/18 @3:19pm

## 2022-02-01 ENCOUNTER — OFFICE VISIT (OUTPATIENT)
Dept: FAMILY MEDICINE CLINIC | Age: 62
End: 2022-02-01
Payer: MEDICAID

## 2022-02-01 VITALS
DIASTOLIC BLOOD PRESSURE: 84 MMHG | SYSTOLIC BLOOD PRESSURE: 140 MMHG | OXYGEN SATURATION: 95 % | WEIGHT: 226 LBS | BODY MASS INDEX: 38.58 KG/M2 | RESPIRATION RATE: 16 BRPM | HEART RATE: 86 BPM | HEIGHT: 64 IN | TEMPERATURE: 97.9 F

## 2022-02-01 DIAGNOSIS — E78.2 MIXED HYPERLIPIDEMIA: ICD-10-CM

## 2022-02-01 DIAGNOSIS — I10 PRIMARY HYPERTENSION: ICD-10-CM

## 2022-02-01 DIAGNOSIS — I10 HYPERTENSION, UNSPECIFIED TYPE: ICD-10-CM

## 2022-02-01 DIAGNOSIS — R05.9 COUGH: ICD-10-CM

## 2022-02-01 DIAGNOSIS — R32 URINARY INCONTINENCE, UNSPECIFIED TYPE: Primary | ICD-10-CM

## 2022-02-01 DIAGNOSIS — J43.8 OTHER EMPHYSEMA (HCC): ICD-10-CM

## 2022-02-01 DIAGNOSIS — J44.9 CHRONIC OBSTRUCTIVE PULMONARY DISEASE, UNSPECIFIED COPD TYPE (HCC): ICD-10-CM

## 2022-02-01 LAB
BILIRUBIN, POC: NORMAL
BLOOD URINE, POC: NORMAL
CLARITY, POC: CLEAR
COLOR, POC: YELLOW
GLUCOSE URINE, POC: NORMAL
KETONES, POC: NORMAL
LEUKOCYTE EST, POC: NORMAL
NITRITE, POC: NORMAL
PH, POC: 7
PROTEIN, POC: 30
SPECIFIC GRAVITY, POC: 1.02
UROBILINOGEN, POC: 0.2

## 2022-02-01 PROCEDURE — 3023F SPIROM DOC REV: CPT | Performed by: INTERNAL MEDICINE

## 2022-02-01 PROCEDURE — G8484 FLU IMMUNIZE NO ADMIN: HCPCS | Performed by: INTERNAL MEDICINE

## 2022-02-01 PROCEDURE — G8427 DOCREV CUR MEDS BY ELIG CLIN: HCPCS | Performed by: INTERNAL MEDICINE

## 2022-02-01 PROCEDURE — 99213 OFFICE O/P EST LOW 20 MIN: CPT | Performed by: INTERNAL MEDICINE

## 2022-02-01 PROCEDURE — 4004F PT TOBACCO SCREEN RCVD TLK: CPT | Performed by: INTERNAL MEDICINE

## 2022-02-01 PROCEDURE — 81002 URINALYSIS NONAUTO W/O SCOPE: CPT | Performed by: INTERNAL MEDICINE

## 2022-02-01 PROCEDURE — G8417 CALC BMI ABV UP PARAM F/U: HCPCS | Performed by: INTERNAL MEDICINE

## 2022-02-01 PROCEDURE — 3017F COLORECTAL CA SCREEN DOC REV: CPT | Performed by: INTERNAL MEDICINE

## 2022-02-01 RX ORDER — ATORVASTATIN CALCIUM 40 MG/1
40 TABLET, FILM COATED ORAL NIGHTLY
Qty: 30 TABLET | Refills: 3 | Status: SHIPPED | OUTPATIENT
Start: 2022-02-01

## 2022-02-01 RX ORDER — BROMPHENIRAMINE MALEATE, PSEUDOEPHEDRINE HYDROCHLORIDE, AND DEXTROMETHORPHAN HYDROBROMIDE 2; 30; 10 MG/5ML; MG/5ML; MG/5ML
5 SYRUP ORAL 4 TIMES DAILY PRN
Qty: 118 ML | Refills: 0 | Status: SHIPPED
Start: 2022-02-01 | End: 2022-06-13 | Stop reason: SDUPTHER

## 2022-02-01 RX ORDER — ASPIRIN 81 MG/1
81 TABLET, CHEWABLE ORAL DAILY
Qty: 30 TABLET | Refills: 3 | Status: SHIPPED
Start: 2022-02-01 | End: 2022-10-19

## 2022-02-01 RX ORDER — METHYLPREDNISOLONE 4 MG/1
TABLET ORAL
Qty: 1 KIT | Refills: 0 | Status: SHIPPED | OUTPATIENT
Start: 2022-02-01 | End: 2022-02-07

## 2022-02-01 NOTE — PROGRESS NOTES
[facility]  [address] [address]  Dept: [phone]  Dept Fax: [phone]     NAME: Hafsa Stover        :  1960        MRN:  [id number]    Chief Complaint   Patient presents with    Referral - General     She would like to get a referral to urology. She stated that she can't get to the bathroom without wetting herself.  Shortness of Breath     Has had for the past week. Would like a steroid.  Cough     She would like to get a cough medicine. She thinks she has bronchitis    Incontinence       Subjective     History of Present Illness  Hafsa Stover is a 64 y.o. female who presents today for routine follow up and medication refill. No longer taking the norvasc, it was discontinued while she was in the hospital in 2021. Acutely patient states that she has had a cough and some shortness of breath for the last week. She states the cough is mildly productive of a whitish colored sputum. She states that it feels like she has bronchitis and she has had very similar symptoms in the past.  Patient reports that the symptoms usually resolve with steroids. She denies any sick contacts. Patient also complains of urinary incontinence that has been ongoing for a while now. She states that she will leak urine if she does not make it to the bathroom in time. She also notes that even after urinating she has continued leaking of urine when she gets up. Patient requesting referral to urology. She does not follow with OB/GYN and states that she does not need to and that she needs to see a urinary specialist.      Review of Systems  Please see HPI above. All bolded are positive.   Gen: fever, chills, fatigue, weakness, diaphoresis, unintentional weight change  Head: headache, vision change, hearing loss  Chest: chest pain/heaviness, palpitations  Lungs: shortness of breath, wheezing, coughing, hemoptysis  Abdomen: abdominal pain, nausea, vomiting, diarrhea, constipation, melena, hematochezia, hematemesis, loss of appetite  Extremities: lower extremity edema, myalgias, arthralgias  Urinary: dysuria, hematuria, weak flow, increase in frequency, incontinence   Neurologic: lightheadedness, dizziness, confusion, syncope  Endocrine: polydipsia, polyuria, heat or cold intolerance  Psychiatric: depression, suicidal ideation, anxiety  Derm: Rashes, ulcers, burns    Past Medical Hx:  No past medical history on file. Past Surgical Hx:  Past Surgical History:   Procedure Laterality Date     SECTION         Family Hx:  No family history on file. Social Hx:  Social History     Tobacco Use    Smoking status: Current Some Day Smoker     Packs/day: 0.50     Years: 13.00     Pack years: 6.50     Types: Cigarettes    Smokeless tobacco: Never Used    Tobacco comment: quit x 14 yrs and started up again    Substance Use Topics    Alcohol use: No       Home Medications:  Current Outpatient Medications   Medication Sig Dispense Refill    TOBRADEX 0.3-0.1 % ophthalmic suspension instill 1 drop into both eyes four times a day for 7 days      aspirin 81 MG chewable tablet Take 1 tablet by mouth daily 30 tablet 3    metoprolol tartrate (LOPRESSOR) 25 MG tablet Take 0.5 tablets by mouth 2 times daily 30 tablet 3    atorvastatin (LIPITOR) 40 MG tablet Take 1 tablet by mouth nightly 30 tablet 3    methylPREDNISolone (MEDROL DOSEPACK) 4 MG tablet Take by mouth.  1 kit 0    brompheniramine-pseudoephedrine-DM (BROMFED DM) 2-30-10 MG/5ML syrup Take 5 mLs by mouth 4 times daily as needed for Cough 118 mL 0    Blood Pressure KIT 1 kit by Does not apply route daily 1 kit 0    albuterol (PROVENTIL) (2.5 MG/3ML) 0.083% nebulizer solution Take 3 mLs by nebulization every 6 hours as needed for Wheezing or Shortness of Breath 1 Package 5    albuterol sulfate HFA (PROVENTIL HFA) 108 (90 Base) MCG/ACT inhaler Inhale 2 puffs into the lungs every 4 hours as needed for Wheezing 1 Inhaler 5    Spacer/Aero-Holding Chambers ABHIJIT 1 Device by Does not apply route daily 1 Device 0    Compression Stockings MISC by Does not apply route (Patient not taking: Reported on 11/10/2021) 2 each 0     No current facility-administered medications for this visit. Allergies:  No Known Allergies    Objective     Vitals:    02/01/22 1217   BP: (!) 140/84   Site: Left Upper Arm   Pulse: 86   Resp: 16   Temp: 97.9 °F (36.6 °C)   TempSrc: Temporal   SpO2: 95%   Weight: 226 lb (102.5 kg)   Height: 5' 4\" (1.626 m)        Physical Exam  General: Awake, alert, and oriented to person, place, time, and purpose, appears stated age and cooperative, No acute distress  Head: Normocephalic, atraumatic  Eyes: conjunctivae/corneas clear, EOMI  Mouth: Mucous membranes moist with no pharyngeal exudate or erythema  Neck: no JVD, no adenopathy, no carotid bruit, supple, symmetrical, trachea midline  Back: symmetric, ROM normal, No CVA tenderness. Lungs: clear to auscultation bilaterally without wheezes, rales, or rhonchi  Heart: regular rate and rhythm, S1, S2 normal, no murmur, click, rub or gallop  Abdomen: soft, non-tender; bowel sounds normal; no masses,  no organomegaly  Extremities: atraumatic, no cyanosis, no edema, 2+ pulses palpated in all 4 extremities  Skin: color, texture, turgor within normal limits.  No rashes or lesions or normal  Neurologic:speech appropriate, moves all 4 extremities, normal muscle strength and tone, CN 2-12 grossly intact    Labs:  Lab Results   Component Value Date    WBC 5.8 11/18/2021    HGB 10.7 (L) 11/18/2021    HCT 37.6 11/18/2021     11/18/2021     11/18/2021    K 3.9 11/18/2021     11/18/2021    CREATININE 0.9 11/18/2021    BUN 16 11/18/2021    CO2 24 11/18/2021    GLUCOSE 100 (H) 11/18/2021    ALT 16 11/18/2021    AST 21 11/18/2021     No results found for: TSH  Lab Results   Component Value Date    TRIG 81 11/18/2021     Lab Results Component Value Date    HDL 37 11/18/2021     Lab Results   Component Value Date    LDLCALC 87 11/18/2021     No results found for: LABA1C  No results found for: INR, PROTIME   *All recent labs were reviewed. Please see electronic chart for a more comprehensive set of labs    Radiology:  No results found. Assessment and Plan     Patient is a 64 y.o. female who presented to the office for follow up. Full problem list is as follows:  Patient Active Problem List   Diagnosis    Primary hypertension    Other emphysema ([address]    Chest pain       Shanti Desai was seen today for referral - general, shortness of breath, cough and incontinence. Diagnoses and all orders for this visit:    Urinary incontinence, unspecified type  -     POCT Urinalysis no Micro  -     Christelle Griffin MD, OB/GYN, Ovi Sanders MD, Urology, [facility]    Primary hypertension  -     aspirin 81 MG chewable tablet; Take 1 tablet by mouth daily  -     metoprolol tartrate (LOPRESSOR) 25 MG tablet; Take 0.5 tablets by mouth 2 times daily    Other emphysema (HCC)    Hypertension, unspecified type    Chronic obstructive pulmonary disease, unspecified COPD type ([address]  -     methylPREDNISolone (MEDROL DOSEPACK) 4 MG tablet; Take by mouth. Mixed hyperlipidemia  -     atorvastatin (LIPITOR) 40 MG tablet; Take 1 tablet by mouth nightly    Cough  -     brompheniramine-pseudoephedrine-DM (BROMFED DM) 2-30-10 MG/5ML syrup; Take 5 mLs by mouth 4 times daily as needed for Cough      Patient refusing ob/gyn at this time stating she needs a urinary specialist.   She does not believe she has a pelvic floor issue, even after explaining to her this is typically what causing the urinary symptoms she is describing. Educational materials and/or home exercises printed for patient's review and were included in patient instructions on his/her After Visit Summary and given to patient at the end of visit.

## 2022-02-10 ENCOUNTER — TELEPHONE (OUTPATIENT)
Dept: FAMILY MEDICINE CLINIC | Age: 62
End: 2022-02-10

## 2022-03-21 ENCOUNTER — OFFICE VISIT (OUTPATIENT)
Dept: FAMILY MEDICINE CLINIC | Age: 62
End: 2022-03-21
Payer: MEDICAID

## 2022-03-21 VITALS
HEIGHT: 65 IN | BODY MASS INDEX: 36.99 KG/M2 | TEMPERATURE: 97.3 F | DIASTOLIC BLOOD PRESSURE: 86 MMHG | WEIGHT: 222 LBS | RESPIRATION RATE: 16 BRPM | HEART RATE: 85 BPM | SYSTOLIC BLOOD PRESSURE: 136 MMHG | OXYGEN SATURATION: 96 %

## 2022-03-21 DIAGNOSIS — E78.49 OTHER HYPERLIPIDEMIA: ICD-10-CM

## 2022-03-21 DIAGNOSIS — I10 PRIMARY HYPERTENSION: ICD-10-CM

## 2022-03-21 DIAGNOSIS — H40.1123 PRIMARY OPEN ANGLE GLAUCOMA (POAG) OF LEFT EYE, SEVERE STAGE: Primary | ICD-10-CM

## 2022-03-21 DIAGNOSIS — J43.8 OTHER EMPHYSEMA (HCC): ICD-10-CM

## 2022-03-21 PROCEDURE — G8427 DOCREV CUR MEDS BY ELIG CLIN: HCPCS | Performed by: INTERNAL MEDICINE

## 2022-03-21 PROCEDURE — 3017F COLORECTAL CA SCREEN DOC REV: CPT | Performed by: INTERNAL MEDICINE

## 2022-03-21 PROCEDURE — G8417 CALC BMI ABV UP PARAM F/U: HCPCS | Performed by: INTERNAL MEDICINE

## 2022-03-21 PROCEDURE — G8484 FLU IMMUNIZE NO ADMIN: HCPCS | Performed by: INTERNAL MEDICINE

## 2022-03-21 PROCEDURE — 99214 OFFICE O/P EST MOD 30 MIN: CPT | Performed by: INTERNAL MEDICINE

## 2022-03-21 PROCEDURE — 3023F SPIROM DOC REV: CPT | Performed by: INTERNAL MEDICINE

## 2022-03-21 PROCEDURE — 4004F PT TOBACCO SCREEN RCVD TLK: CPT | Performed by: INTERNAL MEDICINE

## 2022-03-21 RX ORDER — LATANOPROST 50 UG/ML
SOLUTION/ DROPS OPHTHALMIC
COMMUNITY
Start: 2022-02-18

## 2022-03-21 RX ORDER — DORZOLAMIDE HCL 20 MG/ML
SOLUTION/ DROPS OPHTHALMIC
COMMUNITY
Start: 2022-03-05

## 2022-03-21 RX ORDER — OXYBUTYNIN CHLORIDE 5 MG/1
TABLET, EXTENDED RELEASE ORAL
COMMUNITY
Start: 2022-02-11 | End: 2022-07-14 | Stop reason: ALTCHOICE

## 2022-03-21 RX ORDER — PREDNISOLONE ACETATE 10 MG/ML
SUSPENSION/ DROPS OPHTHALMIC
COMMUNITY
Start: 2022-03-08 | End: 2022-07-14 | Stop reason: ALTCHOICE

## 2022-03-21 RX ORDER — ACETAZOLAMIDE 500 MG/1
CAPSULE, EXTENDED RELEASE ORAL
Status: ON HOLD | COMMUNITY
Start: 2022-03-05 | End: 2022-03-31 | Stop reason: HOSPADM

## 2022-03-21 RX ORDER — ATROPINE SULFATE 10 MG/ML
SOLUTION/ DROPS OPHTHALMIC
Status: ON HOLD | COMMUNITY
Start: 2022-03-09 | End: 2022-03-31 | Stop reason: HOSPADM

## 2022-03-21 RX ORDER — HYDROCODONE BITARTRATE AND ACETAMINOPHEN 5; 325 MG/1; MG/1
TABLET ORAL
COMMUNITY
Start: 2021-11-18 | End: 2022-05-02 | Stop reason: ALTCHOICE

## 2022-03-21 RX ORDER — PILOCARPINE HYDROCHLORIDE 20 MG/ML
SOLUTION/ DROPS OPHTHALMIC
COMMUNITY
Start: 2022-02-16

## 2022-03-21 NOTE — PROGRESS NOTES
[facility] [place] [facility]  [address] [place] [address]  [place] [address]  Dept: [phone]  Dept Fax: [phone]     NAME: Joyce Serna        :  1960        MRN:  [id number]    Chief Complaint   Patient presents with    Pre-op Exam       Subjective     History of Present Illness  Joyce Serna is a 64 y.o. female who presents today for routine follow up and pre-op clearance. Patient is scheduled on 3/25 for eye surgery due to glaucoma. She is currently doing well and has no complaints. She is nervous for the upcoming surgery and has an appointment coming up before surgery to further discuss everything with the surgeon. Review of Systems  Please see HPI above. All bolded are positive. Gen: fever, chills, fatigue, weakness, diaphoresis, unintentional weight change  Head: headache, vision change, hearing loss  Chest: chest pain/heaviness, palpitations  Lungs: shortness of breath, wheezing, coughing, hemoptysis  Abdomen: abdominal pain, nausea, vomiting, diarrhea, constipation, melena, hematochezia, hematemesis, loss of appetite  Extremities: lower extremity edema, myalgias, arthralgias  Urinary: dysuria, hematuria, weak flow, increase in frequency  Neurologic: lightheadedness, dizziness, confusion, syncope  Endocrine: polydipsia, polyuria, heat or cold intolerance  Psychiatric: depression, suicidal ideation, anxiety  Derm: Rashes, ulcers, burns    Past Medical Hx:  No past medical history on file. Past Surgical Hx:  Past Surgical History:   Procedure Laterality Date     SECTION         Family Hx:  No family history on file.     Social Hx:  Social History     Tobacco Use    Smoking status: Current Some Day Smoker     Packs/day: 0.50     Years: 13.00     Pack years: 6.50     Types: Cigarettes    Smokeless tobacco: Never Used    Tobacco comment: quit x 14 yrs and started up again    Substance Use Topics    Alcohol use: No       Home Medications:  Current Outpatient Medications   Medication Sig Dispense Refill    acetaZOLAMIDE (DIAMOX) 500 MG extended release capsule take 1 capsule by mouth twice a day IN THE MORNING and IN THE EVENING      atropine 1 % ophthalmic solution instill 1 drop into left eye twice a day AFTER SURGERY UNTIL GONE THEN STOP      dorzolamide (TRUSOPT) 2 % ophthalmic solution instill 1 drop into left eye twice a day      HYDROcodone-acetaminophen (NORCO) 5-325 MG per tablet       latanoprost (XALATAN) 0.005 % ophthalmic solution instill 1 drop into left eye at bedtime      oxybutynin (DITROPAN-XL) 5 MG extended release tablet take 1 tablet by mouth once daily      pilocarpine (PILOCAR) 2 % ophthalmic solution instill 1 drop into left eye twice a day      prednisoLONE acetate (PRED FORTE) 1 % ophthalmic suspension instill 1 drop into left eye every 2 hours AFTER SURGERY      TOBRADEX 0.3-0.1 % ophthalmic suspension instill 1 drop into both eyes four times a day for 7 days      aspirin 81 MG chewable tablet Take 1 tablet by mouth daily 30 tablet 3    metoprolol tartrate (LOPRESSOR) 25 MG tablet Take 0.5 tablets by mouth 2 times daily 30 tablet 3    atorvastatin (LIPITOR) 40 MG tablet Take 1 tablet by mouth nightly 30 tablet 3    brompheniramine-pseudoephedrine-DM (BROMFED DM) 2-30-10 MG/5ML syrup Take 5 mLs by mouth 4 times daily as needed for Cough 118 mL 0    Blood Pressure KIT 1 kit by Does not apply route daily 1 kit 0    albuterol (PROVENTIL) (2.5 MG/3ML) 0.083% nebulizer solution Take 3 mLs by nebulization every 6 hours as needed for Wheezing or Shortness of Breath 1 Package 5    albuterol sulfate HFA (PROVENTIL HFA) 108 (90 Base) MCG/ACT inhaler Inhale 2 puffs into the lungs every 4 hours as needed for Wheezing 1 Inhaler 5    Spacer/Aero-Holding Chambers ABHIJIT 1 Device by Does not apply route daily 1 Device 0     No current facility-administered medications for this visit.         Allergies:  No Known Allergies    Objective     Vitals:    03/21/22 0841   BP: 136/86   Pulse: 85   Resp: 16   Temp: 97.3 °F (36.3 °C)   TempSrc: Infrared   SpO2: 96%   Weight: 222 lb (100.7 kg)   Height: 5' 5\" (1.651 m)        Physical Exam  General: Awake, alert, and oriented to person, place, time, and purpose, appears stated age and cooperative, No acute distress  Head: Normocephalic, atraumatic  Eyes: conjunctivae/corneas clear, EOMI  Mouth: Mucous membranes moist with no pharyngeal exudate or erythema  Neck: no JVD, no adenopathy, no carotid bruit, supple, symmetrical, trachea midline  Back: symmetric, ROM normal, No CVA tenderness. Lungs: clear to auscultation bilaterally without wheezes, rales, or rhonchi  Heart: regular rate and rhythm, S1, S2 normal, no murmur, click, rub or gallop  Abdomen: soft, non-tender; bowel sounds normal; no masses,  no organomegaly  Extremities: atraumatic, no cyanosis, no edema, 2+ pulses palpated in all 4 extremities  Skin: color, texture, turgor within normal limits. No rashes or lesions   Neurologic:speech appropriate, moves all 4 extremities, normal muscle strength and tone, CN 2-12 grossly intact    Labs:  Lab Results   Component Value Date    WBC 5.8 11/18/2021    HGB 10.7 (L) 11/18/2021    HCT 37.6 11/18/2021     11/18/2021     11/18/2021    K 3.9 11/18/2021     11/18/2021    CREATININE 0.9 11/18/2021    BUN 16 11/18/2021    CO2 24 11/18/2021    GLUCOSE 100 (H) 11/18/2021    ALT 16 11/18/2021    AST 21 11/18/2021     No results found for: TSH  Lab Results   Component Value Date    TRIG 81 11/18/2021     Lab Results   Component Value Date    HDL 37 11/18/2021     Lab Results   Component Value Date    LDLCALC 87 11/18/2021     No results found for: LABA1C  No results found for: INR, PROTIME   *All recent labs were reviewed. Please see electronic chart for a more comprehensive set of labs    Radiology:  No results found.     Assessment and Plan     Patient is a 64 y.o. female who presented to the office for follow up. Full problem list is as follows:  Patient Active Problem List   Diagnosis    Primary hypertension    Other emphysema ([address]    Other hyperlipidemia       Avani Monson was seen today for pre-op exam.    Diagnoses and all orders for this visit:    Primary open angle glaucoma (POAG) of left eye, severe stage    Other emphysema ([address]    Primary hypertension    Other hyperlipidemia    - all chronic conditions are stable. - medically cleared for surgery at this time. Educational materials and/or home exercises printed for patient's review and were included in patient instructions on his/her After Visit Summary and given to patient at the end of visit. Counseled regarding above diagnosis, including possible risks and complications, especially if left uncontrolled. Counseled regarding the possible side effects, risks, benefits and alternatives to treatment; patient and/or guardian verbalizes understanding, agrees, feels comfortable with and wishes to proceed with above treatment plan. Advised patient to call [facility] new medication issues, and read all Rx info from pharmacy to assure aware of all possible risks and side effects of any medication before taking. Patient verbalizes understanding and agrees with above counseling, assessment and plan. All questions answered.     Kai Goldmann, DO

## 2022-03-28 DIAGNOSIS — J44.9 CHRONIC OBSTRUCTIVE PULMONARY DISEASE, UNSPECIFIED COPD TYPE (HCC): ICD-10-CM

## 2022-03-28 RX ORDER — NEBULIZER ACCESSORIES
1 KIT MISCELLANEOUS DAILY PRN
Qty: 1 KIT | Refills: 2 | Status: SHIPPED | OUTPATIENT
Start: 2022-03-28

## 2022-03-28 RX ORDER — ALBUTEROL SULFATE 2.5 MG/3ML
SOLUTION RESPIRATORY (INHALATION)
Qty: 360 ML | Refills: 2 | Status: ON HOLD
Start: 2022-03-28 | End: 2022-03-31 | Stop reason: HOSPADM

## 2022-03-28 NOTE — TELEPHONE ENCOUNTER
Last Appointment:  3/21/2022  Future Appointments   Date Time Provider Ruben Mata   5/2/2022 12:30 PM Major Canales  [address]

## 2022-03-29 ENCOUNTER — APPOINTMENT (OUTPATIENT)
Dept: CT IMAGING | Age: 62
DRG: 190 | End: 2022-03-29
Payer: MEDICAID

## 2022-03-29 ENCOUNTER — HOSPITAL ENCOUNTER (INPATIENT)
Age: 62
LOS: 1 days | Discharge: HOME | DRG: 190 | End: 2022-03-31
Attending: STUDENT IN AN ORGANIZED HEALTH CARE EDUCATION/TRAINING PROGRAM | Admitting: FAMILY MEDICINE
Payer: MEDICAID

## 2022-03-29 ENCOUNTER — APPOINTMENT (OUTPATIENT)
Dept: GENERAL RADIOLOGY | Age: 62
DRG: 190 | End: 2022-03-29
Payer: MEDICAID

## 2022-03-29 DIAGNOSIS — R09.02 HYPOXIA: ICD-10-CM

## 2022-03-29 DIAGNOSIS — R05.9 COUGH: ICD-10-CM

## 2022-03-29 DIAGNOSIS — I21.4 NSTEMI (NON-ST ELEVATED MYOCARDIAL INFARCTION) (HCC): Primary | ICD-10-CM

## 2022-03-29 DIAGNOSIS — J40 BRONCHITIS: ICD-10-CM

## 2022-03-29 LAB
ACETAMINOPHEN LEVEL: <5 MCG/ML (ref 10–30)
ALBUMIN SERPL-MCNC: 4 G/DL (ref 3.5–5.2)
ALP BLD-CCNC: 95 U/L (ref 35–104)
ALT SERPL-CCNC: 22 U/L (ref 0–32)
AMPHETAMINE SCREEN, URINE: NOT DETECTED
ANION GAP SERPL CALCULATED.3IONS-SCNC: 12 MMOL/L (ref 7–16)
ANISOCYTOSIS: ABNORMAL
APTT: 27.5 SEC (ref 24.5–35.1)
AST SERPL-CCNC: 24 U/L (ref 0–31)
BACTERIA: ABNORMAL /HPF
BARBITURATE SCREEN URINE: NOT DETECTED
BASOPHILS ABSOLUTE: 0 E9/L (ref 0–0.2)
BASOPHILS RELATIVE PERCENT: 0.5 % (ref 0–2)
BENZODIAZEPINE SCREEN, URINE: NOT DETECTED
BILIRUB SERPL-MCNC: 0.3 MG/DL (ref 0–1.2)
BILIRUBIN URINE: NEGATIVE
BLOOD, URINE: ABNORMAL
BUN BLDV-MCNC: 18 MG/DL (ref 6–23)
BURR CELLS: ABNORMAL
CALCIUM SERPL-MCNC: 9.1 MG/DL (ref 8.6–10.2)
CANNABINOID SCREEN URINE: NOT DETECTED
CHLORIDE BLD-SCNC: 104 MMOL/L (ref 98–107)
CLARITY: ABNORMAL
CO2: 21 MMOL/L (ref 22–29)
COCAINE METABOLITE SCREEN URINE: NOT DETECTED
COLOR: YELLOW
CREAT SERPL-MCNC: 1 MG/DL (ref 0.5–1)
EOSINOPHILS ABSOLUTE: 0.66 E9/L (ref 0.05–0.5)
EOSINOPHILS RELATIVE PERCENT: 4.4 % (ref 0–6)
ETHANOL: <10 MG/DL (ref 0–0.08)
FENTANYL SCREEN, URINE: NOT DETECTED
GFR AFRICAN AMERICAN: >60
GFR NON-AFRICAN AMERICAN: >60 ML/MIN/1.73
GLUCOSE BLD-MCNC: 109 MG/DL (ref 74–99)
GLUCOSE URINE: NEGATIVE MG/DL
HCT VFR BLD CALC: 44.6 % (ref 34–48)
HCT VFR BLD CALC: 45.7 % (ref 34–48)
HEMOGLOBIN: 13.5 G/DL (ref 11.5–15.5)
HEMOGLOBIN: 13.5 G/DL (ref 11.5–15.5)
KETONES, URINE: NEGATIVE MG/DL
LEUKOCYTE ESTERASE, URINE: NEGATIVE
LYMPHOCYTES ABSOLUTE: 2.83 E9/L (ref 1.5–4)
LYMPHOCYTES RELATIVE PERCENT: 18.6 % (ref 20–42)
Lab: NORMAL
MCH RBC QN AUTO: 22.1 PG (ref 26–35)
MCH RBC QN AUTO: 22.4 PG (ref 26–35)
MCHC RBC AUTO-ENTMCNC: 29.5 % (ref 32–34.5)
MCHC RBC AUTO-ENTMCNC: 30.3 % (ref 32–34.5)
MCV RBC AUTO: 74 FL (ref 80–99.9)
MCV RBC AUTO: 74.8 FL (ref 80–99.9)
METAMYELOCYTES RELATIVE PERCENT: 0.9 % (ref 0–1)
METHADONE SCREEN, URINE: NOT DETECTED
MONOCYTES ABSOLUTE: 0 E9/L (ref 0.1–0.95)
MONOCYTES RELATIVE PERCENT: 4 % (ref 2–12)
MYELOCYTE PERCENT: 0.9 % (ref 0–0)
NEUTROPHILS ABSOLUTE: 11.47 E9/L (ref 1.8–7.3)
NEUTROPHILS RELATIVE PERCENT: 75.2 % (ref 43–80)
NITRITE, URINE: NEGATIVE
OPIATE SCREEN URINE: NOT DETECTED
OVALOCYTES: ABNORMAL
OXYCODONE URINE: NOT DETECTED
PDW BLD-RTO: 20.1 FL (ref 11.5–15)
PDW BLD-RTO: 20.3 FL (ref 11.5–15)
PH UA: 6 (ref 5–9)
PHENCYCLIDINE SCREEN URINE: NOT DETECTED
PLATELET # BLD: 338 E9/L (ref 130–450)
PLATELET # BLD: 365 E9/L (ref 130–450)
PMV BLD AUTO: 10.1 FL (ref 7–12)
PMV BLD AUTO: 10.2 FL (ref 7–12)
POIKILOCYTES: ABNORMAL
POLYCHROMASIA: ABNORMAL
POTASSIUM SERPL-SCNC: 4.5 MMOL/L (ref 3.5–5)
PRO-BNP: 67 PG/ML (ref 0–125)
PROTEIN UA: 100 MG/DL
RBC # BLD: 6.03 E12/L (ref 3.5–5.5)
RBC # BLD: 6.11 E12/L (ref 3.5–5.5)
RBC UA: ABNORMAL /HPF (ref 0–2)
SALICYLATE, SERUM: 1.9 MG/DL (ref 0–30)
SODIUM BLD-SCNC: 137 MMOL/L (ref 132–146)
SPECIFIC GRAVITY UA: 1.01 (ref 1–1.03)
TOTAL PROTEIN: 8 G/DL (ref 6.4–8.3)
TRICYCLIC ANTIDEPRESSANTS SCREEN SERUM: NEGATIVE NG/ML
TROPONIN, HIGH SENSITIVITY: 133 NG/L (ref 0–9)
TROPONIN, HIGH SENSITIVITY: 154 NG/L (ref 0–9)
UROBILINOGEN, URINE: 0.2 E.U./DL
WBC # BLD: 12.2 E9/L (ref 4.5–11.5)
WBC # BLD: 14.9 E9/L (ref 4.5–11.5)
WBC UA: ABNORMAL /HPF (ref 0–5)

## 2022-03-29 PROCEDURE — 6360000004 HC RX CONTRAST MEDICATION: Performed by: RADIOLOGY

## 2022-03-29 PROCEDURE — 81001 URINALYSIS AUTO W/SCOPE: CPT

## 2022-03-29 PROCEDURE — 94640 AIRWAY INHALATION TREATMENT: CPT

## 2022-03-29 PROCEDURE — 2700000000 HC OXYGEN THERAPY PER DAY

## 2022-03-29 PROCEDURE — 93005 ELECTROCARDIOGRAM TRACING: CPT | Performed by: NURSE PRACTITIONER

## 2022-03-29 PROCEDURE — 71045 X-RAY EXAM CHEST 1 VIEW: CPT

## 2022-03-29 PROCEDURE — 83880 ASSAY OF NATRIURETIC PEPTIDE: CPT

## 2022-03-29 PROCEDURE — 82077 ASSAY SPEC XCP UR&BREATH IA: CPT

## 2022-03-29 PROCEDURE — 84484 ASSAY OF TROPONIN QUANT: CPT

## 2022-03-29 PROCEDURE — 94664 DEMO&/EVAL PT USE INHALER: CPT

## 2022-03-29 PROCEDURE — 93005 ELECTROCARDIOGRAM TRACING: CPT | Performed by: STUDENT IN AN ORGANIZED HEALTH CARE EDUCATION/TRAINING PROGRAM

## 2022-03-29 PROCEDURE — 6370000000 HC RX 637 (ALT 250 FOR IP): Performed by: NURSE PRACTITIONER

## 2022-03-29 PROCEDURE — 71275 CT ANGIOGRAPHY CHEST: CPT

## 2022-03-29 PROCEDURE — 85025 COMPLETE CBC W/AUTO DIFF WBC: CPT

## 2022-03-29 PROCEDURE — 80053 COMPREHEN METABOLIC PANEL: CPT

## 2022-03-29 PROCEDURE — 85027 COMPLETE CBC AUTOMATED: CPT

## 2022-03-29 PROCEDURE — 80143 DRUG ASSAY ACETAMINOPHEN: CPT

## 2022-03-29 PROCEDURE — 80179 DRUG ASSAY SALICYLATE: CPT

## 2022-03-29 PROCEDURE — 36415 COLL VENOUS BLD VENIPUNCTURE: CPT

## 2022-03-29 PROCEDURE — 99284 EMERGENCY DEPT VISIT MOD MDM: CPT

## 2022-03-29 PROCEDURE — 80307 DRUG TEST PRSMV CHEM ANLYZR: CPT

## 2022-03-29 PROCEDURE — 70450 CT HEAD/BRAIN W/O DYE: CPT

## 2022-03-29 PROCEDURE — 85730 THROMBOPLASTIN TIME PARTIAL: CPT

## 2022-03-29 PROCEDURE — 0202U NFCT DS 22 TRGT SARS-COV-2: CPT

## 2022-03-29 RX ORDER — HEPARIN SODIUM 1000 [USP'U]/ML
4000 INJECTION, SOLUTION INTRAVENOUS; SUBCUTANEOUS PRN
Status: DISCONTINUED | OUTPATIENT
Start: 2022-03-29 | End: 2022-03-31 | Stop reason: HOSPADM

## 2022-03-29 RX ORDER — ASPIRIN 81 MG/1
324 TABLET, CHEWABLE ORAL ONCE
Status: COMPLETED | OUTPATIENT
Start: 2022-03-29 | End: 2022-03-29

## 2022-03-29 RX ORDER — HEPARIN SODIUM 10000 [USP'U]/100ML
5-30 INJECTION, SOLUTION INTRAVENOUS CONTINUOUS
Status: DISCONTINUED | OUTPATIENT
Start: 2022-03-29 | End: 2022-03-31

## 2022-03-29 RX ORDER — IPRATROPIUM BROMIDE AND ALBUTEROL SULFATE 2.5; .5 MG/3ML; MG/3ML
3 SOLUTION RESPIRATORY (INHALATION) ONCE
Status: COMPLETED | OUTPATIENT
Start: 2022-03-29 | End: 2022-03-29

## 2022-03-29 RX ORDER — ASPIRIN 81 MG/1
324 TABLET, CHEWABLE ORAL ONCE
Status: DISCONTINUED | OUTPATIENT
Start: 2022-03-29 | End: 2022-03-29

## 2022-03-29 RX ORDER — HEPARIN SODIUM 1000 [USP'U]/ML
4000 INJECTION, SOLUTION INTRAVENOUS; SUBCUTANEOUS ONCE
Status: COMPLETED | OUTPATIENT
Start: 2022-03-29 | End: 2022-03-30

## 2022-03-29 RX ORDER — HEPARIN SODIUM 1000 [USP'U]/ML
2000 INJECTION, SOLUTION INTRAVENOUS; SUBCUTANEOUS PRN
Status: DISCONTINUED | OUTPATIENT
Start: 2022-03-29 | End: 2022-03-31 | Stop reason: HOSPADM

## 2022-03-29 RX ADMIN — ASPIRIN 324 MG: 81 TABLET, CHEWABLE ORAL at 19:30

## 2022-03-29 RX ADMIN — IPRATROPIUM BROMIDE AND ALBUTEROL SULFATE 3 AMPULE: .5; 2.5 SOLUTION RESPIRATORY (INHALATION) at 20:14

## 2022-03-29 RX ADMIN — IOPAMIDOL 75 ML: 755 INJECTION, SOLUTION INTRAVENOUS at 22:12

## 2022-03-29 ASSESSMENT — PAIN SCALES - GENERAL: PAINLEVEL_OUTOF10: 4

## 2022-03-29 NOTE — ED NOTES
Department of Emergency Medicine  FIRST PROVIDER TRIAGE NOTE             Independent MLP           3/29/22  7:18 PM EDT    Date of Encounter: 3/29/22   MRN: [id number]      HPI: Diana Omalley is a 64 y.o. female who presents to the ED for Chest Pain (Patient states she has no radiating chest pain that started yesterday, patient states she is also short of breath and has \"poop\" coming out of her nose. Patient has poop sample with her in bag. )      ROS: Negative for urinary complaints or rash. PE: Gen Appearance/Constitutional: alert  Pulm: abnormal breath sounds auscultated     Initial Plan of Care: All treatment areas with department are currently occupied. Plan to order/Initiate the following while awaiting opening in ED: labs, EKG and imaging studies.   Initiate Treatment-Testing, Proceed toTreatment Area When Bed Available for ED Attending/MLP to Continue Care    Electronically signed by MATT [facility] CNP   DD: 3/29/22         MATT [facility] CNP  03/29/22 0194

## 2022-03-29 NOTE — ED NOTES
Patient states she had incontinent urine from coughing. Given clean dry linen, gown, underwear and wipes.      Favian Bloom RN  03/29/22 1944

## 2022-03-29 NOTE — FLOWSHEET NOTE
Patient states she has no radiating chest pain that started yesterday, patient states she is also short of breath and has \"poop\" coming out of her nose. Patient has poop sample with her in bag.

## 2022-03-30 PROBLEM — I21.4 NSTEMI (NON-ST ELEVATED MYOCARDIAL INFARCTION) (HCC): Status: ACTIVE | Noted: 2022-03-30

## 2022-03-30 LAB
ABO/RH: NORMAL
ADENOVIRUS BY PCR: NOT DETECTED
ANION GAP SERPL CALCULATED.3IONS-SCNC: 9 MMOL/L (ref 7–16)
ANTIBODY SCREEN: NORMAL
APTT: 48.8 SEC (ref 24.5–35.1)
APTT: 72.2 SEC (ref 24.5–35.1)
APTT: 99.3 SEC (ref 24.5–35.1)
BORDETELLA PARAPERTUSSIS BY PCR: NOT DETECTED
BORDETELLA PERTUSSIS BY PCR: NOT DETECTED
BUN BLDV-MCNC: 17 MG/DL (ref 6–23)
CALCIUM SERPL-MCNC: 9.1 MG/DL (ref 8.6–10.2)
CHLAMYDOPHILIA PNEUMONIAE BY PCR: NOT DETECTED
CHLORIDE BLD-SCNC: 104 MMOL/L (ref 98–107)
CHOLESTEROL, TOTAL: 149 MG/DL (ref 0–199)
CO2: 28 MMOL/L (ref 22–29)
CORONAVIRUS 229E BY PCR: NOT DETECTED
CORONAVIRUS HKU1 BY PCR: NOT DETECTED
CORONAVIRUS NL63 BY PCR: NOT DETECTED
CORONAVIRUS OC43 BY PCR: NOT DETECTED
CREAT SERPL-MCNC: 1 MG/DL (ref 0.5–1)
EKG ATRIAL RATE: 103 BPM
EKG ATRIAL RATE: 72 BPM
EKG ATRIAL RATE: 83 BPM
EKG P AXIS: 63 DEGREES
EKG P AXIS: 69 DEGREES
EKG P AXIS: 75 DEGREES
EKG P-R INTERVAL: 148 MS
EKG P-R INTERVAL: 148 MS
EKG P-R INTERVAL: 152 MS
EKG Q-T INTERVAL: 358 MS
EKG Q-T INTERVAL: 394 MS
EKG Q-T INTERVAL: 452 MS
EKG QRS DURATION: 82 MS
EKG QRS DURATION: 84 MS
EKG QRS DURATION: 88 MS
EKG QTC CALCULATION (BAZETT): 462 MS
EKG QTC CALCULATION (BAZETT): 468 MS
EKG QTC CALCULATION (BAZETT): 494 MS
EKG R AXIS: -16 DEGREES
EKG R AXIS: -24 DEGREES
EKG R AXIS: -42 DEGREES
EKG T AXIS: 36 DEGREES
EKG T AXIS: 52 DEGREES
EKG T AXIS: 81 DEGREES
EKG VENTRICULAR RATE: 103 BPM
EKG VENTRICULAR RATE: 72 BPM
EKG VENTRICULAR RATE: 83 BPM
GFR AFRICAN AMERICAN: >60
GFR NON-AFRICAN AMERICAN: >60 ML/MIN/1.73
GLUCOSE BLD-MCNC: 97 MG/DL (ref 74–99)
HBA1C MFR BLD: 6.3 % (ref 4–5.6)
HCT VFR BLD CALC: 43.8 % (ref 34–48)
HDLC SERPL-MCNC: 40 MG/DL
HEMOGLOBIN: 13 G/DL (ref 11.5–15.5)
HUMAN METAPNEUMOVIRUS BY PCR: NOT DETECTED
HUMAN RHINOVIRUS/ENTEROVIRUS BY PCR: NOT DETECTED
INFLUENZA A BY PCR: NOT DETECTED
INFLUENZA B BY PCR: NOT DETECTED
LDL CHOLESTEROL CALCULATED: 95 MG/DL (ref 0–99)
MCH RBC QN AUTO: 22.1 PG (ref 26–35)
MCHC RBC AUTO-ENTMCNC: 29.7 % (ref 32–34.5)
MCV RBC AUTO: 74.6 FL (ref 80–99.9)
MYCOPLASMA PNEUMONIAE BY PCR: NOT DETECTED
PARAINFLUENZA VIRUS 1 BY PCR: NOT DETECTED
PARAINFLUENZA VIRUS 2 BY PCR: NOT DETECTED
PARAINFLUENZA VIRUS 3 BY PCR: NOT DETECTED
PARAINFLUENZA VIRUS 4 BY PCR: NOT DETECTED
PDW BLD-RTO: 19.8 FL (ref 11.5–15)
PLATELET # BLD: 317 E9/L (ref 130–450)
PMV BLD AUTO: 10.6 FL (ref 7–12)
POTASSIUM REFLEX MAGNESIUM: 3.8 MMOL/L (ref 3.5–5)
RBC # BLD: 5.87 E12/L (ref 3.5–5.5)
REASON FOR REJECTION: NORMAL
REJECTED TEST: NORMAL
RESPIRATORY SYNCYTIAL VIRUS BY PCR: NOT DETECTED
SARS-COV-2, PCR: NOT DETECTED
SODIUM BLD-SCNC: 141 MMOL/L (ref 132–146)
TRIGL SERPL-MCNC: 68 MG/DL (ref 0–149)
TROPONIN, HIGH SENSITIVITY: 134 NG/L (ref 0–9)
VLDLC SERPL CALC-MCNC: 14 MG/DL
WBC # BLD: 9.7 E9/L (ref 4.5–11.5)

## 2022-03-30 PROCEDURE — 2580000003 HC RX 258: Performed by: FAMILY MEDICINE

## 2022-03-30 PROCEDURE — 93005 ELECTROCARDIOGRAM TRACING: CPT | Performed by: NURSE PRACTITIONER

## 2022-03-30 PROCEDURE — 6360000002 HC RX W HCPCS: Performed by: STUDENT IN AN ORGANIZED HEALTH CARE EDUCATION/TRAINING PROGRAM

## 2022-03-30 PROCEDURE — 2140000000 HC CCU INTERMEDIATE R&B

## 2022-03-30 PROCEDURE — 93010 ELECTROCARDIOGRAM REPORT: CPT | Performed by: INTERNAL MEDICINE

## 2022-03-30 PROCEDURE — 6360000002 HC RX W HCPCS: Performed by: FAMILY MEDICINE

## 2022-03-30 PROCEDURE — 85027 COMPLETE CBC AUTOMATED: CPT

## 2022-03-30 PROCEDURE — 80048 BASIC METABOLIC PNL TOTAL CA: CPT

## 2022-03-30 PROCEDURE — 85730 THROMBOPLASTIN TIME PARTIAL: CPT

## 2022-03-30 PROCEDURE — 6360000002 HC RX W HCPCS: Performed by: NURSE PRACTITIONER

## 2022-03-30 PROCEDURE — 86850 RBC ANTIBODY SCREEN: CPT

## 2022-03-30 PROCEDURE — 6370000000 HC RX 637 (ALT 250 FOR IP)

## 2022-03-30 PROCEDURE — 86901 BLOOD TYPING SEROLOGIC RH(D): CPT

## 2022-03-30 PROCEDURE — 83036 HEMOGLOBIN GLYCOSYLATED A1C: CPT

## 2022-03-30 PROCEDURE — 36415 COLL VENOUS BLD VENIPUNCTURE: CPT

## 2022-03-30 PROCEDURE — 94640 AIRWAY INHALATION TREATMENT: CPT

## 2022-03-30 PROCEDURE — 80061 LIPID PANEL: CPT

## 2022-03-30 PROCEDURE — 84484 ASSAY OF TROPONIN QUANT: CPT

## 2022-03-30 PROCEDURE — 99255 IP/OBS CONSLTJ NEW/EST HI 80: CPT | Performed by: INTERNAL MEDICINE

## 2022-03-30 PROCEDURE — 2580000003 HC RX 258: Performed by: STUDENT IN AN ORGANIZED HEALTH CARE EDUCATION/TRAINING PROGRAM

## 2022-03-30 PROCEDURE — 86900 BLOOD TYPING SEROLOGIC ABO: CPT

## 2022-03-30 PROCEDURE — 6370000000 HC RX 637 (ALT 250 FOR IP): Performed by: INTERNAL MEDICINE

## 2022-03-30 PROCEDURE — 6370000000 HC RX 637 (ALT 250 FOR IP): Performed by: FAMILY MEDICINE

## 2022-03-30 PROCEDURE — 6370000000 HC RX 637 (ALT 250 FOR IP): Performed by: NURSE PRACTITIONER

## 2022-03-30 RX ORDER — HYDRALAZINE HYDROCHLORIDE 20 MG/ML
10 INJECTION INTRAMUSCULAR; INTRAVENOUS EVERY 6 HOURS PRN
Status: DISCONTINUED | OUTPATIENT
Start: 2022-03-30 | End: 2022-03-31 | Stop reason: HOSPADM

## 2022-03-30 RX ORDER — SODIUM CHLORIDE 0.9 % (FLUSH) 0.9 %
5-40 SYRINGE (ML) INJECTION PRN
Status: DISCONTINUED | OUTPATIENT
Start: 2022-03-30 | End: 2022-03-31 | Stop reason: HOSPADM

## 2022-03-30 RX ORDER — ACETAMINOPHEN 325 MG/1
650 TABLET ORAL EVERY 6 HOURS PRN
Status: DISCONTINUED | OUTPATIENT
Start: 2022-03-30 | End: 2022-03-31 | Stop reason: HOSPADM

## 2022-03-30 RX ORDER — ONDANSETRON 4 MG/1
4 TABLET, ORALLY DISINTEGRATING ORAL EVERY 8 HOURS PRN
Status: DISCONTINUED | OUTPATIENT
Start: 2022-03-30 | End: 2022-03-31 | Stop reason: HOSPADM

## 2022-03-30 RX ORDER — ACETAMINOPHEN 650 MG/1
650 SUPPOSITORY RECTAL EVERY 6 HOURS PRN
Status: DISCONTINUED | OUTPATIENT
Start: 2022-03-30 | End: 2022-03-31 | Stop reason: HOSPADM

## 2022-03-30 RX ORDER — BUDESONIDE 0.5 MG/2ML
500 INHALANT ORAL 2 TIMES DAILY
Status: DISCONTINUED | OUTPATIENT
Start: 2022-03-30 | End: 2022-03-31 | Stop reason: HOSPADM

## 2022-03-30 RX ORDER — POLYETHYLENE GLYCOL 3350 17 G/17G
17 POWDER, FOR SOLUTION ORAL DAILY PRN
Status: DISCONTINUED | OUTPATIENT
Start: 2022-03-30 | End: 2022-03-31 | Stop reason: HOSPADM

## 2022-03-30 RX ORDER — LOSARTAN POTASSIUM 25 MG/1
25 TABLET ORAL DAILY
Status: DISCONTINUED | OUTPATIENT
Start: 2022-03-30 | End: 2022-03-31

## 2022-03-30 RX ORDER — ALBUTEROL SULFATE 2.5 MG/3ML
2.5 SOLUTION RESPIRATORY (INHALATION) EVERY 4 HOURS PRN
Status: DISCONTINUED | OUTPATIENT
Start: 2022-03-30 | End: 2022-03-31 | Stop reason: HOSPADM

## 2022-03-30 RX ORDER — SODIUM CHLORIDE 0.9 % (FLUSH) 0.9 %
5-40 SYRINGE (ML) INJECTION EVERY 12 HOURS SCHEDULED
Status: DISCONTINUED | OUTPATIENT
Start: 2022-03-30 | End: 2022-03-31 | Stop reason: HOSPADM

## 2022-03-30 RX ORDER — METOPROLOL TARTRATE 5 MG/5ML
5 INJECTION INTRAVENOUS EVERY 5 MIN PRN
Status: DISCONTINUED | OUTPATIENT
Start: 2022-03-30 | End: 2022-03-31 | Stop reason: HOSPADM

## 2022-03-30 RX ORDER — NICOTINE 21 MG/24HR
1 PATCH, TRANSDERMAL 24 HOURS TRANSDERMAL DAILY
Status: DISCONTINUED | OUTPATIENT
Start: 2022-03-30 | End: 2022-03-31 | Stop reason: HOSPADM

## 2022-03-30 RX ORDER — METOPROLOL TARTRATE 50 MG/1
100 TABLET, FILM COATED ORAL
Status: ACTIVE | OUTPATIENT
Start: 2022-03-30 | End: 2022-03-30

## 2022-03-30 RX ORDER — DILTIAZEM HYDROCHLORIDE 5 MG/ML
10 INJECTION INTRAVENOUS
Status: ACTIVE | OUTPATIENT
Start: 2022-03-30 | End: 2022-03-30

## 2022-03-30 RX ORDER — LANOLIN ALCOHOL/MO/W.PET/CERES
3 CREAM (GRAM) TOPICAL NIGHTLY PRN
Status: DISCONTINUED | OUTPATIENT
Start: 2022-03-30 | End: 2022-03-31 | Stop reason: HOSPADM

## 2022-03-30 RX ORDER — ARFORMOTEROL TARTRATE 15 UG/2ML
15 SOLUTION RESPIRATORY (INHALATION) 2 TIMES DAILY
Status: DISCONTINUED | OUTPATIENT
Start: 2022-03-30 | End: 2022-03-31 | Stop reason: HOSPADM

## 2022-03-30 RX ORDER — ATORVASTATIN CALCIUM 40 MG/1
40 TABLET, FILM COATED ORAL NIGHTLY
Status: DISCONTINUED | OUTPATIENT
Start: 2022-03-30 | End: 2022-03-31 | Stop reason: HOSPADM

## 2022-03-30 RX ORDER — IPRATROPIUM BROMIDE AND ALBUTEROL SULFATE 2.5; .5 MG/3ML; MG/3ML
1 SOLUTION RESPIRATORY (INHALATION) EVERY 4 HOURS PRN
Status: DISCONTINUED | OUTPATIENT
Start: 2022-03-30 | End: 2022-03-31 | Stop reason: HOSPADM

## 2022-03-30 RX ORDER — ONDANSETRON 2 MG/ML
4 INJECTION INTRAMUSCULAR; INTRAVENOUS EVERY 6 HOURS PRN
Status: DISCONTINUED | OUTPATIENT
Start: 2022-03-30 | End: 2022-03-31 | Stop reason: HOSPADM

## 2022-03-30 RX ORDER — IPRATROPIUM BROMIDE AND ALBUTEROL SULFATE 2.5; .5 MG/3ML; MG/3ML
1 SOLUTION RESPIRATORY (INHALATION)
Status: DISCONTINUED | OUTPATIENT
Start: 2022-03-30 | End: 2022-03-31 | Stop reason: HOSPADM

## 2022-03-30 RX ORDER — NITROGLYCERIN 0.4 MG/1
0.8 TABLET SUBLINGUAL ONCE
Status: COMPLETED | OUTPATIENT
Start: 2022-03-30 | End: 2022-03-31

## 2022-03-30 RX ORDER — SODIUM CHLORIDE 9 MG/ML
INJECTION, SOLUTION INTRAVENOUS PRN
Status: DISCONTINUED | OUTPATIENT
Start: 2022-03-30 | End: 2022-03-31 | Stop reason: HOSPADM

## 2022-03-30 RX ORDER — ASPIRIN 81 MG/1
81 TABLET, CHEWABLE ORAL DAILY
Status: DISCONTINUED | OUTPATIENT
Start: 2022-03-31 | End: 2022-03-31 | Stop reason: HOSPADM

## 2022-03-30 RX ADMIN — HEPARIN SODIUM 12 UNITS/KG/HR: 10000 INJECTION, SOLUTION INTRAVENOUS; SUBCUTANEOUS at 07:31

## 2022-03-30 RX ADMIN — IPRATROPIUM BROMIDE AND ALBUTEROL SULFATE 1 AMPULE: .5; 2.5 SOLUTION RESPIRATORY (INHALATION) at 17:18

## 2022-03-30 RX ADMIN — HEPARIN SODIUM 10 UNITS/KG/HR: 10000 INJECTION, SOLUTION INTRAVENOUS; SUBCUTANEOUS at 00:29

## 2022-03-30 RX ADMIN — IPRATROPIUM BROMIDE AND ALBUTEROL SULFATE 1 AMPULE: .5; 2.5 SOLUTION RESPIRATORY (INHALATION) at 10:08

## 2022-03-30 RX ADMIN — HEPARIN SODIUM 10 UNITS/KG/HR: 10000 INJECTION, SOLUTION INTRAVENOUS; SUBCUTANEOUS at 15:06

## 2022-03-30 RX ADMIN — METOPROLOL TARTRATE 12.5 MG: 25 TABLET, FILM COATED ORAL at 21:59

## 2022-03-30 RX ADMIN — SODIUM CHLORIDE, PRESERVATIVE FREE 10 ML: 5 INJECTION INTRAVENOUS at 22:07

## 2022-03-30 RX ADMIN — METOPROLOL TARTRATE 12.5 MG: 25 TABLET, FILM COATED ORAL at 10:18

## 2022-03-30 RX ADMIN — HEPARIN SODIUM 10 UNITS/KG/HR: 10000 INJECTION, SOLUTION INTRAVENOUS; SUBCUTANEOUS at 22:13

## 2022-03-30 RX ADMIN — LOSARTAN POTASSIUM 25 MG: 25 TABLET, FILM COATED ORAL at 09:18

## 2022-03-30 RX ADMIN — IPRATROPIUM BROMIDE AND ALBUTEROL SULFATE 1 AMPULE: .5; 2.5 SOLUTION RESPIRATORY (INHALATION) at 12:48

## 2022-03-30 RX ADMIN — ATORVASTATIN CALCIUM 40 MG: 40 TABLET, FILM COATED ORAL at 21:59

## 2022-03-30 RX ADMIN — IPRATROPIUM BROMIDE AND ALBUTEROL SULFATE 1 AMPULE: .5; 2.5 SOLUTION RESPIRATORY (INHALATION) at 19:41

## 2022-03-30 RX ADMIN — HEPARIN SODIUM 2000 UNITS: 1000 INJECTION INTRAVENOUS; SUBCUTANEOUS at 07:31

## 2022-03-30 RX ADMIN — BUDESONIDE 500 MCG: 0.5 SUSPENSION RESPIRATORY (INHALATION) at 19:41

## 2022-03-30 RX ADMIN — ARFORMOTEROL TARTRATE 15 MCG: 15 SOLUTION RESPIRATORY (INHALATION) at 19:41

## 2022-03-30 RX ADMIN — HEPARIN SODIUM 4000 UNITS: 1000 INJECTION INTRAVENOUS; SUBCUTANEOUS at 00:29

## 2022-03-30 ASSESSMENT — PAIN SCALES - GENERAL
PAINLEVEL_OUTOF10: 0

## 2022-03-30 ASSESSMENT — ENCOUNTER SYMPTOMS
COUGH: 1
SHORTNESS OF BREATH: 1

## 2022-03-30 NOTE — ED PROVIDER NOTES
Nathaly Kamara is a 64year old female who presented to ED with chest pain and shortness of breath that started while patient was out shopping and then had chest pain and shortness of breath start suddenly. Patient presented to ED for symptoms. Patient also reported productive cough and was concerned that she had possible feces coming out of her nose. Patient denies nausea or vomiting. Patient states she feels ill with URI symptoms also, patient seems confused and HPI and ROS limited, patient pausing during HPI and does not complete sentences. Patient also crying at times. HPI and ROS limited due to acuity of condition, could not reach family for collateral information. Patient not on home oxygen and was 89% on RA at time of arrival to ED    The history is provided by the patient and medical records. Review of Systems   Constitutional: Positive for chills and fatigue. Respiratory: Positive for cough and shortness of breath. Cardiovascular: Positive for chest pain. Physical Exam  Vitals and nursing note reviewed. Constitutional:       General: She is not in acute distress. Appearance: She is ill-appearing. HENT:      Head: Normocephalic and atraumatic. Eyes:      General: No scleral icterus. Conjunctiva/sclera: Conjunctivae normal.      Pupils: Pupils are equal, round, and reactive to light. Cardiovascular:      Rate and Rhythm: Regular rhythm. Tachycardia present. Pulmonary:      Effort: Pulmonary effort is normal.      Comments: Diminished breath sounds no wheezes  Abdominal:      General: Bowel sounds are normal. There is no distension. Palpations: Abdomen is soft. Tenderness: There is no abdominal tenderness. There is no guarding or rebound. Musculoskeletal:      Cervical back: Normal range of motion and neck supple. No rigidity or tenderness. No muscular tenderness. Right lower leg: No edema. Left lower leg: No edema.    Skin:     General: Skin is warm and dry.      Capillary Refill: Capillary refill takes less than 2 seconds. Coloration: Skin is not pale. Findings: No erythema or rash. Neurological:      General: No focal deficit present. Mental Status: She is alert. Cranial Nerves: No cranial nerve deficit. Sensory: No sensory deficit. Motor: No weakness. Coordination: Coordination normal.      Gait: Gait normal.      Comments: Oriented to self and plance   Psychiatric:         Mood and Affect: Mood normal.          Procedures     MDM  Number of Diagnoses or Management Options  Diagnosis management comments: Sunshine Mccarthy is a 64year old female who presented to ED with concern for chest pain, patient was confused with NIH 0  CT head unremarkable  Patient was found to have raising troponin CTA negative  Chest pain resolved without intervention repeat EKG stable  Patient given asa and started on heparin, no chest pain at re-evaluation, cardiology consult ordered, patient will be admitted for NSTEMI       ED Course as of 03/30/22 0327   Tue Mar 29, 2022   2329 EKG: This EKG is signed and interpreted by me. Rate: 83  Rhythm: Sinus  Interpretation: non-specific EKG, no ST elevation or depression, left axis,   Comparison: changes compared to previous EKG   [SS]      ED Course User Index  [SS] Timothy Alfonso MD        ED Course as of 03/30/22 0327   Tue Mar 29, 2022   2329 EKG: This EKG is signed and interpreted by me. Rate: 83  Rhythm: Sinus  Interpretation: non-specific EKG, no ST elevation or depression, left axis,   Comparison: changes compared to previous EKG   [SS]      ED Course User Index  [SS] Timothy Alfonso MD       --------------------------------------------- PAST HISTORY ---------------------------------------------  Past Medical History:  has no past medical history on file. Past Surgical History:  has a past surgical history that includes  section.     Social History:  reports that she has been smoking cigarettes. She has a 6.50 pack-year smoking history. She has never used smokeless tobacco. She reports that she does not drink alcohol and does not use drugs. Family History: family history is not on file. The patients home medications have been reviewed. Allergies: Patient has no known allergies.     -------------------------------------------------- RESULTS -------------------------------------------------    LABS:  Results for orders placed or performed during the hospital encounter of 03/29/22   Respiratory Panel, Molecular, with COVID-19 (Restricted: peds pts or suitable admitted adults)    Specimen: Nasopharyngeal   Result Value Ref Range    Adenovirus by PCR Not Detected Not Detected    Bordetella parapertussis by PCR Not Detected Not Detected    Bordetella pertussis by PCR Not Detected Not Detected    Chlamydophilia pneumoniae by PCR Not Detected Not Detected    Coronavirus 229E by PCR Not Detected Not Detected    Coronavirus HKU1 by PCR Not Detected Not Detected    Coronavirus NL63 by PCR Not Detected Not Detected    Coronavirus OC43 by PCR Not Detected Not Detected    SARS-CoV-2, PCR Not Detected Not Detected    Human Metapneumovirus by PCR Not Detected Not Detected    Human Rhinovirus/Enterovirus by PCR Not Detected Not Detected    Influenza A by PCR Not Detected Not Detected    Influenza B by PCR Not Detected Not Detected    Mycoplasma pneumoniae by PCR Not Detected Not Detected    Parainfluenza Virus 1 by PCR Not Detected Not Detected    Parainfluenza Virus 2 by PCR Not Detected Not Detected    Parainfluenza Virus 3 by PCR Not Detected Not Detected    Parainfluenza Virus 4 by PCR Not Detected Not Detected    Respiratory Syncytial Virus by PCR Not Detected Not Detected   Troponin   Result Value Ref Range    Troponin, High Sensitivity 133 (H) 0 - 9 ng/L   CBC with Auto Differential   Result Value Ref Range    WBC 14.9 (H) 4.5 - 11.5 E9/L    RBC 6.11 (H) 3.50 - 5.50 E12/L    Hemoglobin 13.5 11.5 - 15.5 g/dL    Hematocrit 45.7 34.0 - 48.0 %    MCV 74.8 (L) 80.0 - 99.9 fL    MCH 22.1 (L) 26.0 - 35.0 pg    MCHC 29.5 (L) 32.0 - 34.5 %    RDW 20.3 (H) 11.5 - 15.0 fL    Platelets 805 630 - 908 E9/L    MPV 10.2 7.0 - 12.0 fL    Neutrophils % 75.2 43.0 - 80.0 %    Lymphocytes % 18.6 (L) 20.0 - 42.0 %    Monocytes % 4.0 2.0 - 12.0 %    Eosinophils % 4.4 0.0 - 6.0 %    Basophils % 0.5 0.0 - 2.0 %    Neutrophils Absolute 11.47 (H) 1.80 - 7.30 E9/L    Lymphocytes Absolute 2.83 1.50 - 4.00 E9/L    Monocytes Absolute 0.00 (L) 0.10 - 0.95 E9/L    Eosinophils Absolute 0.66 (H) 0.05 - 0.50 E9/L    Basophils Absolute 0.00 0.00 - 0.20 E9/L    Metamyelocytes Relative 0.9 0.0 - 1.0 %    Myelocyte Percent 0.9 0 - 0 %    Anisocytosis 1+     Polychromasia 1+     Poikilocytes 1+     Wiley Cells 1+     Ovalocytes 1+    Comprehensive Metabolic Panel   Result Value Ref Range    Sodium 137 132 - 146 mmol/L    Potassium 4.5 3.5 - 5.0 mmol/L    Chloride 104 98 - 107 mmol/L    CO2 21 (L) 22 - 29 mmol/L    Anion Gap 12 7 - 16 mmol/L    Glucose 109 (H) 74 - 99 mg/dL    BUN 18 6 - 23 mg/dL    CREATININE 1.0 0.5 - 1.0 mg/dL    GFR Non-African American >60 >=60 mL/min/1.73    GFR African American >60     Calcium 9.1 8.6 - 10.2 mg/dL    Total Protein 8.0 6.4 - 8.3 g/dL    Albumin 4.0 3.5 - 5.2 g/dL    Total Bilirubin 0.3 0.0 - 1.2 mg/dL    Alkaline Phosphatase 95 35 - 104 U/L    ALT 22 0 - 32 U/L    AST 24 0 - 31 U/L   Brain Natriuretic Peptide   Result Value Ref Range    Pro-BNP 67 0 - 125 pg/mL   Urinalysis   Result Value Ref Range    Color, UA Yellow Straw/Yellow    Clarity, UA SL CLOUDY Clear    Glucose, Ur Negative Negative mg/dL    Bilirubin Urine Negative Negative    Ketones, Urine Negative Negative mg/dL    Specific Gravity, UA 1.015 1.005 - 1.030    Blood, Urine TRACE-INTACT Negative    pH, UA 6.0 5.0 - 9.0    Protein,  (A) Negative mg/dL    Urobilinogen, Urine 0.2 <2.0 E.U./dL    Nitrite, Urine Negative Negative Leukocyte Esterase, Urine Negative Negative   Troponin   Result Value Ref Range    Troponin, High Sensitivity 154 (H) 0 - 9 ng/L   Serum Drug Screen   Result Value Ref Range    Ethanol Lvl <10 mg/dL    Acetaminophen Level <5.0 (L) 10.0 - 25.9 mcg/mL    Salicylate, Serum 1.9 0.0 - 30.0 mg/dL    TCA Scrn NEGATIVE Cutoff:300 ng/mL   Urine Drug Screen   Result Value Ref Range    Amphetamine Screen, Urine NOT DETECTED Negative <1000 ng/mL    Barbiturate Screen, Ur NOT DETECTED Negative < 200 ng/mL    Benzodiazepine Screen, Urine NOT DETECTED Negative < 200 ng/mL    Cannabinoid Scrn, Ur NOT DETECTED Negative < 50ng/mL    Cocaine Metabolite Screen, Urine NOT DETECTED Negative < 300 ng/mL    Opiate Scrn, Ur NOT DETECTED Negative < 300ng/mL    PCP Screen, Urine NOT DETECTED Negative < 25 ng/mL    Methadone Screen, Urine NOT DETECTED Negative <300 ng/mL    Oxycodone Urine NOT DETECTED Negative <100 ng/mL    FENTANYL SCREEN, URINE NOT DETECTED Negative <1 ng/mL    Drug Screen Comment: see below    CBC   Result Value Ref Range    WBC 12.2 (H) 4.5 - 11.5 E9/L    RBC 6.03 (H) 3.50 - 5.50 E12/L    Hemoglobin 13.5 11.5 - 15.5 g/dL    Hematocrit 44.6 34.0 - 48.0 %    MCV 74.0 (L) 80.0 - 99.9 fL    MCH 22.4 (L) 26.0 - 35.0 pg    MCHC 30.3 (L) 32.0 - 34.5 %    RDW 20.1 (H) 11.5 - 15.0 fL    Platelets 697 559 - 159 E9/L    MPV 10.1 7.0 - 12.0 fL   APTT   Result Value Ref Range    aPTT 27.5 24.5 - 35.1 sec   Microscopic Urinalysis   Result Value Ref Range    WBC, UA 1-3 0 - 5 /HPF    RBC, UA 0-1 0 - 2 /HPF    Bacteria, UA FEW (A) None Seen /HPF   Troponin   Result Value Ref Range    Troponin, High Sensitivity 134 (H) 0 - 9 ng/L   SPECIMEN REJECTION   Result Value Ref Range    Rejected Test [id number]     Reason for Rejection see below        RADIOLOGY:  CT HEAD WO CONTRAST   Final Result   No acute intracranial abnormality.       RECOMMENDATIONS:   Unavailable         CTA CHEST W CONTRAST   Final Result   No evidence of pulmonary embolism or acute pulmonary abnormality. XR CHEST PORTABLE   Final Result   Atherosclerotic disease. No additional evidence of active cardiopulmonary   pathology. ------------------------- NURSING NOTES AND VITALS REVIEWED ---------------------------  Date / Time Roomed:  3/29/2022  7:29 PM  ED Bed Assignment:  24/24    The nursing notes within the ED encounter and vital signs as below have been reviewed. Patient Vitals for the past 24 hrs:   BP Temp Temp src Pulse Resp SpO2 Height Weight   03/29/22 2352 (!) 154/93 -- -- 77 18 93 % -- --   03/29/22 2330 134/66 -- -- -- -- -- -- --   03/29/22 2038 -- -- -- -- 16 95 % -- --   03/29/22 1917 (!) 158/96 98.1 °F (36.7 °C) -- 105 16 95 % 5' 5\" (1.651 m) 220 lb (99.8 kg)   03/29/22 1854 -- 97.8 °F (36.6 °C) Oral 105 18 (!) 89 % -- --       Oxygen Saturation Interpretation: Normal    ------------------------------------------ PROGRESS NOTES ------------------------------------------  Re-evaluation(s):  Time: 10pm  Patients symptoms are improving  Repeat physical examination is improved    Counseling:  I have spoken with the patient and discussed todays results, in addition to providing specific details for the plan of care and counseling regarding the diagnosis and prognosis. Their questions are answered at this time and they are agreeable with the plan of admission.    --------------------------------- ADDITIONAL PROVIDER NOTES ---------------------------------  Consultations:  Spoke with Dr. Gisella Chapman. Discussed case. They will admit the patient. This patient's ED course included: a personal history and physicial examination, re-evaluation prior to disposition, multiple bedside re-evaluations, IV medications and cardiac monitoring    This patient has remained hemodynamically stable during their ED course.     Please note that the withdrawal or failure to initiate urgent interventions for this patient would likely result in a life threatening deterioration or permanent disability. Accordingly this patient received 30 minutes of critical care time, excluding separately billable procedures. Diagnosis:  1. NSTEMI (non-ST elevated myocardial infarction) ([facility] [address]    2. Hypoxia    3. Cough        Disposition:  Patient's disposition: Admit to telemetry  Patient's condition is stable.          Wilda Nance MD  03/30/22 0077

## 2022-03-30 NOTE — CONSULTS
Maria De Jesus Christine M.D.,[facility]  Martha London D.O., F.A.C.O.I., Jaime Hart M.D. Eliazar Rodgres M.D. Roman Eden D.O. Patient:  Mily Bloom 64 y.o. female MRN: [id number]     Date of Service: 3/30/2022      PULMONARY CONSULTATION    Reason for Consultation: Shortness of breath, cardiac cath later today  Referring Physician:     Communication with the referring physician will be sent via the electronic medical record. Chief Complaint: Shortness of breath    CODE STATUS: Full    SUBJECTIVE:  HPI:  Mily Bloom is a 64 y.o. AA female who we are asked to evaluate for shortness of breath, cardiac cath later today. She has not been previously seen by her pulmonary practice. Her past medical history includes ongoing nicotine dependence, COPD, hypertension, hypercholesterolemia, obesity, urinary incontinence, glaucoma, GERD, obesity BMI 36.61. Her home pulmonary regimen includes Proventil via nebulizer and HFA for rescue. She presented to the ED at [facility] on 3/29/2022 with complaint of chest pain and shortness of breath, sudden onset. She was out shopping and noticed symptoms starting. Also endorses chills fatigue, cough-no mucus production. She also presented with altered mentation, ill-appearing. Shortness of breath going on for a while at home, overusing inhaler and nebulizer medication. Radiology review-chest x-ray-no acute process. CTA chest no evidence of PE or acute cardiopulmonary pathology. No mediastinal lymphadenopathy. Mild bronchial wall thickening correlating with tobacco abuse. CT brain no acute intracranial abnormality. Twelve-lead EKG-normal sinus rhythm. Lab testing-tox screen negative, respiratory viral panel negative. High-sensitivity troponin elevated 134, sodium 141, potassium 3.8, BUN 17 creatinine 1.0, WBCs 14.9, down to 9.7, hemoglobin 13.0, proBNP 67.  She has been diagnosed with NSTEMI, scheduled for cardiac catheterization today.   She initially required oxygen 2 L nasal cannula, saturation 89% on room air placed on 2 L recovered to 95%, now weaned off again 96% at rest on room air. No fevers documented. History reviewed. No pertinent past medical history. Past Surgical History:   Procedure Laterality Date     SECTION         History reviewed. No pertinent family history. Social History:   Social History     Socioeconomic History    Marital status: Single     Spouse name: Not on file    Number of children: Not on file    Years of education: Not on file    Highest education level: Not on file   Occupational History    Not on file   Tobacco Use    Smoking status: Current Some Day Smoker     Packs/day: 0.50     Years: 13.00     Pack years: 6.50     Types: Cigarettes    Smokeless tobacco: Never Used    Tobacco comment: quit x 14 yrs and started up again    Vaping Use    Vaping Use: Never used   Substance and Sexual Activity    Alcohol use: No    Drug use: No    Sexual activity: Yes     Partners: Male   Other Topics Concern    Not on file   Social History Narrative    Not on file     Social Determinants of Health     Financial Resource Strain: Low Risk     Difficulty of Paying Living Expenses: Not hard at all   Food Insecurity: No Food Insecurity    Worried About Running Out of Food in the Last Year: Never true    Akssy of Food in the Last Year: Never true   Transportation Needs:     Lack of Transportation (Medical): Not on file    Lack of Transportation (Non-Medical):  Not on file   Physical Activity:     Days of Exercise per Week: Not on file    Minutes of Exercise per Session: Not on file   Stress:     Feeling of Stress : Not on file   Social Connections:     Frequency of Communication with Friends and Family: Not on file    Frequency of Social Gatherings with Friends and Family: Not on file    Attends Yazidism Services: Not on file    Active Member of Clubs or Organizations: Not on file    Attends Club or Organization Meetings: Not on file    Marital Status: Not on file   Intimate Partner Violence:     Fear of Current or Ex-Partner: Not on file    Emotionally Abused: Not on file    Physically Abused: Not on file    Sexually Abused: Not on file   Housing Stability:     Unable to Pay for Housing in the Last Year: Not on file    Number of [place] in the Last Year: Not on file    Unstable Housing in the Last Year: Not on file     Smoking history: The patient is a active smoker of cigarettes half pack per day for over 13 years    ETOH:   reports no history of alcohol use. Exposures: There is no history of TB or asbestos exposure. No recent travel history. No known sick contact exposure. No hot tub exposure. No exotic animals turtles or birds at home. No IV or recreational drug use. Vaccines: There is no immunization history on file for this patient.      Home Meds: Medications Prior to Admission: albuterol (PROVENTIL) (2.5 MG/3ML) 0.083% nebulizer solution, Use 1 vial per nebulizer every 6 hours as needed for wheezing / shortness of breath  Respiratory Therapy Supplies (NEBULIZER/TUBING/MOUTHPIECE) KIT, 1 kit by Does not apply route daily as needed (as needed for wheezing / shortness of breath)  acetaZOLAMIDE (DIAMOX) 500 MG extended release capsule, take 1 capsule by mouth twice a day IN THE MORNING and IN THE EVENING  atropine 1 % ophthalmic solution, instill 1 drop into left eye twice a day AFTER SURGERY UNTIL GONE THEN STOP  dorzolamide (TRUSOPT) 2 % ophthalmic solution, instill 1 drop into left eye twice a day  HYDROcodone-acetaminophen (NORCO) 5-325 MG per tablet,   latanoprost (XALATAN) 0.005 % ophthalmic solution, instill 1 drop into left eye at bedtime  oxybutynin (DITROPAN-XL) 5 MG extended release tablet, take 1 tablet by mouth once daily  pilocarpine (PILOCAR) 2 % ophthalmic solution, instill 1 drop into left eye twice a day  prednisoLONE acetate (PRED FORTE) 1 % ophthalmic suspension, instill 1 drop into left eye every 2 hours AFTER SURGERY  TOBRADEX 0.3-0.1 % ophthalmic suspension, instill 1 drop into both eyes four times a day for 7 days  aspirin 81 MG chewable tablet, Take 1 tablet by mouth daily  metoprolol tartrate (LOPRESSOR) 25 MG tablet, Take 0.5 tablets by mouth 2 times daily  atorvastatin (LIPITOR) 40 MG tablet, Take 1 tablet by mouth nightly  brompheniramine-pseudoephedrine-DM (BROMFED DM) 2-30-10 MG/5ML syrup, Take 5 mLs by mouth 4 times daily as needed for Cough  Blood Pressure KIT, 1 kit by Does not apply route daily  albuterol sulfate HFA (PROVENTIL HFA) 108 (90 Base) MCG/ACT inhaler, Inhale 2 puffs into the lungs every 4 hours as needed for Wheezing  Spacer/Aero-Holding Chambers ABHIJIT, 1 Device by Does not apply route daily    CURRENT MEDS :  Scheduled Meds:   sodium chloride flush  5-40 mL IntraVENous 2 times per day    [START ON 3/31/2022] aspirin  81 mg Oral Daily    atorvastatin  40 mg Oral Nightly    metoprolol tartrate  12.5 mg Oral BID    losartan  25 mg Oral Daily       Continuous Infusions:   sodium chloride      heparin (PORCINE) Infusion 12 Units/kg/hr (03/30/22 0731)       No Known Allergies    REVIEW OF SYSTEMS:  Constitutional: Denies fever, weight loss, night sweats, weakness and fatigue  Skin: Denies pigmentation, dark lesions, and rashes   HEENT: Denies hearing loss, tinnitus, ear drainage, epistaxis, sore throat, and hoarseness. Cardiovascular: Chest pain, elevated troponin.   Respiratory: Cough, shortness of breath  Gastrointestinal: Denies nausea, vomiting, poor appetite, diarrhea, heartburn or reflux  Genitourinary: Denies dysuria, frequency, urgency or hematuria  Musculoskeletal: Denies myalgias, muscle weakness, and bone pain  Neurological: Denies dizziness, vertigo, headache, and focal weakness altered mentation on arrival  Psychological: Denies anxiety and depression  Endocrine: Denies heat intolerance and cold intolerance  Hematopoietic/Lymphatic: Denies bleeding problems and blood transfusions    OBJECTIVE:   /80   Pulse 82   Temp 97 °F (36.1 °C) (Temporal)   Resp 18   Ht 5' 5\" (1.651 m)   Wt 220 lb (99.8 kg)   SpO2 96%   BMI 36.61 kg/m²   SpO2 Readings from Last 1 Encounters:   03/30/22 96%        I/O:  No intake or output data in the 24 hours ending 03/30/22 1358  Vent Information  SpO2: 96 %                Physical Exam:  General: The patient is lying in bed comfortably without any distress.   Breathing is not labored  HEENT: Pupils are equal round and reactive to light, there are no oral lesions and no post-nasal drip   Neck: supple without adenopathy  Cardiovascular: regular rate and rhythm without murmur or gallop  Respiratory: faint Expiratory wheeze to auscultation bilaterally   Air entry is symmetric  Abdomen: soft, non-tender, non-distended, normal bowel sounds  Extremities: warm, no edema, no clubbing  Skin: no rash or lesion  Neurologic: CN II-XII grossly intact, no focal deficits    Pulmonary Function Testing none on file      Imaging personally reviewed:  CTA chest  HISTORY:   ORDERING SYSTEM PROVIDED HISTORY: evaluate for PE   TECHNOLOGIST PROVIDED HISTORY:   Reason for exam:->evaluate for PE   Decision Support Exception - unselect if not a suspected or confirmed   emergency medical condition->Emergency Medical Condition (MA)   What reading provider will be dictating this exam?->CRC       FINDINGS:   Pulmonary Arteries: Pulmonary arteries are adequately opacified for   evaluation.  No evidence of intraluminal filling defect to suggest pulmonary   embolism.  Main pulmonary artery is normal in caliber.       Mediastinum: No evidence of mediastinal lymphadenopathy.  The heart and   pericardium demonstrate no acute abnormality.  There is no acute abnormality   of the thoracic aorta.  There appear to be subcentimeter thyroid nodules.  No   routine follow-up imaging is recommended.     Lungs/pleura: The lungs are without acute process.  No focal consolidation or   pulmonary edema.  No evidence of pleural effusion or pneumothorax.  Mild   bronchial wall thickening probably correlates with smoking history.  No lung   nodule meeting criteria for further evaluation.  There is minimal pulmonary   emphysema.       Upper Abdomen: Hepatic steatosis suggested       Soft Tissues/Bones: No acute bone or soft tissue abnormality.           Impression   No evidence of pulmonary embolism or acute pulmonary abnormality.               Echo:  Not on file  Stress test 11/18/2021  Lexiscan MPS ( treadmill stress test ordered but was converted to the pharmacologic stress test because of uncontrolled BP at baseline )  No CP  No ischemic ECG changes  Nuclear images reported separately  The myocardial perfusion imaging was probably normal showing   attenuation artifact involving the inferior wall but with no   convincing evidence of scars or stress-induced ischemia   Overall left ventricular systolic function was normal with no regional   wall motion abnormality   Low risk pharmacologic stress  test.               Labs:  Lab Results   Component Value Date    WBC 9.7 03/30/2022    HGB 13.0 03/30/2022    HCT 43.8 03/30/2022    MCV 74.6 03/30/2022    MCH 22.1 03/30/2022    MCHC 29.7 03/30/2022    RDW 19.8 03/30/2022     03/30/2022    MPV 10.6 03/30/2022     Lab Results   Component Value Date     03/30/2022    K 3.8 03/30/2022     03/30/2022    CO2 28 03/30/2022    BUN 17 03/30/2022    CREATININE 1.0 03/30/2022    LABALBU 4.0 03/29/2022    CALCIUM 9.1 03/30/2022    GFRAA >60 03/30/2022    LABGLOM >60 03/30/2022     No results found for: PROTIME, INR  Recent Labs     03/29/22  1924   PROBNP 67     No results for input(s): TROPONINI in the last 72 hours. No results for input(s): PROCAL in the last 72 hours. This SmartLink has not been configured with any valid records.        Micro:  No results for input(s): CULTRESP in the last 72 hours. No results for input(s): LABGRAM in the last 72 hours. No results for input(s): LEGUR in the last 72 hours. No results for input(s): STREPNEUMAGU in the last 72 hours. No results for input(s): LP1UAG in the last 72 hours. Assessment:  Acute respiratory insufficiency with hypoxia-resolved  NSTEMI  Dyspnea- improved  Ongoing nicotine dependence  COPD with mild exacerbation  Obesity BMI 36.61  Leukocytosis-resolved  Hypertension  GERD    Plan:  Oxygen weaned off  CTA chest no evidence of pneumonia, no pulmonary edema, no pleural effusion  Cardiology following for management of NSTEMI, plan for left heart catheterization  Continue heparin infusion, cycle enzymes  Albuterol as needed, add laba, ics bid  DVT, GI prophylaxis  Recommend outpatient PFT testing when recovered  Tobacco cessation counseling add nicoderm patch      Thank you for allowing me to participate in the care of [address] [place] [address]. Please feel free to call with questions. This plan of care was reviewed in collaboration with Dr. Paco Paul    Electronically signed by MATT Acuña CNP on 3/30/2022 at 1:58 PM      Note: This report was completed utilizing computer voice recognition software. Every effort has been made to ensure accuracy, however; inadvertent computerized transcription errors may be present        I personally saw, examined and provided care for the patient. Radiographs, labs and medication list were reviewed by me independently. I spoke with bedside nursing, therapists and consultants. The case was discussed in detail and plans for care were established. Review of CNP documentation was conducted and revisions were made as appropriate. I agree with the above documented exam, problem list and plan of care.   Garett Llanes MD

## 2022-03-30 NOTE — CONSULTS
Yury Roth  1960  Date of Service: 3/30/2022    Reason for Consultation: We were asked to see Yury Roth by Dr. Brennan Felty, DO  regarding chest pain, non-ST elevation microinfarction. Sid Ryan History of Chief Complaint: This is a 64 y.o. female known is not previously known to our group. .  They have a history of hypertension, obesity, hypercholesterolemia, COPD, smoking, incontinence, and glaucoma. She also has been noncompliant with her medications. She states that for the past 3 days she has been having a \"indigestion\" feeling in her chest and she goes upstairs. However, she also states that it is always after she eats she goes up the stairs and feels this discomfort. She has been having increasing dyspnea with stairs over the past few days as well. Yesterday she went up the stairs and dyspnea was severe. She tried a nebulizer which she states did help some but did not completely resolve her symptoms. She has also been coughing. She is currently pain-free. She denies any orthopnea/PND. She denies any palpitations, or syncope, or near syncope. REVIEW OF SYSTEMS:   Heart: as above   Lungs: as above   Eyes: denies changes in vision or discharge. Ears: denies changes in hearing or pain. Nose: denies epistaxis or masses   Throat: denies sore throat or trouble swallowing. Neuro: denies numbness, tingling, tremors. Skin: denies rashes or itching. : denies hematuria, dysuria   GI: denies vomiting, diarrhea   Psych: denies mood changed, anxiety, depression.     CURRENT MEDICATIONS:  Current Facility-Administered Medications   Medication Dose Route Frequency Provider Last Rate Last Admin    sodium chloride flush 0.9 % injection 5-40 mL  5-40 mL IntraVENous 2 times per day Shravan Doty, DO        sodium chloride flush 0.9 % injection 5-40 mL  5-40 mL IntraVENous PRN Shravan Doty, DO        0.9 % sodium chloride infusion   IntraVENous PRN Shravan Doty DO        ondansetron (ZOFRAN-ODT) disintegrating tablet 4 mg  4 mg Oral Q8H PRN [place] Gale, DO        Or    ondansetron [facility]) injection 4 mg  4 mg IntraVENous Q6H PRN [place] Gale, DO        acetaminophen (TYLENOL) tablet 650 mg  650 mg Oral Q6H PRN Sabi Gale, DO        Or    acetaminophen (TYLENOL) suppository 650 mg  650 mg Rectal Q6H PRN [place] Gale, DO        polyethylene glycol (GLYCOLAX) packet 17 g  17 g Oral Daily PRN Sabi Gale, DO        [START ON 3/31/2022] aspirin chewable tablet 81 mg  81 mg Oral Daily [place] Gale, DO        atorvastatin (LIPITOR) tablet 40 mg  40 mg Oral Nightly Sabi Gale, DO        metoprolol tartrate (LOPRESSOR) tablet 12.5 mg  12.5 mg Oral BID Sabi Gale, DO   12.5 mg at 22 1018    losartan (COZAAR) tablet 25 mg  25 mg Oral Daily [place] Gale, DO   25 mg at 22 8313    ipratropium-albuterol (DUONEB) nebulizer solution 1 ampule  1 ampule Inhalation Q4H PRN Gordon Mon, DO   1 ampule at 22 1248    hydrALAZINE (APRESOLINE) injection 10 mg  10 mg IntraVENous Q6H PRN Rhoda Claros, DO        melatonin tablet 3 mg  3 mg Oral Nightly PRN Gordon Mon, DO        heparin (porcine) injection 4,000 Units  4,000 Units IntraVENous PRN [place] Gale, DO        heparin (porcine) injection 2,000 Units  2,000 Units IntraVENous PRN Sabi Gale, DO   2,000 Units at 22 0731    heparin 25,000 units in dextrose 5% 250 mL (premix) infusion  5-30 Units/kg/hr IntraVENous Continuous Sabi Gale, DO 12 mL/hr at 22 0731 12 Units/kg/hr at 22 0731        ALLERGIES:  No Known Allergies    MEDICAL HISTORY:  No past medical history on file. SURGICAL HISTORY:  Past Surgical History:   Procedure Laterality Date     SECTION         FAMILY HISTORY:  No family history on file.     SOCIAL HISTORY:  Social History     Socioeconomic History    Marital status: Single     Spouse name: Not on file    Number of children: Not on file    Years 03/30/22 1248   BP: (!) 143/77  134/80    Pulse: 86  82    Resp: 18  18    Temp: 97 °F (36.1 °C)  97 °F (36.1 °C)    TempSrc: Temporal  Temporal    SpO2: 96% 92% 92% 96%   Weight:       Height:           GENERAL:  He is alert and oriented x 3, communicates well, in no distress. NECK:  No masses, trachea is mid position. Supple, full ROM, no JVD or bruits. No palpable thyromegaly or lymphadenopathy. HEART: Distant to auscultation. Regular rate and rhythm. Normal S1 and S2. There is no abnormal murmur. No heaves. LUNGS: Poor air movement. Diffuse bilateral wheezing. No use of accessory muscles. ABDOMEN:  Soft, non-tender. Normal bowel sounds. EXTREMITIES:  Full ROM x4. No bilateral lower extremity edema. Good distal pulses. EYES:  PERRL, normal lids & conjunctiva. No icterus. ENT: no external masses, no bleeding. Moist mucosa. Normal lips formation. NEURO: Full ROM x 4, EOMI, no tremors. SKIN:  Warm, dry and intact. Normal turgor, no petechia. Phych: alert & oriented x3. Normal  Judgement & insight. Currently not agitated or anxious. EKG: Sinus tachycardia, 103 bpm, left axis, nonspecific ST - T wave changes. LVH. Labs:  Recent Labs     03/29/22 1924 03/29/22 2301 03/30/22  0606   WBC 14.9* 12.2* 9.7   HGB 13.5 13.5 13.0   HCT 45.7 44.6 43.8   MCV 74.8* 74.0* 74.6*    338 317     Recent Labs     03/29/22 1924 03/30/22  0606    141   K 4.5 3.8    104   CO2 21* 28   BUN 18 17   CREATININE 1.0 1.0     No results for input(s): PROTIME, INR in the last 72 hours. No results for input(s): CKTOTAL, CKMB, CKMBINDEX, TROPONINI in the last 72 hours. No results for input(s): BNP in the last 72 hours. Recent Labs     03/30/22  0606   CHOL 149   HDL 40   TRIG 68     Recent Labs     03/29/22 1924 03/29/22 2055 03/30/22  0105   TROPHS 133* 154* 134*       Assessment:   1. Exertional chest discomfort and dyspnea concerning for cardiac etiology.   2. Abnormal troponin consistent with a non-ST elevation myocardial infarction. 3. Exacerbation COPD. Improving but still wheezing. 4. Hypertension  5. Obesity  6. Noncompliance  7. Smoking  8. Incontinence  9. Glaucoma        Recommendations:  1. Aspirin  2. Nitropaste  3. I discussed with her and recommended a cardiac catheterization possible PCI. After I discussed the procedure and its risks and benefits she is uncertain if she wishes to proceed. I therefore discussed a coronary CTA in order a stress test and the fact that these are noninvasive but they are also not 100% sensitive as a heart catheterization is. I discussed with her that her enzymes indicate that this is cardiac and I would recommend a cardiac catheterization. She still is not sure she wishes to proceed. Therefore, she wishes to discuss this further with her family and she will let us know what she decides. Thank you for allowing me to participate in your patient's care. [address] [facility], [address] [facility]  Interventional Cardiology    Note: This report was completed using computerized voice recognition software. Every effort has been made to ensure accuracy, however; and invert and computerized transcription errors may be present.

## 2022-03-30 NOTE — PROGRESS NOTES
Patient admitted after midnight for chest pain concerning for cardiac etiology  Being evaluated by cardiology and pulmonology.   Recommending cardiac catheterization, patient discussing with family  CTA coronary    Electronically signed by Karime Wagoner DO on 3/30/2022 at 3:47 PM

## 2022-03-30 NOTE — CARE COORDINATION
3/30/22 Transition of Care: Patient alert and oriented. States she is here due to chest pain and pressure. She has had indigestion for couple of days. She has shortness of breath and used her nebulizer without help. She is seen by cardiology and a cath is recommended/vs cta coronaries. Patient wishes to think about it and discuss with family. She is a MI. She is currently on iv heparin drip. She follows with Ainsley Shoemaker and her pharmacy is [facility]. She does reside alone but is independent. She does not use any assistive devices. She will make a determination regarding her plan of care.  Electronically signed by Mai Mccracken RN CM on 3/30/2022 at 2:51 PM

## 2022-03-30 NOTE — PROGRESS NOTES
Patient's jewelry removed and placed in an envelope. Envelope given to patient. Patient left CT department with jewelry.    One earring

## 2022-03-30 NOTE — PATIENT CARE CONFERENCE
[facility] Quality Flow/Interdisciplinary Rounds Progress Note        Quality Flow Rounds held on March 30, 2022    Disciplines Attending:  Bedside Nurse, ,  and Nursing Unit Leadership    Leta Leventhal was admitted on 3/29/2022  7:29 PM    Anticipated Discharge Date:       Disposition:    Teodoro Score:  Teodoro Scale Score: 21    Readmission Risk              Risk of Unplanned Readmission:  11           Discussed patient goal for the day, patient clinical progression, and barriers to discharge.   The following Goal(s) of the Day/Commitment(s) have been identified:  Diagnostics - Report Results      Sabrina Mccarthy RN  March 30, 2022

## 2022-03-30 NOTE — ED NOTES
Refusing st. Cath. Informed this nurse she has been up to urinate multiple times however did not give urine sample despite being instructed to do so.       Rudi Fatima, MARCUS  03/29/22 6079

## 2022-03-30 NOTE — H&P
Hospitalist History & Physical      PCP: César Alcaraz DO    Date of Service: Pt seen/examined on 3/30/2022     Chief Complaint:  had concerns including Chest Pain (Patient states she has no radiating chest pain that started yesterday, patient states she is also short of breath and has \"poop\" coming out of her nose. Patient has poop sample with her in bag. ). History Of Present Illness:    Ms. Hany Dai, a 64y.o. year old female  who  has no past medical history on file. Patient presented to the emergency department with complaints of chest pain that started yesterday. She is also reporting some shortness of breath and congestion. Vital signs are within normal limits and stable although she was 89% on room air on arrival.  She is currently 93% on room air. Patient is afebrile. Laboratory studies demonstrate glucose 109, troponin of 133 with repeat of 154, WBC 12.2. CT chest and CT head were unremarkable. Patient was started on a heparin infusion and medicine was consulted for admission. No past medical history on file. Past Surgical History:   Procedure Laterality Date     SECTION         Prior to Admission medications    Medication Sig Start Date End Date Taking?  Authorizing Provider   albuterol (PROVENTIL) (2.5 MG/3ML) 0.083% nebulizer solution Use 1 vial per nebulizer every 6 hours as needed for wheezing / shortness of breath 3/28/22   César Alcaraz DO   Respiratory Therapy Supplies (NEBULIZER/TUBING/MOUTHPIECE) KIT 1 kit by Does not apply route daily as needed (as needed for wheezing / shortness of breath) 3/28/22   César Alcaraz DO   acetaZOLAMIDE (DIAMOX) 500 MG extended release capsule take 1 capsule by mouth twice a day IN THE MORNING and IN THE EVENING 3/5/22   Historical Provider, MD   atropine 1 % ophthalmic solution instill 1 drop into left eye twice a day AFTER SURGERY UNTIL GONE THEN STOP 3/9/22   Historical Provider, MD   dorzolamide (TRUSOPT) 2 % ophthalmic solution instill 1 drop into left eye twice a day 3/5/22   Historical Provider, MD   HYDROcodone-acetaminophen ([address]) 5-325 MG per tablet  11/18/21   Historical Provider, MD   latanoprost (XALATAN) 0.005 % ophthalmic solution instill 1 drop into left eye at bedtime 2/18/22   Historical Provider, MD   oxybutynin (DITROPAN-XL) 5 MG extended release tablet take 1 tablet by mouth once daily 2/11/22   Historical Provider, MD   pilocarpine (PILOCAR) 2 % ophthalmic solution instill 1 drop into left eye twice a day 2/16/22   Historical Provider, MD   prednisoLONE acetate (PRED FORTE) 1 % ophthalmic suspension instill 1 drop into left eye every 2 hours AFTER SURGERY 3/8/22   Historical Provider, MD   TOBRADEX 0.3-0.1 % ophthalmic suspension instill 1 drop into both eyes four times a day for 7 days 1/26/22   Historical Provider, MD   aspirin 81 MG chewable tablet Take 1 tablet by mouth daily 2/1/22   Audrey Mccarthy, DO   metoprolol tartrate (LOPRESSOR) 25 MG tablet Take 0.5 tablets by mouth 2 times daily 2/1/22   Audrey Mccarthy, DO   atorvastatin (LIPITOR) 40 MG tablet Take 1 tablet by mouth nightly 2/1/22   Audrey Mccarthy, DO   brompheniramine-pseudoephedrine-DM (BROMFED DM) 2-30-10 MG/5ML syrup Take 5 mLs by mouth 4 times daily as needed for Cough 2/1/22   Audrey Mccarthy, DO   Blood Pressure KIT 1 kit by Does not apply route daily 11/11/21   Audrey Mccarthy, DO   albuterol sulfate HFA (PROVENTIL HFA) 108 (90 Base) MCG/ACT inhaler Inhale 2 puffs into the lungs every 4 hours as needed for Wheezing 7/8/21 7/8/22  Audrey Mccarthy, DO   Spacer/Aero-Holding Chambers ABHIJIT 1 Device by Does not apply route daily 7/5/21   Kj Lung, APRN - CNP         Allergies:  Patient has no known allergies. Social History:    TOBACCO:   reports that she has been smoking cigarettes. She has a 6.50 pack-year smoking history.  She has never used smokeless tobacco.  ETOH:   reports no history of alcohol use. Family History:    Reviewed in detail and negative for DM, CAD, Cancer, CVA. Positive as follows\"  No family history on file. REVIEW OF SYSTEMS:   Pertinent positives as noted in the HPI. All other systems reviewed and negative. PHYSICAL EXAM:  BP (!) 154/93   Pulse 77   Temp 98.1 °F (36.7 °C)   Resp 18   Ht 5' 5\" (1.651 m)   Wt 220 lb (99.8 kg)   SpO2 93%   BMI 36.61 kg/m²   General appearance: No apparent distress, appears stated age and cooperative. HEENT: Normal cephalic, atraumatic without obvious deformity. Pupils equal, round, and reactive to light. Extra ocular muscles intact. Conjunctivae/corneas clear. Neck: Supple, with full range of motion. No jugular venous distention. Trachea midline. Respiratory: CTA  Cardiovascular: RRR  Abdomen: S/NT/ND  Musculoskeletal: No clubbing, cyanosis, edema of bilateral lower extremities. Brisk capillary refill. Skin: Normal skin color. No rashes or lesions. Neurologic:  Neurovascularly intact without any focal sensory/motor deficits. Cranial nerves: II-XII intact, grossly non-focal.  Psychiatric: Alert and oriented, thought content appropriate, normal insight    Reviewed EKG and CXR personally      CBC:   Recent Labs     03/29/22 1924 03/29/22 2301   WBC 14.9* 12.2*   RBC 6.11* 6.03*   HGB 13.5 13.5   HCT 45.7 44.6   MCV 74.8* 74.0*   RDW 20.3* 20.1*    338     BMP:   Recent Labs     03/29/22 1924      K 4.5      CO2 21*   BUN 18   CREATININE 1.0     LFT:  Recent Labs     03/29/22 1924   PROT 8.0   ALKPHOS 95   ALT 22   AST 24   BILITOT 0.3     CE:  No results for input(s): Jadene Moh in the last 72 hours. PT/INR:   Recent Labs     03/29/22 2301   APTT 27.5     BNP: No results for input(s): BNP in the last 72 hours.   ESR: No results found for: SEDRATE  CRP: No results found for: CRP  D Dimer:   Lab Results   Component Value Date    DDIMER <200 11/18/2021      Folate and B12: No results found for: Ricardo Marx, No results found for: FOLATE  Lactic Acid:   Lab Results   Component Value Date    LACTA 1.5 05/26/2019     Thyroid Studies: No results found for: TSH, Shruthi Inoue    Oupatient labs:  Lab Results   Component Value Date    CHOL 140 11/18/2021    TRIG 81 11/18/2021    HDL 37 11/18/2021    LDLCALC 87 11/18/2021       Urinalysis:    Lab Results   Component Value Date    NITRU Negative 03/29/2022    WBCUA 1-3 03/29/2022    BACTERIA FEW 03/29/2022    RBCUA 0-1 03/29/2022    BLOODU TRACE-INTACT 03/29/2022    SPECGRAV 1.015 03/29/2022    GLUCOSEU Negative 03/29/2022       Imaging:  CT HEAD WO CONTRAST    Result Date: 3/29/2022  EXAMINATION: CT OF THE HEAD WITHOUT CONTRAST  3/29/2022 9:52 pm TECHNIQUE: CT of the head was performed without the administration of intravenous contrast. Dose modulation, iterative reconstruction, and/or weight based adjustment of the mA/kV was utilized to reduce the radiation dose to as low as reasonably achievable. COMPARISON: 11/17/2021 HISTORY: ORDERING SYSTEM PROVIDED HISTORY: Evaluate intracranial abnormality TECHNOLOGIST PROVIDED HISTORY: Has a \"code stroke\" or \"stroke alert\" been called? ->No Reason for exam:->Evaluate intracranial abnormality Decision Support Exception - unselect if not a suspected or confirmed emergency medical condition->Emergency Medical Condition (MA) What reading provider will be dictating this exam?->CRC FINDINGS: BRAIN/VENTRICLES: There is no acute intracranial hemorrhage, mass effect or midline shift. No abnormal extra-axial fluid collection. The gray-white differentiation is maintained without evidence of an acute infarct. There is no evidence of hydrocephalus. ORBITS: The visualized portion of the orbits demonstrate no acute abnormality. SINUSES: The visualized paranasal sinuses and mastoid air cells demonstrate no acute abnormality. SOFT TISSUES/SKULL:  No acute abnormality of the visualized skull or soft tissues.      No acute intracranial abnormality. RECOMMENDATIONS: Unavailable     XR CHEST PORTABLE    Result Date: 3/29/2022  EXAMINATION: ONE XRAY VIEW OF THE CHEST 3/29/2022 8:00 pm COMPARISON: Chest series from November 17, 2021 HISTORY: ORDERING SYSTEM PROVIDED HISTORY: chest pain TECHNOLOGIST PROVIDED HISTORY: Reason for exam:->chest pain What reading provider will be dictating this exam?->CRC FINDINGS: Adequate and symmetric aeration of the lungs. There are no formed consolidations, pleural effusions, or pneumothoraces. Trachea and central mainstem bronchi appear clear. Minimal atherosclerotic disease in the aortic arch. The remaining cardiomediastinal silhouette and pulmonary vascularity appear within normal limits. Osseous and thoracic soft tissue structures demonstrate no acute findings. Atherosclerotic disease. No additional evidence of active cardiopulmonary pathology. CTA CHEST W CONTRAST    Result Date: 3/29/2022  EXAMINATION: CTA OF THE CHEST 3/29/2022 9:52 pm TECHNIQUE: CTA of the chest was performed after the administration of intravenous contrast.  Multiplanar reformatted images are provided for review. MIP images are provided for review. Dose modulation, iterative reconstruction, and/or weight based adjustment of the mA/kV was utilized to reduce the radiation dose to as low as reasonably achievable. COMPARISON: None. HISTORY: ORDERING SYSTEM PROVIDED HISTORY: evaluate for PE TECHNOLOGIST PROVIDED HISTORY: Reason for exam:->evaluate for PE Decision Support Exception - unselect if not a suspected or confirmed emergency medical condition->Emergency Medical Condition (MA) What reading provider will be dictating this exam?->CRC FINDINGS: Pulmonary Arteries: Pulmonary arteries are adequately opacified for evaluation. No evidence of intraluminal filling defect to suggest pulmonary embolism. Main pulmonary artery is normal in caliber. Mediastinum: No evidence of mediastinal lymphadenopathy.   The heart and pericardium demonstrate no acute abnormality. There is no acute abnormality of the thoracic aorta. There appear to be subcentimeter thyroid nodules. No routine follow-up imaging is recommended. Lungs/pleura: The lungs are without acute process. No focal consolidation or pulmonary edema. No evidence of pleural effusion or pneumothorax. Mild bronchial wall thickening probably correlates with smoking history. No lung nodule meeting criteria for further evaluation. There is minimal pulmonary emphysema. Upper Abdomen: Hepatic steatosis suggested Soft Tissues/Bones: No acute bone or soft tissue abnormality. No evidence of pulmonary embolism or acute pulmonary abnormality. ASSESSMENT:  -NSTEMI  -Chest pain  -Acute respiratory failure with hypoxia  -Elevated troponin  -Leukocytosis  -Hyperlipidemia  -Hypertension      PLAN:  -Admit to medicine  -Consult cardiology  -Heparin infusion  -N.p.o. now  -Cycle serial troponins  -Aspirin, statin, losartan  -Check lipid panel and A1c        Diet: No diet orders on file  Code Status: Prior  Surrogate decision maker confirmed with patient:   Extended Emergency Contact Information  Primary Emergency Contact: Princess Chance  Address: [address] [place] [address]  Mobile Phone: [phone]  Relation: Child    DVT Prophylaxis: []Lovenox []Heparin []PCD [] [address] []Encouraged ambulation  Disposition: []Med/Surg [] Intermediate [] ICU/CCU  Admit status: [] Observation [] Inpatient     +++++++++++++++++++++++++++++++++++++++++++++++++  Jorge A Madrigal,   +++++++++++++++++++++++++++++++++++++++++++++++++  NOTE: This report was transcribed using voice recognition software. Every effort was made to ensure accuracy; however, inadvertent computerized transcription errors may be present.

## 2022-03-31 ENCOUNTER — APPOINTMENT (OUTPATIENT)
Dept: CT IMAGING | Age: 62
DRG: 190 | End: 2022-03-31
Payer: MEDICAID

## 2022-03-31 VITALS
TEMPERATURE: 97.3 F | OXYGEN SATURATION: 91 % | HEART RATE: 68 BPM | SYSTOLIC BLOOD PRESSURE: 136 MMHG | RESPIRATION RATE: 19 BRPM | BODY MASS INDEX: 36.65 KG/M2 | DIASTOLIC BLOOD PRESSURE: 120 MMHG | HEIGHT: 65 IN | WEIGHT: 220 LBS

## 2022-03-31 LAB
ANION GAP SERPL CALCULATED.3IONS-SCNC: 10 MMOL/L (ref 7–16)
APTT: 47.4 SEC (ref 24.5–35.1)
BUN BLDV-MCNC: 18 MG/DL (ref 6–23)
CALCIUM SERPL-MCNC: 8.9 MG/DL (ref 8.6–10.2)
CHLORIDE BLD-SCNC: 103 MMOL/L (ref 98–107)
CO2: 27 MMOL/L (ref 22–29)
CREAT SERPL-MCNC: 0.8 MG/DL (ref 0.5–1)
EKG ATRIAL RATE: 75 BPM
EKG P AXIS: 71 DEGREES
EKG P-R INTERVAL: 148 MS
EKG Q-T INTERVAL: 472 MS
EKG QRS DURATION: 88 MS
EKG QTC CALCULATION (BAZETT): 527 MS
EKG R AXIS: -11 DEGREES
EKG T AXIS: 73 DEGREES
EKG VENTRICULAR RATE: 75 BPM
GFR AFRICAN AMERICAN: >60
GFR NON-AFRICAN AMERICAN: >60 ML/MIN/1.73
GLUCOSE BLD-MCNC: 95 MG/DL (ref 74–99)
HCT VFR BLD CALC: 42.3 % (ref 34–48)
HEMOGLOBIN: 12.6 G/DL (ref 11.5–15.5)
MCH RBC QN AUTO: 22.4 PG (ref 26–35)
MCHC RBC AUTO-ENTMCNC: 29.8 % (ref 32–34.5)
MCV RBC AUTO: 75.3 FL (ref 80–99.9)
PDW BLD-RTO: 19.9 FL (ref 11.5–15)
PLATELET # BLD: 330 E9/L (ref 130–450)
PMV BLD AUTO: 11.1 FL (ref 7–12)
POTASSIUM SERPL-SCNC: 3.7 MMOL/L (ref 3.5–5)
RBC # BLD: 5.62 E12/L (ref 3.5–5.5)
SODIUM BLD-SCNC: 140 MMOL/L (ref 132–146)
WBC # BLD: 7.4 E9/L (ref 4.5–11.5)

## 2022-03-31 PROCEDURE — 94375 RESPIRATORY FLOW VOLUME LOOP: CPT

## 2022-03-31 PROCEDURE — 6360000002 HC RX W HCPCS: Performed by: FAMILY MEDICINE

## 2022-03-31 PROCEDURE — 94760 N-INVAS EAR/PLS OXIMETRY 1: CPT

## 2022-03-31 PROCEDURE — 99232 SBSQ HOSP IP/OBS MODERATE 35: CPT | Performed by: PHYSICIAN ASSISTANT

## 2022-03-31 PROCEDURE — 85027 COMPLETE CBC AUTOMATED: CPT

## 2022-03-31 PROCEDURE — 93010 ELECTROCARDIOGRAM REPORT: CPT | Performed by: INTERNAL MEDICINE

## 2022-03-31 PROCEDURE — 94640 AIRWAY INHALATION TREATMENT: CPT

## 2022-03-31 PROCEDURE — 6370000000 HC RX 637 (ALT 250 FOR IP): Performed by: INTERNAL MEDICINE

## 2022-03-31 PROCEDURE — 6370000000 HC RX 637 (ALT 250 FOR IP)

## 2022-03-31 PROCEDURE — 2580000003 HC RX 258: Performed by: INTERNAL MEDICINE

## 2022-03-31 PROCEDURE — 94729 DIFFUSING CAPACITY: CPT

## 2022-03-31 PROCEDURE — 80048 BASIC METABOLIC PNL TOTAL CA: CPT

## 2022-03-31 PROCEDURE — 6370000000 HC RX 637 (ALT 250 FOR IP): Performed by: NURSE PRACTITIONER

## 2022-03-31 PROCEDURE — 93005 ELECTROCARDIOGRAM TRACING: CPT | Performed by: FAMILY MEDICINE

## 2022-03-31 PROCEDURE — 75574 CT ANGIO HRT W/3D IMAGE: CPT | Performed by: INTERNAL MEDICINE

## 2022-03-31 PROCEDURE — 6370000000 HC RX 637 (ALT 250 FOR IP): Performed by: FAMILY MEDICINE

## 2022-03-31 PROCEDURE — 36415 COLL VENOUS BLD VENIPUNCTURE: CPT

## 2022-03-31 PROCEDURE — 85730 THROMBOPLASTIN TIME PARTIAL: CPT

## 2022-03-31 PROCEDURE — 2580000003 HC RX 258: Performed by: FAMILY MEDICINE

## 2022-03-31 PROCEDURE — 6360000002 HC RX W HCPCS: Performed by: NURSE PRACTITIONER

## 2022-03-31 PROCEDURE — 6360000004 HC RX CONTRAST MEDICATION: Performed by: RADIOLOGY

## 2022-03-31 PROCEDURE — 75574 CT ANGIO HRT W/3D IMAGE: CPT

## 2022-03-31 RX ORDER — DOXYCYCLINE HYCLATE 100 MG
100 TABLET ORAL 2 TIMES DAILY
Qty: 14 TABLET | Refills: 0 | Status: SHIPPED | OUTPATIENT
Start: 2022-03-31 | End: 2022-04-07

## 2022-03-31 RX ORDER — LOSARTAN POTASSIUM 50 MG/1
50 TABLET ORAL DAILY
Qty: 30 TABLET | Refills: 0 | Status: SHIPPED | OUTPATIENT
Start: 2022-04-01 | End: 2022-05-02 | Stop reason: SDUPTHER

## 2022-03-31 RX ORDER — AZITHROMYCIN 250 MG/1
250 TABLET, FILM COATED ORAL SEE ADMIN INSTRUCTIONS
Qty: 6 TABLET | Refills: 0 | Status: SHIPPED | OUTPATIENT
Start: 2022-03-31 | End: 2022-03-31 | Stop reason: HOSPADM

## 2022-03-31 RX ORDER — BUDESONIDE 0.5 MG/2ML
500 INHALANT ORAL 2 TIMES DAILY
Qty: 60 EACH | Refills: 0 | Status: SHIPPED | OUTPATIENT
Start: 2022-03-31 | End: 2022-03-31 | Stop reason: HOSPADM

## 2022-03-31 RX ORDER — METHYLPREDNISOLONE 4 MG/1
TABLET ORAL
Qty: 1 KIT | Refills: 0 | Status: SHIPPED | OUTPATIENT
Start: 2022-03-31 | End: 2022-04-06

## 2022-03-31 RX ORDER — LOSARTAN POTASSIUM 50 MG/1
50 TABLET ORAL DAILY
Status: DISCONTINUED | OUTPATIENT
Start: 2022-04-01 | End: 2022-03-31 | Stop reason: HOSPADM

## 2022-03-31 RX ORDER — PILOCARPINE HYDROCHLORIDE 20 MG/ML
1 SOLUTION/ DROPS OPHTHALMIC 2 TIMES DAILY
Status: DISCONTINUED | OUTPATIENT
Start: 2022-03-31 | End: 2022-03-31 | Stop reason: HOSPADM

## 2022-03-31 RX ORDER — ARFORMOTEROL TARTRATE 15 UG/2ML
15 SOLUTION RESPIRATORY (INHALATION) 2 TIMES DAILY
Qty: 120 ML | Refills: 0 | Status: SHIPPED | OUTPATIENT
Start: 2022-03-31 | End: 2022-03-31 | Stop reason: HOSPADM

## 2022-03-31 RX ORDER — OXYBUTYNIN CHLORIDE 5 MG/1
5 TABLET, EXTENDED RELEASE ORAL NIGHTLY
Status: DISCONTINUED | OUTPATIENT
Start: 2022-03-31 | End: 2022-03-31 | Stop reason: HOSPADM

## 2022-03-31 RX ORDER — NICOTINE 21 MG/24HR
1 PATCH, TRANSDERMAL 24 HOURS TRANSDERMAL DAILY
Qty: 30 PATCH | Refills: 0 | Status: SHIPPED | OUTPATIENT
Start: 2022-04-01 | End: 2022-06-20

## 2022-03-31 RX ORDER — BROMPHENIRAMINE MALEATE, PSEUDOEPHEDRINE HYDROCHLORIDE, AND DEXTROMETHORPHAN HYDROBROMIDE 2; 30; 10 MG/5ML; MG/5ML; MG/5ML
5 SYRUP ORAL 4 TIMES DAILY PRN
Status: DISCONTINUED | OUTPATIENT
Start: 2022-03-31 | End: 2022-03-31

## 2022-03-31 RX ORDER — DORZOLAMIDE HCL 20 MG/ML
1 SOLUTION/ DROPS OPHTHALMIC 2 TIMES DAILY
Status: DISCONTINUED | OUTPATIENT
Start: 2022-03-31 | End: 2022-03-31 | Stop reason: HOSPADM

## 2022-03-31 RX ORDER — FLUTICASONE FUROATE, UMECLIDINIUM BROMIDE AND VILANTEROL TRIFENATATE 100; 62.5; 25 UG/1; UG/1; UG/1
1 POWDER RESPIRATORY (INHALATION) DAILY
Qty: 1 EACH | Refills: 5 | Status: SHIPPED | OUTPATIENT
Start: 2022-03-31

## 2022-03-31 RX ORDER — IPRATROPIUM BROMIDE AND ALBUTEROL SULFATE 2.5; .5 MG/3ML; MG/3ML
3 SOLUTION RESPIRATORY (INHALATION) EVERY 4 HOURS PRN
Qty: 360 ML | Refills: 0 | Status: SHIPPED | OUTPATIENT
Start: 2022-03-31

## 2022-03-31 RX ORDER — 0.9 % SODIUM CHLORIDE 0.9 %
1000 INTRAVENOUS SOLUTION INTRAVENOUS ONCE
Status: COMPLETED | OUTPATIENT
Start: 2022-03-31 | End: 2022-03-31

## 2022-03-31 RX ORDER — LATANOPROST 50 UG/ML
1 SOLUTION/ DROPS OPHTHALMIC NIGHTLY
Status: DISCONTINUED | OUTPATIENT
Start: 2022-03-31 | End: 2022-03-31 | Stop reason: HOSPADM

## 2022-03-31 RX ORDER — PREDNISOLONE ACETATE 10 MG/ML
1 SUSPENSION/ DROPS OPHTHALMIC
Status: DISCONTINUED | OUTPATIENT
Start: 2022-03-31 | End: 2022-03-31 | Stop reason: HOSPADM

## 2022-03-31 RX ORDER — PANTOPRAZOLE SODIUM 40 MG/1
40 TABLET, DELAYED RELEASE ORAL
Qty: 60 TABLET | Refills: 0 | Status: SHIPPED | OUTPATIENT
Start: 2022-03-31 | End: 2022-03-31 | Stop reason: HOSPADM

## 2022-03-31 RX ADMIN — SODIUM CHLORIDE, PRESERVATIVE FREE 10 ML: 5 INJECTION INTRAVENOUS at 09:03

## 2022-03-31 RX ADMIN — HEPARIN SODIUM 2000 UNITS: 1000 INJECTION INTRAVENOUS; SUBCUTANEOUS at 09:17

## 2022-03-31 RX ADMIN — PILOCARPINE HYDROCHLORIDE 1 DROP: 20 SOLUTION/ DROPS OPHTHALMIC at 12:19

## 2022-03-31 RX ADMIN — PREDNISOLONE ACETATE 1 DROP: 10 SUSPENSION/ DROPS OPHTHALMIC at 12:19

## 2022-03-31 RX ADMIN — NITROGLYCERIN 0.8 MG: 0.4 TABLET SUBLINGUAL at 11:37

## 2022-03-31 RX ADMIN — IVABRADINE 15 MG: 5 TABLET, FILM COATED ORAL at 09:25

## 2022-03-31 RX ADMIN — IPRATROPIUM BROMIDE AND ALBUTEROL SULFATE 1 AMPULE: .5; 2.5 SOLUTION RESPIRATORY (INHALATION) at 08:59

## 2022-03-31 RX ADMIN — ASPIRIN 81 MG 81 MG: 81 TABLET ORAL at 09:02

## 2022-03-31 RX ADMIN — SODIUM CHLORIDE 1000 ML: 9 INJECTION, SOLUTION INTRAVENOUS at 09:19

## 2022-03-31 RX ADMIN — DORZOLAMIDE HYDROCHLORIDE 1 DROP: 20 SOLUTION/ DROPS OPHTHALMIC at 12:19

## 2022-03-31 RX ADMIN — LOSARTAN POTASSIUM 25 MG: 25 TABLET, FILM COATED ORAL at 09:02

## 2022-03-31 RX ADMIN — METOPROLOL TARTRATE 75 MG: 50 TABLET, FILM COATED ORAL at 09:02

## 2022-03-31 RX ADMIN — BUDESONIDE 500 MCG: 0.5 SUSPENSION RESPIRATORY (INHALATION) at 09:00

## 2022-03-31 RX ADMIN — IPRATROPIUM BROMIDE AND ALBUTEROL SULFATE 1 AMPULE: .5; 2.5 SOLUTION RESPIRATORY (INHALATION) at 12:37

## 2022-03-31 RX ADMIN — IOPAMIDOL 60 ML: 755 INJECTION, SOLUTION INTRAVENOUS at 12:01

## 2022-03-31 RX ADMIN — ARFORMOTEROL TARTRATE 15 MCG: 15 SOLUTION RESPIRATORY (INHALATION) at 09:00

## 2022-03-31 ASSESSMENT — PAIN SCALES - GENERAL: PAINLEVEL_OUTOF10: 0

## 2022-03-31 NOTE — PROGRESS NOTES
Renetta Irby M.D.,[facility]  Sadaf Chong D.O., F.A.C.O.I., Devin Simmons M.D. Crispin Pettit M.D. Sunny Franklin D.O. Daily Pulmonary Progress Note    Patient:  Diane Paris 64 y.o. female MRN: [id number]     Date of Service: 3/31/2022      Synopsis     We are following patient for shortness of breath    \"CC\" shortness of breath    Code status: FULL      Subjective      Patient was seen and examined. Standing at bedside getting dressed. On room air without increased work of breathing. She states her breathing feels much better. Audible wheezing heard. Patient states her test today went well and she is waiting to go home. Review of Systems:  Constitutional: Denies fever, weight loss, night sweats, and fatigue  Skin: Denies pigmentation, dark lesions, and rashes   HEENT: Denies hearing loss, tinnitus, ear drainage, epistaxis, sore throat, and hoarseness. Cardiovascular: Denies palpitations, chest pain, and chest pressure. Respiratory: Denies cough, dyspnea at rest, hemoptysis, apnea, and choking.   Gastrointestinal: Denies nausea, vomiting, poor appetite, diarrhea, heartburn or reflux  Genitourinary: Denies dysuria, frequency, urgency or hematuria  Musculoskeletal: Denies myalgias, muscle weakness, and bone pain  Neurological: Denies dizziness, vertigo, headache, and focal weakness  Psychological: Denies anxiety and depression  Endocrine: Denies heat intolerance and cold intolerance  Hematopoietic/Lymphatic: Denies bleeding problems and blood transfusions    24-hour events:  CTA coronary arteries    Objective   Vitals: BP (!) 136/120   Pulse 68   Temp 97.3 °F (36.3 °C)   Resp 19   Ht 5' 5\" (1.651 m)   Wt 220 lb (99.8 kg)   SpO2 91%   BMI 36.61 kg/m²     I/O:    Intake/Output Summary (Last 24 hours) at 3/31/2022 1625  Last data filed at 3/31/2022 1615  Gross per 24 hour   Intake 1464.25 ml   Output --   Net 1464.25 ml       Vent Information  SpO2: 91 %    CURRENT MEDS :  Scheduled Meds:   pilocarpine  1 drop Left Eye BID    oxybutynin  5 mg Oral Nightly    latanoprost  1 drop Left Eye Nightly    dorzolamide  1 drop Left Eye BID    prednisoLONE acetate  1 drop Left Eye Q4H While awake    [START ON 4/1/2022] losartan  50 mg Oral Daily    sodium chloride flush  5-40 mL IntraVENous 2 times per day    aspirin  81 mg Oral Daily    atorvastatin  40 mg Oral Nightly    metoprolol tartrate  12.5 mg Oral BID    Arformoterol Tartrate  15 mcg Nebulization BID    budesonide  500 mcg Nebulization BID    nicotine  1 patch TransDERmal Daily    ipratropium-albuterol  1 ampule Inhalation Q4H WA       Physical Exam:  General Appearance: appears comfortable in no acute distress. HEENT: Normocephalic atraumatic without obvious abnormality   Neck: Lips, mucosa, and tongue normal. Supple, symmetrical, trachea midline, no adenopathy;thyroid: no enlargement/tenderness/nodules or JVD. Lung: Breath sounds faint exp wheeze/diminished bases. Respirations unlabored. Symmetrical expansion. Heart: RRR, normal S1, S2. No MRG  Abdomen: Soft, NT, ND. BS present x 4 quadrants. No bruit or organomegaly. Extremities: Pedal pulses 2+ symmetric b/l. Extremities normal, no cyanosis, clubbing, or edema. Musculokeletal: No joint swelling, no muscle tenderness. ROM normal in all joints of extremities. Neurologic: Mental status: Alert and Oriented X3    Pertinent/ New Labs and Imaging Studies     Imaging Personally Reviewed:  No new imaging     ECHO none on file    CTA coronary arteries 3/31  1. The left main coronary artery is free of atherosclerotic disease       2. The LAD has 10 to 20% stenosis in the midsegment and rest of the LAD has   no significant stenosis       3. The left circumflex artery has no significant stenosis       4. The right coronary artery has 10 to 20% stenosis in the proximal segment.    The PDA and posterolateral ventricular branches are small caliber vessels and   distal segments of this vessels are not well visualized. 5. Normal LV size. 6. Total Calcium score of 0 with minimal plaque burden. CAD-RADS: 1. Maximal stenosis 1 to 24%. Interpretation, minimal   nonobstructive CAD. Labs:  Lab Results   Component Value Date    WBC 7.4 03/31/2022    HGB 12.6 03/31/2022    HCT 42.3 03/31/2022    MCV 75.3 03/31/2022    MCH 22.4 03/31/2022    MCHC 29.8 03/31/2022    RDW 19.9 03/31/2022     03/31/2022    MPV 11.1 03/31/2022     Lab Results   Component Value Date     03/31/2022    K 3.7 03/31/2022    K 3.8 03/30/2022     03/31/2022    CO2 27 03/31/2022    BUN 18 03/31/2022    CREATININE 0.8 03/31/2022    LABALBU 4.0 03/29/2022    CALCIUM 8.9 03/31/2022    GFRAA >60 03/31/2022    LABGLOM >60 03/31/2022     No results found for: PROTIME, INR  Recent Labs     03/29/22  1924   PROBNP 67     No results for input(s): PROCAL in the last 72 hours. This SmartLink has not been configured with any valid records. Micro:  3/29 respiratory panel negative     Assessment:    Acute respiratory insufficiency with hypoxia-resolved  NSTEMI  Stage III COPD  Dyspnea- improved  Ongoing nicotine dependence  COPD with mild exacerbation  Obesity BMI 36.61  Leukocytosis-resolved  Hypertension  GERD      Plan:   Monitor on room air  Albuterol as needed, laba, ics bid  DVT, GI prophylaxis  PFTs reviewed and dictated, stage III COPD. Patient will need trelegy for home  Tobacco cessation counseling add nicoderm patch  Okay to discharge home from pulmonary POV when okay with others. Follow up message routed [employer]      This plan of care was reviewed in collaboration with Dr. Oliverio Brito  Electronically signed by MATT Pandya CNP on 3/31/2022 at 4:25 PM    Addendum:    PFT reviewed, please see full interpretation. Patient with stage III copd will start trelegy . Patient will have follow up in office in 2 weeks with NP. I personally saw, examined and provided care for the patient. Radiographs, labs and medication list were reviewed by me independently. I spoke with bedside nursing, therapists and consultants. The case was discussed in detail and plans for care were established. Review of CNP documentation was conducted and revisions were made as appropriate. I agree with the above documented exam, problem list and plan of care.    Mona Baca MD

## 2022-03-31 NOTE — PROGRESS NOTES
Patient arrived via cart to CT for Coronary CTA to be administered per protocol. Medical history, labs, and medications have been reviewed. PT placed onto table, /83, HR 62. Nitro given at 1139, patient scanned 3 minutes after dose. Post BP taken at 1144: 147/71. HR 70. Report called to MARCUS Bowers.

## 2022-03-31 NOTE — PROGRESS NOTES
Medications:   pilocarpine  1 drop Left Eye BID    oxybutynin  5 mg Oral Nightly    latanoprost  1 drop Left Eye Nightly    dorzolamide  1 drop Left Eye BID    prednisoLONE acetate  1 drop Left Eye Q4H While awake    metoprolol tartrate  75 mg Oral Once    sodium chloride flush  5-40 mL IntraVENous 2 times per day    aspirin  81 mg Oral Daily    atorvastatin  40 mg Oral Nightly    metoprolol tartrate  12.5 mg Oral BID    losartan  25 mg Oral Daily    Arformoterol Tartrate  15 mcg Nebulization BID    budesonide  500 mcg Nebulization BID    nitroGLYCERIN  0.8 mg SubLINGual Once    nicotine  1 patch TransDERmal Daily    ipratropium-albuterol  1 ampule Inhalation Q4H WA       IV Infusions (if any):   sodium chloride      heparin (PORCINE) Infusion 12 Units/kg/hr (03/31/22 0908)       DIAGNOSTIC/ LABORATORY DATA:  Labs:   CBC:   Recent Labs     03/30/22  0606 03/31/22  0614   WBC 9.7 7.4   HGB 13.0 12.6   HCT 43.8 42.3    330     BMP:   Recent Labs     03/30/22  0606 03/31/22  0614    140   K 3.8 3.7   CO2 28 27   BUN 17 18   CREATININE 1.0 0.8   LABGLOM >60 >60   CALCIUM 9.1 8.9     HgA1c:   Lab Results   Component Value Date    LABA1C 6.3 (H) 03/30/2022     APTT:  Recent Labs     03/30/22 2049 03/31/22  0614   APTT 72.2* 47.4*     FASTING LIPID PANEL:  Lab Results   Component Value Date    CHOL 149 03/30/2022    HDL 40 03/30/2022    LDLCALC 95 03/30/2022    TRIG 68 03/30/2022     LIVER PROFILE:  Recent Labs     03/29/22  1924   AST 24   ALT 22   LABALBU 4.0       CXr 329/2022   NO ACUTE PATHOLOGY     Telemetry: SR     Lexiscan MPS 11/2021 :   The myocardial perfusion imaging was probably normal showing attenuation artifact involving the inferior wall but with no convincing evidence of scars or stress-induced ischemia Overall left ventricular systolic function was normal with no regional wall  motion abnormality  Low risk pharmacologic stress  test.    Coronary CTA 3/31/2022  1.  The left main coronary artery is free of atherosclerotic disease   2. The LAD has 10 to 20% stenosis in the midsegment and rest of the LAD   has no significant stenosis   3. The left circumflex artery has no significant stenosis   4. The right coronary artery has 10 to 20% stenosis in the proximal   segment.  The PDA and posterolateral ventricular branches are small   caliber vessels and distal segments of this vessels are not well   visualized. 5. Normal LV size. 6. Total Calcium score of 0 with minimal plaque burden.         CAD-RADS: 1.  Maximal stenosis 1 to 24%.  Interpretation, minimal   nonobstructive CAD. Assessment:  1. Atypical CP / NSTEMI  -- coronary CTA with minimal nonobstructive CAD   2. COPD exacerbation, resolved. Continued tobacco abuse  3. HTN  4. HLD  5. Prediabetes   6. Glaucoma   7.  Obesity, BMI 36.61        Plan:  · Continue ASA / statin   · Continue Toprol XL   · Increase Losartan 50 mg daily   · Aggressive risk factor modification including diet, exercise and weight loss   · Tobacco cessation discussed with the patient   · Rest as per primary service and other consultants   · Recommend PPI at discharge   · May be discharged from cardiology stand point   · Follow up with Dr Jayme Cotter in the office     Electronically signed by Elva Navarrete on 3/31/2022 at 10:52 AM

## 2022-03-31 NOTE — DISCHARGE SUMMARY
Discharge Summary    Admit date: 3/29/2022    Discharge date and time: No discharge date for patient encounter. Admitting Physician: John Paul Sifuentes DO     Consultants: Cardiology, Pulmonology    Admission Diagnoses:  Chest pain    Discharge Diagnoses AND Hospital Course:  · Chest pain/NSTEMI- Coronary CTA with minimal nonobstructive CAD. Continue ASA / statin. Continue Toprol XL. Increase Losartan 50 mg daily. Aggressive risk factor modification including diet, exercise and weight loss. Follow up with Dr Simón Massey in the office. · COPD exacerbation-   Pulm following. Outpatient PFT. · Acute respiratory insufficiency with hypoxia- resolved. CTA chest no evidence of pneumonia, no pulmonary edema, no pleural effusion. Albuterol as needed, add laba, ics bid  · Hypertension  · Obesity- BMI 36  · Smoking- tobacco cessation  · Incontinence  · Glaucoma- continue prescribed eye drops from ophtho  · Prolonged Qtc- avoid qtc prolonging medications      Discharge Exam:  Vitals:    03/31/22 1453   BP: (!) 136/120   Pulse: 68   Resp:    Temp: 97.3 °F (36.3 °C)   SpO2: 91%       General appearance:  awake, alert, and oriented to person, place, time, and purpose; appears stated age and cooperative; no apparent distress no labored breathing  HEENT:  Conjunctivae/corneas clear. Neck: Supple. No jugular venous distention. Respiratory: symmetrical; clear to auscultation bilaterally; no wheezes; no rhonchi; no rales  Cardiovascular: rhythm regular; rate controlled; no murmurs  Abdomen: Soft, nontender, nondistended  Extremities:  peripheral pulses present; no peripheral edema; no ulcers  Musculoskeletal: No clubbing, cyanosis, no bilateral lower extremity edema. Brisk capillary refill. Skin:  No rashes  on visible skin  Neurologic: awake, alert and following commands     Disposition: home  The patient's condition is fair.   At this time the patient is without objective evidence of an acute process requiring continuing hospitalization or inpatient management. They are stable for discharge with outpatient follow-up. I have spoken with the patient and discussed the results of the current hospitalization, in addition to providing specific details for the plan of care and counseling regarding the diagnosis and prognosis. The plan has been discussed in detail and they are aware of the specific conditions for emergent return, as well as the importance of follow-up. Their questions are answered at this time and they are agreeable with the plan for discharge to home     Patient Instructions: Follow up with PCP within 7 days. Follow up with cardiology and pulmonology as directed  Future Appointments   Date Time Provider Ruben Mata   5/2/2022 12:30 PM Milady Summers  [address]       Discharge Medications:     Medication List      START taking these medications    Arformoterol Tartrate 15 MCG/2ML Nebu  Commonly known as: BROVANA  Take 2 mLs by nebulization 2 times daily     budesonide 0.5 MG/2ML nebulizer suspension  Commonly known as: PULMICORT  Take 2 mLs by nebulization 2 times daily     doxycycline hyclate 100 MG tablet  Commonly known as: VIBRA-TABS  Take 1 tablet by mouth 2 times daily for 7 days     ipratropium-albuterol 0.5-2.5 (3) MG/3ML Soln nebulizer solution  Commonly known as: DUONEB  Inhale 3 mLs into the lungs every 4 hours as needed for Shortness of Breath     losartan 50 MG tablet  Commonly known as: COZAAR  Take 1 tablet by mouth daily  Start taking on: April 1, 2022     methylPREDNISolone 4 MG tablet  Commonly known as: MEDROL DOSEPACK  Take by mouth.  Follow box instructions     nicotine 14 MG/24HR  Commonly known as: NICODERM CQ  Place 1 patch onto the skin daily  Start taking on: April 1, 2022        CHANGE how you take these medications    albuterol sulfate  (90 Base) MCG/ACT inhaler  Commonly known as: Proventil HFA  Inhale 2 puffs into the lungs every 4 hours as needed for Wheezing  What changed: Another medication with the same name was removed. Continue taking this medication, and follow the directions you see here.         CONTINUE taking these medications    aspirin 81 MG chewable tablet  Take 1 tablet by mouth daily     atorvastatin 40 MG tablet  Commonly known as: LIPITOR  Take 1 tablet by mouth nightly     Blood Pressure Kit  1 kit by Does not apply route daily     brompheniramine-pseudoephedrine-DM 2-30-10 MG/5ML syrup  Commonly known as: Bromfed DM  Take 5 mLs by mouth 4 times daily as needed for Cough     dorzolamide 2 % ophthalmic solution  Commonly known as: TRUSOPT     HYDROcodone-acetaminophen 5-325 MG per tablet  Commonly known as: NORCO     latanoprost 0.005 % ophthalmic solution  Commonly known as: XALATAN     metoprolol tartrate 25 MG tablet  Commonly known as: LOPRESSOR  Take 0.5 tablets by mouth 2 times daily     Nebulizer/Tubing/Mouthpiece Kit  1 kit by Does not apply route daily as needed (as needed for wheezing / shortness of breath)     oxybutynin 5 MG extended release tablet  Commonly known as: DITROPAN-XL     pilocarpine 2 % ophthalmic solution  Commonly known as: PILOCAR     prednisoLONE acetate 1 % ophthalmic suspension  Commonly known as: PRED FORTE     Spacer/Aero-Holding Chambers Norma  1 Device by Does not apply route daily        STOP taking these medications    acetaZOLAMIDE 500 MG extended release capsule  Commonly known as: DIAMOX     atropine 1 % ophthalmic solution     TobraDex 0.3-0.1 % ophthalmic suspension  Generic drug: tobramycin-dexamethasone           Where to Get Your Medications      These medications were sent to [address] - P [phone] - F [phone]  [address] [facility] [address]    Phone: [phone]   · Arformoterol Tartrate 15 MCG/2ML Nebu  · budesonide 0.5 MG/2ML nebulizer suspension  · doxycycline hyclate 100 MG tablet  · ipratropium-albuterol 0.5-2.5 (3) MG/3ML Soln nebulizer solution  · losartan 50 MG tablet  · methylPREDNISolone 4 MG tablet  · nicotine 14 MG/24HR         Activity: activity as tolerated    Diet: cardiac diet    Wound Care: none needed    Follow-up:     · This patient is instructed to follow-up with her primary care physician. · Patient is instructed to follow-up with the consults listed above as directed by them. · They are instructed to resume home medications and take new medications as indicated in the list above. · If the patient has a recurrence of symptoms, they are instructed to go to the ED. Preparing for this patient's discharge, including paperwork, orders, instructions, and meeting with patient did require > 30 minutes.     Addy Eagle DO   4:25 PM  3/31/2022

## 2022-03-31 NOTE — PROGRESS NOTES
Spoke to MARCUS Bowers and gave instruction for cardiac CT including 20 G diffusic in the right [facility], and to call Dr. Chet Irby for orders. Also for her to call [phone] once heart rate is less than 60. Please bring nitro tabs with patient.      Electronically signed by Eduarda Valdez RN on 3/31/2022 at 7:42 AM

## 2022-04-01 NOTE — PROCEDURES
[address]                  [address] [facility],  [facility]                               PULMONARY FUNCTION    PATIENT NAME: Julianna Prabhakar                        :        1960  MED REC NO:   [id number]                            ROOM:       6316  ACCOUNT NO:   [de-identified]                           ADMIT DATE: 2022  PROVIDER:     Ag Armstrong MD    DATE OF PROCEDURE:  2022    PULMONARY FUNCTION TEST    FINDINGS:  As follows:  FVC was 1.79 liters, which was 67% of predicted. FEV1 was 0.83 liters, which was 39% of predicted. FEV1/FVC was 46. Minute volume ventilation was severely reduced at 38 liters per minute  which was 42% of predicted, and diffusion capacity then was 7.81 mL per  minute per mmHg, which was 32% of predicted. IMPRESSION:  1. Severe obstruction. 2.  Severe decrement in minute volume ventilation. 3.  Severe decrement in diffusion capacity.         Emma Jeffries MD    D: 2022 16:41:45       T: 2022 17:42:16     [email or web address]  Job#: [id number]     Doc#: [id number]    CC:

## 2022-04-05 ENCOUNTER — TELEPHONE (OUTPATIENT)
Dept: CARDIOLOGY CLINIC | Age: 62
End: 2022-04-05

## 2022-04-05 NOTE — PROCEDURES
[address]                  [address] [place],  [facility]                               PULMONARY FUNCTION    PATIENT NAME: LUCIE BLOUNT                        :        1960  MED REC NO:   [id number]                            ROOM:       7122  ACCOUNT NO:   [de-identified]                           ADMIT DATE: 2022  PROVIDER:     Rhett Sommers DO    DATE OF PROCEDURE:  2022    The patient's height is 64 inches. Weight 220 pounds. DIAGNOSES:  1. Cough. 2.  Dyspnea after any exertion. TOBACCO USE:  The patient smoked a half-pack a day for 14 years. SPIROMETRY:  FVC is 1.79 liters which is 67% of predicted. FEV1 is 0.83  liters which is 39% of predicted. FEV1 to FVC ratio is 46. MVV is 38  which is 42% of predicted. LUNG VOLUMES:  FVC is 1.88 which is 70% of predicted. ERV is 0.03 which is 5% of predicted. DIFFUSION:  Diffusion is 7.81 which is 32% of predicted, corrected for  alveolar ventilation is 75% of predicted. FLOW VOLUME LOOP:  Flow volume loop is consistent with both restriction  and obstruction. OVERALL INTERPRETATION:  Pulmonary function tests are consistent with  severe obstruction. There was no bronchodilator given. MVV is  significantly reduced which may be secondary to deconditioning versus  neuromuscular weakness versus difficulty with maneuver. Lung volumes  and flow volume loops are also consistent with the restriction. Based  on the ERV of only 5% of predicted and the patient's weight of 220  pounds and height of 64 inches, this may be secondary to body habitus;  however, other causes such as _____ pulmonary fibrosis cannot be ruled  out. Diffusion is severely reduced, but partially corrects for alveolar  ventilation. Please clinically correlate.         Tato Walters DO    D: 2022 11:10:29       T: 2022 11:12:52     [email or web address]  Job#: [id number]     Doc#: [id number]    CC:

## 2022-05-02 ENCOUNTER — OFFICE VISIT (OUTPATIENT)
Dept: FAMILY MEDICINE CLINIC | Age: 62
End: 2022-05-02
Payer: MEDICAID

## 2022-05-02 VITALS
WEIGHT: 229 LBS | HEIGHT: 65 IN | OXYGEN SATURATION: 95 % | HEART RATE: 90 BPM | TEMPERATURE: 97.7 F | BODY MASS INDEX: 38.15 KG/M2 | SYSTOLIC BLOOD PRESSURE: 168 MMHG | DIASTOLIC BLOOD PRESSURE: 98 MMHG | RESPIRATION RATE: 20 BRPM

## 2022-05-02 DIAGNOSIS — M79.601 RIGHT ARM PAIN: ICD-10-CM

## 2022-05-02 DIAGNOSIS — J43.8 OTHER EMPHYSEMA (HCC): ICD-10-CM

## 2022-05-02 DIAGNOSIS — I10 PRIMARY HYPERTENSION: Primary | ICD-10-CM

## 2022-05-02 DIAGNOSIS — Z09 HOSPITAL DISCHARGE FOLLOW-UP: ICD-10-CM

## 2022-05-02 DIAGNOSIS — E78.49 OTHER HYPERLIPIDEMIA: ICD-10-CM

## 2022-05-02 PROCEDURE — 99214 OFFICE O/P EST MOD 30 MIN: CPT | Performed by: INTERNAL MEDICINE

## 2022-05-02 PROCEDURE — 3023F SPIROM DOC REV: CPT | Performed by: INTERNAL MEDICINE

## 2022-05-02 PROCEDURE — 1036F TOBACCO NON-USER: CPT | Performed by: INTERNAL MEDICINE

## 2022-05-02 PROCEDURE — 3017F COLORECTAL CA SCREEN DOC REV: CPT | Performed by: INTERNAL MEDICINE

## 2022-05-02 PROCEDURE — G8427 DOCREV CUR MEDS BY ELIG CLIN: HCPCS | Performed by: INTERNAL MEDICINE

## 2022-05-02 PROCEDURE — G8417 CALC BMI ABV UP PARAM F/U: HCPCS | Performed by: INTERNAL MEDICINE

## 2022-05-02 RX ORDER — LOSARTAN POTASSIUM 50 MG/1
50 TABLET ORAL DAILY
Qty: 30 TABLET | Refills: 5 | Status: SHIPPED
Start: 2022-05-02 | End: 2022-06-06 | Stop reason: SINTOL
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
## 2022-06-03 ENCOUNTER — OFFICE VISIT (OUTPATIENT)
Dept: PAIN MANAGEMENT | Age: 62
End: 2022-06-03
Payer: MEDICAID

## 2022-06-03 VITALS
RESPIRATION RATE: 16 BRPM | OXYGEN SATURATION: 94 % | DIASTOLIC BLOOD PRESSURE: 75 MMHG | SYSTOLIC BLOOD PRESSURE: 188 MMHG | HEART RATE: 86 BPM | TEMPERATURE: 98.4 F

## 2022-06-03 DIAGNOSIS — G89.21 CHRONIC PAIN DUE TO TRAUMA: Primary | ICD-10-CM

## 2022-06-03 DIAGNOSIS — M79.601 RIGHT ARM PAIN: ICD-10-CM

## 2022-06-03 DIAGNOSIS — S52.001K: ICD-10-CM

## 2022-06-03 PROCEDURE — 3017F COLORECTAL CA SCREEN DOC REV: CPT | Performed by: PAIN MEDICINE

## 2022-06-03 PROCEDURE — 1036F TOBACCO NON-USER: CPT | Performed by: PAIN MEDICINE

## 2022-06-03 PROCEDURE — G8417 CALC BMI ABV UP PARAM F/U: HCPCS | Performed by: PAIN MEDICINE

## 2022-06-03 PROCEDURE — 99204 OFFICE O/P NEW MOD 45 MIN: CPT | Performed by: PAIN MEDICINE

## 2022-06-03 PROCEDURE — 99204 OFFICE O/P NEW MOD 45 MIN: CPT

## 2022-06-03 PROCEDURE — G8427 DOCREV CUR MEDS BY ELIG CLIN: HCPCS | Performed by: PAIN MEDICINE
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
## 2022-06-03 NOTE — PROGRESS NOTES
Do you currently have any of the following:    Fever: No  Headache:  No  Cough: No  Shortness of breath: No  Exposed to anyone with these symptoms: No         Rosalino Pennington presents to the [facility] on 6[facility]3/[facility]22. Miguel A Zhou is complaining of pain in her right arm and neck. The pain is constant. The pain is described as aching, throbbing, shooting and nagging. Pain is rated on her best day at a 10, on her worst day at a 10, and on average at a 10 on the VAS scale. Any procedures since your last visit: No    Pacemaker or defibrillator: No     She is not on NSAIDS and is  on anticoagulation medications to include ASA and is managed by Annetta Aden DO  .     Medication Contract and Consent for Opioid Use Documents Filed      No documents found                BP (!) 188/75   Pulse 86   Temp 98.4 °F (36.9 °C) (Oral)   Resp 16   SpO2 94%      No LMP recorded.  Patient is premenopausal.

## 2022-06-06 ENCOUNTER — OFFICE VISIT (OUTPATIENT)
Dept: FAMILY MEDICINE CLINIC | Age: 62
End: 2022-06-06
Payer: MEDICAID

## 2022-06-06 VITALS
HEIGHT: 65 IN | RESPIRATION RATE: 20 BRPM | BODY MASS INDEX: 38.99 KG/M2 | WEIGHT: 234 LBS | HEART RATE: 70 BPM | SYSTOLIC BLOOD PRESSURE: 148 MMHG | TEMPERATURE: 97.6 F | OXYGEN SATURATION: 95 % | DIASTOLIC BLOOD PRESSURE: 88 MMHG

## 2022-06-06 DIAGNOSIS — I10 PRIMARY HYPERTENSION: ICD-10-CM

## 2022-06-06 DIAGNOSIS — Z01.818 PRE-OP EXAM: Primary | ICD-10-CM

## 2022-06-06 DIAGNOSIS — M79.601 RIGHT ARM PAIN: ICD-10-CM

## 2022-06-06 DIAGNOSIS — J44.9 STAGE 3 SEVERE COPD BY GOLD CLASSIFICATION (HCC): ICD-10-CM

## 2022-06-06 PROCEDURE — 3017F COLORECTAL CA SCREEN DOC REV: CPT | Performed by: INTERNAL MEDICINE

## 2022-06-06 PROCEDURE — 93000 ELECTROCARDIOGRAM COMPLETE: CPT | Performed by: INTERNAL MEDICINE

## 2022-06-06 PROCEDURE — G8427 DOCREV CUR MEDS BY ELIG CLIN: HCPCS | Performed by: INTERNAL MEDICINE

## 2022-06-06 PROCEDURE — 99215 OFFICE O/P EST HI 40 MIN: CPT | Performed by: INTERNAL MEDICINE

## 2022-06-06 PROCEDURE — G8417 CALC BMI ABV UP PARAM F/U: HCPCS | Performed by: INTERNAL MEDICINE

## 2022-06-06 PROCEDURE — 3023F SPIROM DOC REV: CPT | Performed by: INTERNAL MEDICINE

## 2022-06-06 PROCEDURE — 1036F TOBACCO NON-USER: CPT | Performed by: INTERNAL MEDICINE

## 2022-06-06 RX ORDER — LIDOCAINE 50 MG/G
OINTMENT TOPICAL
Qty: 240 G | Refills: 5 | Status: SHIPPED | OUTPATIENT
Start: 2022-06-06

## 2022-06-06 ASSESSMENT — PATIENT HEALTH QUESTIONNAIRE - PHQ9
SUM OF ALL RESPONSES TO PHQ QUESTIONS 1-9: 2
SUM OF ALL RESPONSES TO PHQ QUESTIONS 1-9: 2
2. FEELING DOWN, DEPRESSED OR HOPELESS: 1
SUM OF ALL RESPONSES TO PHQ9 QUESTIONS 1 & 2: 2
SUM OF ALL RESPONSES TO PHQ QUESTIONS 1-9: 2
SUM OF ALL RESPONSES TO PHQ QUESTIONS 1-9: 2
1. LITTLE INTEREST OR PLEASURE IN DOING THINGS: 1

## 2022-06-06 NOTE — PATIENT INSTRUCTIONS
[facility] - Josseline Wilkerson MD  [address] [facility]  [facility]  [phone]
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
## 2022-06-08 ENCOUNTER — TELEPHONE (OUTPATIENT)
Dept: FAMILY MEDICINE CLINIC | Age: 62
End: 2022-06-08

## 2022-06-08 ENCOUNTER — OFFICE VISIT (OUTPATIENT)
Dept: FAMILY MEDICINE CLINIC | Age: 62
End: 2022-06-08
Payer: MEDICAID

## 2022-06-08 VITALS
DIASTOLIC BLOOD PRESSURE: 80 MMHG | BODY MASS INDEX: 39.15 KG/M2 | RESPIRATION RATE: 18 BRPM | SYSTOLIC BLOOD PRESSURE: 138 MMHG | HEART RATE: 92 BPM | TEMPERATURE: 97.7 F | OXYGEN SATURATION: 98 % | WEIGHT: 235 LBS | HEIGHT: 65 IN

## 2022-06-08 DIAGNOSIS — N76.0 ACUTE VAGINITIS: Primary | ICD-10-CM

## 2022-06-08 DIAGNOSIS — N76.0 ACUTE VAGINITIS: ICD-10-CM

## 2022-06-08 PROBLEM — E66.01 CLASS 2 SEVERE OBESITY DUE TO EXCESS CALORIES WITH SERIOUS COMORBIDITY AND BODY MASS INDEX (BMI) OF 39.0 TO 39.9 IN ADULT (HCC): Status: ACTIVE | Noted: 2022-06-08

## 2022-06-08 LAB
BILIRUBIN, POC: NORMAL
BLOOD URINE, POC: NORMAL
CLARITY, POC: CLEAR
COLOR, POC: YELLOW
GLUCOSE URINE, POC: NEGATIVE
KETONES, POC: NEGATIVE
LEUKOCYTE EST, POC: NEGATIVE
NITRITE, POC: NEGATIVE
PH, POC: 6
PROTEIN, POC: NORMAL
SPECIFIC GRAVITY, POC: >=1.03
UROBILINOGEN, POC: NORMAL

## 2022-06-08 PROCEDURE — 81002 URINALYSIS NONAUTO W/O SCOPE: CPT

## 2022-06-08 PROCEDURE — 99213 OFFICE O/P EST LOW 20 MIN: CPT

## 2022-06-08 RX ORDER — FLUCONAZOLE 150 MG/1
TABLET ORAL
Qty: 2 TABLET | Refills: 0 | Status: SHIPPED
Start: 2022-06-08 | End: 2022-06-20

## 2022-06-08 NOTE — PROGRESS NOTES
Chief Complaint       Vaginitis (Possible yeast infection / some vaginal irritation since yesterday /Tried Neosporin and Cortizone because she thought it was related to use of a pad that she usually doesn't use.)    History of Present Illness   Source of history provided by:  patient. Nikos Monroe is a 64 y.o. old female presenting to the walk in clinic for evaluation of vaginal irritation which began 3 days ago. Patient states she used a new sanitary pad that irritated her externally and she placed neosporin and cortisporin in the area and believes she caused a yeast infection. Since onset the symptoms have progressively improved. Denies possible STD exposure. Denies any fever, chills, pain with intercourse, N/V/D, back pain, possibility of pregnancy, or lethargy. No LMP recorded. Patient is premenopausal.    ROS    Unless otherwise stated in this report or unable to obtain because of the patient's clinical or mental status as evidenced by the medical record, this patients's positive and negative responses for Review of Systems, constitutional, psych, eyes, ENT, cardiovascular, respiratory, gastrointestinal, neurological, genitourinary, musculoskeletal, integument systems and systems related to the presenting problem are either stated in the preceding or were not pertinent or were negative for the symptoms and/or complaints related to the medical problem. Physical Exam         VS:  /80   Pulse 92   Temp 97.7 °F (36.5 °C) (Temporal)   Resp 18   Ht 5' 5\" (1.651 m)   Wt 235 lb (106.6 kg)   SpO2 98%   BMI 39.11 kg/m²    Oxygen Saturation Interpretation: Normal.    Constitutional:  A&Ox3, development consistent with age, NAD. CV: Heart RRR, no murmurs, rubs, or gallops. Lungs: CTAB without wheezing, rales, or rhonchi  Abdomen:  General Appearance: No rashes, bruising, or abrasions noted. Bowel sounds: BS+x4. Distension:  None. Tenderness: None.          Liver/Spleen: Non-tender and no hepatosplenomegaly. Back: CVA Tenderness: None bilaterally. Skin:  Normal turgor. Warm, dry, without visible rash, unless noted elsewhere. Neurological:  Orientation age-appropriate. Motor functions intact. Lab / Imaging Results   (All laboratory and radiology results have been personally reviewed by myself)  Labs:  Results for orders placed or performed in visit on 06/08/22   POCT Urinalysis no Micro   Result Value Ref Range    Color, UA yellow     Clarity, UA clear     Glucose, UA POC negative     Bilirubin, UA neagtive     Ketones, UA negative     Spec Grav, UA >=1.030     Blood, UA POC trace     pH, UA 6.0     Protein, UA POC 00 mg/dl     Urobilinogen, UA 0.2 E.U./dl     Leukocytes, UA negative     Nitrite, UA negative        Imaging: All Radiology results interpreted by Radiologist unless otherwise noted. Assessment / Plan     Impression(s):  Eddie Cobian was seen today for vaginitis. Diagnoses and all orders for this visit:    Acute vaginitis  -     POCT Urinalysis no Micro  -     Culture, Genital; Future  -     fluconazole (DIFLUCAN) 150 MG tablet; 1 tab po x 1 dose, may repeat in 72 hrs if still symptomatic      Disposition:  Disposition: Discharge to home. I am going to treat patient for suspected yeast infection based on her positive clinical picture. Script written for Diflucan, side effects discussed. Swabs obtained and pending for genital culture, will call with results once available. Advised to avoid sexual contact until results are confirmed. F/u PCP or GYN in 1 week if symptoms persist. ED sooner if symptoms worsen or change. ED immediately with the development of fever, pelvic pain, body aches, shaking chills, severe/worsening abdominal pain, dyspareunia, CP, or SOB. Pt is in agreement with this care plan. All questions answered. FELECIA Acevedo    **This report was transcribed using voice recognition software.  Every effort was made to ensure accuracy; however, inadvertent computerized transcription errors may be present.

## 2022-06-12 LAB — GENITAL CULTURE, ROUTINE: NORMAL

## 2022-06-13 ENCOUNTER — OFFICE VISIT (OUTPATIENT)
Dept: FAMILY MEDICINE CLINIC | Age: 62
End: 2022-06-13
Payer: MEDICAID

## 2022-06-13 VITALS
SYSTOLIC BLOOD PRESSURE: 136 MMHG | HEART RATE: 94 BPM | TEMPERATURE: 97.7 F | HEIGHT: 65 IN | WEIGHT: 239 LBS | DIASTOLIC BLOOD PRESSURE: 88 MMHG | BODY MASS INDEX: 39.82 KG/M2 | RESPIRATION RATE: 16 BRPM | OXYGEN SATURATION: 95 %

## 2022-06-13 DIAGNOSIS — L98.9 SKIN LESION: Primary | ICD-10-CM

## 2022-06-13 DIAGNOSIS — R05.9 COUGH: ICD-10-CM

## 2022-06-13 PROCEDURE — 99213 OFFICE O/P EST LOW 20 MIN: CPT | Performed by: INTERNAL MEDICINE

## 2022-06-13 PROCEDURE — 3017F COLORECTAL CA SCREEN DOC REV: CPT | Performed by: INTERNAL MEDICINE

## 2022-06-13 PROCEDURE — G8427 DOCREV CUR MEDS BY ELIG CLIN: HCPCS | Performed by: INTERNAL MEDICINE

## 2022-06-13 PROCEDURE — 1036F TOBACCO NON-USER: CPT | Performed by: INTERNAL MEDICINE

## 2022-06-13 PROCEDURE — G8417 CALC BMI ABV UP PARAM F/U: HCPCS | Performed by: INTERNAL MEDICINE

## 2022-06-13 RX ORDER — BROMPHENIRAMINE MALEATE, PSEUDOEPHEDRINE HYDROCHLORIDE, AND DEXTROMETHORPHAN HYDROBROMIDE 2; 30; 10 MG/5ML; MG/5ML; MG/5ML
5 SYRUP ORAL 4 TIMES DAILY PRN
Qty: 118 ML | Refills: 0 | Status: SHIPPED | OUTPATIENT
Start: 2022-06-13

## 2022-06-13 NOTE — PATIENT INSTRUCTIONS
[id number] Levi Rojas MD   [address] [facility], [address] [facility] [address]   Phone: Bel Uriostegui MD  [address] [facility]  [phone]      Call your insurance for a dermatologist

## 2022-06-13 NOTE — PROGRESS NOTES
[facility]  [address] [place] [address]  [place] [address]  Dept: [phone]  Dept Fax: [phone]     NAME: Grecia Price        :  1960        MRN:  [id number]    Chief Complaint   Patient presents with    Referral - General     Would like to discuss getting referrals to dermatology for a dark spot on her face and to weight management.  Cough     She has had on and off for awhile. She would like to get a refill on a cough med that she got in Feb.        Subjective     History of Present Illness  Grecia Price is a 64 y.o. female who presents today for an acute visit requesting referral to dermatology. She has a dark spot on her face that has been present for a while. She is wanting to see dermatology. She also continues to complain of a chronic cough. She has a cough syrup prescribed in February that worked well for her. Patient also bring up wanting to loose weight and see a specialist to help her do so. She had previously asked to see a nutritionist, now she wants a physician. Review of Systems  Please see HPI above. All bolded are positive.   Gen: fever, chills, fatigue, weakness, diaphoresis, unintentional weight change  Head: headache, vision change, hearing loss  Chest: chest pain/heaviness, palpitations  Lungs: shortness of breath, wheezing, coughing, hemoptysis  Abdomen: abdominal pain, nausea, vomiting, diarrhea, constipation, melena, hematochezia, hematemesis, loss of appetite  Extremities: lower extremity edema, myalgias, arthralgias  Urinary: dysuria, hematuria, weak flow, increase in frequency  Neurologic: lightheadedness, dizziness, confusion, syncope  Endocrine: polydipsia, polyuria, heat or cold intolerance  Psychiatric: depression, suicidal ideation, anxiety  Derm: Rashes, ulcers, burns, lesion    Home Medications:  Current Outpatient Medications   Medication Sig Dispense Refill    brompheniramine-pseudoephedrine-DM (BROMFED DM) 2-30-10 MG/5ML syrup Take 5 mLs by mouth 4 times daily as needed for Cough 118 mL 0    metoprolol tartrate (LOPRESSOR) 25 MG tablet Take 1 tablet by mouth 2 times daily 60 tablet 3    lidocaine (XYLOCAINE) 5 % ointment Apply topically as needed.  240 g 5    ipratropium-albuterol (DUONEB) 0.5-2.5 (3) MG/3ML SOLN nebulizer solution Inhale 3 mLs into the lungs every 4 hours as needed for Shortness of Breath 360 mL 0    fluticasone-umeclidin-vilant (TRELEGY ELLIPTA) 100-62.5-25 MCG/INH AEPB Inhale 1 puff into the lungs daily 1 each 5    Respiratory Therapy Supplies (NEBULIZER/TUBING/MOUTHPIECE) KIT 1 kit by Does not apply route daily as needed (as needed for wheezing / shortness of breath) 1 kit 2    dorzolamide (TRUSOPT) 2 % ophthalmic solution instill 1 drop into left eye twice a day      latanoprost (XALATAN) 0.005 % ophthalmic solution instill 1 drop into left eye at bedtime      oxybutynin (DITROPAN-XL) 5 MG extended release tablet take 1 tablet by mouth once daily      pilocarpine (PILOCAR) 2 % ophthalmic solution instill 1 drop into left eye twice a day      prednisoLONE acetate (PRED FORTE) 1 % ophthalmic suspension instill 1 drop into left eye every 2 hours AFTER SURGERY      aspirin 81 MG chewable tablet Take 1 tablet by mouth daily 30 tablet 3    atorvastatin (LIPITOR) 40 MG tablet Take 1 tablet by mouth nightly 30 tablet 3    Blood Pressure KIT 1 kit by Does not apply route daily 1 kit 0    albuterol sulfate HFA (PROVENTIL HFA) 108 (90 Base) MCG/ACT inhaler Inhale 2 puffs into the lungs every 4 hours as needed for Wheezing 1 Inhaler 5    Spacer/Aero-Holding Chambers ABHIJIT 1 Device by Does not apply route daily 1 Device 0    fluconazole (DIFLUCAN) 150 MG tablet 1 tab po x 1 dose, may repeat in 72 hrs if still symptomatic (Patient not taking: Reported on 6/13/2022) 2 tablet 0    nicotine (NICODERM CQ) 14 MG/24HR Place 1 patch onto the skin daily (Patient not taking: Reported on 6/13/2022) 30 patch 0     No current facility-administered medications for this visit. Allergies:  No Known Allergies    Objective     Vitals:    06/13/22 1253   BP: 136/88   Pulse: 94   Resp: 16   Temp: 97.7 °F (36.5 °C)   TempSrc: Temporal   SpO2: 95%   Weight: 239 lb (108.4 kg)   Height: 5' 5\" (1.651 m)        Physical Exam  General: Awake, alert, and oriented to person, place, time, and purpose, appears stated age and cooperative, No acute distress  Head: Normocephalic, atraumatic  Eyes: conjunctivae/corneas clear, EOMI  Mouth: Mucous membranes moist with no pharyngeal exudate or erythema  Neck: no JVD, no adenopathy, no carotid bruit, supple, symmetrical, trachea midline  Back: symmetric, ROM normal, No CVA tenderness. Lungs: clear to auscultation bilaterally without wheezes, rales, or rhonchi  Heart: regular rate and rhythm, S1, S2 normal, no murmur, click, rub or gallop  Abdomen: soft, non-tender; bowel sounds normal; no masses,  no organomegaly  Extremities: atraumatic, no cyanosis, no edema, 2+ pulses palpated in all 4 extremities  Skin: dark lesion to face, flat and non concerning appearing  Neurologic:speech appropriate, moves all 4 extremities, normal muscle strength and tone, CN 2-12 grossly intact      Assessment and Plan     Patient is a 64 y.o. female who presented to the office for an acute visit. Renetta Chopra was seen today for referral - general and cough. Diagnoses and all orders for this visit:    Skin lesion  -     External Referral To Dermatology    Cough  -     brompheniramine-pseudoephedrine-DM (BROMFED DM) 2-30-10 MG/5ML syrup; Take 5 mLs by mouth 4 times daily as needed for Cough    BMI 39.0-39.9,adult  -     Eliceo Herring MD, Bariatrics, [facility]        Educational materials and/or home exercises printed for patient's review and were included in patient instructions on his/her After Visit Summary and given to patient at the end of visit. Counseled regarding above diagnosis, including possible risks and complications, especially if left uncontrolled or untreated. Advised to present to the Emergency Department if symptoms worsen. Counseled regarding the possible side effects, risks, benefits and alternatives to treatment; patient and/or guardian verbalizes understanding, agrees, feels comfortable with and wishes to proceed with above treatment plan. Advised patient to call [facility] new medication issues, and read all Rx info from pharmacy to assure aware of all possible risks and side effects of any medication before taking. Patient verbalizes understanding and agrees with above counseling, assessment and plan. All questions answered.     Noe Mccormack, DO

## 2022-06-20 ENCOUNTER — PREP FOR PROCEDURE (OUTPATIENT)
Dept: UROLOGY | Age: 62
End: 2022-06-20

## 2022-06-20 RX ORDER — SODIUM CHLORIDE 0.9 % (FLUSH) 0.9 %
5-40 SYRINGE (ML) INJECTION PRN
Status: CANCELLED | OUTPATIENT
Start: 2022-06-20

## 2022-06-20 RX ORDER — SODIUM CHLORIDE 0.9 % (FLUSH) 0.9 %
5-40 SYRINGE (ML) INJECTION EVERY 12 HOURS SCHEDULED
Status: CANCELLED | OUTPATIENT
Start: 2022-06-20

## 2022-06-20 RX ORDER — SODIUM CHLORIDE 9 MG/ML
INJECTION, SOLUTION INTRAVENOUS CONTINUOUS
Status: CANCELLED | OUTPATIENT
Start: 2022-06-20

## 2022-06-20 RX ORDER — SODIUM CHLORIDE 9 MG/ML
INJECTION, SOLUTION INTRAVENOUS PRN
Status: CANCELLED | OUTPATIENT
Start: 2022-06-20

## 2022-06-20 NOTE — PROGRESS NOTES
[facility] PRE-ADMISSION TESTING INSTRUCTIONS    The Preadmission Testing patient is instructed accordingly using the following criteria (check applicable):    ARRIVAL INSTRUCTIONS:  [x] Parking the day of Surgery is located in the Main Entrance lot. Upon entering the door, make an immediate right to the surgery reception desk    [x] Bring photo ID and insurance card    [] Bring in a copy of Living will or Durable Power of  papers. [x] Please be sure to arrange for responsible adult to provide transportation to and from the hospital    [] Please arrange for responsible adult to be with you for the 24 hour period post procedure due to having anesthesia    [x] If you awake am of surgery not feeling well or have temperature >100 please call [phone]    GENERAL INSTRUCTIONS:    [x] Nothing by mouth after midnight, including gum, candy, mints or water    [x] You may brush your teeth, but do not swallow any water    [x] Take medications as instructed with 1-2 oz of water    [] Stop herbal supplements and vitamins 5 days prior to procedure    [] Follow preop dosing of blood thinners per physician instructions    [] Take 1/2 dose of evening insulin, but no insulin after midnight    [] No oral diabetic medications after midnight    [] If diabetic and have low blood sugar or feel symptomatic, take 1-2oz apple juice only    [] Bring inhalers day of surgery    [] Bring C-PAP/ Bi-Pap day of surgery    [x] Bring urine specimen day of surgery    [x] Shower or bath with soap, lather and rinse well, AM of Surgery, no lotion, powders or creams to surgical site    [] Follow bowel prep as instructed per surgeon    [] No tobacco products within 24 hours of surgery     [x] No alcohol or illegal drug use within 24 hours of surgery.     [x] Jewelry, body piercing's, eyeglasses, contact lenses and dentures are not permitted into surgery (bring cases)      [x] Please do not wear any nail polish, make up or hair products on the day of surgery    [x] You can expect a call the business day prior to procedure to notify you if your arrival time changes    [x] If you receive a survey after surgery we would greatly appreciate your comments    [] Parent/guardian of a minor must accompany their child and remain on the premises  the entire time they are under our care     [] Pediatric patients may bring favorite toy, blanket or comfort item with them    [] A caregiver or family member must remain with the patient during their stay if they are mentally handicapped, have dementia, disoriented or unable to use a call light or would be a safety concern if left unattended    [x] Please notify surgeon if you develop any illness between now and time of surgery (cold, cough, sore throat, fever, nausea, vomiting) or any signs of infections  including skin, wounds, and dental.    [x]  The Outpatient Pharmacy is available to fill your prescription here on your day of surgery, ask your preop nurse for details    [] Other instructions    EDUCATIONAL MATERIALS PROVIDED:    [] PAT Preoperative Education Packet/Booklet     [] Medication List    [] Transfusion bracelet applied with instructions    [] Shower with soap, lather and rinse well, and use CHG wipes provided the evening before surgery as instructed    [] Incentive spirometer with instructions

## 2022-06-22 ENCOUNTER — HOSPITAL ENCOUNTER (OUTPATIENT)
Age: 62
Setting detail: OUTPATIENT SURGERY
Discharge: HOME | End: 2022-06-22
Attending: UROLOGY | Admitting: UROLOGY
Payer: MEDICAID

## 2022-06-22 ENCOUNTER — ANESTHESIA (OUTPATIENT)
Dept: OPERATING ROOM | Age: 62
End: 2022-06-22
Payer: MEDICAID

## 2022-06-22 ENCOUNTER — ANESTHESIA EVENT (OUTPATIENT)
Dept: OPERATING ROOM | Age: 62
End: 2022-06-22
Payer: MEDICAID

## 2022-06-22 VITALS
TEMPERATURE: 97.2 F | SYSTOLIC BLOOD PRESSURE: 174 MMHG | OXYGEN SATURATION: 93 % | BODY MASS INDEX: 40.29 KG/M2 | RESPIRATION RATE: 18 BRPM | DIASTOLIC BLOOD PRESSURE: 87 MMHG | HEART RATE: 71 BPM | HEIGHT: 64 IN | WEIGHT: 236 LBS

## 2022-06-22 LAB
HCG, URINE, POC: NEGATIVE
Lab: NORMAL
NEGATIVE QC PASS/FAIL: NORMAL
POSITIVE QC PASS/FAIL: NORMAL

## 2022-06-22 PROCEDURE — 3700000000 HC ANESTHESIA ATTENDED CARE: Performed by: UROLOGY

## 2022-06-22 PROCEDURE — 3700000001 HC ADD 15 MINUTES (ANESTHESIA): Performed by: UROLOGY

## 2022-06-22 PROCEDURE — 2580000003 HC RX 258: Performed by: NURSE ANESTHETIST, CERTIFIED REGISTERED

## 2022-06-22 PROCEDURE — 3600000012 HC SURGERY LEVEL 2 ADDTL 15MIN: Performed by: UROLOGY

## 2022-06-22 PROCEDURE — 2500000003 HC RX 250 WO HCPCS: Performed by: NURSE ANESTHETIST, CERTIFIED REGISTERED

## 2022-06-22 PROCEDURE — 6360000002 HC RX W HCPCS: Performed by: NURSE ANESTHETIST, CERTIFIED REGISTERED

## 2022-06-22 PROCEDURE — 6360000002 HC RX W HCPCS: Performed by: UROLOGY

## 2022-06-22 PROCEDURE — 6360000002 HC RX W HCPCS: Performed by: NURSE PRACTITIONER

## 2022-06-22 PROCEDURE — 7100000011 HC PHASE II RECOVERY - ADDTL 15 MIN: Performed by: UROLOGY

## 2022-06-22 PROCEDURE — 7100000010 HC PHASE II RECOVERY - FIRST 15 MIN: Performed by: UROLOGY

## 2022-06-22 PROCEDURE — 3600000002 HC SURGERY LEVEL 2 BASE: Performed by: UROLOGY

## 2022-06-22 PROCEDURE — 2709999900 HC NON-CHARGEABLE SUPPLY: Performed by: UROLOGY

## 2022-06-22 RX ORDER — SODIUM CHLORIDE 0.9 % (FLUSH) 0.9 %
5-40 SYRINGE (ML) INJECTION PRN
Status: CANCELLED | OUTPATIENT
Start: 2022-06-22

## 2022-06-22 RX ORDER — TRAMADOL HYDROCHLORIDE 50 MG/1
100 TABLET ORAL PRN
Status: CANCELLED | OUTPATIENT
Start: 2022-06-22 | End: 2022-06-22

## 2022-06-22 RX ORDER — SODIUM CHLORIDE 0.9 % (FLUSH) 0.9 %
5-40 SYRINGE (ML) INJECTION PRN
Status: DISCONTINUED | OUTPATIENT
Start: 2022-06-22 | End: 2022-06-22 | Stop reason: HOSPADM

## 2022-06-22 RX ORDER — SODIUM CHLORIDE 0.9 % (FLUSH) 0.9 %
5-40 SYRINGE (ML) INJECTION EVERY 12 HOURS SCHEDULED
Status: CANCELLED | OUTPATIENT
Start: 2022-06-22

## 2022-06-22 RX ORDER — TRAMADOL HYDROCHLORIDE 50 MG/1
50 TABLET ORAL PRN
Status: CANCELLED | OUTPATIENT
Start: 2022-06-22 | End: 2022-06-22

## 2022-06-22 RX ORDER — PROPOFOL 10 MG/ML
INJECTION, EMULSION INTRAVENOUS CONTINUOUS PRN
Status: DISCONTINUED | OUTPATIENT
Start: 2022-06-22 | End: 2022-06-22 | Stop reason: SDUPTHER

## 2022-06-22 RX ORDER — SODIUM CHLORIDE 0.9 % (FLUSH) 0.9 %
5-40 SYRINGE (ML) INJECTION EVERY 12 HOURS SCHEDULED
Status: DISCONTINUED | OUTPATIENT
Start: 2022-06-22 | End: 2022-06-22 | Stop reason: HOSPADM

## 2022-06-22 RX ORDER — SODIUM CHLORIDE 9 MG/ML
INJECTION, SOLUTION INTRAVENOUS PRN
Status: CANCELLED | OUTPATIENT
Start: 2022-06-22

## 2022-06-22 RX ORDER — MIDAZOLAM HYDROCHLORIDE 1 MG/ML
INJECTION INTRAMUSCULAR; INTRAVENOUS PRN
Status: DISCONTINUED | OUTPATIENT
Start: 2022-06-22 | End: 2022-06-22 | Stop reason: SDUPTHER

## 2022-06-22 RX ORDER — SODIUM CHLORIDE 9 MG/ML
INJECTION, SOLUTION INTRAVENOUS CONTINUOUS
Status: DISCONTINUED | OUTPATIENT
Start: 2022-06-22 | End: 2022-06-22 | Stop reason: HOSPADM

## 2022-06-22 RX ORDER — SODIUM CHLORIDE 9 MG/ML
INJECTION, SOLUTION INTRAVENOUS PRN
Status: DISCONTINUED | OUTPATIENT
Start: 2022-06-22 | End: 2022-06-22 | Stop reason: HOSPADM

## 2022-06-22 RX ORDER — FENTANYL CITRATE 50 UG/ML
INJECTION, SOLUTION INTRAMUSCULAR; INTRAVENOUS PRN
Status: DISCONTINUED | OUTPATIENT
Start: 2022-06-22 | End: 2022-06-22 | Stop reason: SDUPTHER

## 2022-06-22 RX ORDER — ONDANSETRON 2 MG/ML
INJECTION INTRAMUSCULAR; INTRAVENOUS PRN
Status: DISCONTINUED | OUTPATIENT
Start: 2022-06-22 | End: 2022-06-22 | Stop reason: SDUPTHER

## 2022-06-22 RX ORDER — SODIUM CHLORIDE 9 MG/ML
INJECTION, SOLUTION INTRAVENOUS CONTINUOUS PRN
Status: DISCONTINUED | OUTPATIENT
Start: 2022-06-22 | End: 2022-06-22 | Stop reason: SDUPTHER

## 2022-06-22 RX ORDER — GLYCOPYRROLATE 0.2 MG/ML
INJECTION INTRAMUSCULAR; INTRAVENOUS PRN
Status: DISCONTINUED | OUTPATIENT
Start: 2022-06-22 | End: 2022-06-22 | Stop reason: SDUPTHER

## 2022-06-22 RX ADMIN — Medication 2000 MG: at 10:34

## 2022-06-22 RX ADMIN — FENTANYL CITRATE 50 MCG: 50 INJECTION, SOLUTION INTRAMUSCULAR; INTRAVENOUS at 10:39

## 2022-06-22 RX ADMIN — SODIUM CHLORIDE: 9 INJECTION, SOLUTION INTRAVENOUS at 09:30

## 2022-06-22 RX ADMIN — PROPOFOL 75 MCG/KG/MIN: 10 INJECTION, EMULSION INTRAVENOUS at 10:30

## 2022-06-22 RX ADMIN — ONDANSETRON 4 MG: 2 INJECTION INTRAMUSCULAR; INTRAVENOUS at 10:23

## 2022-06-22 RX ADMIN — FENTANYL CITRATE 50 MCG: 50 INJECTION, SOLUTION INTRAMUSCULAR; INTRAVENOUS at 10:30

## 2022-06-22 RX ADMIN — GLYCOPYRROLATE 0.2 MG: 0.2 INJECTION, SOLUTION INTRAMUSCULAR; INTRAVENOUS at 10:23

## 2022-06-22 RX ADMIN — MIDAZOLAM 2 MG: 1 INJECTION INTRAMUSCULAR; INTRAVENOUS at 10:23

## 2022-06-22 ASSESSMENT — PAIN - FUNCTIONAL ASSESSMENT
PAIN_FUNCTIONAL_ASSESSMENT: NONE - DENIES PAIN
PAIN_FUNCTIONAL_ASSESSMENT: NONE - DENIES PAIN

## 2022-06-22 ASSESSMENT — PAIN SCALES - GENERAL
PAINLEVEL_OUTOF10: 0
PAINLEVEL_OUTOF10: 0

## 2022-06-22 NOTE — ANESTHESIA PRE PROCEDURE
Department of Anesthesiology  Preprocedure Note       Name:  Carrillo Soto   Age:  64 y.o.  :  1960                                          MRN:  [id number]         Date:  2022      Surgeon: Stephanie Massey):  [facility] Dawson, DO    Procedure: Procedure(s):  CYSTOSCOPY BOTOX INJECTION 100 UNITS   (PHARMACY NOTIFIED)    Medications prior to admission:   Prior to Admission medications    Medication Sig Start Date End Date Taking? Authorizing Provider   brompheniramine-pseudoephedrine-DM (BROMFED DM) 2-30-10 MG/5ML syrup Take 5 mLs by mouth 4 times daily as needed for Cough 22   Daphine Monk, DO   metoprolol tartrate (LOPRESSOR) 25 MG tablet Take 1 tablet by mouth 2 times daily 22   Daphine Monk, DO   lidocaine (XYLOCAINE) 5 % ointment Apply topically as needed.  22   Daphine Monk, DO   ipratropium-albuterol (DUONEB) 0.5-2.5 (3) MG/3ML SOLN nebulizer solution Inhale 3 mLs into the lungs every 4 hours as needed for Shortness of Breath 3/31/22   Chato Dumont,    fluticasone-umeclidin-vilant (TRELEGY ELLIPTA) 100-62.5-25 MCG/INH AEPB Inhale 1 puff into the lungs daily 3/31/22   Sandro Shannon, APRN - CNP   Respiratory Therapy Supplies (NEBULIZER/TUBING/MOUTHPIECE) KIT 1 kit by Does not apply route daily as needed (as needed for wheezing / shortness of breath) 3/28/22   Daphine , DO   dorzolamide (TRUSOPT) 2 % ophthalmic solution instill 1 drop into left eye twice a day 3/5/22   Historical Provider, MD   latanoprost (XALATAN) 0.005 % ophthalmic solution instill 1 drop into left eye at bedtime 22   Historical Provider, MD   oxybutynin (DITROPAN-XL) 5 MG extended release tablet take 1 tablet by mouth once daily 22   Historical Provider, MD   pilocarpine (PILOCAR) 2 % ophthalmic solution instill 1 drop into left eye twice a day 22   Historical Provider, MD   prednisoLONE acetate (PRED FORTE) 1 % ophthalmic suspension instill 1 drop into left eye every 2 hours AFTER SURGERY 3/8/22   Historical Provider, MD   aspirin 81 MG chewable tablet Take 1 tablet by mouth daily 22   Amy Lyons, DO   atorvastatin (LIPITOR) 40 MG tablet Take 1 tablet by mouth nightly 22   Amy Lyons, DO   Blood Pressure KIT 1 kit by Does not apply route daily 21   Amy [place], DO   albuterol sulfate HFA (PROVENTIL HFA) 108 (90 Base) MCG/ACT inhaler Inhale 2 puffs into the lungs every 4 hours as needed for Wheezing 21  Amy Lyons, DO   Spacer/Aero-Holding Chambers ABHIJIT 1 Device by Does not apply route daily 21   MATT Damon CNP       Current medications:    No current facility-administered medications for this encounter. 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    Class 2 severe obesity due to excess calories with serious comorbidity and body mass index (BMI) of 39.0 to 39.9 in adult ([facility] E66.01, Z68.39       Past Medical History:        Diagnosis Date    [facility]ch[facility] disease) [facility]     Urinary incontinence     For or 22       Past Surgical History:        Procedure Laterality Date     SECTION      x5    TONSILLECTOMY         Social History:    Social History     Tobacco Use    Smoking status: Former Smoker     Packs/day: 0.50     Years: 13.00     Pack years: 6.50     Types: Cigarettes     Quit date: 2022     Years since quittin.2    Smokeless tobacco: Never Used   Substance Use Topics    Alcohol use:  No                                Counseling given: Not Answered      Vital Signs (Current):   Vitals:    22 1715 Weight: 236 lb (107 kg)   Height: 5' 4\" (1.626 m)                                              BP Readings from Last 3 Encounters:   06/13/22 136/88   06/08/22 138/80   06/06/22 (!) 148/88       NPO Status:                                                                                 BMI:   Wt Readings from Last 3 Encounters:   06/20/22 236 lb (107 kg)   06/13/22 239 lb (108.4 kg)   06/08/22 235 lb (106.6 kg)     Body mass index is 40.51 kg/m². CBC:   Lab Results   Component Value Date    WBC 7.4 03/31/2022    RBC 5.62 03/31/2022    HGB 12.6 03/31/2022    HCT 42.3 03/31/2022    MCV 75.3 03/31/2022    RDW 19.9 03/31/2022     03/31/2022       CMP:   Lab Results   Component Value Date     03/31/2022    K 3.7 03/31/2022    K 3.8 03/30/2022     03/31/2022    CO2 27 03/31/2022    BUN 18 03/31/2022    CREATININE 0.8 03/31/2022    GFRAA >60 03/31/2022    LABGLOM >60 03/31/2022    GLUCOSE 95 03/31/2022    PROT 8.0 03/29/2022    CALCIUM 8.9 03/31/2022    BILITOT 0.3 03/29/2022    ALKPHOS 95 03/29/2022    AST 24 03/29/2022    ALT 22 03/29/2022       POC Tests: No results for input(s): POCGLU, POCNA, POCK, POCCL, POCBUN, POCHEMO, POCHCT in the last 72 hours.     Coags:   Lab Results   Component Value Date    APTT 47.4 03/31/2022       HCG (If Applicable): No results found for: PREGTESTUR, PREGSERUM, HCG, HCGQUANT     ABGs: No results found for: PHART, PO2ART, NST9JKB, KRF6FFV, BEART, R3BCVNZN     Type & Screen (If Applicable):  No results found for: LABABO, LABRH    Drug/Infectious Status (If Applicable):  No results found for: HIV, HEPCAB    COVID-19 Screening (If Applicable):   Lab Results   Component Value Date    COVID19 Not Detected 03/29/2022           Anesthesia Evaluation  Patient summary reviewed no history of anesthetic complications:   Airway: Mallampati: I  TM distance: >3 FB   Neck ROM: full     Dental:          Pulmonary: breath sounds clear to auscultation  (+) COPD: ROS comment: Former Smoker   Cardiovascular:    (+) hypertension:, past MI: > 6 months, hyperlipidemia        Rhythm: regular  Rate: normal           Beta Blocker:  Dose within 24 Hrs         Neuro/Psych:   Negative Neuro/Psych ROS               ROS comment: glaucoma GI/Hepatic/Renal:   (+) morbid obesity          Endo/Other: Negative Endo/Other ROS                    Abdominal:             Vascular: negative vascular ROS. Other Findings:           Anesthesia Plan      MAC     ASA 3       Induction: intravenous. Anesthetic plan and risks discussed with patient. Plan discussed with CRNA.                     Severiano Monreal MD   6/22/2022

## 2022-06-22 NOTE — H&P
2022 10:23 AM  Service: Urology  Group: [facility] (Dawson/Marino/Yary)    Devin Daigle  [id number]     Chief Complaint: overactive bladder    History of Present Illness: The patient is a 64 y.o. female patient who presents with above    Past Medical History:   Diagnosis Date    COPD (chronic obstructive pulmonary disease) ([facility]     Urinary incontinence     For or 22       Past Surgical History:   Procedure Laterality Date     SECTION      x5    TONSILLECTOMY         Medications Prior to Admission:    Medications Prior to Admission: brompheniramine-pseudoephedrine-DM (BROMFED DM) 2-30-10 MG/5ML syrup, Take 5 mLs by mouth 4 times daily as needed for Cough  metoprolol tartrate (LOPRESSOR) 25 MG tablet, Take 1 tablet by mouth 2 times daily  lidocaine (XYLOCAINE) 5 % ointment, Apply topically as needed.   ipratropium-albuterol (DUONEB) 0.5-2.5 (3) MG/3ML SOLN nebulizer solution, Inhale 3 mLs into the lungs every 4 hours as needed for Shortness of Breath  fluticasone-umeclidin-vilant (TRELEGY ELLIPTA) 100-62.5-25 MCG/INH AEPB, Inhale 1 puff into the lungs daily  Respiratory Therapy Supplies (NEBULIZER/TUBING/MOUTHPIECE) KIT, 1 kit by Does not apply route daily as needed (as needed for wheezing / shortness of breath)  dorzolamide (TRUSOPT) 2 % ophthalmic solution, instill 1 drop into left eye twice a day  latanoprost (XALATAN) 0.005 % ophthalmic solution, instill 1 drop into left eye at bedtime  oxybutynin (DITROPAN-XL) 5 MG extended release tablet, take 1 tablet by mouth once daily  pilocarpine (PILOCAR) 2 % ophthalmic solution, instill 1 drop into left eye twice a day  prednisoLONE acetate (PRED FORTE) 1 % ophthalmic suspension, instill 1 drop into left eye every 2 hours AFTER SURGERY  aspirin 81 MG chewable tablet, Take 1 tablet by mouth daily  atorvastatin (LIPITOR) 40 MG tablet, Take 1 tablet by mouth nightly  Blood Pressure KIT, 1 kit by Does not apply route daily  albuterol sulfate HFA (PROVENTIL HFA) 108 (90 Base) MCG/ACT inhaler, Inhale 2 puffs into the lungs every 4 hours as needed for Wheezing  Spacer/Aero-Holding Chambers ABHIJIT, 1 Device by Does not apply route daily    Allergies:    Patient has no known allergies. Social History:    reports that she quit smoking about 2 months ago. Her smoking use included cigarettes. She has a 6.50 pack-year smoking history. She has never used smokeless tobacco. She reports that she does not drink alcohol and does not use drugs. Family History:   Non-contributory to this urological problem  family history is not on file. Review of Systems:  Respiratory: negative for cough and hemoptysis  Cardiovascular: negative for chest pain and dyspnea  Gastrointestinal: negative for abdominal pain, diarrhea, nausea and vomiting  Derm: negative for rash and skin lesion(s)  Neurological: negative for seizures and tremors  Endocrine: negative for diabetic symptoms including polydipsia and polyuria  : As above in the HPI, otherwise negative  All other reviews are negative    Physical Exam:   Vitals: /65   Pulse 64   Temp 97.8 °F (36.6 °C) (Temporal)   Resp 18   Ht 5' 4\" (1.626 m)   Wt 236 lb (107 kg)   LMP 05/20/2022 Comment: urine pregnancy negative  SpO2 96%   BMI 40.51 kg/m²   General:  Awake, alert, oriented X 3. Well developed, well nourished, well groomed. No apparent distress. HEENT:  Normocephalic, atraumatic. Pupils equal, round. No scleral icterus. No conjunctival injection. Normal lips, teeth, and gums. No nasal discharge. Neck:  Supple, no masses. Heart:  RRR  Lungs:  No audible wheezing. Respirations symmetric and non-labored.   Abdomen:  soft, nontender, no masses, no organomegaly, no peritoneal signs  Extremities:  No clubbing, cyanosis, or edema  Skin:  Warm and dry, no open lesions or rashes  Neuro:  Cranial nerves 2-12 intact, no focal deficits  Rectal: deferred  Genitalia:  Ray no    Labs:   No results for input(s): WBC, RBC, HGB, HCT, MCV, MCH, MCHC, RDW, PLT, MPV in the last 72 hours. No results for input(s): CREATININE in the last 72 hours.     Images:      Assessment: Maria Elena Dowd 64 y.o. female     OAB    Plan:    See the outpatient H&P  All options were discussed  The patient family is present  Progress to the OR for cysto, botox 100 mg   The risks, benefits, and alternatives were discussed  NPO  DVT prophylaxis  Pre-op antibiotics      Eligio Ruby Dawson, DO   [facility]

## 2022-06-22 NOTE — PROGRESS NOTES
Dr Osman here to see patient. Patent having occasional  twitching of neck and head to left side. No facial drooping noted. Smile symmetrical, able to raise eyebrows. Hand grasps moderate and equal . Dr Shelia Holloway notified and will come and see patient.

## 2022-06-22 NOTE — BRIEF OP NOTE
Brief Postoperative Note      Patient: Misa Salas  YOB: 1960  MRN: [id number]    Date of Procedure: 6/22/2022    Pre-Op Diagnosis: URGE INCONTINENCE    Post-Op Diagnosis: Same       Procedure(s):  CYSTOSCOPY BOTOX INJECTION 100 UNITS   (PHARMACY NOTIFIED)    Surgeon(s):  Eemlina Osman DO    Assistant:  * No surgical staff found *    Anesthesia: Monitor Anesthesia Care    Estimated Blood Loss (mL): Minimal    Complications: None    Specimens:   * No specimens in log *    Implants:  * No implants in log *      Drains: * No LDAs found *    Findings: see operative report     Electronically signed by Emelina Osman DO on 6/22/2022 at 10:24 AM

## 2022-06-22 NOTE — PROGRESS NOTES
Patient no longer having the twitching of head . Speech normal. Son states patient seems to be back to her normal self.  Dr Rochelle Kent notified of twitching episodes and that are no longer present ok to send patient home

## 2022-06-23 NOTE — OP NOTE
through the  meatus in an atraumatic fashion. Panendoscopic visualization of the  bladder was devoid of any significant masses, tumors, lesions, or  defects. The right and left ureteral orifices had normal position. The  Botox needle was inserted and I began to do a standard 21-point Botox  injection with 0.5 mL aliquots. I did do 0.5 mL flush. The patient  tolerated the procedure quite well. Bladder was drained. She awoke  from anesthesia without complication and brought to PACU in stable  condition. PLAN:  1. She could be discharged home today.   2.  She will follow up in the office with our nurse practitioner, Garret Tolentino,  where she can have a postvoid residual.        Sheppard Paget, DO    D: 06/22/2022 10:48:25       T: 06/22/2022 10:50:46     [id number]  Job#: [id number]     Doc#: [id number]    CC:  _____

## 2022-06-23 NOTE — ANESTHESIA POSTPROCEDURE EVALUATION
Department of Anesthesiology  Postprocedure Note    Patient: Allan Nathan  MRN: [id number]  YOB: 1960  Date of evaluation: 6/23/2022      Procedure Summary     Date: 06/22/22 Room / Location: [facility] 01 / 106 [facility]    Anesthesia Start: 9948 Anesthesia Stop: 8816    Procedure: CYSTOSCOPY BOTOX INJECTION 100 UNITS   (PHARMACY NOTIFIED) (N/A Bladder) Diagnosis:       Urge incontinence      (Author Escobar)    Surgeons: Blaise Starr DO Responsible Provider: Maira Stinson MD    Anesthesia Type: MAC ASA Status: 3          Anesthesia Type: No value filed. Summer Phase I: Summer Score: 10    Summer Phase II: Summer Score: 10    Last vitals:   Vitals Value Taken Time   /87 06/22/22 1210   Temp 97.2 °F (36.2 °C) 06/22/22 1210   Pulse 71 06/22/22 1210   Resp 18 06/22/22 1210   SpO2 93 % 06/22/22 1210         Anesthesia Post Evaluation    Patient location during evaluation: PACU  Patient participation: complete - patient participated  Level of consciousness: awake and alert  Airway patency: patent  Nausea & Vomiting: no nausea and no vomiting  Complications: no  Cardiovascular status: hemodynamically stable  Respiratory status: acceptable  Hydration status: euvolemic  There was medical reason for not using a multimodal analgesia pain management approach.

## 2022-06-28 ENCOUNTER — CLINICAL DOCUMENTATION (OUTPATIENT)
Dept: NUTRITION | Age: 62
End: 2022-06-28

## 2022-06-28 NOTE — PROGRESS NOTES
Patient No Show      Scheduled Date: 6/28/2022  Patient: Yuan Blackman  Referring Clinician: Dalila Odom DO  Fax: [phone]      Dear Dalila Odom DO,    Thank you for referring Tyrone Starr to [place] [address] for outpatient nutrition counseling. Maria Elena Dowd did not keep scheduled appointment on 6/28/2022. If I can be of further assistance with this patient, please contact me.      Thank you,    Jazmine Herndon MS, RD, LD  Outpatient Dietitian   Phone: [phone]  Fax: [phone]

## 2022-07-10 ENCOUNTER — HOSPITAL ENCOUNTER (EMERGENCY)
Age: 62
Discharge: HOME | End: 2022-07-10
Attending: STUDENT IN AN ORGANIZED HEALTH CARE EDUCATION/TRAINING PROGRAM
Payer: MEDICAID

## 2022-07-10 VITALS
TEMPERATURE: 98.4 F | DIASTOLIC BLOOD PRESSURE: 110 MMHG | WEIGHT: 237 LBS | HEART RATE: 86 BPM | HEIGHT: 65 IN | OXYGEN SATURATION: 95 % | BODY MASS INDEX: 39.49 KG/M2 | RESPIRATION RATE: 18 BRPM | SYSTOLIC BLOOD PRESSURE: 201 MMHG

## 2022-07-10 DIAGNOSIS — J34.89 SORE IN NOSE: Primary | ICD-10-CM

## 2022-07-10 DIAGNOSIS — R03.0 ELEVATED BLOOD PRESSURE READING: ICD-10-CM

## 2022-07-10 PROCEDURE — 99283 EMERGENCY DEPT VISIT LOW MDM: CPT

## 2022-07-10 PROCEDURE — 6370000000 HC RX 637 (ALT 250 FOR IP): Performed by: STUDENT IN AN ORGANIZED HEALTH CARE EDUCATION/TRAINING PROGRAM

## 2022-07-10 RX ADMIN — SALINE NASAL SPRAY 1 SPRAY: 1.5 SOLUTION NASAL at 07:37

## 2022-07-10 RX ADMIN — METOPROLOL TARTRATE 25 MG: 25 TABLET, FILM COATED ORAL at 07:37

## 2022-07-10 ASSESSMENT — PAIN - FUNCTIONAL ASSESSMENT: PAIN_FUNCTIONAL_ASSESSMENT: NONE - DENIES PAIN

## 2022-07-10 NOTE — ED PROVIDER NOTES
has never used smokeless tobacco. She reports that she does not drink alcohol and does not use drugs. Family History: family history is not on file. . Unless otherwise noted, family history is non contributory    The patients home medications have been reviewed. Allergies: Patient has no known allergies. I have reviewed the past medical history, past surgical history, social history, and family history    ---------------------------------------------------PHYSICAL EXAM--------------------------------------    Constitutional/General: Alert and oriented x3, obese female sitting in bed in no acute distress  Head: Normocephalic and atraumatic  Eyes:  EOMI, sclera non icteric  ENT: Oropharynx clear, handling secretions, no trismus, no asymmetry of the posterior oropharynx or uvular edema, there is a small scabbed over area in the left naris. No active bleeding present. Neck: Supple, full ROM, no stridor, no meningeal signs  Respiratory: Lungs clear to auscultation bilaterally, no wheezes, rales, or rhonchi. Not in respiratory distress  Cardiovascular:  Regular rate. Regular rhythm. No murmurs, no gallops, no rubs. 2+ distal pulses. Equal extremity pulses. Gastrointestinal:  Abdomen Soft, Non tender, Non distended. No rebound, guarding, or rigidity. No pulsatile masses. Musculoskeletal: Moves all extremities x 4. Warm and well perfused, no clubbing, no cyanosis, no edema. Capillary refill <3 seconds  Skin: skin warm and dry. No rashes. Neurologic: GCS 15, no focal deficits, symmetric strength 5/5 in the upper and lower extremities bilaterally  Psychiatric: Normal Affect          -------------------------------------------------- RESULTS -------------------------------------------------  I have personally reviewed all laboratory and imaging results for this patient. Results are listed below.      LABS: (Lab results interpreted by me)  No results found for this visit on 07/10/22., RADIOLOGY:  Interpreted by Radiologist unless otherwise specified  No orders to display             ------------------------- NURSING NOTES AND VITALS REVIEWED ---------------------------   The nursing notes within the ED encounter and vital signs as below have been reviewed by myself  BP (!) 201/110   Pulse 86   Temp 98.4 °F (36.9 °C) (Oral)   Resp 18   Ht 5' 5\" (1.651 m)   Wt 237 lb (107.5 kg)   SpO2 95%   BMI 39.44 kg/m²     Oxygen Saturation Interpretation: Normal    The patients available past medical records and past encounters were reviewed. ------------------------------ ED COURSE/MEDICAL DECISION MAKING----------------------  Medications   metoprolol tartrate (LOPRESSOR) tablet 25 mg (25 mg Oral Given 7/10/22 0737)   sodium chloride (OCEAN, BABY AYR) 0.65 % nasal spray 1 spray (1 spray Each Nostril Given 7/10/22 0737)              I, Dr. Betsey Mcdonald, am the primary provider of record    Medical Decision Making:   The patient is a 40-year-old female presents emergency department complaining of a sore in her left nare. She is hypertensive on arrival but has not been taking her blood pressure medications at home. She was given her home Lopressor and nasal spray for the small cut on her nose. There is no active bleeding present while she is in the emergency department. Recommend her to continue taking her home blood pressure medications. She was asymptomatic regarding her elevated blood pressure. Did slightly improve after she was given a dose of her home medication. Strict return precautions were given and patient was discharged home in stable condition. Oxygen Saturation Interpretation: 95 % on room air. Re-Evaluations:       Felt better.        This patient's ED course included: a personal history and physicial examination, re-evaluation prior to disposition, multiple bedside re-evaluations, IV medications, cardiac monitoring, continuous pulse oximetry and complex medical decision making and emergency management    This patient has remained hemodynamically stable during their ED course. Counseling: The emergency provider has spoken with the patient and discussed todays results, in addition to providing specific details for the plan of care and counseling regarding the diagnosis and prognosis. Questions are answered at this time and they are agreeable with the plan.       --------------------------------- IMPRESSION AND DISPOSITION ---------------------------------    IMPRESSION  1. Sore in nose    2. Elevated blood pressure reading        DISPOSITION  Disposition: Discharge to home  Patient condition is stable        NOTE: This report was transcribed using voice recognition software.  Every effort was made to ensure accuracy; however, inadvertent computerized transcription errors may be present       Qi Alvarez DO  07/12/22 3100

## 2022-07-11 ENCOUNTER — TELEPHONE (OUTPATIENT)
Dept: FAMILY MEDICINE CLINIC | Age: 62
End: 2022-07-11

## 2022-07-11 NOTE — TELEPHONE ENCOUNTER
She needs to take all medications as prescribed. It does not sound like the metoprolol is causing the headache if she had no side effects while in the ED. Her headache is likely due to her significantly elevated blood pressure. If she is taking all medications as prescribed and continues to have a headache, have her come in for an appointment or through walk-in or back to the ED for evaluation.

## 2022-07-11 NOTE — TELEPHONE ENCOUNTER
Phone call from patient. She said her metoprolol is causing her head to hurt. She will occasionally not take the med because she does not the side effect. She was in the ER yesterday and they gave her a metoprolol and her head did not hurt. Please advise.

## 2022-07-11 NOTE — TELEPHONE ENCOUNTER
Patient advised. She stated she wants her med changed to something else because she will not take a med that makes her head hurt. The ER doctor told her that there are a lot of different meds she can take and that we could change it for her. Please advise.

## 2022-07-12 ENCOUNTER — CLINICAL DOCUMENTATION (OUTPATIENT)
Dept: NUTRITION | Age: 62
End: 2022-07-12

## 2022-07-12 NOTE — PROGRESS NOTES
Initial Assessment      Date: 7/12/22   Time: 11:00 pm   Patient Name: Hannah Lin   Referring Clinician: Barry Umana DO   Fax: [phone]   Reason for Visit: Weight reduction/HTN    Goals: \"To lose 40-50#\"  SMART goals:  1. To replace sugar-sweetened drinks with water at least 5 times a week & after 7/20/22 will no longer purchase sugary drinks. 2. Will only eat steak 2x/month   3. Will walk on treadmill or at gym for 20 mins at least 5 days a week    Current Eating Pattern:  24 hr recall  Breakfast: Corn flakes around 5am, then fell back asleep  A few hours later had- Sausage, eggs, potatoes, biscuits, coffee w/ powder creamer  Lunch: Foard chicken snacker arbys, water  Snack: cheese puffs ~1 cup  1 piece of cake  Dinner - skipped  Fluids: 7up, sweet tea    Past Nutrition Hx:  Has used medications in the past to aid in weight loss/curb appetite. Stress/Environment Issues that may be affecting po intake:  Pt states since her son passed away 15 years ago, she began smoking again and was depressed for about 10 years and still struggles with feeling depressed at times. Work:  Not working    Family Support:  Good support from son    Current Exercise Pattern:  Not right now    Past Exercise Routine:  Has exercise equipment at home - treadmill, stationary bike    Sex: female  Age: 64  Ht: 5'5\"  CBW: 236.6#  BMI: 39.4      Medical Hx:  Past Medical History:   Diagnosis Date    COPD (chronic obstructive pulmonary disease) ([facility] [address]     Urinary incontinence     For or 6-22-22          Family Hx:  No family history on file.      Medications:  Current Outpatient Medications on File Prior to Visit   Medication Sig Dispense Refill    brompheniramine-pseudoephedrine-DM (BROMFED DM) 2-30-10 MG/5ML syrup Take 5 mLs by mouth 4 times daily as needed for Cough 118 mL 0    metoprolol tartrate (LOPRESSOR) 25 MG tablet Take 1 tablet by mouth 2 times daily 60 tablet 3    lidocaine (XYLOCAINE) 5 % ointment Apply topically as needed. 240 g 5    ipratropium-albuterol (DUONEB) 0.5-2.5 (3) MG/3ML SOLN nebulizer solution Inhale 3 mLs into the lungs every 4 hours as needed for Shortness of Breath 360 mL 0    fluticasone-umeclidin-vilant (TRELEGY ELLIPTA) 100-62.5-25 MCG/INH AEPB Inhale 1 puff into the lungs daily 1 each 5    Respiratory Therapy Supplies (NEBULIZER/TUBING/MOUTHPIECE) KIT 1 kit by Does not apply route daily as needed (as needed for wheezing / shortness of breath) 1 kit 2    dorzolamide (TRUSOPT) 2 % ophthalmic solution instill 1 drop into left eye twice a day      latanoprost (XALATAN) 0.005 % ophthalmic solution instill 1 drop into left eye at bedtime      oxybutynin (DITROPAN-XL) 5 MG extended release tablet take 1 tablet by mouth once daily      pilocarpine (PILOCAR) 2 % ophthalmic solution instill 1 drop into left eye twice a day      prednisoLONE acetate (PRED FORTE) 1 % ophthalmic suspension instill 1 drop into left eye every 2 hours AFTER SURGERY      aspirin 81 MG chewable tablet Take 1 tablet by mouth daily 30 tablet 3    atorvastatin (LIPITOR) 40 MG tablet Take 1 tablet by mouth nightly 30 tablet 3    Blood Pressure KIT 1 kit by Does not apply route daily 1 kit 0    albuterol sulfate HFA (PROVENTIL HFA) 108 (90 Base) MCG/ACT inhaler Inhale 2 puffs into the lungs every 4 hours as needed for Wheezing 1 Inhaler 5    Spacer/Aero-Holding Chambers ABHIJIT 1 Device by Does not apply route daily 1 Device 0     No current facility-administered medications on file prior to visit. Supplements/OTC:  none    Notes:   Patient states she quit smoking about 3 months ago and noticed significant weight gain during this time ~30#. Pt reports having increased appetite and is snacking much more than normal and portion sizes are larger as well. Patient states she has followed diet plans in the past and is a great cook, but has gotten away from her healthier habits after her son passed away.  She states she is much more motivated than before and states she needs to make changes for her health. Pt was focused on fruit and has the belief that fruit is not good to eat due to the sugar content. Educated pt that 2 servings of fruit/day is recommended and that the sugar in fruit is natural sugar and that fruit is a great source of fiber. Plan:   Pt would like to not eat past 7:30 pm and understands that she needs to watch her portion sizes. Patient requested a sample meal plan, which an 1800 calorie meal plan was provided. Informed patient that this meal plan is just a guide to help her create her own meals that are similar and balanced while watching the portion sizes. Informed patient that I would like to give her the information and knowledge on how to create healthy meals instead of me simply telling her what to eat. Encouraged patient to write down the foods she is eating in a food journal or on an nya such as myfitness pal. Pt has support from her son at this time. We discussed the importance of eating 3 meals/day with snacks in between to boost her metabolism. Pt would also like to start walking for exercise. Handouts provided: 1800 aidee meal plan, starting simple with myplate, setting goals for weight loss, tips to support weight loss    Motivation Level for nutrition counseling on a scale of 1 to 10: 12    Motivators?   Pt wants to lower blood pressure, breathe easier, sleep better, and and feel better    Barriers?  none    Follow-Up Appointment:  August 11 @ 2pm

## 2022-07-14 ENCOUNTER — OFFICE VISIT (OUTPATIENT)
Dept: FAMILY MEDICINE CLINIC | Age: 62
End: 2022-07-14
Payer: MEDICAID

## 2022-07-14 VITALS
DIASTOLIC BLOOD PRESSURE: 82 MMHG | RESPIRATION RATE: 16 BRPM | HEART RATE: 68 BPM | OXYGEN SATURATION: 95 % | BODY MASS INDEX: 39.32 KG/M2 | WEIGHT: 236 LBS | SYSTOLIC BLOOD PRESSURE: 144 MMHG | TEMPERATURE: 97.1 F | HEIGHT: 65 IN

## 2022-07-14 DIAGNOSIS — I10 PRIMARY HYPERTENSION: Primary | ICD-10-CM

## 2022-07-14 PROCEDURE — 99214 OFFICE O/P EST MOD 30 MIN: CPT | Performed by: INTERNAL MEDICINE

## 2022-07-14 PROCEDURE — 3017F COLORECTAL CA SCREEN DOC REV: CPT | Performed by: INTERNAL MEDICINE

## 2022-07-14 PROCEDURE — G8417 CALC BMI ABV UP PARAM F/U: HCPCS | Performed by: INTERNAL MEDICINE

## 2022-07-14 PROCEDURE — G8427 DOCREV CUR MEDS BY ELIG CLIN: HCPCS | Performed by: INTERNAL MEDICINE

## 2022-07-14 PROCEDURE — 1036F TOBACCO NON-USER: CPT | Performed by: INTERNAL MEDICINE

## 2022-07-14 RX ORDER — NETARSUDIL 0.2 MG/ML
1 SOLUTION/ DROPS OPHTHALMIC; TOPICAL NIGHTLY
COMMUNITY

## 2022-07-14 RX ORDER — AMLODIPINE BESYLATE 5 MG/1
5 TABLET ORAL DAILY
Qty: 30 TABLET | Refills: 3 | Status: SHIPPED
Start: 2022-07-14 | End: 2022-08-09 | Stop reason: SDUPTHER

## 2022-07-14 SDOH — ECONOMIC STABILITY: FOOD INSECURITY: WITHIN THE PAST 12 MONTHS, YOU WORRIED THAT YOUR FOOD WOULD RUN OUT BEFORE YOU GOT MONEY TO BUY MORE.: NEVER TRUE

## 2022-07-14 SDOH — ECONOMIC STABILITY: FOOD INSECURITY: WITHIN THE PAST 12 MONTHS, THE FOOD YOU BOUGHT JUST DIDN'T LAST AND YOU DIDN'T HAVE MONEY TO GET MORE.: NEVER TRUE

## 2022-07-14 ASSESSMENT — SOCIAL DETERMINANTS OF HEALTH (SDOH): HOW HARD IS IT FOR YOU TO PAY FOR THE VERY BASICS LIKE FOOD, HOUSING, MEDICAL CARE, AND HEATING?: NOT HARD AT ALL

## 2022-07-14 NOTE — PROGRESS NOTES
daily as needed for Cough 118 mL 0    metoprolol tartrate (LOPRESSOR) 25 MG tablet Take 1 tablet by mouth 2 times daily 60 tablet 3    lidocaine (XYLOCAINE) 5 % ointment Apply topically as needed. 240 g 5    ipratropium-albuterol (DUONEB) 0.5-2.5 (3) MG/3ML SOLN nebulizer solution Inhale 3 mLs into the lungs every 4 hours as needed for Shortness of Breath 360 mL 0    fluticasone-umeclidin-vilant (TRELEGY ELLIPTA) 100-62.5-25 MCG/INH AEPB Inhale 1 puff into the lungs daily 1 each 5    Respiratory Therapy Supplies (NEBULIZER/TUBING/MOUTHPIECE) KIT 1 kit by Does not apply route daily as needed (as needed for wheezing / shortness of breath) 1 kit 2    dorzolamide (TRUSOPT) 2 % ophthalmic solution instill 1 drop into left eye twice a day      latanoprost (XALATAN) 0.005 % ophthalmic solution instill 1 drop into left eye at bedtime      pilocarpine (PILOCAR) 2 % ophthalmic solution instill 1 drop into left eye twice a day      aspirin 81 MG chewable tablet Take 1 tablet by mouth daily 30 tablet 3    atorvastatin (LIPITOR) 40 MG tablet Take 1 tablet by mouth nightly 30 tablet 3    Blood Pressure KIT 1 kit by Does not apply route daily 1 kit 0    albuterol sulfate HFA (PROVENTIL HFA) 108 (90 Base) MCG/ACT inhaler Inhale 2 puffs into the lungs every 4 hours as needed for Wheezing 1 Inhaler 5    Spacer/Aero-Holding Chambers ABHIJIT 1 Device by Does not apply route daily 1 Device 0     No current facility-administered medications for this visit.         Allergies:  No Known Allergies    Objective     Vitals:    07/14/22 1429 07/14/22 1450   BP: 134/80 (!) 144/82   Site: Left Upper Arm Right Upper Arm   Position: Sitting Sitting   Cuff Size: Large Adult Large Adult   Pulse: 68    Resp: 16    Temp: 97.1 °F (36.2 °C)    TempSrc: Infrared    SpO2: 95%    Weight: 236 lb (107 kg)    Height: 5' 5\" (1.651 m)         Physical Exam  General: Awake, alert, and oriented to person, place, time, and purpose, appears stated age and cooperative, No acute distress  Head: Normocephalic, atraumatic  Eyes: conjunctivae/corneas clear, EOMI  Mouth: Mucous membranes moist with no pharyngeal exudate or erythema  Neck: no JVD, no adenopathy, no carotid bruit, supple, symmetrical, trachea midline  Back: symmetric, ROM normal, No CVA tenderness. Lungs: clear to auscultation bilaterally without wheezes, rales, or rhonchi  Heart: regular rate and rhythm, S1, S2 normal, no murmur, click, rub or gallop  Abdomen: soft, non-tender; bowel sounds normal; no masses,  no organomegaly  Extremities: atraumatic, no cyanosis, no edema, 2+ pulses palpated in all 4 extremities  Skin: color, texture, turgor within normal limits. No rashes or lesions or normal  Neurologic:speech appropriate, moves all 4 extremities, normal muscle strength and tone, CN 2-12 grossly intact      Assessment and Plan     Patient is a 64 y.o. female who presented to the office for an acute visit. Sofiya Bae was seen today for medication problem. Diagnoses and all orders for this visit:    Primary hypertension  -     amLODIPine (NORVASC) 5 MG tablet; Take 1 tablet by mouth daily  - Blood pressure is uncontrolled. - Will add amlodipine 5mg daily  - Patient will try stopping the metoprolol as she believes it is giving her a headache. Educational materials and/or home exercises printed for patient's review and were included in patient instructions on his/her After Visit Summary and given to patient at the end of visit. Counseled regarding above diagnosis, including possible risks and complications, especially if left uncontrolled or untreated. Advised to present to the Emergency Department if symptoms worsen. Counseled regarding the possible side effects, risks, benefits and alternatives to treatment; patient and/or guardian verbalizes understanding, agrees, feels comfortable with and wishes to proceed with above treatment plan.      Advised patient to call withany new medication issues, and read all Rx info from pharmacy to assure aware of all possible risks and side effects of any medication before taking. Patient verbalizes understanding and agrees with above counseling, assessment and plan. All questions answered.     Michelle Dear, DO

## 2022-08-09 ENCOUNTER — OFFICE VISIT (OUTPATIENT)
Dept: FAMILY MEDICINE CLINIC | Age: 62
End: 2022-08-09
Payer: MEDICAID

## 2022-08-09 VITALS
DIASTOLIC BLOOD PRESSURE: 90 MMHG | WEIGHT: 243 LBS | HEART RATE: 77 BPM | RESPIRATION RATE: 16 BRPM | TEMPERATURE: 97.5 F | HEIGHT: 65 IN | OXYGEN SATURATION: 95 % | BODY MASS INDEX: 40.48 KG/M2 | SYSTOLIC BLOOD PRESSURE: 160 MMHG

## 2022-08-09 DIAGNOSIS — B37.2 YEAST INFECTION OF THE SKIN: ICD-10-CM

## 2022-08-09 DIAGNOSIS — I10 PRIMARY HYPERTENSION: Primary | ICD-10-CM

## 2022-08-09 DIAGNOSIS — Z00.00 ENCOUNTER FOR WELL ADULT EXAM WITHOUT ABNORMAL FINDINGS: ICD-10-CM

## 2022-08-09 DIAGNOSIS — E66.01 CLASS 3 SEVERE OBESITY DUE TO EXCESS CALORIES WITH SERIOUS COMORBIDITY AND BODY MASS INDEX (BMI) OF 40.0 TO 44.9 IN ADULT (HCC): ICD-10-CM

## 2022-08-09 PROCEDURE — 99396 PREV VISIT EST AGE 40-64: CPT | Performed by: INTERNAL MEDICINE

## 2022-08-09 RX ORDER — AMLODIPINE BESYLATE 10 MG/1
10 TABLET ORAL DAILY
Qty: 30 TABLET | Refills: 5 | Status: SHIPPED | OUTPATIENT
Start: 2022-08-09

## 2022-08-09 RX ORDER — NYSTATIN 100000 U/G
CREAM TOPICAL
Qty: 30 G | Refills: 1 | Status: SHIPPED | OUTPATIENT
Start: 2022-08-09

## 2022-08-09 NOTE — PROGRESS NOTES
[facility]  [address] [address]  Dept: [phone]  Dept Fax: [phone]     NAME: Brayan Vásquez        :  1960        MRN:  [id number]    Chief Complaint   Patient presents with    Annual Exam    Hypertension     Follow up. Discuss Medications     She would like to discuss vitamins. Subjective     History of Present Illness  Brayan Vásquez is a 58 y.o. female who presents today for routine exam and above listed concerns. Review of Systems  Please see HPI above. All bolded are positive. Gen: fever, chills, fatigue, weakness, diaphoresis, unintentional weight change  Head: headache, vision change, hearing loss  Chest: chest pain/heaviness, palpitations  Lungs: shortness of breath, wheezing, coughing, hemoptysis  Abdomen: abdominal pain, nausea, vomiting, diarrhea, constipation, melena, hematochezia, hematemesis, loss of appetite  Extremities: lower extremity edema, myalgias, arthralgias  Urinary: dysuria, hematuria, weak flow, increase in frequency  Neurologic: lightheadedness, dizziness, confusion, syncope  Endocrine: polydipsia, polyuria, heat or cold intolerance  Psychiatric: depression, suicidal ideation, anxiety  Derm: Rashes, ulcers, burns    Past Medical Hx:  Past Medical History:   Diagnosis Date    COPD (chronic obstructive pulmonary disease) ([facility] [address]     Primary hypertension     Primary open angle glaucoma (POAG) of left eye, severe stage     Urinary incontinence     For or 22       Past Surgical Hx:  Past Surgical History:   Procedure Laterality Date     SECTION      x5    CYSTOSCOPY N/A 2022    CYSTOSCOPY BOTOX INJECTION 100 UNITS   (PHARMACY NOTIFIED) performed by Kenzie Osman DO at [facility]         Family Hx:  No family history on file.     Social Hx:  Social History     Tobacco Use    Smoking status: Former     Packs/day: 0.50     Years: 13.00     Pack years: 6.50 Types: Cigarettes     Quit date: 2022     Years since quittin.3    Smokeless tobacco: Never   Substance Use Topics    Alcohol use: No       Home Medications:  Current Outpatient Medications   Medication Sig Dispense Refill    amLODIPine (NORVASC) 10 MG tablet Take 1 tablet by mouth in the morning. 30 tablet 5    nystatin (MYCOSTATIN) 048364 UNIT/GM cream Apply topically 2 times daily. 30 g 1    Netarsudil Dimesylate (RHOPRESSA) 0.02 % SOLN Apply 1 drop to eye nightly      lidocaine (XYLOCAINE) 5 % ointment Apply topically as needed.  240 g 5    ipratropium-albuterol (DUONEB) 0.5-2.5 (3) MG/3ML SOLN nebulizer solution Inhale 3 mLs into the lungs every 4 hours as needed for Shortness of Breath 360 mL 0    fluticasone-umeclidin-vilant (TRELEGY ELLIPTA) 100-62.5-25 MCG/INH AEPB Inhale 1 puff into the lungs daily 1 each 5    Respiratory Therapy Supplies (NEBULIZER/TUBING/MOUTHPIECE) KIT 1 kit by Does not apply route daily as needed (as needed for wheezing / shortness of breath) 1 kit 2    dorzolamide (TRUSOPT) 2 % ophthalmic solution instill 1 drop into left eye twice a day      latanoprost (XALATAN) 0.005 % ophthalmic solution instill 1 drop into left eye at bedtime      pilocarpine (PILOCAR) 2 % ophthalmic solution instill 1 drop into left eye twice a day      aspirin 81 MG chewable tablet Take 1 tablet by mouth daily 30 tablet 3    atorvastatin (LIPITOR) 40 MG tablet Take 1 tablet by mouth nightly 30 tablet 3    Blood Pressure KIT 1 kit by Does not apply route daily 1 kit 0    Spacer/Aero-Holding Chambers ABHIJIT 1 Device by Does not apply route daily 1 Device 0    buPROPion (WELLBUTRIN XL) 150 MG extended release tablet Take 1 tablet by mouth every morning 30 tablet 3    brompheniramine-pseudoephedrine-DM (BROMFED DM) 2-30-10 MG/5ML syrup Take 5 mLs by mouth 4 times daily as needed for Cough (Patient not taking: Reported on 2022) 118 mL 0    albuterol sulfate HFA (PROVENTIL HFA) 108 (90 Base) MCG/ACT inhaler 11/18/2021     Lab Results   Component Value Date    LABA1C 6.3 (H) 03/30/2022     No results found for: INR, PROTIME   *All recent labs were reviewed. Please see electronic chart for a more comprehensive set of labs    Radiology:  No results found. Assessment and Plan     Patient is a 58 y.o. female who presented to the office for follow up. 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 Diagnoses and all orders for this visit:    Primary hypertension  -     amLODIPine (NORVASC) 10 MG tablet; Take 1 tablet by mouth in the morning. Yeast infection of the skin  -     nystatin (MYCOSTATIN) 166016 UNIT/GM cream; Apply topically 2 times daily. Class 3 severe obesity due to excess calories with serious comorbidity and body mass index (BMI) of 40.0 to 44.9 in [facility])    Encounter for well adult exam without abnormal findings    BP is uncontrolled. Norvasc increased. Educational materials and/or home exercises printed for patient's review and were included in patient instructions on his/her After Visit Summary and given to patient at the end of visit. Counseled regarding above diagnosis, including possible risks and complications, especially if left uncontrolled.      Counseled regarding the possible side effects, risks, benefits and alternatives to treatment; patient and/or guardian verbalizes understanding, agrees, feels comfortable with and wishes to proceed with above treatment plan. Advised patient to call Myron Haste new medication issues, and read all Rx info from pharmacy to assure aware of all possible risks and side effects of any medication before taking. Reviewed age and gender appropriate health screening exams and vaccinations. Advised patient regarding importance of keeping up with recommended health maintenance and to schedule as soon as possible if overdue, as this is important in assessing for undiagnosed pathology, especially cancer, as well as protecting against potentially harmful/life threatening disease. Patient verbalizes understanding and agrees with above counseling, assessment and plan. All questions answered.     Doron Cabezas DO

## 2022-08-09 NOTE — PATIENT INSTRUCTIONS
Recommend a Multivitamin daily        Well Visit, Women 48 to 72: Care Instructions  Overview     Well visits can help you stay healthy. Your doctor has checked your overall health and may have suggested ways to take good care of yourself. Your doctor also may have recommended tests. At home, you can help prevent illness withhealthy eating, regular exercise, and other steps. Follow-up care is a key part of your treatment and safety. Be sure to make and go to all appointments, and call your doctor if you are having problems. It's also a good idea to know your test results and keep alist of the medicines you take. How can you care for yourself at home? Get screening tests that you and your doctor decide on. Screening helps find diseases before any symptoms appear. Eat healthy foods. Choose fruits, vegetables, whole grains, protein, and low-fat dairy foods. Limit fat, especially saturated fat. Reduce salt in your diet. Limit alcohol. Have no more than 1 drink a day or 7 drinks a week. Get at least 30 minutes of exercise on most days of the week. Walking is a good choice. You also may want to do other activities, such as running, swimming, cycling, or playing tennis or team sports. Reach and stay at a healthy weight. This will lower your risk for many problems, such as obesity, diabetes, heart disease, and high blood pressure. Do not smoke. Smoking can make health problems worse. If you need help quitting, talk to your doctor about stop-smoking programs and medicines. These can increase your chances of quitting for good. Care for your mental health. It is easy to get weighed down by worry and stress. Learn strategies to manage stress, like deep breathing and mindfulness, and stay connected with your family and community. If you find you often feel sad or hopeless, talk with your doctor. Treatment can help. Talk to your doctor about whether you have any risk factors for sexually transmitted infections (STIs). You can help prevent STIs if you wait to have sex with a new partner (or partners) until you've each been tested for STIs. It also helps if you use condoms (male or female condoms) and if you limit your sex partners to one person who only has sex with you. Vaccines are available for some STIs. If you think you may have a problem with alcohol or drug use, talk to your doctor. This includes prescription medicines (such as amphetamines and opioids) and illegal drugs (such as cocaine and methamphetamine). Your doctor can help you figure out what type of treatment is best for you. Protect your skin from too much sun. When you're outdoors from 10 a.m. to 4 p.m., stay in the shade or cover up with clothing and a hat with a wide brim. Wear sunglasses that block UV rays. Even when it's cloudy, put broad-spectrum sunscreen (SPF 30 or higher) on any exposed skin. See a dentist one or two times a year for checkups and to have your teeth cleaned. Wear a seat belt in the car. When should you call for help? Watch closely for changes in your health, and be sure to contact your doctor if you have any problems or symptoms that concern you. Where can you learn more? Go to [email or web address] org and sign in to your [email or web address] account. Enter K200 in the [facility] box to learn more about \"Well Visit, Women 50 to 72: Care Instructions. \"     If you do not have an account, please click on the \"Sign Up Now\" link. Current as of: October 6, 2021               Content Version: 13.3  © 2336-1402 Healthwise, Incorporated. Care instructions adapted under license by [facility] ([facility]). If you have questions about a medical condition or this instruction, always ask your healthcare professional. [place] [address] any warranty or liability for your use of this information.

## 2022-08-10 ENCOUNTER — OFFICE VISIT (OUTPATIENT)
Dept: BARIATRICS/WEIGHT MGMT | Age: 62
End: 2022-08-10
Payer: MEDICAID

## 2022-08-10 VITALS
WEIGHT: 241.8 LBS | BODY MASS INDEX: 41.28 KG/M2 | DIASTOLIC BLOOD PRESSURE: 78 MMHG | HEIGHT: 64 IN | HEART RATE: 80 BPM | TEMPERATURE: 97.5 F | SYSTOLIC BLOOD PRESSURE: 163 MMHG

## 2022-08-10 DIAGNOSIS — I10 PRIMARY HYPERTENSION: Primary | ICD-10-CM

## 2022-08-10 DIAGNOSIS — F32.4 MAJOR DEPRESSIVE DISORDER WITH SINGLE EPISODE, IN PARTIAL REMISSION (HCC): ICD-10-CM

## 2022-08-10 DIAGNOSIS — E66.01 CLASS 3 SEVERE OBESITY DUE TO EXCESS CALORIES WITHOUT SERIOUS COMORBIDITY WITH BODY MASS INDEX (BMI) OF 40.0 TO 44.9 IN ADULT (HCC): ICD-10-CM

## 2022-08-10 PROCEDURE — 99205 OFFICE O/P NEW HI 60 MIN: CPT | Performed by: INTERNAL MEDICINE

## 2022-08-10 PROCEDURE — 99202 OFFICE O/P NEW SF 15 MIN: CPT

## 2022-08-10 RX ORDER — BUPROPION HYDROCHLORIDE 150 MG/1
150 TABLET ORAL EVERY MORNING
Qty: 30 TABLET | Refills: 3 | Status: SHIPPED | OUTPATIENT
Start: 2022-08-10

## 2022-08-10 NOTE — PROGRESS NOTES
CC -   HTN, weight gain    Would like to be at about 170 (was feeling great at that weight)    BACKGROUND -     First visit: 8/10/22    Obesity (all weight in lbs)  Began years ago, but worse 5 months ago after quitting smoking  Initial BMI 41.50, Wt 241.8, Ht 64\"  HS Grad wt 130   Lowest   wt 125 (16-18 yrs of age)   Highest  wt 241.8  Pattern of wt gain: gradual  Wt change past yr: 50 lbs  Most wt lost: 30 lbs  Other diets attempted: went to diet doctor, had shots in her legs (fat burner injection ?, b12 etc, pills with blue spec), increased water intake, changed diet, had appetite suppressant. Desire to lose weight: 10/10 (would like to work on weight without surgery)    Initial Diet:    Number of meals per day - 3    Number of snacks per day - 4-6    Meal volume - 12\" plate,  no seconds    Fast food/convenience store - 2-3x/week    Restaurants (not fast food) - 1-2x/week   Sweets - 7d/week   Chips - 3-4d/week (cheese puffs)   Crackers/pretzels - 1-2d/week   Nuts - 0-1d/week   Peanut Butter - 1-2d/week   Popcorn - 0d/week   Dried fruit - 0-1d/week (raisin in salad)   Whole fruit - 3-4d/week   Breakfast cereal - 0-1d/week (decreased in the last month)   Granola/Protein/Energy bar - 0d/week   Sugar sweetened beverages - slowed down on pop. Peach tea (100 Rian/bottle) 400 Rian/day. Coffee - vannilla powder cream, not everyday. Occ fruit juice - apple, cranberry. Pure leaf iced tea. No energy drink. Protein - No supplements   Fiber - No supplements    Wt effect of HR foods = 2100 Rian/wk = 300 Rian/d= 16% DEN = 31 lb/year. Initial Exercise:    Gym membership - no.  Has gym in her house (bike, up and down stepper)    Walking - no    Running - no    Resistance - no    Aerobic class - no        Initial Sleep: Bedtime: 9 pm currently, wake up time: 3 am (if she ent to bed at 9), daytime naps: sometimes    Weight scale at home: no, takes weight: only at Dr office  Food scale: yes  ______________________    [facility] Medical History:   Diagnosis Date    COPD (chronic obstructive pulmonary disease) ([facility] [address]     Primary hypertension     Primary open angle glaucoma (POAG) of left eye, severe stage     Urinary incontinence     For or 22     Past Surgical History:   Procedure Laterality Date     SECTION      x5    CYSTOSCOPY N/A 2022    CYSTOSCOPY BOTOX INJECTION 100 UNITS   (PHARMACY NOTIFIED) performed by Diane Osman DO at [address] [facility]       Prior to Admission medications    Medication Sig Start Date End Date Taking? Authorizing Provider   amLODIPine (NORVASC) 10 MG tablet Take 1 tablet by mouth in the morning. 22   Melvin Roth, DO   nystatin (MYCOSTATIN) 693864 UNIT/GM cream Apply topically 2 times daily. 22   Melvin Roth, DO   Netarsudil Dimesylate (RHOPRESSA) 0.02 % SOLN Apply 1 drop to eye nightly    Historical Provider, MD   brompheniramine-pseudoephedrine-DM (BROMFED DM) 2-30-10 MG/5ML syrup Take 5 mLs by mouth 4 times daily as needed for Cough  Patient not taking: Reported on 2022   Melvin Roth, DO   lidocaine (XYLOCAINE) 5 % ointment Apply topically as needed.  22   Melvin Roth, DO   ipratropium-albuterol (DUONEB) 0.5-2.5 (3) MG/3ML SOLN nebulizer solution Inhale 3 mLs into the lungs every 4 hours as needed for Shortness of Breath 3/31/22   [facility], DO   fluticasone-umeclidin-vilant (TRELEGY ELLIPTA) 100-62.5-25 MCG/INH AEPB Inhale 1 puff into the lungs daily 3/31/22   MATT Boyce - CNP   Respiratory Therapy Supplies (NEBULIZER/TUBING/MOUTHPIECE) KIT 1 kit by Does not apply route daily as needed (as needed for wheezing / shortness of breath) 3/28/22   Melvin Roth, DO   dorzolamide (TRUSOPT) 2 % ophthalmic solution instill 1 drop into left eye twice a day 3/5/22   Historical Provider, MD   latanoprost (XALATAN) 0.005 % ophthalmic solution instill 1 drop into left eye at bedtime 22   Historical Provider, MD pilocarpine (PILOCAR) 2 % ophthalmic solution instill 1 drop into left eye twice a day 2/16/22   Historical Provider, MD   aspirin 81 MG chewable tablet Take 1 tablet by mouth daily 2/1/22   Tera Marino DO   atorvastatin (LIPITOR) 40 MG tablet Take 1 tablet by mouth nightly 2/1/22   Tera Marino DO   Blood Pressure KIT 1 kit by Does not apply route daily 11/11/21   Tera Marino DO   albuterol sulfate HFA (PROVENTIL HFA) 108 (90 Base) MCG/ACT inhaler Inhale 2 puffs into the lungs every 4 hours as needed for Wheezing 7/8/21 7/14/22  Tera Marino DO   Spacer/Aero-Holding Chambers ABHIJIT 1 Device by Does not apply route daily 7/5/21   MATT Bennett - CNP   Elderberry supplement, zinc daily. Family history: DM: no, Heart disease: no.    Allergies: No Known Allergies    Social history: smoking: quit 5 months ago ; Alcohol: rarely (had one on her birthday). ROS -  Card - no CP  GI - no N/V/D/C    PE -  Gen : BP (!) 163/78 (Site: Left Upper Arm, Position: Sitting, Cuff Size: Large Adult)   Pulse 80   Temp 97.5 °F (36.4 °C) (Temporal)   Ht 5' 4\" (1.626 m)   Wt 241 lb 12.8 oz (109.7 kg)   BMI 41.50 kg/m²   Repeat BP was 160/81, HR 74. WN, WD, NAD  Lung: Nml resp effort, CTA B/L  Heart:  RRR w/o MGR, no LE pitting edema b/l  Psych: Normal mood   Full affect  Neuro: Moves all ext well  ______________________    HISTORY & ASSESSMENT/PLAN -     Problem 1 - Hypertension   HPI   - BP was high today even on repeat, she is on Amlodipine 10 which she had not taken this am, and she took it in office. She is asymptomatic otherwise. Assessment  - uncontrolled  Plan   - Continue medication. Weight reduction can help.  Weight reduction per plan below    Problem 2  - Obesity   HPI   - See above Background for description    Weight  Date    241.8  8/10/22    Patient's estimated daily energy need (DEN) = 1861 Rian/d = 25012 Rian/wk    Assessment  - Uncontrolled    Plan   - Talked about various options. She would like to try VLCD. Would like an appetite suppressant, and after talking about options and side effects, opted for Wellbutrin - need to watch BP closely at home, she has BP machine and will watch BP per plan. Planned as follows:  Patient Instructions     Breakfast -     one high protein shake                            + 20 grams of fiber. Do this by either eating 12 tablespoons of the original, plain Fiber One cereal every day or 4 tablespoons of wheat dextrin powder (Benefiber or a generic brand) every day. Work up to this amount slowly by starting with only one-eighth to one-fourth of the target amount and then adding another one-eighth to one-fourth every one or two weeks until reaching the target. Lunch -           one high protein shake                           + one a fat snack item     Dinner -          one frozen meal                           + one snack item     Shake options (<200 aidee, >25 grams/protein) :  Nectar, Pure Protein, Premier, Boost Max, Ensure Max, BeneProtein and [employer] (which is lactose-free) are milk-based options; Nectar, Premier Protein Clear, IsoPure Protein Drink, and Protein 2 O are water-based options; (Premier Protein Clear, the water-based option, comes in a 20 oz bottle with 20 grams of protein and 90 calories. So you have to drink three each day which increases the cost.)  (Disclaimer: Dietary supplements rarely have their listed ingredients and the amount of each verified by a third party other. Sometimes they give verification for their claims to be GMO and gluten free and to be organic.  However, even such verifications as these may still be untrustworthy.)     Fat snack options (<150 aidee, >11 grams of fat): 22 almonds, 1 1/2 tablespoon of a oil-based dressing or 4 tablespoons of [place] dressing on a bed of salad greens, 1 1/2 tablespoons of peanut butter, 1 Cranberry Lewisberry [employer] options (<100 aidee, no sweets): fruit, low fat/high protein [place] yogurt, mozzarella cheese stick, nuts, salad with dressing, peanut butter, chips/crackers/pretzels     Frozen meal options (<350 aidee):  Weight Watchers Smart Ones, [employer] Cuisine, Healthy Choice, Codi's, Josefa's     Food substitutes for the shakes:  4 oz of baked, grilled or broiled chicken, turkey or fish, 10 egg whites     Take a multivitamin daily     Walk 30 min every day. Wellbutrin:  Take Bupropion XL (Wellbutrin XL) 150 mg, one tablet every morning; if appetite suppression is insufficient after one week, then stop it  While taking it, check the BP twice each day for one week. If the systolic BP is >493 mmHg or the diastolic BP is >67 mmHg consistently, then stop taking it. Follow up in 1 month. Problem 3  - Depression   HPI   - h/o son passing away and has been suffering with depression  Assessment  - seems to be uncontrolled  Plan   - she states she is ok, has not seen a psychiatrist. Wellbutrin may help. Medications ordered in this encounter:  Orders Placed This Encounter   Medications    buPROPion (WELLBUTRIN XL) 150 MG extended release tablet     Sig: Take 1 tablet by mouth every morning     Dispense:  30 tablet     Refill:  3          Other orders placed in this encounter:  No orders of the defined types were placed in this encounter. Total time spent on encounter: 64 min. Preston Mack MD  Internal Medicine/Obesity Medicine  8/10/2022.

## 2022-08-11 ENCOUNTER — CLINICAL DOCUMENTATION (OUTPATIENT)
Dept: NUTRITION | Age: 62
End: 2022-08-11

## 2022-08-11 NOTE — PROGRESS NOTES
Patient No Show      Scheduled Date: 8/11/2022  Patient: Ngoc Dugan  Referring Clinician: Melvin Roth DO  Fax: [phone]      Dear Melvin Roth DO,    Thank you for referring Harjit Coelho to [place] [address] for outpatient nutrition counseling. Maria Elena Dowd did not keep scheduled follow-up appointment on 8/11/2022. If I can be of further assistance with this patient, please contact me.      Thank you,    Kay Sadler MS, RD, LD  Outpatient Dietitian   Phone: [phone]  Fax: [phone]

## 2022-08-28 ENCOUNTER — HOSPITAL ENCOUNTER (EMERGENCY)
Age: 62
Discharge: HOME | End: 2022-08-28
Payer: MEDICAID

## 2022-08-28 VITALS
SYSTOLIC BLOOD PRESSURE: 187 MMHG | OXYGEN SATURATION: 99 % | RESPIRATION RATE: 17 BRPM | DIASTOLIC BLOOD PRESSURE: 90 MMHG | TEMPERATURE: 98.6 F | HEART RATE: 84 BPM

## 2022-08-28 DIAGNOSIS — N30.01 ACUTE CYSTITIS WITH HEMATURIA: Primary | ICD-10-CM

## 2022-08-28 LAB
BACTERIA: ABNORMAL /HPF
BILIRUBIN URINE: NEGATIVE
BLOOD, URINE: ABNORMAL
CLARITY: ABNORMAL
COLOR: YELLOW
GLUCOSE URINE: NEGATIVE MG/DL
KETONES, URINE: NEGATIVE MG/DL
LEUKOCYTE ESTERASE, URINE: ABNORMAL
NITRITE, URINE: POSITIVE
PH UA: 6 (ref 5–9)
PROTEIN UA: 100 MG/DL
RBC UA: ABNORMAL /HPF (ref 0–2)
SPECIFIC GRAVITY UA: >=1.03 (ref 1–1.03)
UROBILINOGEN, URINE: 0.2 E.U./DL
WBC UA: >20 /HPF (ref 0–5)

## 2022-08-28 PROCEDURE — 81001 URINALYSIS AUTO W/SCOPE: CPT

## 2022-08-28 PROCEDURE — 87077 CULTURE AEROBIC IDENTIFY: CPT

## 2022-08-28 PROCEDURE — 99283 EMERGENCY DEPT VISIT LOW MDM: CPT

## 2022-08-28 PROCEDURE — 87186 SC STD MICRODIL/AGAR DIL: CPT

## 2022-08-28 PROCEDURE — 87088 URINE BACTERIA CULTURE: CPT

## 2022-08-28 RX ORDER — NITROFURANTOIN 25; 75 MG/1; MG/1
100 CAPSULE ORAL 2 TIMES DAILY
Qty: 14 CAPSULE | Refills: 0 | Status: SHIPPED | OUTPATIENT
Start: 2022-08-28 | End: 2022-09-04

## 2022-08-28 NOTE — ED PROVIDER NOTES
[address]  Department of Emergency Medicine   ED  Encounter Note  Admit Date/RoomTime: 2022  9:44 AM  ED Room: [facility]    NAME: Evin Lockhart  : 1960  MRN: [id number]     Chief Complaint:  Dysuria (Discomfort and pressure with urination over this past week, denies N/V/D or fever)    History of Present Illness       Evin Lockhart is a 58 y.o. old female who presents to the emergency department by private vehicle, for dysuria, which occured 1 week(s) prior to arrival.  Since onset the symptoms have been stable and mild-moderate in severity. Symptoms are associated with urinary frequency, urgency and burning at the end of stream.  Maria Elena Dowd has a history of urinary tract infections. Last Menstrual  Cycle or OB history not available or N/A. She states she is not sexually active. She denies any abnormal vaginal bleeding or discharge. ROS   Pertinent positives and negatives are stated within HPI, all other systems reviewed and are negative. Past Medical History:  has a past medical history of COPD (chronic obstructive pulmonary disease) ([address] Primary hypertension, Primary open angle glaucoma (POAG) of left eye, severe stage, and Urinary incontinence. Surgical History:  has a past surgical history that includes  section; Tonsillectomy; and Cystoscopy (N/A, 2022). Social History:  reports that she quit smoking about 4 months ago. Her smoking use included cigarettes. She has a 6.50 pack-year smoking history. She has never used smokeless tobacco. She reports that she does not drink alcohol and does not use drugs. Family History: family history is not on file. Allergies: Patient has no known allergies.     Physical Exam   Oxygen Saturation Interpretation: Normal.        ED Triage Vitals   BP Temp Temp Source Heart Rate Resp SpO2 Height Weight   22 0941 22 1107 22 1107 22 3365 22 0941 22 0938 -- -- (!) 183/107 98.6 °F (37 °C) Oral 73 17 97 %           Constitutional:  Alert, development consistent with age. HEENT:  NC/NT. Airway patent. Neck:  Normal ROM. Supple. Respiratory:  Lungs Clear to auscultation and breath sounds equal.  CV:  Regular rate and rhythm, normal heart sounds, without pathological murmurs, ectopy, gallops, or rubs. GI:  normal appearing, non-distended with no visible hernias. Bowel sounds: normal bowel sounds. Tenderness: No abdominal tenderness, guarding, rebound, rigidity or pulsatile mass. .        Liver: non-tender. Spleen:  non-tender. : /Pelvic examination deferred / declined. Back: CVA Tenderness: No CVA tenderness. Integument:  Normal turgor. Warm, dry, without visible rash, unless noted elsewhere. Lymphatics: No lymphangitis or adenopathy noted. Neurological:  Oriented. Motor functions intact. Lab / Imaging Results   (All laboratory and radiology results have been personally reviewed by myself)  Labs:  Results for orders placed or performed during the hospital encounter of 08/28/22   Urinalysis with Microscopic   Result Value Ref Range    Color, UA Yellow Straw/Yellow    Clarity, UA TURBID (A) Clear    Glucose, Ur Negative Negative mg/dL    Bilirubin Urine Negative Negative    Ketones, Urine Negative Negative mg/dL    Specific Gravity, UA >=1.030 1.005 - 1.030    Blood, Urine SMALL (A) Negative    pH, UA 6.0 5.0 - 9.0    Protein,  (A) Negative mg/dL    Urobilinogen, Urine 0.2 <2.0 E.U./dL    Nitrite, Urine POSITIVE (A) Negative    Leukocyte Esterase, Urine LARGE (A) Negative    WBC, UA >20 (A) 0 - 5 /HPF    RBC, UA 1-3 0 - 2 /HPF    Bacteria, UA MODERATE (A) None Seen /HPF       Imaging: All Radiology results interpreted by Radiologist unless otherwise noted.   No orders to display     ED Course / Medical Decision Making   Medications - No data to display         Consults:   None    Procedures:   None    Medical Decision Making:    Patient is well appearing, non toxic and appropriate for outpatient management. Afebrile, no flank or abdominal pain. UA positive for nitrates, leukocytes and greater than 20 WBCs. Plan will be to initiate Macrobid, urine culture added and pending. Patient to follow-up with PCP or urologist patient was educated on signs symptoms require emergent re-evaluation. Plan of Care/Counseling:  MATT Logan CNP reviewed today's visit with the patient in addition to providing specific details for the plan of care and counseling regarding the diagnosis and prognosis. Questions are answered at this time and are agreeable with the plan. Assessment      1. Acute cystitis with hematuria      Plan   Disposition:   Discharged home. Patient condition is good    New Medications     Discharge Medication List as of 8/28/2022 11:01 AM        START taking these medications    Details   nitrofurantoin, macrocrystal-monohydrate, (MACROBID) 100 MG capsule Take 1 capsule by mouth 2 times daily for 7 days, Disp-14 capsule, R-0Print           Electronically signed by MATT Logan CNP   DD: 8/28/22  **This report was transcribed using voice recognition software. Every effort was made to ensure accuracy; however, inadvertent computerized transcription errors may be present.   END OF ED PROVIDER NOTE      MATT Lomeli CNP  08/28/22 [id number]

## 2022-08-30 LAB
ORGANISM: ABNORMAL
URINE CULTURE, ROUTINE: ABNORMAL

## 2022-09-05 ENCOUNTER — HOSPITAL ENCOUNTER (EMERGENCY)
Age: 62
Discharge: HOME | End: 2022-09-05
Attending: EMERGENCY MEDICINE
Payer: MEDICAID

## 2022-09-05 ENCOUNTER — APPOINTMENT (OUTPATIENT)
Dept: CT IMAGING | Age: 62
End: 2022-09-05
Payer: MEDICAID

## 2022-09-05 ENCOUNTER — APPOINTMENT (OUTPATIENT)
Dept: GENERAL RADIOLOGY | Age: 62
End: 2022-09-05
Payer: MEDICAID

## 2022-09-05 VITALS
WEIGHT: 243 LBS | RESPIRATION RATE: 18 BRPM | BODY MASS INDEX: 41.71 KG/M2 | TEMPERATURE: 98.3 F | DIASTOLIC BLOOD PRESSURE: 94 MMHG | SYSTOLIC BLOOD PRESSURE: 165 MMHG | OXYGEN SATURATION: 96 % | HEART RATE: 74 BPM

## 2022-09-05 DIAGNOSIS — I10 PRIMARY HYPERTENSION: Primary | ICD-10-CM

## 2022-09-05 DIAGNOSIS — G89.21 CHRONIC PAIN DUE TO TRAUMA: ICD-10-CM

## 2022-09-05 DIAGNOSIS — Z91.14 NON COMPLIANCE W MEDICATION REGIMEN: ICD-10-CM

## 2022-09-05 LAB
ALBUMIN SERPL-MCNC: 3.6 G/DL (ref 3.5–5.2)
ALP BLD-CCNC: 76 U/L (ref 35–104)
ALT SERPL-CCNC: 15 U/L (ref 0–32)
ANION GAP SERPL CALCULATED.3IONS-SCNC: 9 MMOL/L (ref 7–16)
AST SERPL-CCNC: 21 U/L (ref 0–31)
BASOPHILS ABSOLUTE: 0.05 E9/L (ref 0–0.2)
BASOPHILS RELATIVE PERCENT: 1 % (ref 0–2)
BILIRUB SERPL-MCNC: 0.4 MG/DL (ref 0–1.2)
BUN BLDV-MCNC: 12 MG/DL (ref 6–23)
CALCIUM SERPL-MCNC: 8.7 MG/DL (ref 8.6–10.2)
CHLORIDE BLD-SCNC: 106 MMOL/L (ref 98–107)
CO2: 26 MMOL/L (ref 22–29)
CREAT SERPL-MCNC: 0.8 MG/DL (ref 0.5–1)
EOSINOPHILS ABSOLUTE: 0.29 E9/L (ref 0.05–0.5)
EOSINOPHILS RELATIVE PERCENT: 5.7 % (ref 0–6)
GFR AFRICAN AMERICAN: >60
GFR NON-AFRICAN AMERICAN: >60 ML/MIN/1.73
GLUCOSE BLD-MCNC: 153 MG/DL (ref 74–99)
HCT VFR BLD CALC: 42.6 % (ref 34–48)
HEMOGLOBIN: 13.3 G/DL (ref 11.5–15.5)
IMMATURE GRANULOCYTES #: 0.02 E9/L
IMMATURE GRANULOCYTES %: 0.4 % (ref 0–5)
LACTIC ACID: 2.2 MMOL/L (ref 0.5–2.2)
LYMPHOCYTES ABSOLUTE: 1.87 E9/L (ref 1.5–4)
LYMPHOCYTES RELATIVE PERCENT: 36.8 % (ref 20–42)
MCH RBC QN AUTO: 24.4 PG (ref 26–35)
MCHC RBC AUTO-ENTMCNC: 31.2 % (ref 32–34.5)
MCV RBC AUTO: 78.3 FL (ref 80–99.9)
MONOCYTES ABSOLUTE: 0.31 E9/L (ref 0.1–0.95)
MONOCYTES RELATIVE PERCENT: 6.1 % (ref 2–12)
NEUTROPHILS ABSOLUTE: 2.54 E9/L (ref 1.8–7.3)
NEUTROPHILS RELATIVE PERCENT: 50 % (ref 43–80)
PDW BLD-RTO: 16.4 FL (ref 11.5–15)
PLATELET # BLD: 245 E9/L (ref 130–450)
PMV BLD AUTO: 10.3 FL (ref 7–12)
POTASSIUM REFLEX MAGNESIUM: 4.1 MMOL/L (ref 3.5–5)
PRO-BNP: 42 PG/ML (ref 0–125)
RBC # BLD: 5.44 E12/L (ref 3.5–5.5)
SODIUM BLD-SCNC: 141 MMOL/L (ref 132–146)
TOTAL PROTEIN: 7.2 G/DL (ref 6.4–8.3)
TROPONIN, HIGH SENSITIVITY: <6 NG/L (ref 0–9)
WBC # BLD: 5.1 E9/L (ref 4.5–11.5)

## 2022-09-05 PROCEDURE — 71045 X-RAY EXAM CHEST 1 VIEW: CPT

## 2022-09-05 PROCEDURE — 83605 ASSAY OF LACTIC ACID: CPT

## 2022-09-05 PROCEDURE — 96374 THER/PROPH/DIAG INJ IV PUSH: CPT

## 2022-09-05 PROCEDURE — 6370000000 HC RX 637 (ALT 250 FOR IP): Performed by: STUDENT IN AN ORGANIZED HEALTH CARE EDUCATION/TRAINING PROGRAM

## 2022-09-05 PROCEDURE — 6370000000 HC RX 637 (ALT 250 FOR IP): Performed by: EMERGENCY MEDICINE

## 2022-09-05 PROCEDURE — 80053 COMPREHEN METABOLIC PANEL: CPT

## 2022-09-05 PROCEDURE — 70450 CT HEAD/BRAIN W/O DYE: CPT

## 2022-09-05 PROCEDURE — 84484 ASSAY OF TROPONIN QUANT: CPT

## 2022-09-05 PROCEDURE — 93005 ELECTROCARDIOGRAM TRACING: CPT | Performed by: STUDENT IN AN ORGANIZED HEALTH CARE EDUCATION/TRAINING PROGRAM

## 2022-09-05 PROCEDURE — 99285 EMERGENCY DEPT VISIT HI MDM: CPT

## 2022-09-05 PROCEDURE — 83880 ASSAY OF NATRIURETIC PEPTIDE: CPT

## 2022-09-05 PROCEDURE — 6360000002 HC RX W HCPCS: Performed by: EMERGENCY MEDICINE

## 2022-09-05 PROCEDURE — 85025 COMPLETE CBC W/AUTO DIFF WBC: CPT

## 2022-09-05 RX ORDER — KETOROLAC TROMETHAMINE 30 MG/ML
15 INJECTION, SOLUTION INTRAMUSCULAR; INTRAVENOUS ONCE
Status: COMPLETED | OUTPATIENT
Start: 2022-09-05 | End: 2022-09-05

## 2022-09-05 RX ORDER — AMLODIPINE BESYLATE 5 MG/1
10 TABLET ORAL ONCE
Status: COMPLETED | OUTPATIENT
Start: 2022-09-05 | End: 2022-09-05

## 2022-09-05 RX ORDER — HYDROCODONE BITARTRATE AND ACETAMINOPHEN 5; 325 MG/1; MG/1
1 TABLET ORAL ONCE
Status: COMPLETED | OUTPATIENT
Start: 2022-09-05 | End: 2022-09-05

## 2022-09-05 RX ORDER — HYDROCODONE BITARTRATE AND ACETAMINOPHEN 5; 325 MG/1; MG/1
1 TABLET ORAL EVERY 6 HOURS PRN
Qty: 4 TABLET | Refills: 0 | Status: SHIPPED | OUTPATIENT
Start: 2022-09-05 | End: 2022-09-08

## 2022-09-05 RX ADMIN — AMLODIPINE BESYLATE 10 MG: 5 TABLET ORAL at 10:52

## 2022-09-05 RX ADMIN — HYDROCODONE BITARTRATE AND ACETAMINOPHEN 1 TABLET: 5; 325 TABLET ORAL at 14:28

## 2022-09-05 RX ADMIN — KETOROLAC TROMETHAMINE 15 MG: 30 INJECTION, SOLUTION INTRAMUSCULAR; INTRAVENOUS at 14:28

## 2022-09-05 ASSESSMENT — PAIN DESCRIPTION - LOCATION: LOCATION: HEAD

## 2022-09-05 ASSESSMENT — ENCOUNTER SYMPTOMS
COUGH: 0
RHINORRHEA: 0
NAUSEA: 0
ABDOMINAL PAIN: 0
DIARRHEA: 0
VOMITING: 0
SHORTNESS OF BREATH: 0
PHOTOPHOBIA: 0

## 2022-09-05 ASSESSMENT — PAIN SCALES - GENERAL
PAINLEVEL_OUTOF10: 9
PAINLEVEL_OUTOF10: 10

## 2022-09-05 ASSESSMENT — PAIN - FUNCTIONAL ASSESSMENT: PAIN_FUNCTIONAL_ASSESSMENT: 0-10

## 2022-09-05 NOTE — DISCHARGE INSTRUCTIONS
Continue take your medications as prescribed including metoprolol and amlodipine    Follow-up with your primary care doctor tomorrow for a visit    Return to the emergency department for new or worsening symptoms
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
## 2022-09-06 LAB
EKG ATRIAL RATE: 68 BPM
EKG P AXIS: -4 DEGREES
EKG P-R INTERVAL: 162 MS
EKG Q-T INTERVAL: 420 MS
EKG QRS DURATION: 82 MS
EKG QTC CALCULATION (BAZETT): 446 MS
EKG R AXIS: 89 DEGREES
EKG T AXIS: 21 DEGREES
EKG VENTRICULAR RATE: 68 BPM

## 2022-09-08 ENCOUNTER — APPOINTMENT (OUTPATIENT)
Dept: CT IMAGING | Age: 62
End: 2022-09-08
Payer: MEDICAID

## 2022-09-08 ENCOUNTER — APPOINTMENT (OUTPATIENT)
Dept: GENERAL RADIOLOGY | Age: 62
End: 2022-09-08
Payer: MEDICAID

## 2022-09-08 ENCOUNTER — HOSPITAL ENCOUNTER (EMERGENCY)
Age: 62
Discharge: HOME | End: 2022-09-09
Attending: EMERGENCY MEDICINE
Payer: MEDICAID

## 2022-09-08 VITALS
OXYGEN SATURATION: 97 % | HEIGHT: 65 IN | SYSTOLIC BLOOD PRESSURE: 157 MMHG | RESPIRATION RATE: 16 BRPM | WEIGHT: 240 LBS | DIASTOLIC BLOOD PRESSURE: 85 MMHG | TEMPERATURE: 97.7 F | BODY MASS INDEX: 39.99 KG/M2 | HEART RATE: 81 BPM

## 2022-09-08 DIAGNOSIS — R51.9 HEADACHE, UNSPECIFIED HEADACHE TYPE: ICD-10-CM

## 2022-09-08 DIAGNOSIS — I10 HYPERTENSION, UNSPECIFIED TYPE: Primary | ICD-10-CM

## 2022-09-08 LAB
ALBUMIN SERPL-MCNC: 4.3 G/DL (ref 3.5–5.2)
ALP BLD-CCNC: 84 U/L (ref 35–104)
ALT SERPL-CCNC: 16 U/L (ref 0–32)
ANION GAP SERPL CALCULATED.3IONS-SCNC: 12 MMOL/L (ref 7–16)
AST SERPL-CCNC: 18 U/L (ref 0–31)
BASOPHILS ABSOLUTE: 0.05 E9/L (ref 0–0.2)
BASOPHILS RELATIVE PERCENT: 0.8 % (ref 0–2)
BILIRUB SERPL-MCNC: 0.3 MG/DL (ref 0–1.2)
BUN BLDV-MCNC: 14 MG/DL (ref 6–23)
CALCIUM SERPL-MCNC: 9.5 MG/DL (ref 8.6–10.2)
CHLORIDE BLD-SCNC: 104 MMOL/L (ref 98–107)
CO2: 25 MMOL/L (ref 22–29)
CREAT SERPL-MCNC: 0.8 MG/DL (ref 0.5–1)
EOSINOPHILS ABSOLUTE: 0.31 E9/L (ref 0.05–0.5)
EOSINOPHILS RELATIVE PERCENT: 5.1 % (ref 0–6)
GFR AFRICAN AMERICAN: >60
GFR NON-AFRICAN AMERICAN: >60 ML/MIN/1.73
GLUCOSE BLD-MCNC: 105 MG/DL (ref 74–99)
HCT VFR BLD CALC: 45.4 % (ref 34–48)
HEMOGLOBIN: 14.3 G/DL (ref 11.5–15.5)
IMMATURE GRANULOCYTES #: 0 E9/L
IMMATURE GRANULOCYTES %: 0 % (ref 0–5)
LYMPHOCYTES ABSOLUTE: 2.2 E9/L (ref 1.5–4)
LYMPHOCYTES RELATIVE PERCENT: 35.9 % (ref 20–42)
MAGNESIUM: 1.8 MG/DL (ref 1.6–2.6)
MCH RBC QN AUTO: 24.2 PG (ref 26–35)
MCHC RBC AUTO-ENTMCNC: 31.5 % (ref 32–34.5)
MCV RBC AUTO: 76.7 FL (ref 80–99.9)
MONOCYTES ABSOLUTE: 0.51 E9/L (ref 0.1–0.95)
MONOCYTES RELATIVE PERCENT: 8.3 % (ref 2–12)
NEUTROPHILS ABSOLUTE: 3.05 E9/L (ref 1.8–7.3)
NEUTROPHILS RELATIVE PERCENT: 49.9 % (ref 43–80)
PDW BLD-RTO: 16.5 FL (ref 11.5–15)
PLATELET # BLD: 330 E9/L (ref 130–450)
PMV BLD AUTO: 10.8 FL (ref 7–12)
POTASSIUM SERPL-SCNC: 3.9 MMOL/L (ref 3.5–5)
RBC # BLD: 5.92 E12/L (ref 3.5–5.5)
SODIUM BLD-SCNC: 141 MMOL/L (ref 132–146)
TOTAL PROTEIN: 8.1 G/DL (ref 6.4–8.3)
TROPONIN, HIGH SENSITIVITY: <6 NG/L (ref 0–9)
WBC # BLD: 6.1 E9/L (ref 4.5–11.5)

## 2022-09-08 PROCEDURE — 84484 ASSAY OF TROPONIN QUANT: CPT

## 2022-09-08 PROCEDURE — 70450 CT HEAD/BRAIN W/O DYE: CPT

## 2022-09-08 PROCEDURE — 85025 COMPLETE CBC W/AUTO DIFF WBC: CPT

## 2022-09-08 PROCEDURE — 99285 EMERGENCY DEPT VISIT HI MDM: CPT

## 2022-09-08 PROCEDURE — 6360000002 HC RX W HCPCS: Performed by: EMERGENCY MEDICINE

## 2022-09-08 PROCEDURE — 96375 TX/PRO/DX INJ NEW DRUG ADDON: CPT

## 2022-09-08 PROCEDURE — 80053 COMPREHEN METABOLIC PANEL: CPT

## 2022-09-08 PROCEDURE — 71045 X-RAY EXAM CHEST 1 VIEW: CPT

## 2022-09-08 PROCEDURE — 96374 THER/PROPH/DIAG INJ IV PUSH: CPT

## 2022-09-08 PROCEDURE — 93005 ELECTROCARDIOGRAM TRACING: CPT | Performed by: NURSE PRACTITIONER

## 2022-09-08 PROCEDURE — 83735 ASSAY OF MAGNESIUM: CPT

## 2022-09-08 RX ORDER — HYDRALAZINE HYDROCHLORIDE 20 MG/ML
10 INJECTION INTRAMUSCULAR; INTRAVENOUS ONCE
Status: COMPLETED | OUTPATIENT
Start: 2022-09-08 | End: 2022-09-08

## 2022-09-08 RX ORDER — METOCLOPRAMIDE HYDROCHLORIDE 5 MG/ML
10 INJECTION INTRAMUSCULAR; INTRAVENOUS ONCE
Status: COMPLETED | OUTPATIENT
Start: 2022-09-08 | End: 2022-09-08

## 2022-09-08 RX ORDER — DIPHENHYDRAMINE HYDROCHLORIDE 50 MG/ML
25 INJECTION INTRAMUSCULAR; INTRAVENOUS ONCE
Status: COMPLETED | OUTPATIENT
Start: 2022-09-08 | End: 2022-09-08

## 2022-09-08 RX ADMIN — METOCLOPRAMIDE HYDROCHLORIDE 10 MG: 5 INJECTION INTRAMUSCULAR; INTRAVENOUS at 23:10

## 2022-09-08 RX ADMIN — DIPHENHYDRAMINE HYDROCHLORIDE 25 MG: 50 INJECTION, SOLUTION INTRAMUSCULAR; INTRAVENOUS at 23:10

## 2022-09-08 RX ADMIN — HYDRALAZINE HYDROCHLORIDE 10 MG: 20 INJECTION INTRAMUSCULAR; INTRAVENOUS at 21:16

## 2022-09-08 ASSESSMENT — PAIN DESCRIPTION - LOCATION: LOCATION: HEAD

## 2022-09-08 ASSESSMENT — PAIN SCALES - GENERAL: PAINLEVEL_OUTOF10: 9

## 2022-09-08 NOTE — ED NOTES
Department of Emergency Medicine  FIRST PROVIDER TRIAGE NOTE             Independent MLP           9/8/22  6:28 PM EDT    Date of Encounter: 9/8/22   MRN: [id number]      HPI: Nupur Benoit is a 58 y.o. female who presents to the ED for Hypertension (Pt stated that she was recently here for the same issue. Pt stated that she has a headache and has been taking her meds as prescribed. )     Patient was just here September 5 also with hypertension, still with elevated BP.reports we havent gotten her BP down yet and is upset  ROS: Negative for cp or sob. PE: Gen Appearance/Constitutional: alert  HEENT: NC/NT. PERRLA,  Airway patent. Initial Plan of Care: All treatment areas with department are currently occupied. Plan to order/Initiate the following while awaiting opening in ED: labs, EKG, and imaging studies.   Initiate Treatment-Testing, Proceed toTreatment Area When Bed Available for ED Attending/MLP to Continue Care    Electronically signed by MATT Hatch CNP   DD: 9/8/22       MATT Hatch CNP  09/08/22 [id number]

## 2022-09-09 LAB
EKG ATRIAL RATE: 76 BPM
EKG P AXIS: 69 DEGREES
EKG P-R INTERVAL: 146 MS
EKG Q-T INTERVAL: 392 MS
EKG QRS DURATION: 82 MS
EKG QTC CALCULATION (BAZETT): 441 MS
EKG R AXIS: -36 DEGREES
EKG T AXIS: 29 DEGREES
EKG VENTRICULAR RATE: 76 BPM

## 2022-09-09 NOTE — ED PROVIDER NOTES
HPI:  22, Time: 8:29 PM EDT         Nupur Benoit is a 58 y.o. female presenting to the ED for elevated blood pressure, beginning days ago. The complaint has been persistent, moderate in severity, and worsened by nothing. Patient presenting here because of elevated blood pressure she was seen here several days ago for the same complaint. Patient reporting some frontal headache. Patient reports she is blind in her left eye for last 4 months she has a history of all, patient reporting no abdominal pain no vomiting or diarrhea she reports no productive cough there is no fever chills. Patient reporting no chest pain she reports no upper or lower extremity weakness. Patient reporting no paralysis or slurred speech. Patient is on losartan also on beta-blocker and Norvasc    ROS:   Pertinent positives and negatives are stated within HPI, all other systems reviewed and are negative.  --------------------------------------------- PAST HISTORY ---------------------------------------------  Past Medical History:  has a past medical history of COPD (chronic obstructive pulmonary disease) ([facility] [address] Primary hypertension, Primary open angle glaucoma (POAG) of left eye, severe stage, and Urinary incontinence. Past Surgical History:  has a past surgical history that includes  section; Tonsillectomy; and Cystoscopy (N/A, 2022). Social History:  reports that she quit smoking about 5 months ago. Her smoking use included cigarettes. She has a 6.50 pack-year smoking history. She has never used smokeless tobacco. She reports that she does not drink alcohol and does not use drugs. Family History: family history is not on file. The patients home medications have been reviewed. Allergies: Patient has no known allergies.     ---------------------------------------------------PHYSICAL EXAM--------------------------------------    Constitutional/General: Alert and oriented x3, well appearing, non toxic in NAD  Head: Normocephalic and atraumatic  Eyes: PERRL, EOMI  Mouth: Oropharynx clear, handling secretions, no trismus  Neck: Supple, full ROM, non tender to palpation in the midline, no stridor, no crepitus, no meningeal signs  Pulmonary: Lungs clear to auscultation bilaterally, no wheezes, rales, or rhonchi. Not in respiratory distress  Cardiovascular:  Regular rate. Regular rhythm. No murmurs, gallops, or rubs. 2+ distal pulses  Chest: no chest wall tenderness  Abdomen: Soft. Non tender. Non distended. +BS. No rebound, guarding, or rigidity. No pulsatile masses appreciated. Musculoskeletal: Moves all extremities x 4. Warm and well perfused, no clubbing, cyanosis, or edema. Capillary refill <3 seconds  Skin: warm and dry. No rashes. Neurologic: GCS 15, CN 2-12 grossly intact, no focal deficits, symmetric strength 5/5 in the upper and lower extremities bilaterally  Psych: Normal Affect    -------------------------------------------------- RESULTS -------------------------------------------------  I have personally reviewed all laboratory and imaging results for this patient. Results are listed below.      LABS:  Results for orders placed or performed during the hospital encounter of 09/08/22   Troponin   Result Value Ref Range    Troponin, High Sensitivity <6 0 - 9 ng/L   CBC with Auto Differential   Result Value Ref Range    WBC 6.1 4.5 - 11.5 E9/L    RBC 5.92 (H) 3.50 - 5.50 E12/L    Hemoglobin 14.3 11.5 - 15.5 g/dL    Hematocrit 45.4 34.0 - 48.0 %    MCV 76.7 (L) 80.0 - 99.9 fL    MCH 24.2 (L) 26.0 - 35.0 pg    MCHC 31.5 (L) 32.0 - 34.5 %    RDW 16.5 (H) 11.5 - 15.0 fL    Platelets 346 687 - 673 E9/L    MPV 10.8 7.0 - 12.0 fL    Neutrophils % 49.9 43.0 - 80.0 %    Immature Granulocytes % 0.0 0.0 - 5.0 %    Lymphocytes % 35.9 20.0 - 42.0 %    Monocytes % 8.3 2.0 - 12.0 %    Eosinophils % 5.1 0.0 - 6.0 %    Basophils % 0.8 0.0 - 2.0 %    Neutrophils Absolute 3.05 1.80 - 7.30 E9/L    Immature Granulocytes # 0.00 ------------------------------ ED COURSE/MEDICAL DECISION MAKING----------------------  Medications   hydrALAZINE (APRESOLINE) injection 10 mg (10 mg IntraVENous Given 9/8/22 2116)   metoclopramide (REGLAN) injection 10 mg (10 mg IntraVENous Given 9/8/22 2310)   diphenhydrAMINE (BENADRYL) injection 25 mg (25 mg IntraVENous Given 9/8/22 2310)             Medical Decision Making:   Presenting here because of elevated blood pressure. Patient reporting some headache frontal she reports no vomiting or diarrhea she reports no chest pain or shortness of breath. Patient reports history of glaucoma with left-sided eye blindness. Patient here is neurologically intact labs and EKG noted reviewed. Patient mildly hypertensive here in the emergency room she was medicated. Patient also given Reglan Benadryl for headache. Patient was reassessed blood pressure did come down. And headache has improved here. Patient having again no discomfort of chest or shortness of breath she is neurologically intact. Patient will be discharged home she is to follow-up with her PCP she is to return if symptoms worsen or persist     Re-Evaluations:           Patient reevaluated and vital signs noted improved. Patient reporting headache has improved as well. Patient having no chest pain no difficulty breathing. Patient comfortable being discharged home she is to follow-up with her PCP she is to return if symptoms worsen or persist      Consultations:                 Critical Care: This patient's ED course included: a personal history and physicial eaxmination    This patient has been closely monitored during their ED course. Counseling: The emergency provider has spoken with the patient and discussed todays results, in addition to providing specific details for the plan of care and counseling regarding the diagnosis and prognosis. Questions are answered at this time and they are agreeable with the plan. --------------------------------- IMPRESSION AND DISPOSITION ---------------------------------    IMPRESSION  1. Hypertension, unspecified type    2. Headache, unspecified headache type        DISPOSITION  Disposition: Discharge home  Patient condition is stable        NOTE: This report was transcribed using voice recognition software.  Every effort was made to ensure accuracy; however, inadvertent computerized transcription errors may be present          Dinora Zendejas MD  09/09/22 [id number]

## 2022-10-19 DIAGNOSIS — I10 PRIMARY HYPERTENSION: ICD-10-CM

## 2022-10-19 RX ORDER — ASPIRIN 81 MG
TABLET,CHEWABLE ORAL
Qty: 30 TABLET | Refills: 3 | Status: SHIPPED | OUTPATIENT
Start: 2022-10-19

## 2022-10-19 NOTE — TELEPHONE ENCOUNTER
Last Appointment:  8/9/2022  Future Appointments   Date Time Provider Ruben Jonisti   11/9/2022  1:15 PM Selena Elliott  [facility]   11/22/2022  2:00 PM Katlin Cary MD Surg [facility]

## 2022-11-09 ENCOUNTER — OFFICE VISIT (OUTPATIENT)
Dept: FAMILY MEDICINE CLINIC | Age: 62
End: 2022-11-09
Payer: MEDICAID

## 2022-11-09 VITALS
WEIGHT: 250 LBS | HEART RATE: 88 BPM | OXYGEN SATURATION: 96 % | HEIGHT: 65 IN | SYSTOLIC BLOOD PRESSURE: 144 MMHG | DIASTOLIC BLOOD PRESSURE: 82 MMHG | BODY MASS INDEX: 41.65 KG/M2 | RESPIRATION RATE: 16 BRPM | TEMPERATURE: 97.7 F

## 2022-11-09 DIAGNOSIS — J43.8 OTHER EMPHYSEMA (HCC): ICD-10-CM

## 2022-11-09 DIAGNOSIS — I10 PRIMARY HYPERTENSION: Primary | ICD-10-CM

## 2022-11-09 DIAGNOSIS — R25.2 MUSCLE CRAMPS: ICD-10-CM

## 2022-11-09 PROBLEM — I21.4 NSTEMI (NON-ST ELEVATED MYOCARDIAL INFARCTION) (HCC): Status: RESOLVED | Noted: 2022-03-30 | Resolved: 2022-11-09

## 2022-11-09 PROCEDURE — 3017F COLORECTAL CA SCREEN DOC REV: CPT | Performed by: INTERNAL MEDICINE

## 2022-11-09 PROCEDURE — 3078F DIAST BP <80 MM HG: CPT | Performed by: INTERNAL MEDICINE

## 2022-11-09 PROCEDURE — 3023F SPIROM DOC REV: CPT | Performed by: INTERNAL MEDICINE

## 2022-11-09 PROCEDURE — 1036F TOBACCO NON-USER: CPT | Performed by: INTERNAL MEDICINE

## 2022-11-09 PROCEDURE — G8427 DOCREV CUR MEDS BY ELIG CLIN: HCPCS | Performed by: INTERNAL MEDICINE

## 2022-11-09 PROCEDURE — 3074F SYST BP LT 130 MM HG: CPT | Performed by: INTERNAL MEDICINE

## 2022-11-09 PROCEDURE — G8417 CALC BMI ABV UP PARAM F/U: HCPCS | Performed by: INTERNAL MEDICINE

## 2022-11-09 PROCEDURE — 99214 OFFICE O/P EST MOD 30 MIN: CPT | Performed by: INTERNAL MEDICINE

## 2022-11-09 PROCEDURE — G8484 FLU IMMUNIZE NO ADMIN: HCPCS | Performed by: INTERNAL MEDICINE

## 2022-11-09 NOTE — PROGRESS NOTES
[facility] PRIMARY CARE  [address] [address]  Dept: [phone]  Dept Fax: [phone]     NAME: Jennifer Farris        :  1960        MRN:  [id number]    Chief Complaint   Patient presents with    Hypertension     3 month follow up. Leg Pain     Bilateral legs pain and cramping for the past 3 months. She eats mustard and has tried pedialite but it has not helped. Subjective     History of Present Illness  Jennifer Farris is a 58 y.o. female who presents today for routine follow up and medication refill. Review of Systems  Please see HPI above. All bolded are positive.   Gen: fever, chills, fatigue, weakness, diaphoresis, unintentional weight change  Head: headache, vision change, hearing loss  Chest: chest pain/heaviness, palpitations  Lungs: shortness of breath, wheezing, coughing, hemoptysis  Abdomen: abdominal pain, nausea, vomiting, diarrhea, constipation, melena, hematochezia, hematemesis, loss of appetite  Extremities: lower extremity edema, myalgias, arthralgias  Urinary: dysuria, hematuria, weak flow, increase in frequency  Neurologic: lightheadedness, dizziness, confusion, syncope  Endocrine: polydipsia, polyuria, heat or cold intolerance  Psychiatric: depression, suicidal ideation, anxiety  Derm: Rashes, ulcers, burns    Past Medical Hx:  Past Medical History:   Diagnosis Date    COPD (chronic obstructive pulmonary disease) (HCC)     NSTEMI (non-ST elevated myocardial infarction) ([facility] [address] 3/30/2022    Primary hypertension     Primary open angle glaucoma (POAG) of left eye, severe stage     Urinary incontinence     For or 22       Past Surgical Hx:  Past Surgical History:   Procedure Laterality Date     SECTION      x5    CYSTOSCOPY N/A 2022    CYSTOSCOPY BOTOX INJECTION 100 UNITS   (PHARMACY NOTIFIED) performed by Edward Duran Dawson, DO at [address]         Family Hx:  No family history on file.    Social Hx:  Social History     Tobacco Use    Smoking status: Former     Packs/day: 0.50     Years: 13.00     Pack years: 6.50     Types: Cigarettes     Quit date: 2022     Years since quittin.6    Smokeless tobacco: Never   Substance Use Topics    Alcohol use: No       Home Medications:  Current Outpatient Medications   Medication Sig Dispense Refill    metoprolol tartrate (LOPRESSOR) 25 MG tablet Taking 1/2 tablet daily      ASPIRIN LOW DOSE 81 MG chewable tablet chew and swallow 1 tablet by mouth once daily 30 tablet 3    buPROPion (WELLBUTRIN XL) 150 MG extended release tablet Take 1 tablet by mouth every morning 30 tablet 3    amLODIPine (NORVASC) 10 MG tablet Take 1 tablet by mouth in the morning. 30 tablet 5    nystatin (MYCOSTATIN) 820253 UNIT/GM cream Apply topically 2 times daily. 30 g 1    lidocaine (XYLOCAINE) 5 % ointment Apply topically as needed. 240 g 5    ipratropium-albuterol (DUONEB) 0.5-2.5 (3) MG/3ML SOLN nebulizer solution Inhale 3 mLs into the lungs every 4 hours as needed for Shortness of Breath 360 mL 0    fluticasone-umeclidin-vilant (TRELEGY ELLIPTA) 100-62.5-25 MCG/INH AEPB Inhale 1 puff into the lungs daily 1 each 5    Respiratory Therapy Supplies (NEBULIZER/TUBING/MOUTHPIECE) KIT 1 kit by Does not apply route daily as needed (as needed for wheezing / shortness of breath) 1 kit 2    dorzolamide (TRUSOPT) 2 % ophthalmic solution instill 1 drop into left eye twice a day      atorvastatin (LIPITOR) 40 MG tablet Take 1 tablet by mouth nightly 30 tablet 3    Blood Pressure KIT 1 kit by Does not apply route daily 1 kit 0    albuterol sulfate HFA (PROVENTIL HFA) 108 (90 Base) MCG/ACT inhaler Inhale 2 puffs into the lungs every 4 hours as needed for Wheezing 1 Inhaler 5    Spacer/Aero-Holding Chambers ABHIJIT 1 Device by Does not apply route daily 1 Device 0     No current facility-administered medications for this visit.         Allergies:  No Known Allergies    Objective Vitals:    11/09/22 1308 11/09/22 1320 11/09/22 1340   BP: (!) 170/92 (!) 154/84 (!) 144/82   Pulse: 88     Resp: 16     Temp: 97.7 °F (36.5 °C)     TempSrc: Temporal     SpO2: 96%     Weight: 250 lb (113.4 kg)     Height: 5' 5\" (1.651 m)          Physical Exam  General: Awake, alert, and oriented to person, place, time, and purpose, appears stated age and cooperative, No acute distress  Head: Normocephalic, atraumatic  Eyes: conjunctivae/corneas clear, EOMI  Mouth: Mucous membranes moist with no pharyngeal exudate or erythema  Neck: no JVD, no adenopathy, no carotid bruit, supple, symmetrical, trachea midline  Back: symmetric, ROM normal, No CVA tenderness. Lungs: clear to auscultation bilaterally without wheezes, rales, or rhonchi  Heart: regular rate and rhythm, S1, S2 normal, no murmur, click, rub or gallop  Abdomen: soft, non-tender; bowel sounds normal; no masses,  no organomegaly  Extremities: atraumatic, no cyanosis, no edema, 2+ pulses palpated in all 4 extremities  Skin: color, texture, turgor within normal limits. No rashes or lesions   Neurologic:speech appropriate, moves all 4 extremities, normal muscle strength and tone, CN 2-12 grossly intact    Labs:  Lab Results   Component Value Date    WBC 6.1 09/08/2022    HGB 14.3 09/08/2022    HCT 45.4 09/08/2022     09/08/2022     09/08/2022    K 3.9 09/08/2022     09/08/2022    CREATININE 0.8 09/08/2022    BUN 14 09/08/2022    CO2 25 09/08/2022    GLUCOSE 105 (H) 09/08/2022    ALT 16 09/08/2022    AST 18 09/08/2022     No results found for: TSH  Lab Results   Component Value Date    TRIG 68 03/30/2022    TRIG 81 11/18/2021     Lab Results   Component Value Date    HDL 40 03/30/2022    HDL 37 11/18/2021     Lab Results   Component Value Date    LDLCALC 95 03/30/2022    LDLCALC 87 11/18/2021     Lab Results   Component Value Date    LABA1C 6.3 (H) 03/30/2022     No results found for: INR, PROTIME   *All recent labs were reviewed.  Please see electronic chart for a more comprehensive set of labs    Radiology:  No results found. Assessment and Plan     Patient is a 58 y.o. female who presented to the office for follow up. 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 Diagnoses and all orders for this visit:    Primary hypertension  - stable, continue norvasc and lopressor     Other emphysema ([facility] [address]  - stable, continue trelegy and PRN albuterol and duonebs    Muscle cramps  - advised she needs to be drinking at least 64oz of water daily. - discussed adding electrolytes to her water for at least one serving a day. - new problem, likely due to dehydration       Educational materials and/or home exercises printed for patient's review and were included in patient instructions on his/her After Visit Summary and given to patient at the end of visit. Counseled regarding above diagnosis, including possible risks and complications, especially if left uncontrolled. Counseled regarding the possible side effects, risks, benefits and alternatives to treatment; patient and/or guardian verbalizes understanding, agrees, feels comfortable with and wishes to proceed with above treatment plan. Advised patient to call Cris Dobbins new medication issues, and read all Rx info from pharmacy to assure aware of all possible risks and side effects of any medication before taking. Patient verbalizes understanding and agrees with above counseling, assessment and plan.      All questions answered.     Nicholas Stover, DO

## 2022-11-22 ENCOUNTER — HOSPITAL ENCOUNTER (EMERGENCY)
Age: 62
Discharge: HOME | End: 2022-11-22
Attending: EMERGENCY MEDICINE
Payer: MEDICAID

## 2022-11-22 ENCOUNTER — APPOINTMENT (OUTPATIENT)
Dept: GENERAL RADIOLOGY | Age: 62
End: 2022-11-22
Payer: MEDICAID

## 2022-11-22 ENCOUNTER — APPOINTMENT (OUTPATIENT)
Dept: CT IMAGING | Age: 62
End: 2022-11-22
Payer: MEDICAID

## 2022-11-22 VITALS
SYSTOLIC BLOOD PRESSURE: 176 MMHG | DIASTOLIC BLOOD PRESSURE: 85 MMHG | BODY MASS INDEX: 40.82 KG/M2 | HEIGHT: 65 IN | TEMPERATURE: 97.9 F | WEIGHT: 245 LBS | HEART RATE: 84 BPM | RESPIRATION RATE: 20 BRPM | OXYGEN SATURATION: 97 %

## 2022-11-22 DIAGNOSIS — R31.9 URINARY TRACT INFECTION WITH HEMATURIA, SITE UNSPECIFIED: ICD-10-CM

## 2022-11-22 DIAGNOSIS — N39.0 URINARY TRACT INFECTION WITH HEMATURIA, SITE UNSPECIFIED: ICD-10-CM

## 2022-11-22 DIAGNOSIS — J44.1 COPD EXACERBATION (HCC): Primary | ICD-10-CM

## 2022-11-22 DIAGNOSIS — R31.29 MICROSCOPIC HEMATURIA: ICD-10-CM

## 2022-11-22 DIAGNOSIS — D25.9 UTERINE LEIOMYOMA, UNSPECIFIED LOCATION: ICD-10-CM

## 2022-11-22 LAB
ANION GAP SERPL CALCULATED.3IONS-SCNC: 11 MMOL/L (ref 7–16)
BACTERIA: ABNORMAL /HPF
BASOPHILS ABSOLUTE: 0.03 E9/L (ref 0–0.2)
BASOPHILS RELATIVE PERCENT: 0.6 % (ref 0–2)
BILIRUBIN URINE: NEGATIVE
BLOOD, URINE: ABNORMAL
BUN BLDV-MCNC: 13 MG/DL (ref 6–23)
CALCIUM SERPL-MCNC: 9.3 MG/DL (ref 8.6–10.2)
CHLORIDE BLD-SCNC: 105 MMOL/L (ref 98–107)
CLARITY: CLEAR
CO2: 25 MMOL/L (ref 22–29)
COLOR: YELLOW
CREAT SERPL-MCNC: 0.7 MG/DL (ref 0.5–1)
EKG ATRIAL RATE: 75 BPM
EKG P AXIS: 73 DEGREES
EKG P-R INTERVAL: 148 MS
EKG Q-T INTERVAL: 408 MS
EKG QRS DURATION: 84 MS
EKG QTC CALCULATION (BAZETT): 455 MS
EKG R AXIS: -26 DEGREES
EKG T AXIS: 33 DEGREES
EKG VENTRICULAR RATE: 75 BPM
EOSINOPHILS ABSOLUTE: 0.47 E9/L (ref 0.05–0.5)
EOSINOPHILS RELATIVE PERCENT: 9.2 % (ref 0–6)
GFR SERPL CREATININE-BSD FRML MDRD: >60 ML/MIN/1.73
GLUCOSE BLD-MCNC: 170 MG/DL (ref 74–99)
GLUCOSE URINE: NEGATIVE MG/DL
HCT VFR BLD CALC: 42.9 % (ref 34–48)
HEMOGLOBIN: 13.6 G/DL (ref 11.5–15.5)
IMMATURE GRANULOCYTES #: 0.01 E9/L
IMMATURE GRANULOCYTES %: 0.2 % (ref 0–5)
KETONES, URINE: NEGATIVE MG/DL
LEUKOCYTE ESTERASE, URINE: ABNORMAL
LYMPHOCYTES ABSOLUTE: 1.81 E9/L (ref 1.5–4)
LYMPHOCYTES RELATIVE PERCENT: 35.6 % (ref 20–42)
MCH RBC QN AUTO: 25.1 PG (ref 26–35)
MCHC RBC AUTO-ENTMCNC: 31.7 % (ref 32–34.5)
MCV RBC AUTO: 79.2 FL (ref 80–99.9)
MONOCYTES ABSOLUTE: 0.48 E9/L (ref 0.1–0.95)
MONOCYTES RELATIVE PERCENT: 9.4 % (ref 2–12)
NEUTROPHILS ABSOLUTE: 2.29 E9/L (ref 1.8–7.3)
NEUTROPHILS RELATIVE PERCENT: 45 % (ref 43–80)
NITRITE, URINE: NEGATIVE
PDW BLD-RTO: 15.8 FL (ref 11.5–15)
PH UA: 6 (ref 5–9)
PLATELET # BLD: 290 E9/L (ref 130–450)
PMV BLD AUTO: 10.4 FL (ref 7–12)
POTASSIUM SERPL-SCNC: 3.6 MMOL/L (ref 3.5–5)
PRO-BNP: 23 PG/ML (ref 0–125)
PROTEIN UA: 30 MG/DL
RBC # BLD: 5.42 E12/L (ref 3.5–5.5)
RBC UA: ABNORMAL /HPF (ref 0–2)
SODIUM BLD-SCNC: 141 MMOL/L (ref 132–146)
SPECIFIC GRAVITY UA: >=1.03 (ref 1–1.03)
TROPONIN, HIGH SENSITIVITY: <6 NG/L (ref 0–9)
UROBILINOGEN, URINE: 0.2 E.U./DL
WBC # BLD: 5.1 E9/L (ref 4.5–11.5)
WBC UA: ABNORMAL /HPF (ref 0–5)

## 2022-11-22 PROCEDURE — 6370000000 HC RX 637 (ALT 250 FOR IP): Performed by: STUDENT IN AN ORGANIZED HEALTH CARE EDUCATION/TRAINING PROGRAM

## 2022-11-22 PROCEDURE — 93010 ELECTROCARDIOGRAM REPORT: CPT | Performed by: INTERNAL MEDICINE

## 2022-11-22 PROCEDURE — 94640 AIRWAY INHALATION TREATMENT: CPT

## 2022-11-22 PROCEDURE — 99285 EMERGENCY DEPT VISIT HI MDM: CPT

## 2022-11-22 PROCEDURE — 81001 URINALYSIS AUTO W/SCOPE: CPT

## 2022-11-22 PROCEDURE — 71046 X-RAY EXAM CHEST 2 VIEWS: CPT

## 2022-11-22 PROCEDURE — 83880 ASSAY OF NATRIURETIC PEPTIDE: CPT

## 2022-11-22 PROCEDURE — 94664 DEMO&/EVAL PT USE INHALER: CPT

## 2022-11-22 PROCEDURE — 85025 COMPLETE CBC W/AUTO DIFF WBC: CPT

## 2022-11-22 PROCEDURE — 74176 CT ABD & PELVIS W/O CONTRAST: CPT

## 2022-11-22 PROCEDURE — 80048 BASIC METABOLIC PNL TOTAL CA: CPT

## 2022-11-22 PROCEDURE — 84484 ASSAY OF TROPONIN QUANT: CPT

## 2022-11-22 PROCEDURE — 93005 ELECTROCARDIOGRAM TRACING: CPT | Performed by: STUDENT IN AN ORGANIZED HEALTH CARE EDUCATION/TRAINING PROGRAM

## 2022-11-22 PROCEDURE — 36415 COLL VENOUS BLD VENIPUNCTURE: CPT

## 2022-11-22 RX ORDER — CEFDINIR 300 MG/1
300 CAPSULE ORAL ONCE
Status: COMPLETED | OUTPATIENT
Start: 2022-11-22 | End: 2022-11-22

## 2022-11-22 RX ORDER — PREDNISONE 20 MG/1
60 TABLET ORAL ONCE
Status: COMPLETED | OUTPATIENT
Start: 2022-11-22 | End: 2022-11-22

## 2022-11-22 RX ORDER — PREDNISONE 50 MG/1
50 TABLET ORAL DAILY
Qty: 5 TABLET | Refills: 0 | Status: SHIPPED | OUTPATIENT
Start: 2022-11-23 | End: 2022-11-28

## 2022-11-22 RX ORDER — CEFDINIR 300 MG/1
300 CAPSULE ORAL 2 TIMES DAILY
Qty: 20 CAPSULE | Refills: 0 | Status: SHIPPED | OUTPATIENT
Start: 2022-11-22 | End: 2022-12-02

## 2022-11-22 RX ORDER — IPRATROPIUM BROMIDE AND ALBUTEROL SULFATE 2.5; .5 MG/3ML; MG/3ML
3 SOLUTION RESPIRATORY (INHALATION) ONCE
Status: COMPLETED | OUTPATIENT
Start: 2022-11-22 | End: 2022-11-22

## 2022-11-22 RX ADMIN — CEFDINIR 300 MG: 300 CAPSULE ORAL at 11:29

## 2022-11-22 RX ADMIN — PREDNISONE 60 MG: 20 TABLET ORAL at 09:13

## 2022-11-22 RX ADMIN — IPRATROPIUM BROMIDE AND ALBUTEROL SULFATE 3 AMPULE: .5; 2.5 SOLUTION RESPIRATORY (INHALATION) at 08:56

## 2022-11-22 ASSESSMENT — ENCOUNTER SYMPTOMS
WHEEZING: 1
SORE THROAT: 0
BACK PAIN: 0
DIARRHEA: 0
ABDOMINAL PAIN: 0
COUGH: 0
VOMITING: 0
NAUSEA: 0
SHORTNESS OF BREATH: 1

## 2022-11-22 NOTE — ED PROVIDER NOTES
ATTENDING PROVIDER ATTESTATION:     Yvonne Platt presented to the emergency department for evaluation of Shortness of Breath (C/o SOB x a few days. Pt states it's worse with exertion. Denies pain with deep breathing.) and Urinary Tract Infection (Denies burning with urination. States she has pressure towards the end of voiding. Denies foul odor to urine or discharge. )   and was initially evaluated by the Medical Resident. See Original ED Note for H&P and ED course above. I have reviewed and discussed the case, including pertinent history (medical, surgical, family and social) and exam findings with the Medical Resident assigned to Yvonne Platt. I have personally performed and/or participated in the history, exam, medical decision making, and procedures and agree with all pertinent clinical information and any additional changes or corrections are noted below in my assessment and plan. I have discussed this patient in detail with the resident, and provided the instruction and education,       I have reviewed my findings and recommendations with the assigned Medical Resident, Yvonne Platt and members of family present at the time of disposition. I have performed a history and physical examination of this patient and directly supervised the resident caring for this patient      History of Present Illness:    Presents to the ED for cough and shortness of breath, beginning a few days ago. The complaint has been persistent, moderate in severity, and worsened by nothing. Nonproductive cough with shortness of breath for the last few days. No pain with breathing. No chest pain at all. No leg pain. No orthopnea. No peripheral edema. Also concerned that she may have a urinary tract infection as she has pressure when she voids. No pain with breathing. No history of DVT or PE. She denies any other complaints.          Review of Systems:   A complete review of systems was performed and pertinent positives and negatives are stated within HPI, all other systems reviewed and are negative.    --------------------------------------------- PAST HISTORY ---------------------------------------------  Past Medical History:  has a past medical history of COPD (chronic obstructive pulmonary disease) ([facility] [address] [facility]STEM[facility] (non-ST elevated [facility]) [facility] Primary hypertension, Primary open angle glaucoma (POAG) of left eye, severe stage, and Urinary incontinence. Past Surgical History:  has a past surgical history that includes  section; Tonsillectomy; and Cystoscopy (N/A, 2022). Social History:  reports that she quit smoking about 7 months ago. Her smoking use included cigarettes. She has a 6.50 pack-year smoking history. She has never used smokeless tobacco. She reports that she does not drink alcohol and does not use drugs. Family History: family history is not on file. Unless otherwise noted, family history is non contributory    The patients home medications have been reviewed. Allergies: Patient has no known allergies. EKG Interpretation  Interpreted by emergency department physician, Dr. Foote Figures     22  Time: 5148    Rhythm: normal sinus   Rate: normal  Axis: left  Conduction: normal  ST Segments: no acute change  T Waves: no acute change    Clinical Impression: Sinus rhythm, no acute ischemic changes  Comparison to Old EKG  Stable from prior EKG        Physical Exam:  Constitutional/General: Alert and oriented x3  Head: Normocephalic and atraumatic  Eyes: PERRL, EOMI, sclera non icteric  ENT: Oropharynx clear, handling secretions  Neck: Supple, full ROM, no stridor, no meningeal signs  Respiratory: Lungs with scattered wheezes bilaterally. Not in respiratory distress  Cardiovascular:  Regular rate. Regular rhythm. No murmurs, no gallops, no rubs. 2+ distal pulses. Equal extremity pulses. GI:  Abdomen Soft, Non tender, Non distended. No rebound, guarding, or rigidity.  No pulsatile masses. Musculoskeletal: Moves all extremities x 4. Warm and well perfused,  no clubbing, no cyanosis, no edema. Palpable peripheral pulses  Integument: skin warm and dry. No rashes. Neurologic: GCS 15, no focal deficits  Psychiatric: Normal Affect      I directly supervised any procedures performed by the resident and was present for the procedure including all critical portions of the procedure             Oxygen Saturation Interpretation: 97 % on RA. I, Dr. Bhavana Perez, am the primary provider of record      Medical Decision Making:   Cough, bronchospasm, no focal pneumonia, no chest pain, sx improving  Plan for nebs, steroids, antibiotics for UTI and outpatient treatment              Name and Route of medications administered in the ED:  Medications   cefdinir (OMNICEF) capsule 300 mg (has no administration in time range)   ipratropium-albuterol (DUONEB) nebulizer solution 3 ampule (3 ampules Inhalation Given 11/22/22 0856)   predniSONE (DELTASONE) tablet 60 mg (60 mg Oral Given 11/22/22 0913)            Re-Evaluations:  ED Course as of 11/22/22 1115   Tue Nov 22, 2022   0919 With patient's bladder pressure and microscopic hematuria noted on UA, will obtain CT abdomen pelvis to rule out stone. [VG]   1047 IMPRESSION:  1. Mild mural thickening along the posterior wall of the urinary bladder of  unclear etiology. Consider cystoscopy for further evaluation. 2. No urolithiasis or hydronephrosis. 3. Small uterine fibroid. [VG]      ED Course User Index  [VG] Segundo Major DO       improving            Critical Care: none    1. COPD exacerbation ([address]    2. Microscopic hematuria    3. Uterine leiomyoma, unspecified location    4.  Urinary tract infection with hematuria, site unspecified            Jewell Sher MD  11/22/22 1116

## 2022-11-22 NOTE — ED NOTES
Pt refused Covid test at this time, Pt stats she can get a Covid test at PCP.      Bianca Munson  11/22/22 8300

## 2022-11-22 NOTE — ED PROVIDER NOTES
[facility]  ED Provider Note  Department of Emergency Medicine     ED Room: [place] [address]/      Written by: Karlos Guerra DO  Patient Name: Chucky Burks  Attending Provider: Dany Macias MD  Admit Date: 2022  7:16 AM  MRN: [id number]    : 1960        Chief Complaint   Patient presents with    Shortness of Breath     C/o SOB x a few days. Pt states it's worse with exertion. Denies pain with deep breathing. Urinary Tract Infection     Denies burning with urination. States she has pressure towards the end of voiding. Denies foul odor to urine or discharge. - Chief complaint    HPI   Chucky Burks is a 58 y.o. female presenting to the ED for evaluation of Shortness of Breath (C/o SOB x a few days. Pt states it's worse with exertion. Denies pain with deep breathing.) and Urinary Tract Infection (Denies burning with urination. States she has pressure towards the end of voiding. Denies foul odor to urine or discharge. )      History obtained from patient. Patient has a past medical history of emphysema, HTN, MDD, COPD, NSTEMI in 2022, history of issues with UTIs and urinary retention/incontinence for which she received a Botox injection 6 months ago. Patient is presenting for evaluation of shortness of breath and wheezing worsened with exertion over the past 4-5 days as well as feeling like she might have a UTI because she says she has a sensation of bladder pressure at the end of urination, which has been going on for 2-3 days. Patient states she does have an inhaler at home but it did not significantly improve her symptoms; per chart review she has a DuoNeb nebulizer prescribed but has not used it. She denies history of diabetes. Patient denies any chest pain or palpitations. Denies any fevers, cough, sore throat, abdominal pain, nausea or vomiting or diarrhea. She denies any dysuria, abnormal vaginal bleeding or discharge or vaginal irritation.   Patient's present. No rhonchi or rales. Abdominal:      General: Bowel sounds are normal.      Palpations: Abdomen is soft. Tenderness: There is no abdominal tenderness. There is no right CVA tenderness, left CVA tenderness, guarding or rebound. Musculoskeletal:         General: No tenderness. Normal range of motion. Cervical back: Normal range of motion and neck supple. Right lower leg: No tenderness. No edema. Left lower leg: No tenderness. No edema. Skin:     General: Skin is warm and dry. Capillary Refill: Capillary refill takes less than 2 seconds. Coloration: Skin is not cyanotic, jaundiced or pale. Findings: No rash. Neurological:      General: No focal deficit present. Mental Status: She is alert and oriented to person, place, and time. Sensory: No sensory deficit. Motor: No weakness. Psychiatric:         Mood and Affect: Mood normal.         Behavior: Behavior normal.        Procedures       MDM     Amount and/or Complexity of Data Reviewed  Decide to obtain previous medical records or to obtain history from someone other than the patient: yes         ED Course as of 11/22/22 1633   Tue Nov 22, 2022   0919 With patient's bladder pressure and microscopic hematuria noted on UA, will obtain CT abdomen pelvis to rule out stone. [VG]   1047 IMPRESSION:  1. Mild mural thickening along the posterior wall of the urinary bladder of  unclear etiology. Consider cystoscopy for further evaluation. 2. No urolithiasis or hydronephrosis. 3. Small uterine fibroid. [VG]   3601 Patient reevaluated her symptoms are improving. Results and plan for discharge discussed, she is amenable. [VG]      ED Course User Index  [VG] Rome Reed DO         Medical Decision Making: This is a 58 y.o. female presenting for evaluation of UTI symptoms and SOB. Has history of COPD; does have a nebulizer machine at home but states she did not use any of her nebulizer treatments. On arrival she is alert and oriented, no acute distress; she does have some scattered wheezes and diminished breath sounds bilaterally. She is not in respiratory distress. Vital signs significant for HTN, otherwise stable. Abdomen soft and nontender. Labs reviewed, she does have hematuria; CT abdomen pelvis showed some bladder wall thickening. Patient treated with initial dose of Omnicef for UTI and provided prescription as well. She also has uterine fibroids, was instructed to follow-up outpatient with her OB. She has also followed closely with urology in the past and was instructed to follow-up outpatient with them as well. She was given duo nebs for her shortness of breath, as well as a dose of prednisone; she will provide a prescription for this for the next 5 days. After DuoNeb she states her symptoms are significantly improved. Lung sounds are also improved. Feel she was experiencing COPD exacerbation, mild. Patient is well-appearing and in no distress. She is not hypoxic or tachycardic, troponin is negative and she has no chest symptoms. Feel she is stable and appropriate for discharge. Results and plan were discussed, she voices understanding and is amenable. Strict return precautions were discussed, patient is aware of signs/symptoms for which return to the ED. See ED COURSE for additional MDM. EKG reviewed and interpreted by me: This EKG is signed by emergency department physician. NSR, LAD, no ST elevations; nonspecific T wave abnormality lead aVL;\ nonspecific to read normalities V4-V6; generally unchanged when compared to most recent EKG from September 2022; , QRS 84, QTc 455. Labs & imaging were reviewed and interpreted, see RESULTS. I have personally reviewed all laboratory and imaging results for this patient. I have discussed this patient with my attending, who has seen the patient and agrees with this disposition.     Patient was seen and evaluated by myself and my attending MD Diana. Assessment and Plan discussed with attending provider, please see attestation for final plan of care.         --------------------------------------------- PAST HISTORY ---------------------------------------------  Past Medical History:  has a past medical history of COPD (chronic obstructive pulmonary disease) ([facility] NSTEMI (non-ST elevated myocardial infarction) ([facility] Primary hypertension, Primary open angle glaucoma (POAG) of left eye, severe stage, and Urinary incontinence. Past Surgical History:  has a past surgical history that includes  section; Tonsillectomy; and Cystoscopy (N/A, 2022). Social History:  reports that she quit smoking about 7 months ago. Her smoking use included cigarettes. She has a 6.50 pack-year smoking history. She has never used smokeless tobacco. She reports that she does not drink alcohol and does not use drugs. Family History: family history is not on file. Unless otherwise noted, family history is non contributory. The patients home medications have been reviewed. Allergies: Patient has no known allergies.     -------------------------------------------------- RESULTS -------------------------------------------------  Labs:  Results for orders placed or performed during the hospital encounter of 22   CBC with Auto Differential   Result Value Ref Range    WBC 5.1 4.5 - 11.5 E9/L    RBC 5.42 3.50 - 5.50 E12/L    Hemoglobin 13.6 11.5 - 15.5 g/dL    Hematocrit 42.9 34.0 - 48.0 %    MCV 79.2 (L) 80.0 - 99.9 fL    MCH 25.1 (L) 26.0 - 35.0 pg    MCHC 31.7 (L) 32.0 - 34.5 %    RDW 15.8 (H) 11.5 - 15.0 fL    Platelets 731 112 - 939 E9/L    MPV 10.4 7.0 - 12.0 fL    Neutrophils % 45.0 43.0 - 80.0 %    Immature Granulocytes % 0.2 0.0 - 5.0 %    Lymphocytes % 35.6 20.0 - 42.0 %    Monocytes % 9.4 2.0 - 12.0 %    Eosinophils % 9.2 (H) 0.0 - 6.0 %    Basophils % 0.6 0.0 - 2.0 %    Neutrophils Absolute 2.29 1.80 - 7.30 E9/L    Immature Granulocytes # 0.01 E9/L    Lymphocytes Absolute 1.81 1.50 - 4.00 E9/L    Monocytes Absolute 0.48 0.10 - 0.95 E9/L    Eosinophils Absolute 0.47 0.05 - 0.50 E9/L    Basophils Absolute 0.03 0.00 - 0.20 E9/L   BMP   Result Value Ref Range    Sodium 141 132 - 146 mmol/L    Potassium 3.6 3.5 - 5.0 mmol/L    Chloride 105 98 - 107 mmol/L    CO2 25 22 - 29 mmol/L    Anion Gap 11 7 - 16 mmol/L    Glucose 170 (H) 74 - 99 mg/dL    BUN 13 6 - 23 mg/dL    Creatinine 0.7 0.5 - 1.0 mg/dL    Est, Glom Filt Rate >60 >=60 mL/min/1.73    Calcium 9.3 8.6 - 10.2 mg/dL   Troponin   Result Value Ref Range    Troponin, High Sensitivity <6 0 - 9 ng/L   Brain Natriuretic Peptide   Result Value Ref Range    Pro-BNP 23 0 - 125 pg/mL   Urinalysis with Microscopic   Result Value Ref Range    Color, UA Yellow Straw/Yellow    Clarity, UA Clear Clear    Glucose, Ur Negative Negative mg/dL    Bilirubin Urine Negative Negative    Ketones, Urine Negative Negative mg/dL    Specific Gravity, UA >=1.030 1.005 - 1.030    Blood, Urine LARGE (A) Negative    pH, UA 6.0 5.0 - 9.0    Protein, UA 30 (A) Negative mg/dL    Urobilinogen, Urine 0.2 <2.0 E.U./dL    Nitrite, Urine Negative Negative    Leukocyte Esterase, Urine SMALL (A) Negative    WBC, UA 2-5 0 - 5 /HPF    RBC, UA 10-20 (A) 0 - 2 /HPF    Bacteria, UA NONE SEEN None Seen /HPF   EKG 12 Lead   Result Value Ref Range    Ventricular Rate 75 BPM    Atrial Rate 75 BPM    P-R Interval 148 ms    QRS Duration 84 ms    Q-T Interval 408 ms    QTc Calculation (Bazett) 455 ms    P Axis 73 degrees    R Axis -26 degrees    T Axis 33 degrees       Radiology:  XR CHEST (2 VW)   Final Result   No radiographic evidence of acute cardiopulmonary disease. CT ABDOMEN PELVIS WO CONTRAST Additional Contrast? None   Final Result   1. Mild mural thickening along the posterior wall of the urinary bladder of   unclear etiology. Consider cystoscopy for further evaluation.    2. No urolithiasis or hydronephrosis. 3. Small uterine fibroid. Interpreted by the radiologist unless otherwise specified.      ------------------------- NURSING NOTES AND VITALS REVIEWED ---------------------------  Date / Time Roomed:  11/22/2022 11:00 AM  ED Bed Assignment:  [id number]    The nursing notes within the ED encounter and vital signs as below have been reviewed by myself. BP (!) 176/85   Pulse 84   Temp 97.9 °F (36.6 °C) (Oral)   Resp 20   Ht 5' 5\" (1.651 m)   Wt 245 lb (111.1 kg)   SpO2 97%   BMI 40.77 kg/m²   Oxygen Saturation Interpretation: Normal    The patients available past medical records and past encounters were reviewed. ------------------------------------------ PROGRESS NOTES ------------------------------------------  4:36 PM EST  I have spoken with the patient and discussed todays results, in addition to providing specific details for the plan of care and counseling regarding the diagnosis and prognosis. Their questions are answered at this time and they are agreeable with the plan. I discussed at length with them reasons for immediate return here for re evaluation. They will followup with their  urologist, OB/GYn and primary care physician by calling their office tomorrow. --------------------------------- ADDITIONAL PROVIDER NOTES ---------------------------------  At this time the patient is without objective evidence of an acute process requiring hospitalization or inpatient management. They have remained hemodynamically stable throughout their entire ED visit and are stable for discharge with outpatient follow-up. The plan has been discussed in detail and they are aware of the specific conditions for emergent return, as well as the importance of follow-up.       Discharge Medication List as of 11/22/2022 11:15 AM        START taking these medications    Details   predniSONE (DELTASONE) 50 MG tablet Take 1 tablet by mouth daily for 5 days, Disp-5 tablet, R-0Normal      cefdinir (OMNICEF) 300 MG capsule Take 1 capsule by mouth 2 times daily for 10 days, Disp-20 capsule, R-0Normal             Diagnosis:  1. COPD exacerbation ([address]    2. Microscopic hematuria    3. Uterine leiomyoma, unspecified location    4. Urinary tract infection with hematuria, site unspecified        Disposition:  Patient's disposition: Discharge to home  Patient's condition is stable. Aaron Lorenzo D.O. PGY-3     Resident Physician     Emergency Medicine      11/22/2022 7:47 AM      NOTE: This report was transcribed using voice recognition software.  Every effort was made to ensure accuracy; however, inadvertent computerized transcription errors may be present             Aaron Lorenzo DO  Resident  11/22/22 5429

## 2022-12-16 ENCOUNTER — COMMUNITY OUTREACH (OUTPATIENT)
Dept: FAMILY MEDICINE CLINIC | Age: 62
End: 2022-12-16

## 2023-01-15 ENCOUNTER — HOSPITAL ENCOUNTER (EMERGENCY)
Age: 63
Discharge: HOME | End: 2023-01-15
Attending: STUDENT IN AN ORGANIZED HEALTH CARE EDUCATION/TRAINING PROGRAM
Payer: MEDICAID

## 2023-01-15 ENCOUNTER — APPOINTMENT (OUTPATIENT)
Dept: GENERAL RADIOLOGY | Age: 63
End: 2023-01-15
Payer: MEDICAID

## 2023-01-15 VITALS
RESPIRATION RATE: 18 BRPM | HEART RATE: 97 BPM | HEIGHT: 64 IN | TEMPERATURE: 97.7 F | SYSTOLIC BLOOD PRESSURE: 183 MMHG | BODY MASS INDEX: 20.49 KG/M2 | WEIGHT: 120 LBS | OXYGEN SATURATION: 96 % | DIASTOLIC BLOOD PRESSURE: 101 MMHG

## 2023-01-15 DIAGNOSIS — J44.1 COPD EXACERBATION (HCC): Primary | ICD-10-CM

## 2023-01-15 LAB
ALBUMIN SERPL-MCNC: 3.7 G/DL (ref 3.5–5.2)
ALP BLD-CCNC: 69 U/L (ref 35–104)
ALT SERPL-CCNC: 17 U/L (ref 0–32)
ANION GAP SERPL CALCULATED.3IONS-SCNC: 8 MMOL/L (ref 7–16)
AST SERPL-CCNC: 18 U/L (ref 0–31)
BASOPHILS ABSOLUTE: 0.06 E9/L (ref 0–0.2)
BASOPHILS RELATIVE PERCENT: 1.2 % (ref 0–2)
BILIRUB SERPL-MCNC: 0.5 MG/DL (ref 0–1.2)
BUN BLDV-MCNC: 11 MG/DL (ref 6–23)
CALCIUM SERPL-MCNC: 9 MG/DL (ref 8.6–10.2)
CHLORIDE BLD-SCNC: 107 MMOL/L (ref 98–107)
CO2: 27 MMOL/L (ref 22–29)
CREAT SERPL-MCNC: 0.9 MG/DL (ref 0.5–1)
EOSINOPHILS ABSOLUTE: 0.7 E9/L (ref 0.05–0.5)
EOSINOPHILS RELATIVE PERCENT: 14.2 % (ref 0–6)
GFR SERPL CREATININE-BSD FRML MDRD: >60 ML/MIN/1.73
GLUCOSE BLD-MCNC: 114 MG/DL (ref 74–99)
HCT VFR BLD CALC: 39.3 % (ref 34–48)
HEMOGLOBIN: 12.9 G/DL (ref 11.5–15.5)
IMMATURE GRANULOCYTES #: 0.01 E9/L
IMMATURE GRANULOCYTES %: 0.2 % (ref 0–5)
INFLUENZA A BY PCR: NOT DETECTED
INFLUENZA B BY PCR: NOT DETECTED
LYMPHOCYTES ABSOLUTE: 1.77 E9/L (ref 1.5–4)
LYMPHOCYTES RELATIVE PERCENT: 36 % (ref 20–42)
MCH RBC QN AUTO: 24.3 PG (ref 26–35)
MCHC RBC AUTO-ENTMCNC: 32.8 % (ref 32–34.5)
MCV RBC AUTO: 74 FL (ref 80–99.9)
MONOCYTES ABSOLUTE: 0.34 E9/L (ref 0.1–0.95)
MONOCYTES RELATIVE PERCENT: 6.9 % (ref 2–12)
NEUTROPHILS ABSOLUTE: 2.04 E9/L (ref 1.8–7.3)
NEUTROPHILS RELATIVE PERCENT: 41.5 % (ref 43–80)
PDW BLD-RTO: 15.3 FL (ref 11.5–15)
PLATELET # BLD: 357 E9/L (ref 130–450)
PMV BLD AUTO: 10.1 FL (ref 7–12)
POTASSIUM REFLEX MAGNESIUM: 3.9 MMOL/L (ref 3.5–5)
PRO-BNP: 26 PG/ML (ref 0–125)
RBC # BLD: 5.31 E12/L (ref 3.5–5.5)
SARS-COV-2, NAAT: NOT DETECTED
SODIUM BLD-SCNC: 142 MMOL/L (ref 132–146)
TOTAL PROTEIN: 7 G/DL (ref 6.4–8.3)
TROPONIN, HIGH SENSITIVITY: 6 NG/L (ref 0–9)
WBC # BLD: 4.9 E9/L (ref 4.5–11.5)

## 2023-01-15 PROCEDURE — 80053 COMPREHEN METABOLIC PANEL: CPT

## 2023-01-15 PROCEDURE — 71046 X-RAY EXAM CHEST 2 VIEWS: CPT

## 2023-01-15 PROCEDURE — 94640 AIRWAY INHALATION TREATMENT: CPT

## 2023-01-15 PROCEDURE — 6370000000 HC RX 637 (ALT 250 FOR IP): Performed by: STUDENT IN AN ORGANIZED HEALTH CARE EDUCATION/TRAINING PROGRAM

## 2023-01-15 PROCEDURE — 87502 INFLUENZA DNA AMP PROBE: CPT

## 2023-01-15 PROCEDURE — 85025 COMPLETE CBC W/AUTO DIFF WBC: CPT

## 2023-01-15 PROCEDURE — 93005 ELECTROCARDIOGRAM TRACING: CPT | Performed by: PHYSICIAN ASSISTANT

## 2023-01-15 PROCEDURE — 6370000000 HC RX 637 (ALT 250 FOR IP)

## 2023-01-15 PROCEDURE — 84484 ASSAY OF TROPONIN QUANT: CPT

## 2023-01-15 PROCEDURE — 87635 SARS-COV-2 COVID-19 AMP PRB: CPT

## 2023-01-15 PROCEDURE — 99285 EMERGENCY DEPT VISIT HI MDM: CPT

## 2023-01-15 PROCEDURE — 83880 ASSAY OF NATRIURETIC PEPTIDE: CPT

## 2023-01-15 RX ORDER — METHYLPREDNISOLONE SODIUM SUCCINATE 125 MG/2ML
125 INJECTION, POWDER, LYOPHILIZED, FOR SOLUTION INTRAMUSCULAR; INTRAVENOUS ONCE
Status: DISCONTINUED | OUTPATIENT
Start: 2023-01-15 | End: 2023-01-15

## 2023-01-15 RX ORDER — BROMPHENIRAMINE MALEATE, PSEUDOEPHEDRINE HYDROCHLORIDE, AND DEXTROMETHORPHAN HYDROBROMIDE 2; 30; 10 MG/5ML; MG/5ML; MG/5ML
2.5 SYRUP ORAL 4 TIMES DAILY PRN
Qty: 118 ML | Refills: 0 | Status: SHIPPED | OUTPATIENT
Start: 2023-01-15

## 2023-01-15 RX ORDER — IPRATROPIUM BROMIDE AND ALBUTEROL SULFATE 2.5; .5 MG/3ML; MG/3ML
1 SOLUTION RESPIRATORY (INHALATION) ONCE
Status: COMPLETED | OUTPATIENT
Start: 2023-01-15 | End: 2023-01-15

## 2023-01-15 RX ORDER — METHYLPREDNISOLONE 4 MG/1
TABLET ORAL
Qty: 1 KIT | Refills: 0 | Status: SHIPPED | OUTPATIENT
Start: 2023-01-15 | End: 2023-01-21

## 2023-01-15 RX ORDER — PREDNISONE 20 MG/1
60 TABLET ORAL ONCE
Status: COMPLETED | OUTPATIENT
Start: 2023-01-15 | End: 2023-01-15

## 2023-01-15 RX ADMIN — IPRATROPIUM BROMIDE AND ALBUTEROL SULFATE 1 AMPULE: .5; 2.5 SOLUTION RESPIRATORY (INHALATION) at 20:03

## 2023-01-15 RX ADMIN — PREDNISONE 60 MG: 20 TABLET ORAL at 18:52

## 2023-01-15 ASSESSMENT — LIFESTYLE VARIABLES
HOW OFTEN DO YOU HAVE A DRINK CONTAINING ALCOHOL: NEVER
HOW OFTEN DO YOU HAVE A DRINK CONTAINING ALCOHOL: NEVER

## 2023-01-15 NOTE — ED NOTES
Department of Emergency Medicine    FIRST PROVIDER TRIAGE NOTE             Independent MLP           1/15/23  4:43 PM EST    Date of Encounter: 1/15/23   MRN: [id number]    Vitals:    01/15/23 1632 01/15/23 1642   BP:  (!) 185/108   Pulse: (!) 106    Resp:  20   Temp: 97.7 °F (36.5 °C)    SpO2: 96%    Weight: 120 lb (54.4 kg)    Height: 5' 4\" (1.626 m)       HPI: Florencio Jacques is a 58 y.o. female who presents to the ED for Shortness of Breath (SOB since shes been stuck in the rain since Friday. Also thinks her potassium potassium is low because she can feel it in her bones. )   Also reports cough     ROS: Negative for cp, vomiting, or diarrhea. Physical Exam:   Gen Appearance/Constitutional: alert  CV: tachycardia  Pulm: CTA bilat     Initial Plan of Care: All treatment areas with department are currently occupied. Plan to order/Initiate the following while awaiting opening in ED: labs, EKG, and imaging studies.     Initial Plan of Care: Initiate Treatment-Testing, Proceed toTreatment Area When Bed Available for ED Attending/MLP to Continue Care    Electronically signed by Mio Bates PA-C   DD: 1/15/23       Mio Bates PA-C  01/15/23 1360

## 2023-01-15 NOTE — ED PROVIDER NOTES
80-year-old female with history of COPD presents emerged part for increased shortness of breath that started 3 days ago. She reports increased wheezing and cough with minimal sputum production. He has tried her albuterol and DuoNeb treatments at home with some improvement. She says laying down flat makes the symptoms worse and exertion makes her symptoms worse as well. Symptoms are better with rest.  She denies the following: Chest pain, hemoptysis, leg pain, history of blood clots, headache, abdominal pain, nausea, vomiting, GI bleed and urinary symptoms. Chief Complaint   Patient presents with    Shortness of Breath     SOB since shes been stuck in the rain since Friday. Also thinks her potassium potassium is low because she can feel it in her bones. Review of Systems   Stated in HPI above  Physical Exam  Constitutional:       Appearance: She is well-developed. HENT:      Head: Normocephalic and atraumatic. Eyes:      Extraocular Movements: Extraocular movements intact. Pupils: Pupils are equal, round, and reactive to light. Neck:      Vascular: No JVD. Cardiovascular:      Rate and Rhythm: Normal rate and regular rhythm. Pulmonary:      Effort: Pulmonary effort is normal.      Breath sounds: Wheezing present. Abdominal:      Palpations: Abdomen is soft. Tenderness: There is no abdominal tenderness. Musculoskeletal:         General: Normal range of motion. Cervical back: Normal range of motion. Right lower leg: No tenderness. Left lower leg: No tenderness. Skin:     General: Skin is warm. Capillary Refill: Capillary refill takes less than 2 seconds. Neurological:      General: No focal deficit present. Mental Status: She is alert and oriented to person, place, and time. Motor: No weakness. Psychiatric:         Mood and Affect: Mood normal.         Behavior: Behavior normal.        Procedures     EKG:   This EKG is signed by emergency department physician. Rate: 91  Rhythm: Sinus  AXIS:left  ST Changes:No ST elevations   Interpretation: normal sinus rhythm   Comparison: no significant changes were found. MDM       57-year-old female with history of COPD presents emerged part for increased shortness of breath that started 3 days ago. She reports increased wheezing and cough with minimal sputum production. He has tried her albuterol and DuoNeb treatments at home with some improvement. She says laying down flat makes the symptoms worse and exertion makes her symptoms worse as well. Symptoms are better with rest.  She denies the following: Chest pain, hemoptysis, leg pain, history of blood clots, headache, abdominal pain, nausea, vomiting, GI bleed and urinary symptoms. Upon entering the patient is hemodynamically stable she is not tachycardic and 94% on room air. Although her blood pressure is elevated she has not taken her blood pressure medicine. heart is regular rate and rhythm. Lungs have diffuse wheezing on auscultation. Patient is given DuoNeb 3 amps and steroid treatment with much improvement of symptoms upon reevaluation her lung auscultation has improved although there is wheezing it is much less than she originally came in with. Labs were unremarkable she had no white blood cell count no low hemoglobin no electrolyte abnormalities BNP was less than 100. Her chest x-ray was unremarkable for consolidation effusions or acute cardiopulmonary process such as cardiomegaly. Upon ambulatory pulse ox as the patient remained above 90% on room air treated with nontachycardic nontachypneic and with no respiratory distress and said she is feeling much better. Impression is COPD exacerbation that improved with treatment. Patient is to go home with cough medicines and is compliant with her blood pressure medicines and Medrol taper. She agreed to follow-up with her physician for today's visit.     I considered acute coronary syndrome however patient's EKG did not show evidence of acute ischemic changes and troponin levels were within normal limits. She has no active chest pain. I considered pneumonia however patient was afebrile no white blood cell count chest x-ray did not show evidence of interstitial infiltrates or consolidation. I considered COVID and flu however the patient is negative for blood test.  I considered congestive heart failure however the patient BNP was within normal limits there is no cardiomegaly on chest x-ray. I considered pneumothorax however patient had bilateral breath sounds and no pneumothorax on chest x-ray  I considered pulmonary embolism however the patient had no pleuritic chest pain no chest pain and general.  She had no hemoptysis he is not tachycardic not tachypneic and not hypoxic on room air.                 --------------------------------------------- PAST HISTORY ---------------------------------------------  Past Medical History:  has a past medical history of COPD (chronic obstructive pulmonary disease) ([facility] NSTEMI (non-ST elevated myocardial infarction) ([facility] Primary hypertension, Primary open angle glaucoma (POAG) of left eye, severe stage, and Urinary incontinence. Past Surgical History:  has a past surgical history that includes  section; Tonsillectomy; and Cystoscopy (N/A, 2022). Social History:  reports that she quit smoking about 9 months ago. Her smoking use included cigarettes. She has a 6.50 pack-year smoking history. She has never used smokeless tobacco. She reports that she does not drink alcohol and does not use drugs. Family History: family history is not on file. The patients home medications have been reviewed. Allergies: Patient has no known allergies.     -------------------------------------------------- RESULTS -------------------------------------------------  Labs:  Results for orders placed or performed during the hospital encounter of 01/15/23 RAPID INFLUENZA A/B ANTIGENS    Specimen: Nasopharyngeal   Result Value Ref Range    Influenza A by PCR Not Detected Not Detected    Influenza B by PCR Not Detected Not Detected   COVID-19, Rapid    Specimen: Nasopharyngeal Swab   Result Value Ref Range    SARS-CoV-2, NAAT Not Detected Not Detected   CBC with Auto Differential   Result Value Ref Range    WBC 4.9 4.5 - 11.5 E9/L    RBC 5.31 3.50 - 5.50 E12/L    Hemoglobin 12.9 11.5 - 15.5 g/dL    Hematocrit 39.3 34.0 - 48.0 %    MCV 74.0 (L) 80.0 - 99.9 fL    MCH 24.3 (L) 26.0 - 35.0 pg    MCHC 32.8 32.0 - 34.5 %    RDW 15.3 (H) 11.5 - 15.0 fL    Platelets 392 901 - 525 E9/L    MPV 10.1 7.0 - 12.0 fL    Neutrophils % 41.5 (L) 43.0 - 80.0 %    Immature Granulocytes % 0.2 0.0 - 5.0 %    Lymphocytes % 36.0 20.0 - 42.0 %    Monocytes % 6.9 2.0 - 12.0 %    Eosinophils % 14.2 (H) 0.0 - 6.0 %    Basophils % 1.2 0.0 - 2.0 %    Neutrophils Absolute 2.04 1.80 - 7.30 E9/L    Immature Granulocytes # 0.01 E9/L    Lymphocytes Absolute 1.77 1.50 - 4.00 E9/L    Monocytes Absolute 0.34 0.10 - 0.95 E9/L    Eosinophils Absolute 0.70 (H) 0.05 - 0.50 E9/L    Basophils Absolute 0.06 0.00 - 0.20 E9/L   Comprehensive Metabolic Panel w/ Reflex to MG   Result Value Ref Range    Sodium 142 132 - 146 mmol/L    Potassium reflex Magnesium 3.9 3.5 - 5.0 mmol/L    Chloride 107 98 - 107 mmol/L    CO2 27 22 - 29 mmol/L    Anion Gap 8 7 - 16 mmol/L    Glucose 114 (H) 74 - 99 mg/dL    BUN 11 6 - 23 mg/dL    Creatinine 0.9 0.5 - 1.0 mg/dL    Est, Glom Filt Rate >60 >=60 mL/min/1.73    Calcium 9.0 8.6 - 10.2 mg/dL    Total Protein 7.0 6.4 - 8.3 g/dL    Albumin 3.7 3.5 - 5.2 g/dL    Total Bilirubin 0.5 0.0 - 1.2 mg/dL    Alkaline Phosphatase 69 35 - 104 U/L    ALT 17 0 - 32 U/L    AST 18 0 - 31 U/L   Troponin   Result Value Ref Range    Troponin, High Sensitivity 6 0 - 9 ng/L   Brain Natriuretic Peptide   Result Value Ref Range    Pro-BNP 26 0 - 125 pg/mL   EKG 12 Lead   Result Value Ref Range Ventricular Rate 91 BPM    Atrial Rate 91 BPM    P-R Interval 148 ms    QRS Duration 84 ms    Q-T Interval 364 ms    QTc Calculation (Bazett) 447 ms    P Axis 72 degrees    R Axis -27 degrees    T Axis 64 degrees       Radiology:  XR CHEST (2 VW)   Final Result   No acute process. ------------------------- NURSING NOTES AND VITALS REVIEWED ---------------------------  Date / Time Roomed:  1/15/2023  6:14 PM  ED Bed Assignment:  24/24    The nursing notes within the ED encounter and vital signs as below have been reviewed. BP (!) 183/101   Pulse 97   Temp 97.7 °F (36.5 °C)   Resp 18   Ht 5' 4\" (1.626 m)   Wt 120 lb (54.4 kg)   SpO2 96%   BMI 20.60 kg/m²   Oxygen Saturation Interpretation: Normal      ------------------------------------------ PROGRESS NOTES ------------------------------------------  I have spoken with the patient and discussed todays results, in addition to providing specific details for the plan of care and counseling regarding the diagnosis and prognosis. Their questions are answered at this time and they are agreeable with the plan. I discussed at length with them reasons for immediate return here for re evaluation. They will followup with primary care by calling their office tomorrow. --------------------------------- ADDITIONAL PROVIDER NOTES ---------------------------------  At this time the patient is without objective evidence of an acute process requiring hospitalization or inpatient management. They have remained hemodynamically stable throughout their entire ED visit and are stable for discharge with outpatient follow-up. The plan has been discussed in detail and they are aware of the specific conditions for emergent return, as well as the importance of follow-up.       Discharge Medication List as of 1/15/2023  9:00 PM        START taking these medications    Details   brompheniramine-pseudoephedrine-DM 2-30-10 MG/5ML syrup Take 2.5 mLs by mouth 4 times daily as needed for Cough, Disp-118 mL, R-0Print      methylPREDNISolone (MEDROL, MELODIE,) 4 MG tablet Take by mouth., Disp-1 kit, R-0Print             Diagnosis:  1. COPD exacerbation ([facility] [facility]        Disposition:  Patient's disposition: Discharge to home  Patient's condition is stable. Attending was present and available throughout encounter including all critical portions;  See Attending Note/Attestation for Final Plan      Tyra Hernadez DO  Resident  01/16/23 [id number]

## 2023-01-16 LAB
EKG ATRIAL RATE: 91 BPM
EKG P AXIS: 72 DEGREES
EKG P-R INTERVAL: 148 MS
EKG Q-T INTERVAL: 364 MS
EKG QRS DURATION: 84 MS
EKG QTC CALCULATION (BAZETT): 447 MS
EKG R AXIS: -27 DEGREES
EKG T AXIS: 64 DEGREES
EKG VENTRICULAR RATE: 91 BPM

## 2023-01-16 PROCEDURE — 93010 ELECTROCARDIOGRAM REPORT: CPT | Performed by: INTERNAL MEDICINE

## 2023-01-16 NOTE — ED NOTES
patient ambulated approximately 20ft with the lowest o2 sat at 93%. No other complications      Barbi Covington RN  01/15/23 2019

## 2023-01-17 RX ORDER — FLUTICASONE FUROATE, UMECLIDINIUM BROMIDE AND VILANTEROL TRIFENATATE 100; 62.5; 25 UG/1; UG/1; UG/1
POWDER RESPIRATORY (INHALATION)
OUTPATIENT
Start: 2023-01-17

## 2023-01-22 ENCOUNTER — HOSPITAL ENCOUNTER (EMERGENCY)
Age: 63
Discharge: HOME | End: 2023-01-22
Attending: EMERGENCY MEDICINE
Payer: MEDICAID

## 2023-01-22 VITALS
TEMPERATURE: 96.8 F | RESPIRATION RATE: 17 BRPM | OXYGEN SATURATION: 96 % | SYSTOLIC BLOOD PRESSURE: 155 MMHG | HEART RATE: 90 BPM | DIASTOLIC BLOOD PRESSURE: 107 MMHG

## 2023-01-22 DIAGNOSIS — H40.212 ACUTE ANGLE-CLOSURE GLAUCOMA OF LEFT EYE: Primary | ICD-10-CM

## 2023-01-22 PROCEDURE — 99284 EMERGENCY DEPT VISIT MOD MDM: CPT

## 2023-01-22 PROCEDURE — 6370000000 HC RX 637 (ALT 250 FOR IP): Performed by: STUDENT IN AN ORGANIZED HEALTH CARE EDUCATION/TRAINING PROGRAM

## 2023-01-22 PROCEDURE — 6360000002 HC RX W HCPCS: Performed by: STUDENT IN AN ORGANIZED HEALTH CARE EDUCATION/TRAINING PROGRAM

## 2023-01-22 PROCEDURE — 2580000003 HC RX 258: Performed by: STUDENT IN AN ORGANIZED HEALTH CARE EDUCATION/TRAINING PROGRAM

## 2023-01-22 PROCEDURE — 96365 THER/PROPH/DIAG IV INF INIT: CPT

## 2023-01-22 RX ORDER — DORZOLAMIDE HCL 20 MG/ML
1 SOLUTION/ DROPS OPHTHALMIC ONCE
Status: COMPLETED | OUTPATIENT
Start: 2023-01-22 | End: 2023-01-22

## 2023-01-22 RX ORDER — ACETAZOLAMIDE 250 MG/1
500 TABLET ORAL 2 TIMES DAILY
Qty: 50 TABLET | Refills: 0 | Status: SHIPPED | OUTPATIENT
Start: 2023-01-22

## 2023-01-22 RX ORDER — LATANOPROST 50 UG/ML
1 SOLUTION/ DROPS OPHTHALMIC NIGHTLY
Qty: 2.5 ML | Refills: 0 | Status: SHIPPED | OUTPATIENT
Start: 2023-01-22

## 2023-01-22 RX ORDER — BRIMONIDINE TARTRATE 2 MG/ML
1 SOLUTION/ DROPS OPHTHALMIC ONCE
Status: COMPLETED | OUTPATIENT
Start: 2023-01-22 | End: 2023-01-22

## 2023-01-22 RX ORDER — DORZOLAMIDE HYDROCHLORIDE AND TIMOLOL MALEATE 20; 5 MG/ML; MG/ML
1 SOLUTION/ DROPS OPHTHALMIC ONCE
Status: DISCONTINUED | OUTPATIENT
Start: 2023-01-22 | End: 2023-01-22 | Stop reason: SDUPTHER

## 2023-01-22 RX ORDER — DORZOLAMIDE HYDROCHLORIDE AND TIMOLOL MALEATE 20; 5 MG/ML; MG/ML
1 SOLUTION/ DROPS OPHTHALMIC 2 TIMES DAILY
Qty: 10 ML | Refills: 0 | Status: SHIPPED | OUTPATIENT
Start: 2023-01-22 | End: 2023-02-21

## 2023-01-22 RX ORDER — BRIMONIDINE TARTRATE 2 MG/ML
1 SOLUTION/ DROPS OPHTHALMIC 3 TIMES DAILY
Qty: 5 ML | Refills: 0 | Status: SHIPPED | OUTPATIENT
Start: 2023-01-22

## 2023-01-22 RX ORDER — LATANOPROST 50 UG/ML
1 SOLUTION/ DROPS OPHTHALMIC ONCE
Status: COMPLETED | OUTPATIENT
Start: 2023-01-22 | End: 2023-01-22

## 2023-01-22 RX ORDER — TIMOLOL MALEATE 5 MG/ML
1 SOLUTION/ DROPS OPHTHALMIC ONCE
Status: COMPLETED | OUTPATIENT
Start: 2023-01-22 | End: 2023-01-22

## 2023-01-22 RX ADMIN — LATANOPROST 1 DROP: 50 SOLUTION/ DROPS OPHTHALMIC at 16:34

## 2023-01-22 RX ADMIN — DORZOLAMIDE HYDROCHLORIDE 1 DROP: 20 SOLUTION OPHTHALMIC at 16:07

## 2023-01-22 RX ADMIN — TIMOLOL MALEATE 1 DROP: 5 SOLUTION OPHTHALMIC at 16:23

## 2023-01-22 RX ADMIN — BRIMONIDINE TARTRATE 1 DROP: 2 SOLUTION/ DROPS OPHTHALMIC at 15:48

## 2023-01-22 RX ADMIN — ACETAZOLAMIDE 500 MG: 500 INJECTION, POWDER, LYOPHILIZED, FOR SOLUTION INTRAVENOUS at 15:46

## 2023-01-22 ASSESSMENT — ENCOUNTER SYMPTOMS
DIARRHEA: 0
BACK PAIN: 0
NAUSEA: 0
COLOR CHANGE: 0
EYE REDNESS: 1
EYE PAIN: 1
COUGH: 0
ABDOMINAL PAIN: 0
VOMITING: 0
SHORTNESS OF BREATH: 0

## 2023-01-22 NOTE — DISCHARGE INSTRUCTIONS
If symptoms return please come back to ED for reassessment. Continue all medications.   Please follow-up with your ophthalmologist.      Use the dorzolamide drops twice daily  Use the timolol drops once daily  Take latanoprost at bedtime  Use brimonidine 3 times daily  Take your oral Diamox 500 mg twice daily

## 2023-01-22 NOTE — ED PROVIDER NOTES
HPI   55-year-old female patient presented to emergency department for evaluation of left eye pain. Patient reporting she is legally blind in the left eye. She states she is on latanoprost and has not been compliant with it, normally supposed to use it at bedtime she states in the last week she has missed about 3 times. She also has past history of glaucoma. She is having associated eye pain and redness. ,  Gradual in onset. She denies any fevers, chills, chest pain, shortness of breath. No drainage in the eye. No trauma. Review of Systems   Constitutional:  Negative for chills and fever. HENT:  Negative for congestion. Eyes:  Positive for pain and redness. Respiratory:  Negative for cough and shortness of breath. Cardiovascular:  Negative for chest pain. Gastrointestinal:  Negative for abdominal pain, diarrhea, nausea and vomiting. Genitourinary:  Negative for difficulty urinating, dysuria and hematuria. Musculoskeletal:  Negative for back pain. Skin:  Negative for color change. All other systems reviewed and are negative. Physical Exam  Vitals and nursing note reviewed. Constitutional:       Appearance: Normal appearance. HENT:      Head: Normocephalic and atraumatic. Nose: Nose normal. No congestion. Mouth/Throat:      Mouth: Mucous membranes are moist.      Pharynx: Oropharynx is clear. Eyes:      Conjunctiva/sclera: Conjunctivae normal.      Pupils: Pupils are equal, round, and reactive to light. Comments: Left eye is with a mid dilated pupil it is injected. Extraocular movements are intact. Cardiovascular:      Rate and Rhythm: Normal rate and regular rhythm. Pulses: Normal pulses. Heart sounds: Normal heart sounds. Pulmonary:      Effort: Pulmonary effort is normal. No respiratory distress. Breath sounds: Normal breath sounds. Abdominal:      General: Bowel sounds are normal. There is no distension. Tenderness:  There is no abdominal tenderness. Musculoskeletal:         General: Normal range of motion. Cervical back: Normal range of motion and neck supple. Skin:     General: Skin is warm and dry. Capillary Refill: Capillary refill takes less than 2 seconds. Neurological:      General: No focal deficit present. Mental Status: She is alert. Procedures     MDM  57-year-old female here for evaluation of left eye pain she has a history of angle-closure glaucoma. Her eye is red and painful here intraocular pressures 35 in the left eye initially concern for acute angle-closure glaucoma. After thorough discussion of patient's history with [facility] clinic they determined that patient has comfort care I she is legally blind in that eye she just needs her symptoms and pain controlled and can be discharged. Given her appropriate medications as described below and intraocular pressure significantly improved. Down to 5 mmHg in the left eye. He is feeling significantly better no more pain. She was given medications from the emergency department to go home with. She is also written scripts just in case. Clinical clinic said that they would call patient to schedule appointment later this week. She was told that if symptoms return if her eye pain comes back or if anything else happens she needs to come back emergently to the emergency department for reassessment. Patient is agreeable with this plan. Stable for discharge at this time. ED Course as of 01/22/23 1642   Sun Jan 22, 2023   1349 Intraocular pressure measured by me found to be 38 in the left eye and 19 in the right [FG]   1417 Spoke with Dr. Savita Wilson from [facility] clinic, on-call ophthalmologist.  Requested patient get, dorzolamide combo timolol drops twice daily first dose here, requested patient get IV Diamox here and be sent home with Diamox 500 mg twice daily. Requested latanoprost at bedtime and a dose here.   Also requested brimonidine 3 times daily first dose here. She can be sent home with pressure less than 30 [FG]   1636 Patient reassessed by me her intraocular pressure is 5 her pain is gone the eye is no longer red and injected. She is feeling much better. Discussed with her all of her findings. She is happy with this plan. [FG]      ED Course User Index  [FG] Shania Mi DO       ED Course as of 23 1642   Sun 2023   1349 Intraocular pressure measured by me found to be 38 in the left eye and 19 in the right [FG]   4563 Spoke with Dr. Douglas Pichardo from [facility], on-call ophthalmologist.  Requested patient get, dorzolamide combo timolol drops twice daily first dose here, requested patient get IV Diamox here and be sent home with Diamox 500 mg twice daily. Requested latanoprost at bedtime and a dose here. Also requested brimonidine 3 times daily first dose here. She can be sent home with pressure less than 30 [FG]   1636 Patient reassessed by me her intraocular pressure is 5 her pain is gone the eye is no longer red and injected. She is feeling much better. Discussed with her all of her findings. She is happy with this plan. [FG]      ED Course User Index  [FG] Shania Mi DO       --------------------------------------------- PAST HISTORY ---------------------------------------------  Past Medical History:  has a past medical history of COPD (chronic obstructive pulmonary disease) ([facility] [address] NSTEMI (non-ST elevated myocardial infarction) ([facility] [address] Primary hypertension, Primary open angle glaucoma (POAG) of left eye, severe stage, and Urinary incontinence. Past Surgical History:  has a past surgical history that includes  section; Tonsillectomy; and Cystoscopy (N/A, 2022). Social History:  reports that she quit smoking about 9 months ago. Her smoking use included cigarettes. She has a 6.50 pack-year smoking history.  She has never used smokeless tobacco. She reports that she does not drink alcohol and does not use drugs. Family History: family history is not on file. The patients home medications have been reviewed. Allergies: Patient has no known allergies. -------------------------------------------------- RESULTS -------------------------------------------------  Labs:  No results found for this visit on 01/22/23. Radiology:  No orders to display       ------------------------- NURSING NOTES AND VITALS REVIEWED ---------------------------  Date / Time Roomed:  1/22/2023  1:10 PM  ED Bed Assignment:  52/01    The nursing notes within the ED encounter and vital signs as below have been reviewed. BP (!) 155/107   Pulse 90   Temp 96.8 °F (36 °C)   Resp 17   LMP 01/08/2023 (Approximate) Comment: pt states she has never been through menopause yet. SpO2 96%   Oxygen Saturation Interpretation: Normal    --------------------------------- ADDITIONAL PROVIDER NOTES ---------------------------------  At this time the patient is without objective evidence of an acute process requiring hospitalization or inpatient management. They have remained hemodynamically stable throughout their entire ED visit and are stable for discharge with outpatient follow-up. The plan has been discussed in detail and they are aware of the specific conditions for emergent return, as well as the importance of follow-up. New Prescriptions    ACETAZOLAMIDE (DIAMOX) 250 MG TABLET    Take 2 tablets by mouth 2 times daily    BRIMONIDINE (ALPHAGAN) 0.2 % OPHTHALMIC SOLUTION    Place 1 drop into the left eye 3 times daily    DORZOLAMIDE-TIMOLOL (COSOPT) 22.3-6.8 MG/ML OPHTHALMIC SOLUTION    Place 1 drop into the left eye 2 times daily    LATANOPROST (XALATAN) 0.005 % OPHTHALMIC SOLUTION    Place 1 drop into the left eye nightly       Diagnosis:  1. Acute angle-closure glaucoma of left eye        Disposition:  Patient's disposition: Discharge to home  Patient's condition is stable.        Shimon Tena DO  Resident  01/22/23 4634 [facility]

## 2023-01-22 NOTE — ED NOTES
Notified pharmacy to send medications to tube system 223 when they are ready      Rahat Arredondo RN  01/22/23 0426

## 2023-01-22 NOTE — ED NOTES
Pain in left eye, sclera of left eye reddened and irritated in appearance.       [facility]  01/22/23 4373

## 2023-02-09 RX ORDER — FLUTICASONE FUROATE, UMECLIDINIUM BROMIDE AND VILANTEROL TRIFENATATE 100; 62.5; 25 UG/1; UG/1; UG/1
POWDER RESPIRATORY (INHALATION)
Qty: 1 EACH | Refills: 5 | Status: SHIPPED | OUTPATIENT
Start: 2023-02-09

## 2023-02-09 NOTE — TELEPHONE ENCOUNTER
Last Appointment:  11/9/2022  Future Appointments   Date Time Provider Ruben Mata   2/13/2023  1:30 PM Steven Vinson  [address]

## 2023-02-13 ENCOUNTER — OFFICE VISIT (OUTPATIENT)
Dept: FAMILY MEDICINE CLINIC | Age: 63
End: 2023-02-13

## 2023-02-13 VITALS
HEART RATE: 84 BPM | TEMPERATURE: 97.6 F | OXYGEN SATURATION: 94 % | DIASTOLIC BLOOD PRESSURE: 80 MMHG | WEIGHT: 251.4 LBS | HEIGHT: 65 IN | SYSTOLIC BLOOD PRESSURE: 140 MMHG | RESPIRATION RATE: 16 BRPM | BODY MASS INDEX: 41.88 KG/M2

## 2023-02-13 DIAGNOSIS — Z12.11 COLON CANCER SCREENING: ICD-10-CM

## 2023-02-13 DIAGNOSIS — R35.0 URINARY FREQUENCY: ICD-10-CM

## 2023-02-13 DIAGNOSIS — I10 PRIMARY HYPERTENSION: Primary | ICD-10-CM

## 2023-02-13 DIAGNOSIS — R10.11 RUQ PAIN: ICD-10-CM

## 2023-02-13 DIAGNOSIS — R51.9 NONINTRACTABLE HEADACHE, UNSPECIFIED CHRONICITY PATTERN, UNSPECIFIED HEADACHE TYPE: ICD-10-CM

## 2023-02-13 DIAGNOSIS — Z12.39 ENCOUNTER FOR SCREENING FOR MALIGNANT NEOPLASM OF BREAST, UNSPECIFIED SCREENING MODALITY: ICD-10-CM

## 2023-02-13 DIAGNOSIS — J44.9 STAGE 3 SEVERE COPD BY GOLD CLASSIFICATION (HCC): ICD-10-CM

## 2023-02-13 LAB
BILIRUBIN, POC: NORMAL
BLOOD URINE, POC: NORMAL
CLARITY, POC: CLEAR
COLOR, POC: YELLOW
GLUCOSE URINE, POC: NORMAL
KETONES, POC: NORMAL
LEUKOCYTE EST, POC: NORMAL
NITRITE, POC: NORMAL
PH, POC: 5.5
PROTEIN, POC: 100
SPECIFIC GRAVITY, POC: 1.02
UROBILINOGEN, POC: 0.2

## 2023-02-13 RX ORDER — IPRATROPIUM BROMIDE AND ALBUTEROL SULFATE 2.5; .5 MG/3ML; MG/3ML
3 SOLUTION RESPIRATORY (INHALATION) EVERY 4 HOURS PRN
Qty: 360 ML | Refills: 0 | Status: SHIPPED | OUTPATIENT
Start: 2023-02-13

## 2023-02-13 SDOH — ECONOMIC STABILITY: INCOME INSECURITY: HOW HARD IS IT FOR YOU TO PAY FOR THE VERY BASICS LIKE FOOD, HOUSING, MEDICAL CARE, AND HEATING?: NOT HARD AT ALL

## 2023-02-13 SDOH — ECONOMIC STABILITY: FOOD INSECURITY: WITHIN THE PAST 12 MONTHS, THE FOOD YOU BOUGHT JUST DIDN'T LAST AND YOU DIDN'T HAVE MONEY TO GET MORE.: NEVER TRUE

## 2023-02-13 SDOH — ECONOMIC STABILITY: FOOD INSECURITY: WITHIN THE PAST 12 MONTHS, YOU WORRIED THAT YOUR FOOD WOULD RUN OUT BEFORE YOU GOT MONEY TO BUY MORE.: NEVER TRUE

## 2023-02-13 SDOH — ECONOMIC STABILITY: HOUSING INSECURITY
IN THE LAST 12 MONTHS, WAS THERE A TIME WHEN YOU DID NOT HAVE A STEADY PLACE TO SLEEP OR SLEPT IN A SHELTER (INCLUDING NOW)?: NO

## 2023-02-13 ASSESSMENT — PATIENT HEALTH QUESTIONNAIRE - PHQ9
7. TROUBLE CONCENTRATING ON THINGS, SUCH AS READING THE NEWSPAPER OR WATCHING TELEVISION: 0
5. POOR APPETITE OR OVEREATING: 0
4. FEELING TIRED OR HAVING LITTLE ENERGY: 0
SUM OF ALL RESPONSES TO PHQ QUESTIONS 1-9: 0
6. FEELING BAD ABOUT YOURSELF - OR THAT YOU ARE A FAILURE OR HAVE LET YOURSELF OR YOUR FAMILY DOWN: 0
9. THOUGHTS THAT YOU WOULD BE BETTER OFF DEAD, OR OF HURTING YOURSELF: 0
SUM OF ALL RESPONSES TO PHQ QUESTIONS 1-9: 0
10. IF YOU CHECKED OFF ANY PROBLEMS, HOW DIFFICULT HAVE THESE PROBLEMS MADE IT FOR YOU TO DO YOUR WORK, TAKE CARE OF THINGS AT HOME, OR GET ALONG WITH OTHER PEOPLE: 0
SUM OF ALL RESPONSES TO PHQ9 QUESTIONS 1 & 2: 0
8. MOVING OR SPEAKING SO SLOWLY THAT OTHER PEOPLE COULD HAVE NOTICED. OR THE OPPOSITE, BEING SO FIGETY OR RESTLESS THAT YOU HAVE BEEN MOVING AROUND A LOT MORE THAN USUAL: 0
3. TROUBLE FALLING OR STAYING ASLEEP: 0
SUM OF ALL RESPONSES TO PHQ QUESTIONS 1-9: 0
2. FEELING DOWN, DEPRESSED OR HOPELESS: 0
1. LITTLE INTEREST OR PLEASURE IN DOING THINGS: 0
SUM OF ALL RESPONSES TO PHQ QUESTIONS 1-9: 0

## 2023-02-13 NOTE — PROGRESS NOTES
[facility] PRIMARY CARE  [address] [place] [address]  [place] [address]  Dept: [phone]  Dept Fax: [phone]     NAME: Eric Choudhary        :  1960        MRN:  [id number]    Chief Complaint   Patient presents with    3 Month Follow-Up    Hypertension    Urinary Frequency    Flank Pain     Right side flank pain, x 1 week    Shortness of Breath     Having to use inhaler more often, x couple weeks, wanting lungs checked out    Headache     Wanting imaging done on head         Subjective     History of Present Illness  Eric Choudhary is a 58 y.o. female who presents today for routine follow up and additionally has several concerns. RUQ/flank pain - rubbing the area stating she feels a bump that is painful. Increased urinary frequency. Denies dysuria   States that she is \"barely breathing\", requesting testing of her lungs. States that \"Something is going on\" and \"Some mornings I feel like I'm going to die\"  Had to order an adjustable bed as she is unable to sit up from lying down without pain. Frequent headaches and she states she takes aspirin for and they go away. She is on 81mg ASA daily and I tried to explain this is not for headaches, rather her heart and she did not seem to understand. States she takes it everyday and sometimes takes two. Also reports she had another \"menstrual cycle\" - had a full period this month after not having one for month. States that she is not worried about this as she is not cramping or bleeding currently and was told this is menopause   Patient continues to request that something be done to [facility] inside her\" to figure out what it wrong. She has had several ED visits within the last 6 months.      Had a CT of the head on 22 - no acute intracranial abnormality   Chest xray last done on 1/15/23 - no acute process   CT abd/pelvis done 22 - mild mural thickening along the posterior wall of the urinary bladder, small uterine fibroid, no urolithiasis or hydronephrosis     BP is slightly elevated today, but her O2 saturations are at her baseline 94-95% and she is not visibly dyspneic.     Review of Systems  Please see HPI above. Comprehensive review of systems negative unless otherwise noted above.    Past Medical Hx:  Past Medical History:   Diagnosis Date    COPD (chronic obstructive pulmonary disease) ([facility])     NSTEMI (non-ST elevated myocardial infarction) ([facility]) 3/30/2022    Primary hypertension     Primary open angle glaucoma (POAG) of left eye, severe stage     Urinary incontinence     For or 22       Past Surgical Hx:  Past Surgical History:   Procedure Laterality Date     SECTION      x5    CYSTOSCOPY N/A 2022    CYSTOSCOPY BOTOX INJECTION 100 UNITS   (PHARMACY NOTIFIED) performed by Cristi Osman DO at [facility] OR    TONSILLECTOMY         Family Hx:  No family history on file.    Social Hx:  Social History     Tobacco Use    Smoking status: Former     Packs/day: 0.50     Years: 13.00     Pack years: 6.50     Types: Cigarettes     Quit date: 2022     Years since quittin.8    Smokeless tobacco: Never   Substance Use Topics    Alcohol use: No       Home Medications:  Current Outpatient Medications   Medication Sig Dispense Refill    ipratropium-albuterol (DUONEB) 0.5-2.5 (3) MG/3ML SOLN nebulizer solution Inhale 3 mLs into the lungs every 4 hours as needed for Shortness of Breath 360 mL 0    metoprolol tartrate (LOPRESSOR) 25 MG tablet 1 tablet Taking 1 tablet daily 60 tablet     TRELEGY ELLIPTA 100-62.5-25 MCG/ACT AEPB inhaler inhale 1 puff by mouth and INTO THE LUNGS once daily 1 each 5    acetaZOLAMIDE (DIAMOX) 250 MG tablet Take 2 tablets by mouth 2 times daily 50 tablet 0    dorzolamide-timolol (COSOPT) 22.3-6.8 MG/ML ophthalmic solution Place 1 drop into the left eye 2 times daily 10 mL 0    latanoprost (XALATAN) 0.005 % ophthalmic solution Place 1 drop into the left eye nightly 2.5 mL 0    brimonidine  (ALPHAGAN) 0.2 % ophthalmic solution Place 1 drop into the left eye 3 times daily 5 mL 0    brompheniramine-pseudoephedrine-DM 2-30-10 MG/5ML syrup Take 2.5 mLs by mouth 4 times daily as needed for Cough 118 mL 0    ASPIRIN LOW DOSE 81 MG chewable tablet chew and swallow 1 tablet by mouth once daily 30 tablet 3    buPROPion (WELLBUTRIN XL) 150 MG extended release tablet Take 1 tablet by mouth every morning 30 tablet 3    amLODIPine (NORVASC) 10 MG tablet Take 1 tablet by mouth in the morning. 30 tablet 5    nystatin (MYCOSTATIN) 051562 UNIT/GM cream Apply topically 2 times daily. 30 g 1    lidocaine (XYLOCAINE) 5 % ointment Apply topically as needed. 240 g 5    Respiratory Therapy Supplies (NEBULIZER/TUBING/MOUTHPIECE) KIT 1 kit by Does not apply route daily as needed (as needed for wheezing / shortness of breath) 1 kit 2    dorzolamide (TRUSOPT) 2 % ophthalmic solution instill 1 drop into left eye twice a day      atorvastatin (LIPITOR) 40 MG tablet Take 1 tablet by mouth nightly 30 tablet 3    Blood Pressure KIT 1 kit by Does not apply route daily 1 kit 0    albuterol sulfate HFA (PROVENTIL HFA) 108 (90 Base) MCG/ACT inhaler Inhale 2 puffs into the lungs every 4 hours as needed for Wheezing 1 Inhaler 5    Spacer/Aero-Holding Chambers ABHIJIT 1 Device by Does not apply route daily 1 Device 0     No current facility-administered medications for this visit.         Allergies:  No Known Allergies    Objective     Vitals:    02/13/23 1320 02/13/23 1344   BP: (!) 150/80 (!) 140/80   Pulse: 84    Resp: 16    Temp: 97.6 °F (36.4 °C)    TempSrc: Temporal    SpO2: 94%    Weight: 251 lb 6.4 oz (114 kg)    Height: 5' 4.5\" (1.638 m)         Physical Exam  General: Awake, alert, and oriented to person, place, time, and purpose, appears stated age and cooperative, No acute distress  Head: Normocephalic, atraumatic  Eyes: conjunctivae/corneas clear, EOMI  Neck:  symmetrical, trachea midline  Back: symmetric, ROM normal, No CVA tenderness. Lungs: clear but diminished to auscultation bilaterally without wheezes, rales, or rhonchi  Heart: regular rate and rhythm, S1, S2 normal, no murmur, click, rub or gallop  Abdomen: soft, generalized RUQ tenderness; bowel sounds normal; morbidly obese   Extremities: atraumatic, no cyanosis, no edema  Skin: color, texture, turgor within normal limits  Neurologic:speech appropriate, moves all 4 extremities, normal muscle strength and tone, CN 2-12 grossly intact    Labs:  Lab Results   Component Value Date    WBC 4.9 01/15/2023    HGB 12.9 01/15/2023    HCT 39.3 01/15/2023     01/15/2023     01/15/2023    K 3.9 01/15/2023     01/15/2023    CREATININE 0.9 01/15/2023    BUN 11 01/15/2023    CO2 27 01/15/2023    GLUCOSE 114 (H) 01/15/2023    ALT 17 01/15/2023    AST 18 01/15/2023     No results found for: TSH  Lab Results   Component Value Date    TRIG 68 03/30/2022    TRIG 81 11/18/2021     Lab Results   Component Value Date    HDL 40 03/30/2022    HDL 37 11/18/2021     Lab Results   Component Value Date    LDLCALC 95 03/30/2022    LDLCALC 87 11/18/2021     Lab Results   Component Value Date    LABA1C 6.3 (H) 03/30/2022     No results found for: INR, PROTIME   *All recent labs were reviewed. Please see electronic chart for a more comprehensive set of labs    Radiology:  XR CHEST (2 VW)    Result Date: 1/15/2023  EXAMINATION: TWO XRAY VIEWS OF THE CHEST 1/15/2023 5:29 pm COMPARISON: /22/22 HISTORY: ORDERING SYSTEM PROVIDED HISTORY: Shortness of breath TECHNOLOGIST PROVIDED HISTORY: Reason for exam:->Shortness of breath What reading provider will be dictating this exam?->CRC FINDINGS: The lungs are without acute focal process. There is no effusion or pneumothorax. The cardiomediastinal silhouette is without acute process. The osseous structures are without acute process. No acute process.        Assessment and Plan     Patient is a 58 y.o. female who presented to the office for follow up.   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 Diagnoses and all orders for this visit:    Primary hypertension  - uncontrolled today, likely due to her being worked up about several different concerns today  - will continue lopressor, norvasc, and diuretic     Nonintractable headache, unspecified chronicity pattern, unspecified headache type  - reports daily headaches alleviated with extra aspirin, suggested tylenol instead     Urinary frequency  -     POCT Urinalysis no Micro  -     Culture, Urine; Future  -UA in office negative for infection, culture sent for confirmation    RUQ pain  -     US GALLBLADDER RUQ; Future  -     patient describes vague pain, not collicky or related to food intake. When asked if she still has her gallbladder she reports \"it was taken out a long time ago\", although normal gallbladder was mentioned on last CT scan     Colon cancer screening  -     Fecal DNA Colorectal cancer screening (Cologuard)    Encounter for screening for malignant neoplasm of breast, unspecified screening modality  -     CHARITY DIGITAL SCREEN BILATERAL PER PROTOCOL; Future    Stage 3 severe COPD by GOLD classification ([facility]  -     ipratropium-albuterol (DUONEB) 0.5-2.5 (3) MG/3ML SOLN nebulizer solution; Inhale 3 mLs into the lungs every 4 hours as needed for Shortness of Breath  -     XR CHEST (2 VW);  Future  -     Kajal Robles MD, Pulmonary, [facility]  - stable, patient with chronic lung concerns   - not acutely dyspneic today and her O2 sats are at baseline, offered a chest Xray and she states she will go tomorrow morning as she can't get it done today      Educational materials and/or home exercises printed for patient's review and were included in patient instructions on his/her After Visit Summary and given to patient at the end of visit. Counseled regarding above diagnosis, including possible risks and complications, especially if left uncontrolled. Counseled regarding the possible side effects, risks, benefits and alternatives to treatment; patient and/or guardian verbalizes understanding, agrees, feels comfortable with and wishes to proceed with above treatment plan. Advised patient to call [place] [facility] new medication issues, and read all Rx info from pharmacy to assure aware of all possible risks and side effects of any medication before taking. Reviewed age and gender appropriate health screening exams and vaccinations. Advised patient regarding importance of keeping up with recommended health maintenance and to schedule as soon as possible if overdue, as this is important in assessing for undiagnosed pathology, especially cancer, as well as protecting against potentially harmful/life threatening disease. Patient verbalizes understanding and agrees with above counseling, assessment and plan. All questions answered.     Doron Cabezas,

## 2023-02-15 ENCOUNTER — HOSPITAL ENCOUNTER (EMERGENCY)
Age: 63
Discharge: HOME | End: 2023-02-15
Attending: EMERGENCY MEDICINE
Payer: MEDICAID

## 2023-02-15 ENCOUNTER — APPOINTMENT (OUTPATIENT)
Dept: GENERAL RADIOLOGY | Age: 63
End: 2023-02-15
Payer: MEDICAID

## 2023-02-15 ENCOUNTER — APPOINTMENT (OUTPATIENT)
Dept: CT IMAGING | Age: 63
End: 2023-02-15
Payer: MEDICAID

## 2023-02-15 ENCOUNTER — APPOINTMENT (OUTPATIENT)
Dept: ULTRASOUND IMAGING | Age: 63
End: 2023-02-15
Payer: MEDICAID

## 2023-02-15 VITALS
RESPIRATION RATE: 18 BRPM | SYSTOLIC BLOOD PRESSURE: 158 MMHG | WEIGHT: 240 LBS | TEMPERATURE: 98 F | OXYGEN SATURATION: 96 % | HEIGHT: 64 IN | BODY MASS INDEX: 40.97 KG/M2 | HEART RATE: 84 BPM | DIASTOLIC BLOOD PRESSURE: 88 MMHG

## 2023-02-15 DIAGNOSIS — R10.11 RIGHT UPPER QUADRANT ABDOMINAL PAIN: Primary | ICD-10-CM

## 2023-02-15 LAB
ALBUMIN SERPL-MCNC: 3.9 G/DL (ref 3.5–5.2)
ALP BLD-CCNC: 70 U/L (ref 35–104)
ALT SERPL-CCNC: 15 U/L (ref 0–32)
ANION GAP SERPL CALCULATED.3IONS-SCNC: 11 MMOL/L (ref 7–16)
AST SERPL-CCNC: 17 U/L (ref 0–31)
BASOPHILS ABSOLUTE: 0.05 E9/L (ref 0–0.2)
BASOPHILS RELATIVE PERCENT: 1 % (ref 0–2)
BILIRUB SERPL-MCNC: 0.3 MG/DL (ref 0–1.2)
BUN BLDV-MCNC: 7 MG/DL (ref 6–23)
CALCIUM SERPL-MCNC: 9.2 MG/DL (ref 8.6–10.2)
CHLORIDE BLD-SCNC: 105 MMOL/L (ref 98–107)
CO2: 25 MMOL/L (ref 22–29)
CREAT SERPL-MCNC: 0.8 MG/DL (ref 0.5–1)
EKG ATRIAL RATE: 89 BPM
EKG P AXIS: 70 DEGREES
EKG P-R INTERVAL: 152 MS
EKG Q-T INTERVAL: 374 MS
EKG QRS DURATION: 80 MS
EKG QTC CALCULATION (BAZETT): 455 MS
EKG R AXIS: 16 DEGREES
EKG T AXIS: 47 DEGREES
EKG VENTRICULAR RATE: 89 BPM
EOSINOPHILS ABSOLUTE: 0.71 E9/L (ref 0.05–0.5)
EOSINOPHILS RELATIVE PERCENT: 14 % (ref 0–6)
GFR SERPL CREATININE-BSD FRML MDRD: >60 ML/MIN/1.73
GLUCOSE BLD-MCNC: 139 MG/DL (ref 74–99)
HCT VFR BLD CALC: 43.1 % (ref 34–48)
HEMOGLOBIN: 13.5 G/DL (ref 11.5–15.5)
IMMATURE GRANULOCYTES #: 0.01 E9/L
IMMATURE GRANULOCYTES %: 0.2 % (ref 0–5)
LACTIC ACID: 1 MMOL/L (ref 0.5–2.2)
LIPASE: 23 U/L (ref 13–60)
LYMPHOCYTES ABSOLUTE: 2.21 E9/L (ref 1.5–4)
LYMPHOCYTES RELATIVE PERCENT: 43.5 % (ref 20–42)
MCH RBC QN AUTO: 24.9 PG (ref 26–35)
MCHC RBC AUTO-ENTMCNC: 31.3 % (ref 32–34.5)
MCV RBC AUTO: 79.4 FL (ref 80–99.9)
MONOCYTES ABSOLUTE: 0.35 E9/L (ref 0.1–0.95)
MONOCYTES RELATIVE PERCENT: 6.9 % (ref 2–12)
NEUTROPHILS ABSOLUTE: 1.75 E9/L (ref 1.8–7.3)
NEUTROPHILS RELATIVE PERCENT: 34.4 % (ref 43–80)
PDW BLD-RTO: 15.4 FL (ref 11.5–15)
PLATELET # BLD: 327 E9/L (ref 130–450)
PMV BLD AUTO: 10 FL (ref 7–12)
POTASSIUM REFLEX MAGNESIUM: 3.7 MMOL/L (ref 3.5–5)
RBC # BLD: 5.43 E12/L (ref 3.5–5.5)
SODIUM BLD-SCNC: 141 MMOL/L (ref 132–146)
TOTAL PROTEIN: 7.5 G/DL (ref 6.4–8.3)
TROPONIN, HIGH SENSITIVITY: <6 NG/L (ref 0–9)
WBC # BLD: 5.1 E9/L (ref 4.5–11.5)

## 2023-02-15 PROCEDURE — 93010 ELECTROCARDIOGRAM REPORT: CPT | Performed by: INTERNAL MEDICINE

## 2023-02-15 PROCEDURE — A4216 STERILE WATER/SALINE, 10 ML: HCPCS | Performed by: STUDENT IN AN ORGANIZED HEALTH CARE EDUCATION/TRAINING PROGRAM

## 2023-02-15 PROCEDURE — 6360000004 HC RX CONTRAST MEDICATION: Performed by: RADIOLOGY

## 2023-02-15 PROCEDURE — 74177 CT ABD & PELVIS W/CONTRAST: CPT

## 2023-02-15 PROCEDURE — 96374 THER/PROPH/DIAG INJ IV PUSH: CPT

## 2023-02-15 PROCEDURE — 36415 COLL VENOUS BLD VENIPUNCTURE: CPT

## 2023-02-15 PROCEDURE — 2580000003 HC RX 258: Performed by: RADIOLOGY

## 2023-02-15 PROCEDURE — 2580000003 HC RX 258: Performed by: STUDENT IN AN ORGANIZED HEALTH CARE EDUCATION/TRAINING PROGRAM

## 2023-02-15 PROCEDURE — 71046 X-RAY EXAM CHEST 2 VIEWS: CPT

## 2023-02-15 PROCEDURE — 85025 COMPLETE CBC W/AUTO DIFF WBC: CPT

## 2023-02-15 PROCEDURE — 2500000003 HC RX 250 WO HCPCS: Performed by: STUDENT IN AN ORGANIZED HEALTH CARE EDUCATION/TRAINING PROGRAM

## 2023-02-15 PROCEDURE — 71275 CT ANGIOGRAPHY CHEST: CPT

## 2023-02-15 PROCEDURE — 76705 ECHO EXAM OF ABDOMEN: CPT

## 2023-02-15 PROCEDURE — 84484 ASSAY OF TROPONIN QUANT: CPT

## 2023-02-15 PROCEDURE — 96375 TX/PRO/DX INJ NEW DRUG ADDON: CPT

## 2023-02-15 PROCEDURE — 6360000002 HC RX W HCPCS: Performed by: STUDENT IN AN ORGANIZED HEALTH CARE EDUCATION/TRAINING PROGRAM

## 2023-02-15 PROCEDURE — 83690 ASSAY OF LIPASE: CPT

## 2023-02-15 PROCEDURE — 93005 ELECTROCARDIOGRAM TRACING: CPT | Performed by: NURSE PRACTITIONER

## 2023-02-15 PROCEDURE — 6370000000 HC RX 637 (ALT 250 FOR IP): Performed by: STUDENT IN AN ORGANIZED HEALTH CARE EDUCATION/TRAINING PROGRAM

## 2023-02-15 PROCEDURE — 80053 COMPREHEN METABOLIC PANEL: CPT

## 2023-02-15 PROCEDURE — 94640 AIRWAY INHALATION TREATMENT: CPT

## 2023-02-15 PROCEDURE — 94664 DEMO&/EVAL PT USE INHALER: CPT

## 2023-02-15 PROCEDURE — 99285 EMERGENCY DEPT VISIT HI MDM: CPT

## 2023-02-15 PROCEDURE — 83605 ASSAY OF LACTIC ACID: CPT

## 2023-02-15 RX ORDER — DICYCLOMINE HYDROCHLORIDE 10 MG/1
10 CAPSULE ORAL 4 TIMES DAILY PRN
Qty: 40 CAPSULE | Refills: 0 | Status: SHIPPED | OUTPATIENT
Start: 2023-02-15

## 2023-02-15 RX ORDER — MORPHINE SULFATE 4 MG/ML
4 INJECTION, SOLUTION INTRAMUSCULAR; INTRAVENOUS ONCE
Status: COMPLETED | OUTPATIENT
Start: 2023-02-15 | End: 2023-02-15

## 2023-02-15 RX ORDER — IPRATROPIUM BROMIDE AND ALBUTEROL SULFATE 2.5; .5 MG/3ML; MG/3ML
1 SOLUTION RESPIRATORY (INHALATION) ONCE
Status: COMPLETED | OUTPATIENT
Start: 2023-02-15 | End: 2023-02-15

## 2023-02-15 RX ORDER — SODIUM CHLORIDE 0.9 % (FLUSH) 0.9 %
10 SYRINGE (ML) INJECTION
Status: COMPLETED | OUTPATIENT
Start: 2023-02-15 | End: 2023-02-15

## 2023-02-15 RX ADMIN — MORPHINE SULFATE 4 MG: 4 INJECTION, SOLUTION INTRAMUSCULAR; INTRAVENOUS at 09:15

## 2023-02-15 RX ADMIN — FAMOTIDINE 20 MG: 10 INJECTION, SOLUTION INTRAVENOUS at 09:16

## 2023-02-15 RX ADMIN — IOPAMIDOL 90 ML: 755 INJECTION, SOLUTION INTRAVENOUS at 10:00

## 2023-02-15 RX ADMIN — IPRATROPIUM BROMIDE AND ALBUTEROL SULFATE 1 AMPULE: .5; 2.5 SOLUTION RESPIRATORY (INHALATION) at 12:30

## 2023-02-15 RX ADMIN — Medication 10 ML: at 10:00

## 2023-02-15 ASSESSMENT — PAIN SCALES - GENERAL: PAINLEVEL_OUTOF10: 10

## 2023-02-15 ASSESSMENT — ENCOUNTER SYMPTOMS
NAUSEA: 0
SHORTNESS OF BREATH: 0
VOMITING: 0
COUGH: 0
BACK PAIN: 0
RHINORRHEA: 0
DIARRHEA: 0
ABDOMINAL PAIN: 1

## 2023-02-15 ASSESSMENT — PAIN DESCRIPTION - LOCATION: LOCATION: RIB CAGE

## 2023-02-15 ASSESSMENT — PAIN DESCRIPTION - ORIENTATION: ORIENTATION: RIGHT

## 2023-02-15 ASSESSMENT — PAIN - FUNCTIONAL ASSESSMENT: PAIN_FUNCTIONAL_ASSESSMENT: 0-10

## 2023-02-15 NOTE — DISCHARGE INSTRUCTIONS
Please be sure to call both your primary care doctor and gastroenterology referral to follow-up. You may require further testing if your symptoms continue.

## 2023-02-15 NOTE — ED NOTES
Department of Emergency Medicine  FIRST PROVIDER TRIAGE NOTE             Independent MLP           2/15/23  8:02 AM EST    Date of Encounter: 2/15/23   MRN: [id number]      HPI: Cody Randall is a 58 y.o. female who presents to the ED for No chief complaint on file. Right sided chest pain and under right lower ribs. Stated she has also been getting short of breath - more than normal.  Nothing makes pain worse or better. PCP was concerned about her gallbladder. ROS: Negative for back pain, fever, vomiting, or diarrhea. PE: Gen Appearance/Constitutional: alert  GI: tender to palpation RUQ and right lower ribs. Pain is reproducible in RUQ and rib chest with palpation. Initial Plan of Care: All treatment areas with department are currently occupied. Plan to order/Initiate the following while awaiting opening in ED: labs, EKG, and imaging studies.   Initiate Treatment-Testing, Proceed toTreatment Area When Bed Available for ED Attending/MLP to Continue Care    Electronically signed by MATT Mike CNP   DD: 2/15/23      MATT Mike CNP  02/15/23 5586

## 2023-02-15 NOTE — ED PROVIDER NOTES
[facility]  ED Provider Note  Department of Emergency Medicine     ED Room:       Written by: Elizabeth Vásquez DO  Patient Name: Harjit Coelho  Attending Provider: Ronel Zambrano MD  Admit Date: 2/15/2023  8:28 AM  MRN: [id number]    : 1960        Chief Complaint   Patient presents with    Rib Pain (injury)     Ongoing for three days. Per pt feels like \"a knot under right breast.\" C/O intermittent SOB.    - Chief complaint    HPI   Harjit Coelho is a 58 y.o. female presenting to the ED for evaluation of Rib Pain (injury) (Ongoing for three days. Per pt feels like \"a knot under right breast.\" C/O intermittent SOB.)      History obtained from the patient and chart review. Patient is a 70-year-old female with past medical history of COPD emphysema, HTN, HLD, history of NSTEMI in 2022. She is presenting to the ED for evaluation of about 9 days of right upper quadrant abdominal pain worsening today. Per chart review patient was seen at her family medicine doctor outpatient office on 2023, at that time she was evaluated for right-sided flank/right upper quadrant pain x1 week and reported a \"bump\" in the area that is painful. Per their note she also reported increased urinary frequency; she had orders placed for outpatient mammogram, chest x-ray, and gallbladder ultrasound. She had a urinalysis performed that was negative for nitrates, showed small leukocyte esterase with culture sent for confirmation. She states that the pain is not associated with food, unsure of any specific aggravating relieving factors; states it has been pretty much constant with waxing and waning severity but she does not know any specific reason as to why it worsens. She denies any chest pain, shortness of breath, nausea or vomiting, diarrhea, black or bloody stools. She did recently endorse urinary frequency but denies any today.   Denies any dysuria, fevers, abnormal rashes anywhere, motor or sensory deficits. She has not had any falls or injuries. She has not taken anything for her symptoms. Review of Systems   Constitutional:  Negative for chills and fever. HENT:  Negative for congestion and rhinorrhea. Eyes:  Negative for visual disturbance. Respiratory:  Negative for cough and shortness of breath. Cardiovascular:  Negative for chest pain and palpitations. Gastrointestinal:  Positive for abdominal pain. Negative for diarrhea, nausea and vomiting. Genitourinary:  Negative for dysuria and frequency. Musculoskeletal:  Negative for back pain and myalgias. Skin:  Negative for rash and wound. Neurological:  Negative for weakness and numbness. Psychiatric/Behavioral:  Negative for confusion. All other systems reviewed and are negative. Physical Exam  Vitals and nursing note reviewed. Constitutional:       General: She is not in acute distress. Appearance: She is not toxic-appearing. HENT:      Head: Normocephalic and atraumatic. Right Ear: External ear normal.      Left Ear: External ear normal.      Nose: Nose normal. No rhinorrhea. Mouth/Throat:      Mouth: Mucous membranes are moist.      Pharynx: Oropharynx is clear. Eyes:      Extraocular Movements: Extraocular movements intact. Conjunctiva/sclera: Conjunctivae normal.      Pupils: Pupils are equal, round, and reactive to light. Cardiovascular:      Rate and Rhythm: Normal rate and regular rhythm. Pulses: Normal pulses. Heart sounds: Normal heart sounds. Pulmonary:      Effort: Pulmonary effort is normal. No respiratory distress. Breath sounds: Normal breath sounds. No wheezing or rales. Abdominal:      General: Abdomen is protuberant. Bowel sounds are normal.      Palpations: Abdomen is soft. Tenderness: There is abdominal tenderness in the right upper quadrant and epigastric area. There is no right CVA tenderness, left CVA tenderness, guarding or rebound. Musculoskeletal:         General: Normal range of motion. Cervical back: Normal range of motion and neck supple. Right lower leg: No edema. Left lower leg: No edema. Skin:     General: Skin is warm and dry. Capillary Refill: Capillary refill takes less than 2 seconds. Neurological:      General: No focal deficit present. Mental Status: She is alert and oriented to person, place, and time. Sensory: No sensory deficit. Psychiatric:         Mood and Affect: Mood normal.         Behavior: Behavior normal.        Procedures       Medical Decision Making: This is a 58 y.o. female presenting for evaluation of upper abdominal pain worse on the right over the past 8-9 days. Please see HPI for further details, additional history and chart review. On my evaluation today, patient is alert, oriented, nontoxic in appearance, no acute distress. Vitals are significant for HTN, otherwise stable. Exam findings are as documented above; abdomen is protuberant secondary to body habitus; there is no rebound or guarding, she does have upper abdominal pain worse in the right upper quadrant region; no masses or palpable organomegaly are noted. She was given a dose of 4 mg IV Zofran with improved symptoms. She denied nausea. She is neurovascular intact throughout. Later during this encounter patient did have some wheezing, she has asthma/COPD and is supposed to use DuoNeb nebulizers but has not had any yet today; she was given x1 DuoNeb with improvement. Ultimately, patient underwent CTA pulmonary which did not show acute pathology and also CT abdomen pelvis with IV contrast which did not show acute pathology; a right upper quadrant ultrasound did not show evidence to suggest cholecystitis or other acute findings.   At this time, patient is overall well-appearing and her symptoms are improved, I feel she is stable and appropriate for discharge, I provided her prescription for Bentyl and referred her to gastroenterology; she may need a HIDA scan especially if her symptoms continue.  Results and plan were discussed with the patient, she voiced understanding and is amenable.  Strict return precaution were discussed.         While not exhaustive, the following diagnoses and their severity were considered: Biliary colic, cholecystitis, gallstones, GERD, PUD, intra-abdominal infection.       Independent interpretation of Laboratory tests by Mahnaz Monahan DO: CMP, CBC, troponin, lactic acid, lipase all WNL's.    Independent interpretation of Radiology tests by Mahnaz Monahan DO: CTA pulmonary no evidence of acute pathology, no PE.  CT abdomen pelvis no acute pathology.  RUQ ultrasound no acute pathology.      EKG reviewed and interpreted by me, TIME 0819:  This EKG is signed by emergency department physician.    NSR, normal axis, no ischemic changes; rate 89, QTc 455.  No changes compared to previous EKG from 1/15/2023.      Labs & imaging were reviewed and interpreted, see RESULTS. I have personally reviewed all laboratory and imaging results for this patient.       Are there any additional factors to consider that affect care (uninsured, homeless, illiterate, history from another source, etc.) (If yes, which ones).  No      Name and Route of medications administered in the ED:  Medications - No data to display        Re-Evaluations:  ED Course as of 02/15/23 1657   Wed Feb 15, 2023   0853 EKG Interpretation  Interpreted by emergency department physician, Dr. Marcus     2/15/23  Time: 0819    Rhythm: normal sinus   Rate: normal  Axis: normal  Conduction: normal  ST Segments: no acute change  T Waves: no acute change    Clinical Impression:  Sinus rhythm, no acute ischemic changes    Comparison to Old EKG  Stable from prior EKG       [CD]   0919 My end of interpretation laboratory tests show normal CMP, normal CBC, normal troponin [CD]   1235 When patient was getting ready to be discharged,  she did ask if she could be given 1 DuoNeb nebulizer, she is was to use them every 4 hours at home and has not had any yet today. She is in no distress, does have some mild wheezing noted; x1 DuoNeb ordered. [VG]      ED Course User Index  [CD] Andrei Mujica MD  [VG] Addison Loo, DO         Please see ED course for any additional MDM documentation. I have discussed this patient with my attending, who has seen the patient and agrees with this disposition. Patient was seen and evaluated by myself and my attending Andrei Mujica MD. Assessment and Plan discussed with attending provider, please see attestation for final plan of care.           --------------------------------------------- PAST HISTORY ---------------------------------------------  Past Medical History:  has a past medical history of COPD (chronic obstructive pulmonary disease) ([facility] NSTEMI (non-ST elevated myocardial infarction) ([facility] Primary hypertension, Primary open angle glaucoma (POAG) of left eye, severe stage, and Urinary incontinence. Past Surgical History:  has a past surgical history that includes  section; Tonsillectomy; and Cystoscopy (N/A, 2022). Social History:  reports that she quit smoking about 10 months ago. Her smoking use included cigarettes. She has a 6.50 pack-year smoking history. She has never used smokeless tobacco. She reports that she does not drink alcohol and does not use drugs. Family History: family history is not on file. Unless otherwise noted, family history is non contributory. The patients home medications have been reviewed. Allergies: Patient has no known allergies.     -------------------------------------------------- RESULTS -------------------------------------------------  Labs:  Results for orders placed or performed during the hospital encounter of 02/15/23   CBC with Auto Differential   Result Value Ref Range    WBC 5.1 4.5 - 11.5 E9/L    RBC 5.43 3.50 - 5.50 E12/L    Hemoglobin 13.5 11.5 - 15.5 g/dL    Hematocrit 43.1 34.0 - 48.0 %    MCV 79.4 (L) 80.0 - 99.9 fL    MCH 24.9 (L) 26.0 - 35.0 pg    MCHC 31.3 (L) 32.0 - 34.5 %    RDW 15.4 (H) 11.5 - 15.0 fL    Platelets 004 583 - 535 E9/L    MPV 10.0 7.0 - 12.0 fL    Neutrophils % 34.4 (L) 43.0 - 80.0 %    Immature Granulocytes % 0.2 0.0 - 5.0 %    Lymphocytes % 43.5 (H) 20.0 - 42.0 %    Monocytes % 6.9 2.0 - 12.0 %    Eosinophils % 14.0 (H) 0.0 - 6.0 %    Basophils % 1.0 0.0 - 2.0 %    Neutrophils Absolute 1.75 (L) 1.80 - 7.30 E9/L    Immature Granulocytes # 0.01 E9/L    Lymphocytes Absolute 2.21 1.50 - 4.00 E9/L    Monocytes Absolute 0.35 0.10 - 0.95 E9/L    Eosinophils Absolute 0.71 (H) 0.05 - 0.50 E9/L    Basophils Absolute 0.05 0.00 - 0.20 E9/L   Comprehensive Metabolic Panel w/ Reflex to MG   Result Value Ref Range    Sodium 141 132 - 146 mmol/L    Potassium reflex Magnesium 3.7 3.5 - 5.0 mmol/L    Chloride 105 98 - 107 mmol/L    CO2 25 22 - 29 mmol/L    Anion Gap 11 7 - 16 mmol/L    Glucose 139 (H) 74 - 99 mg/dL    BUN 7 6 - 23 mg/dL    Creatinine 0.8 0.5 - 1.0 mg/dL    Est, Glom Filt Rate >60 >=60 mL/min/1.73    Calcium 9.2 8.6 - 10.2 mg/dL    Total Protein 7.5 6.4 - 8.3 g/dL    Albumin 3.9 3.5 - 5.2 g/dL    Total Bilirubin 0.3 0.0 - 1.2 mg/dL    Alkaline Phosphatase 70 35 - 104 U/L    ALT 15 0 - 32 U/L    AST 17 0 - 31 U/L   Troponin   Result Value Ref Range    Troponin, High Sensitivity <6 0 - 9 ng/L   Lipase   Result Value Ref Range    Lipase 23 13 - 60 U/L   Lactic Acid   Result Value Ref Range    Lactic Acid 1.0 0.5 - 2.2 mmol/L   EKG 12 Lead   Result Value Ref Range    Ventricular Rate 89 BPM    Atrial Rate 89 BPM    P-R Interval 152 ms    QRS Duration 80 ms    Q-T Interval 374 ms    QTc Calculation (Bazett) 455 ms    P Axis 70 degrees    R Axis 16 degrees    T Axis 47 degrees       Radiology:  US GALLBLADDER RUQ   Final Result   Unremarkable gallbladder ultrasound.          CT ABDOMEN PELVIS W IV CONTRAST Additional Contrast? None   Final Result   No acute abdominal or pelvic abnormality. Small ill-defined hypoenhancing focus in the spleen, etiology and   significance unknown. Tiny right renal angiomyolipoma. Small indeterminate hypoenhancing lesion in the left kidney. Ultrasound   could be attempted for further evaluation. Mild thickening of the posterior wall of the urinary bladder, unchanged. Probable small uterine fibroid. Small and presumably physiologic left ovarian cyst.         CTA PULMONARY W CONTRAST   Final Result   No evidence of pulmonary embolism or acute pulmonary abnormality. XR CHEST (2 VW)   Final Result   No acute cardiopulmonary disease. Interpreted by the radiologist unless otherwise specified.      ------------------------- NURSING NOTES AND VITALS REVIEWED ---------------------------  Date / Time Roomed:  2/15/2023  8:28 AM  ED Bed Assignment:  01/01    The nursing notes within the ED encounter and vital signs as below have been reviewed by myself. BP (!) 158/88   Pulse 84   Temp 98 °F (36.7 °C)   Resp 18   Ht 5' 4\" (1.626 m)   Wt 240 lb (108.9 kg)   LMP 02/01/2023   SpO2 96%   BMI 41.20 kg/m²   Oxygen Saturation Interpretation: Normal    The patients available past medical records and past encounters were reviewed. ------------------------------------------ PROGRESS NOTES ------------------------------------------  5:00 PM EST  I have spoken with the patient and discussed todays results, in addition to providing specific details for the plan of care and counseling regarding the diagnosis and prognosis. Their questions are answered at this time and they are agreeable with the plan. I discussed at length with them reasons for immediate return here for re evaluation. They will followup with their  gastroenterology referral and primary care physician by calling their office tomorrow.       --------------------------------- ADDITIONAL PROVIDER NOTES ---------------------------------  At this time the patient is without objective evidence of an acute process requiring hospitalization or inpatient management. They have remained hemodynamically stable throughout their entire ED visit and are stable for discharge with outpatient follow-up. The plan has been discussed in detail and they are aware of the specific conditions for emergent return, as well as the importance of follow-up. Discharge Medication List as of 2/15/2023 12:01 PM        START taking these medications    Details   dicyclomine (BENTYL) 10 MG capsule Take 1 capsule by mouth 4 times daily as needed (abdominal cramping), Disp-40 capsule, R-[id number]             Diagnosis:  1. Right upper quadrant abdominal pain        Disposition:  Patient's disposition: Discharge to home  Patient's condition is stable. Bernardo Stanton D.O. PGY-3     Resident Physician     Emergency Medicine      2/15/2023 8:42 AM      NOTE: This report was transcribed using voice recognition software.  Every effort was made to ensure accuracy; however, inadvertent computerized transcription errors may be present             Bernardo Stanton DO  Resident  02/15/23 [id number]

## 2023-02-15 NOTE — ED PROVIDER NOTES
ATTENDING PROVIDER ATTESTATION:     Bonnie Redmond presented to the emergency department for evaluation of Rib Pain (injury) (Ongoing for three days. Per pt feels like \"a knot under right breast.\" C/O intermittent SOB.)   and was initially evaluated by the Medical Resident. See Original ED Note for H&P and ED course above. I have reviewed and discussed the case, including pertinent history (medical, surgical, family and social) and exam findings with the Medical Resident assigned to Bonnie Redmond. I have personally performed and/or participated in the history, exam, medical decision making, and procedures and agree with all pertinent clinical information and any additional changes or corrections are noted below in my assessment and plan. I have discussed this patient in detail with the resident, and provided the instruction and education,       I have reviewed my findings and recommendations with the assigned Medical Resident, Bonnie Redmond and members of family present at the time of disposition. I have performed a history and physical examination of this patient and directly supervised the resident caring for this patient              [address]        Pt Name: Bonnie Redmond  MRN: [id number]  [place] 1960  Date of evaluation: 2/15/2023  Provider: Sharita Gil MD  PCP: Deon Tinajero DO  Note Started: 8:53 AM EST 2/15/23    CHIEF COMPLAINT       Chief Complaint   Patient presents with    Rib Pain (injury)     Ongoing for three days. Per pt feels like \"a knot under right breast.\" C/O intermittent SOB. HISTORY OF PRESENT ILLNESS: 1 or more Elements     Limitations to history : None    Maria Elena Groves is a 58 y.o. female who presents for right upper quadrant abdominal pain. Patient reports pain for the last 3 days. She reports it is under her right chest/right upper abdomen. She reports aching pain.   She says she feels like there is something tight under there. Sometimes the pain is sharp. She reports intermittently she feels short of breath. No fevers or chills. No trauma. No history of DVT or PE. No orthopnea. She denies rash. No history of gallbladder disease or gallstones. Nursing Notes were all reviewed and agreed with or any disagreements were addressed in the HPI. REVIEW OF EXTERNAL NOTE :       1/15/23 EKG  21 Stress test      Controlled Substance Monitoring:    Acute and Chronic Pain Monitoring:   No flowsheet data found. REVIEW OF SYSTEMS :      Positives and Pertinent negatives as per HPI.      SURGICAL HISTORY     Past Surgical History:   Procedure Laterality Date     SECTION      x5    CYSTOSCOPY N/A 2022    CYSTOSCOPY BOTOX INJECTION 100 UNITS   (PHARMACY NOTIFIED) performed by Dinah Osman DO at [facility]       Discharge Medication List as of 2/15/2023 12:01 PM        CONTINUE these medications which have NOT CHANGED    Details   ipratropium-albuterol (DUONEB) 0.5-2.5 (3) MG/3ML SOLN nebulizer solution Inhale 3 mLs into the lungs every 4 hours as needed for Shortness of Breath, Disp-360 mL, R-0Normal      metoprolol tartrate (LOPRESSOR) 25 MG tablet 1 tablet Taking 1 tablet daily, Disp-60 tabletHistorical Med      TRELEGY ELLIPTA 100-62.5-25 MCG/ACT AEPB inhaler inhale 1 puff by mouth and INTO THE LUNGS once daily, Disp-1 each, R-5Normal      acetaZOLAMIDE (DIAMOX) 250 MG tablet Take 2 tablets by mouth 2 times daily, Disp-50 tablet, R-0Normal      dorzolamide-timolol (COSOPT) 22.3-6.8 MG/ML ophthalmic solution Place 1 drop into the left eye 2 times daily, Disp-10 mL, R-0Normal      latanoprost (XALATAN) 0.005 % ophthalmic solution Place 1 drop into the left eye nightly, Disp-2.5 mL, R-0Normal      brimonidine (ALPHAGAN) 0.2 % ophthalmic solution Place 1 drop into the left eye 3 times daily, Disp-5 mL, R-0Normal brompheniramine-pseudoephedrine-DM 2-30-10 MG/5ML syrup Take 2.5 mLs by mouth 4 times daily as needed for Cough, Disp-118 mL, R-0Print      ASPIRIN LOW DOSE 81 MG chewable tablet chew and swallow 1 tablet by mouth once daily, Disp-30 tablet, R-3Normal      buPROPion (WELLBUTRIN XL) 150 MG extended release tablet Take 1 tablet by mouth every morning, Disp-30 tablet, R-3Normal      amLODIPine (NORVASC) 10 MG tablet Take 1 tablet by mouth in the morning., Disp-30 tablet, R-5Normal      nystatin (MYCOSTATIN) 049126 UNIT/GM cream Apply topically 2 times daily. , Disp-30 g, R-1, Normal      lidocaine (XYLOCAINE) 5 % ointment Apply topically as needed. , Disp-240 g, R-5, Normal      Respiratory Therapy Supplies (NEBULIZER/TUBING/MOUTHPIECE) KIT DAILY PRN Starting Mon 3/28/2022, Disp-1 kit, R-2, Normal      dorzolamide (TRUSOPT) 2 % ophthalmic solution instill 1 drop into left eye twice a dayHistorical Med      atorvastatin (LIPITOR) 40 MG tablet Take 1 tablet by mouth nightly, Disp-30 tablet, R-3Normal      Blood Pressure KIT DAILY Starting u 2021, Disp-1 kit, R-0, Print      albuterol sulfate HFA (PROVENTIL HFA) 108 (90 Base) MCG/ACT inhaler Inhale 2 puffs into the lungs every 4 hours as needed for Wheezing, Disp-1 Inhaler, R-5May substitute for any albuterol inhaler. Normal      Spacer/Aero-Holding Chambers ABHIJIT DAILY Starting 2021, Disp-1 Device, R-0, Print             ALLERGIES     Patient has no known allergies. FAMILYHISTORY     History reviewed. No pertinent family history.      SOCIAL HISTORY       Social History     Tobacco Use    Smoking status: Former     Packs/day: 0.50     Years: 13.00     Pack years: 6.50     Types: Cigarettes     Quit date: 2022     Years since quittin.8    Smokeless tobacco: Never   Vaping Use    Vaping Use: Never used   Substance Use Topics    Alcohol use: No    Drug use: No       SCREENINGS        Cando Coma Scale  Eye Opening: Spontaneous  Best Verbal Response: Oriented  Best Motor Response: Obeys commands  Neda Coma Scale Score: 15                CIWA Assessment  BP: (!) 158/88  Heart Rate: 84           PHYSICAL EXAM  1 or more Elements     ED Triage Vitals   BP Temp Temp src Heart Rate Resp SpO2 Height Weight   02/15/23 0804 02/15/23 0757 -- 02/15/23 0757 02/15/23 0804 02/15/23 0757 02/15/23 0757 02/15/23 0757   (!) 193/121 98.1 °F (36.7 °C)  98 18 96 % 5' 4\" (1.626 m) 240 lb (108.9 kg)                 Constitutional/General: Alert and oriented x3  Head: Normocephalic and atraumatic  Eyes: PERRL, EOMI, conjunctiva normal, sclera non icteric  ENT:  Oropharynx clear, handling secretions, no trismus, no asymmetry of the posterior oropharynx or uvular edema  Neck: Supple, full ROM, no stridor, no meningeal signs  Respiratory: Lungs with a few scattered wheezes bilaterally, and. Not in respiratory distress  Cardiovascular:  Regular rate. Regular rhythm. No murmurs, no gallops, no rubs. 2+ distal pulses. Equal extremity pulses. Chest: No chest wall tenderness  GI:  Abdomen Soft, right upper quadrant and epigastric tenderness. No lower abdominal tenderness. Negative Orellana sign. Non distended. No rebound, guarding, or rigidity. No pulsatile masses. Musculoskeletal: Moves all extremities x 4. Warm and well perfused, no clubbing, no cyanosis, no edema. Capillary refill <3 seconds  Integument: skin warm and dry. No rashes.    Neurologic: GCS 15, no focal deficits, symmetric strength 5/5 in the upper and lower extremities bilaterally  Psychiatric: Normal Affect            DIAGNOSTIC RESULTS   LABS: Interpreted by Cecil Brooks MD    Labs Reviewed   CBC WITH AUTO DIFFERENTIAL - Abnormal; Notable for the following components:       Result Value    MCV 79.4 (*)     MCH 24.9 (*)     MCHC 31.3 (*)     RDW 15.4 (*)     Neutrophils % 34.4 (*)     Lymphocytes % 43.5 (*)     Eosinophils % 14.0 (*)     Neutrophils Absolute 1.75 (*)     Eosinophils Absolute 0.71 (*)     All other components within normal limits   COMPREHENSIVE METABOLIC PANEL W/ REFLEX TO MG FOR LOW K - Abnormal; Notable for the following components:    Glucose 139 (*)     All other components within normal limits   TROPONIN   LIPASE   LACTIC ACID       As interpreted by me, the above displayed labs are abnormal. All other labs obtained during this visit were within normal range or not returned as of this dictation. RADIOLOGY:   Unless a wet read is documented in MDM or ED course,  non-plain film images such as CT, Ultrasound and MRI are read by the radiologist. Plain radiographic images are visualized and preliminarily interpreted by the ED Provider with the findings documented in the ED course:    Interpretation per the Radiologist below, if available at the time of this note:    [address]   Final Result   Unremarkable gallbladder ultrasound. CT ABDOMEN PELVIS W IV CONTRAST Additional Contrast? None   Final Result   No acute abdominal or pelvic abnormality. Small ill-defined hypoenhancing focus in the spleen, etiology and   significance unknown. Tiny right renal angiomyolipoma. Small indeterminate hypoenhancing lesion in the left kidney. Ultrasound   could be attempted for further evaluation. Mild thickening of the posterior wall of the urinary bladder, unchanged. Probable small uterine fibroid. Small and presumably physiologic left ovarian cyst.         CTA PULMONARY W CONTRAST   Final Result   No evidence of pulmonary embolism or acute pulmonary abnormality. XR CHEST (2 VW)   Final Result   No acute cardiopulmonary disease. No results found. No results found.     PROCEDURES   Unless otherwise noted below, none       PAST MEDICAL HISTORY/Chronic Conditions Affecting Care      has a past medical history of COPD (chronic obstructive pulmonary disease) ([facility] [address] NSTEMI (non-ST elevated myocardial infarction) ([facility] [address] (3/30/2022), Primary hypertension, Primary open angle glaucoma (POAG) of left eye, severe stage, and Urinary incontinence. EMERGENCY DEPARTMENT COURSE    Vitals:    Vitals:    02/15/23 0757 02/15/23 0804 02/15/23 0858 02/15/23 1230   BP:  (!) 193/121 (!) 162/98 (!) 158/88   Pulse: 98 98 86 84   Resp:  18 16 18   Temp: 98.1 °F (36.7 °C) 98 °F (36.7 °C)     SpO2: 96% 96% 97% 96%   Weight: 240 lb (108.9 kg) 240 lb (108.9 kg)     Height: 5' 4\" (1.626 m) 5' 4\" (1.626 m)         Patient was given the following medications:  Medications   morphine sulfate (PF) injection 4 mg (4 mg IntraVENous Given 2/15/23 0915)   famotidine (PEPCID) 20 mg in sodium chloride (PF) 0.9 % 10 mL injection (20 mg IntraVENous Given 2/15/23 0916)   iopamidol (ISOVUE-370) 76 % injection 90 mL (90 mLs IntraVENous Given 2/15/23 1000)   sodium chloride flush 0.9 % injection 10 mL (10 mLs IntraVENous Given 2/15/23 1000)   ipratropium-albuterol (DUONEB) nebulizer solution 1 ampule (1 ampule Inhalation Given 2/15/23 1230)                Medical Decision Making/Differential Diagnosis:    CC/HPI Summary, Social Determinants of health, Records Reviewed, DDx, testing done/not done, ED Course, Reassessment, disposition considerations/shared decision making with patient, consults, disposition:      ED Course as of 02/15/23 1904   Wed Feb 15, 2023   5485 EKG Interpretation  Interpreted by emergency department physician, Dr. Elizabeth Jean     2/15/23  Time: 0819    Rhythm: normal sinus   Rate: normal  Axis: normal  Conduction: normal  ST Segments: no acute change  T Waves: no acute change    Clinical Impression:  Sinus rhythm, no acute ischemic changes    Comparison to Old EKG  Stable from prior EKG       [CD]   0919 My end of interpretation laboratory tests show normal CMP, normal CBC, normal troponin [CD]   7061 When patient was getting ready to be discharged, she did ask if she could be given 1 DuoNeb nebulizer, she is was to use them every 4 hours at home and has not had any yet today.   She is in no distress, does have some mild wheezing noted; x1 DuoNeb ordered. [VG]      ED Course User Index  [CD] Blane Queen MD  [VG] Duran Ingram DO          Medical Decision Making  Differential diagnosis includes but not limited to acute coronary syndrome, STEMI, non-STEMI, pneumothorax, cholecystitis, pancreatitis, bowel obstruction. EKG does not show ST segment elevation. Chest x-ray per my wet read interpretation does not show any obvious pneumothorax. Clinically she improved with some medications in the emergency department. She was given Pepcid, intravenous morphine as well as a breathing treatment for her cough and bronchospasm. My independent interpretation of the labs show normal lactate, normal CBC, CMP with glucose 139, otherwise normal, troponin less than 6, lipase 23. CT scan of the abdomen and pelvis also obtained and interpreted by radiology as normal.  CT angiogram of the chest negative for pulmonary embolism or pneumonia. Did consider admission to the hospital but in the setting of normal testing, improvement of symptoms, normal imaging I see no indication for admission. She has no chest pain. This seems to be abdominal pain. She has normal troponin and reassuring EKG. Problems Addressed:  Right upper quadrant abdominal pain: acute illness or injury    Amount and/or Complexity of Data Reviewed  External Data Reviewed: notes. Labs: ordered. Decision-making details documented in ED Course. Radiology: ordered and independent interpretation performed. Decision-making details documented in ED Course. ECG/medicine tests: ordered and independent interpretation performed. Decision-making details documented in ED Course. Risk  OTC drugs. Prescription drug management. Parenteral controlled substances. Drug therapy requiring intensive monitoring for toxicity. Decision regarding hospitalization.                  CONSULTS:   None              CRITICAL CARE TIME (.cct)            I am the Primary Clinician of Record. FINAL IMPRESSION      1. Right upper quadrant abdominal pain          DISPOSITION/PLAN     DISPOSITION Decision To Discharge 02/15/2023 12:40:30 PM      PATIENT REFERRED TO:  Shankar Abernathy MD  [address] [facility]  [phone]    Call on 2/15/2023  For follow-up as soon as possible (GASTROENTEROLOGY). Cammie Gonzalez DO  [address] [facility]  [phone]    Call on 2/15/2023  For follow-up within 1-2 days.     [address] [facility]  [address] [place] [address]  [phone]  Go to   As needed, If symptoms worsen    DISCHARGE MEDICATIONS:  Discharge Medication List as of 2/15/2023 12:01 PM        START taking these medications    Details   dicyclomine (BENTYL) 10 MG capsule Take 1 capsule by mouth 4 times daily as needed (abdominal cramping), Disp-40 capsule, R-0Print             DISCONTINUED MEDICATIONS:  Discharge Medication List as of 2/15/2023 12:01 PM                 (Please note that portions of this note were completed with a voice recognition program.  Efforts were made to edit the dictations but occasionally words are mis-transcribed.)    Gill Birmingham MD (electronically signed)            Ida Smith MD  02/15/23 2022

## 2023-02-15 NOTE — ED NOTES
Patient states she wants to eat before she leaves, lunch tray provided.      Micki Foley, RN  02/15/23 9910

## 2023-02-15 NOTE — ED NOTES
Patient c/o Right upper quad pain     eing analyzed. Pau Coley RN  02/15/23 0919       Pau Coley RN  02/15/23 1000

## 2023-02-16 LAB — URINE CULTURE, ROUTINE: NORMAL

## 2023-02-24 ENCOUNTER — HOSPITAL ENCOUNTER (OUTPATIENT)
Age: 63
End: 2023-02-24
Payer: MEDICAID

## 2023-02-24 ENCOUNTER — HOSPITAL ENCOUNTER (OUTPATIENT)
Dept: ULTRASOUND IMAGING | Age: 63
Discharge: HOME | End: 2023-02-24
Payer: MEDICAID

## 2023-02-24 ENCOUNTER — HOSPITAL ENCOUNTER (OUTPATIENT)
Dept: GENERAL RADIOLOGY | Age: 63
End: 2023-02-24
Payer: MEDICAID

## 2023-02-24 DIAGNOSIS — R10.11 RUQ PAIN: ICD-10-CM

## 2023-02-24 DIAGNOSIS — J44.9 STAGE 3 SEVERE COPD BY GOLD CLASSIFICATION (HCC): ICD-10-CM

## 2023-02-24 PROCEDURE — 71046 X-RAY EXAM CHEST 2 VIEWS: CPT

## 2023-02-24 PROCEDURE — 76705 ECHO EXAM OF ABDOMEN: CPT

## 2023-03-02 ENCOUNTER — OFFICE VISIT (OUTPATIENT)
Dept: PULMONOLOGY | Age: 63
End: 2023-03-02
Payer: MEDICAID

## 2023-03-02 ENCOUNTER — TELEPHONE (OUTPATIENT)
Dept: PULMONOLOGY | Age: 63
End: 2023-03-02

## 2023-03-02 VITALS
BODY MASS INDEX: 42.61 KG/M2 | OXYGEN SATURATION: 95 % | WEIGHT: 249.6 LBS | HEART RATE: 106 BPM | TEMPERATURE: 96.4 F | SYSTOLIC BLOOD PRESSURE: 168 MMHG | HEIGHT: 64 IN | RESPIRATION RATE: 18 BRPM | DIASTOLIC BLOOD PRESSURE: 98 MMHG

## 2023-03-02 DIAGNOSIS — J44.9 CHRONIC OBSTRUCTIVE PULMONARY DISEASE, UNSPECIFIED COPD TYPE (HCC): ICD-10-CM

## 2023-03-02 DIAGNOSIS — G47.33 OSA (OBSTRUCTIVE SLEEP APNEA): Primary | ICD-10-CM

## 2023-03-02 PROCEDURE — 99203 OFFICE O/P NEW LOW 30 MIN: CPT | Performed by: NURSE PRACTITIONER

## 2023-03-02 RX ORDER — ALBUTEROL SULFATE 90 UG/1
2 AEROSOL, METERED RESPIRATORY (INHALATION) EVERY 4 HOURS PRN
Qty: 1 EACH | Refills: 5 | Status: SHIPPED | OUTPATIENT
Start: 2023-03-02 | End: 2024-03-01

## 2023-03-02 NOTE — PROGRESS NOTES
New pt to establish care with pulmonary provider here in office. Pt reports she uses Trelegy inhaler with Duoneb aerosols prn. Pt needs new rescue Albuterol inhaler; office to send script. Plan for pt to follow up in office in 2 mos and appt given. Tests to be ordered and pt advised Office will mail out appt letter and scripts for labs with be mailed.

## 2023-03-02 NOTE — TELEPHONE ENCOUNTER
Mailed a letter to patient informing her that her PFT is scheduled for 4-5-23 at 1:00 pm at the [facility]. She must arrive by 12:30 pm. She is to have no caffeine for 24 hours prior to test and  no resp meds for at least 4 hours prior to test

## 2023-03-02 NOTE — PROGRESS NOTES
[place]  Department of Pulmonary, Critical Care and Sleep Medicine  Dr. Alexis Martines, Dr. Colleen Wayne, Dr. Emir Camp, APRN    Pulmonary & Critical Care Office Note - Follow up      Assessment/Plan       Problem List Items Addressed This Visit    None  Visit Diagnoses       RAUL (obstructive sleep apnea)    -  Primary    Relevant Orders    Baseline Diagnostic Sleep Study    Chronic obstructive pulmonary disease, unspecified COPD type ([address]        Relevant Medications    albuterol sulfate HFA (PROVENTIL HFA) 108 (90 Base) MCG/ACT inhaler    Other Relevant Orders    [facility] - Pulmonary Rehab, [id number], [address] [facility] [place], [place], , Internal Medicine, [facility]    Full PFT Study With Bronchodilator            Patient ID: Jordy Griffin is a 58 y.o. female    Chief Complaint: Shortness of breath    HPI: Jordy Griffin is a pleasant 58 y.o. female with a PMH of severe COPD, history of tobacco use, HTN, HLD and open angle glaucoma. Lola Zapata presents to the office today to establish care and for evaluation and management of her COPD and exertional dyspnea. Maria Elena smoked from age 25- 25 and then started smoking again at age 52 about 1 ppd until she quit last year (2022). Spirometry conducted last year showed severe obstruction and revealed evidence of possible restriction, she will need full PFT to evaluate. She states she has been short of breath for years, and has gained about 50 lbs. Lola Zapata does not sleep well and recently purchased and adjustable bed so she could sleep sitting up. She would benefit from baseline diagnostic sleep study to rule out sleep apnea. Assessment:    Severe COPD, 2/2 previous tobacco use  Dyspnea on exertion, multifactorial, severe COPD, obesity and deconditioning. Would likely benefit from pulmonary rehabilitation  Possible restrictive lung disease, previous spirometry revealed evidence of possible restriction.  Needs full PFT and possibly HRCT chest based on findings. Obesity BMI 42, has gained about 50 lbs reportedly since she quit smoking a year ago  Deconditioning  RAUL?, poor sleep with waking feeling like she is choking and gasping for air  Orthopnea, recently ordered an adjustable bed  HTN, metoprolol and amlodipine  HLD, atorvastatin  Primary open angle glaucoma, left eye blindess. Recently visited the ER due to left eye pain and was found to have elevated pressure. Started on Diamox 500 mg bid  Medication non-compliance, spent about 15 minutes reviewing all home medications with Sofiya Bae and educating what each medication is and what is for. She was unsure if she really needed to be taking all of these medication. I did recommend if she has any further questions after our discussion to reach out to her PCP. Plan:     Continue Trelegy   Advised to rinse mouth after each use. P.r.n. albuterol via inhaler and nebulizer  Advised on proper inhaler technique, and adherence to prescribed inhalers  Refer to pulmonary rehabilitation  Refer to Internal medicine, Dr. Dina De Los Santos   Full PFT in lab  Sleep study  Obtain respiratory allergen panel and eosinophil count       Nodule OR Screen - 50-80, smoked ? 20 pack years, smoke or quit within 15 yrs. Will need CT chest February 15, 2024. Aspiration / GERD precautions  Head end of bed elevation. Maintain active and healthy lifestyle with weight reduction. COVID-19 precautions  Recommend yearly Influenza and appropriate pneumonia vaccinations. No family history on file.      Follow up: in 2 months    Clayton Laureano, APRN-CNP  Pulmonary [facility]  Dr. Zee Castillo, Dr. Thomas Russo, Dr. Raymon Bhatt, Dr. Henrene Apgar This Encounter   Procedures    [address] [place] [address]     Referral Priority:   Routine     Referral Type:   Eval and Treat     Referral Reason:   Specialty Services Required     Requested Specialty: Rehabilitation     Number of Visits Requested:   [address] [facility] [address], Gisella Adams DO, Internal Medicine, [place] [facility]     Referral Priority:   Routine     Referral Type:   Eval and Treat     Referral Reason:   Specialty Services Required     Referred to Provider:   Lizette Rodrigues DO     Requested Specialty:   Internal Medicine     Number of Visits Requested:   1    Full PFT Study With Bronchodilator     If an ABG is needed along with this PFT procedure, please place the appropriate lab order     Standing Status:   Future     Standing Expiration Date:   3/2/2024    Baseline Diagnostic Sleep Study     Standing Status:   Future     Standing Expiration Date:   3/2/2024     Order Specific Question:   Adult or Pediatric     Answer:   Adult Study (>7 Years)     Order Specific Question:   Location For Sleep Study     Answer: [place]     Order Specific Question:   Select Sleep Lab Location     Answer:   [facility]         There is no immunization history on file for this patient. Subjective     Last office visit: New patient visit    Exacerbations: Exacerbation with ER visit January 15, 2023    Pulm Meds: Trelegy, labuterol and duoneb    Antiplatelet/anticoagulants: Aspirin     Smoking history: smoked from age [age] quit and started again [age]years old    Quit date: April 2022    SPIROMETRY March 31, 2022:  FVC is 1.79 liters which is 67% of predicted. FEV1 is 0.83  liters which is 39% of predicted. FEV1 to FVC ratio is 46. MVV is 38  which is 42% of predicted. LUNG VOLUMES:  FVC is 1.88 which is 70% of predicted. ERV is 0.03 which is 5% of predicted. DIFFUSION:  Diffusion is 7.81 which is 32% of predicted, corrected for  alveolar ventilation is 75% of predicted. FLOW VOLUME LOOP:  Flow volume loop is consistent with both restriction  and obstruction. OVERALL INTERPRETATION:  Pulmonary function tests are consistent with  severe obstruction.   There was no bronchodilator given.  MVV is  significantly reduced which may be secondary to deconditioning versus  neuromuscular weakness versus difficulty with maneuver. Lung volumes  and flow volume loops are also consistent with the restriction. Based  on the ERV of only 5% of predicted and the patient's weight of 220  pounds and height of 64 inches, this may be secondary to body habitus;  however, other causes such as _____ pulmonary fibrosis cannot be ruled  out. Diffusion is severely reduced, but partially corrects for alveolar  ventilation. Please clinically correlate. Imaging   Reviewed imaging studies personally and findings as below  XR CHEST (2 VW)    Result Date: 2/24/2023  EXAMINATION: TWO XRAY VIEWS OF THE CHEST 2/24/2023 9:54 am COMPARISON: Chest CT 15 February 2023 HISTORY: ORDERING SYSTEM PROVIDED HISTORY: Stage 3 severe COPD by GOLD classification ([address] FINDINGS: The lungs are without acute focal process. There is no effusion or pneumothorax. The cardiomediastinal silhouette is without acute process. The osseous structures are without acute process. No acute process. XR CHEST (2 VW)    Result Date: 2/15/2023  EXAMINATION: TWO XRAY VIEWS OF THE CHEST 2/15/2023 8:47 am COMPARISON: 01/15/2023. HISTORY: ORDERING SYSTEM PROVIDED HISTORY: chest pain TECHNOLOGIST PROVIDED HISTORY: Reason for exam:->chest pain What reading provider will be dictating this exam?->CRC FINDINGS: The cardiac silhouette is normal in size. The pulmonary vasculature is within normal limits. No pulmonary consolidation or collapse is identified. No pneumothorax or pleural effusion is seen. No acute cardiopulmonary disease. CT ABDOMEN PELVIS W IV CONTRAST Additional Contrast? None    Result Date: 2/15/2023  EXAMINATION: CT OF THE ABDOMEN AND PELVIS WITH CONTRAST 2/15/2023 9:50 am TECHNIQUE: CT of the abdomen and pelvis was performed with the administration of intravenous contrast. Multiplanar reformatted images are provided for review. Automated exposure control, iterative reconstruction, and/or weight based adjustment of the mA/kV was utilized to reduce the radiation dose to as low as reasonably achievable. COMPARISON: 11/22/2022. HISTORY: THE LOWER LUMBAR SPINE DEMONSTRATES DEGENERATIVE CHANGES. THERE IS A SMALL UNCOMPLICATED UMBILICAL HERNIA CONTAINING FAT. ORDERING SYSTEM PROVIDED HISTORY: upper abdominal pain worse on the right TECHNOLOGIST PROVIDED HISTORY: Reason for exam:->upper abdominal pain worse on the right Additional Contrast?->None Decision Support Exception - unselect if not a suspected or confirmed emergency medical condition->Emergency Medical Condition (MA) What reading provider will be dictating this exam?->CRC FINDINGS: CTA chest was also performed and will be separately interpreted. The liver, pancreas, and both adrenal glands appear unremarkable. There is a small ill-defined hypoenhancing focus in the spleen inferiorly and posteriorly. There is a tiny fatty lesion consistent with an angiomyolipoma in the upper pole of the right kidney posteriorly. There is also a small hypoenhancing lesion in the upper pole the left kidney anteriorly. This finding is indeterminate. The posterior wall of the urinary bladder again appears mildly thickened. A small fibroid is suspected along the anterior wall of the body of the uterus. There is a small and presumably physiologic left ovarian cyst.  No acute intestinal abnormality is identified. The appendix appears unremarkable. No free intra-abdominal air or ascites is seen. Mild atherosclerotic changes are noted. The lower lumbar spine demonstrates degenerative changes. There is a small uncomplicated umbilical hernia containing fat. No acute abdominal or pelvic abnormality. Small ill-defined hypoenhancing focus in the spleen, etiology and significance unknown. Tiny right renal angiomyolipoma. Small indeterminate hypoenhancing lesion in the left kidney.   Ultrasound could be attempted for further evaluation. Mild thickening of the posterior wall of the urinary bladder, unchanged. Probable small uterine fibroid. Small and presumably physiologic left ovarian cyst.     US GALLBLADDER RUQ    Result Date: 2/24/2023  EXAMINATION: RIGHT UPPER QUADRANT ULTRASOUND 2/24/2023 10:40 am COMPARISON: None. HISTORY: ORDERING SYSTEM PROVIDED HISTORY: RUQ pain TECHNOLOGIST PROVIDED HISTORY: What reading provider will be dictating this exam?->CRC FINDINGS: LIVER:  The liver demonstrates mildly increased echogenicity without evidence of intrahepatic biliary ductal dilatation. BILIARY SYSTEM:  Gallbladder is unremarkable without evidence of pericholecystic fluid, wall thickening or stones. Negative sonographic Orellana's sign. Common bile duct is within normal limits measuring 3.0 mm. RIGHT KIDNEY: The right kidney is grossly unremarkable without evidence of hydronephrosis. PANCREAS:  Visualized portions of the pancreas are unremarkable. OTHER: No evidence of right upper quadrant ascites. Mild fatty liver. No sonographic evidence of cholecystitis. US GALLBLADDER RUQ    Result Date: 2/15/2023  EXAMINATION: RIGHT UPPER QUADRANT ULTRASOUND 2/15/2023 11:24 am COMPARISON: None. HISTORY: ORDERING SYSTEM PROVIDED HISTORY: RUQ pain TECHNOLOGIST PROVIDED HISTORY: Reason for exam:->RUQ pain What reading provider will be dictating this exam?->CRC FINDINGS: This study is limited to evaluation of the gallbladder. BILIARY SYSTEM:  Gallbladder is unremarkable without evidence of pericholecystic fluid, wall thickening or stones. Negative sonographic Orellana's sign. Common bile duct is within normal limits measuring 5.0 mm. OTHER: No evidence of right upper quadrant ascites. Unremarkable gallbladder ultrasound.      CTA PULMONARY W CONTRAST    Result Date: 2/15/2023  EXAMINATION: CTA OF THE CHEST 2/15/2023 9:50 am TECHNIQUE: CTA of the chest was performed after the administration of intravenous contrast. Multiplanar reformatted images are provided for review. MIP images are provided for review. Automated exposure control, iterative reconstruction, and/or weight based adjustment of the mA/kV was utilized to reduce the radiation dose to as low as reasonably achievable. COMPARISON: None. HISTORY: ORDERING SYSTEM PROVIDED HISTORY: Right lower chest pain, r/o PE TECHNOLOGIST PROVIDED HISTORY: Reason for exam:->Right lower chest pain, r/o PE Decision Support Exception - unselect if not a suspected or confirmed emergency medical condition->Emergency Medical Condition (MA) What reading provider will be dictating this exam?->CRC FINDINGS: Pulmonary Arteries: Pulmonary arteries are adequately opacified for evaluation. No evidence of intraluminal filling defect to suggest pulmonary embolism. Main pulmonary artery is normal in caliber. Mediastinum: No evidence of mediastinal lymphadenopathy. The heart and pericardium demonstrate no acute abnormality. There is no acute abnormality of the thoracic aorta. Lungs/pleura: The lungs are without acute process. No focal consolidation or pulmonary edema. No evidence of pleural effusion or pneumothorax. Upper Abdomen: Limited images of the upper abdomen are unremarkable. Soft Tissues/Bones: No acute bone or soft tissue abnormality. No evidence of pulmonary embolism or acute pulmonary abnormality. CT chest: February 15, 2023    Impression   No evidence of pulmonary embolism or acute pulmonary abnormality. Sleep Study:  Will schedule    ALLERGIES:No Known Allergies    SOCIAL HISTORY:   Social History     Tobacco Use    Smoking status: Former     Packs/day: 0.50     Years: 13.00     Pack years: 6.50     Types: Cigarettes     Quit date: 2022     Years since quittin.9    Smokeless tobacco: Never   Vaping Use    Vaping Use: Never used   Substance Use Topics    Alcohol use: No    Drug use: No     MEDS:   Current Outpatient Medications   Medication Sig Dispense Refill    albuterol sulfate HFA (PROVENTIL HFA) 108 (90 Base) MCG/ACT inhaler Inhale 2 puffs into the lungs every 4 hours as needed for Wheezing 1 each 5    ipratropium-albuterol (DUONEB) 0.5-2.5 (3) MG/3ML SOLN nebulizer solution Inhale 3 mLs into the lungs every 4 hours as needed for Shortness of Breath 360 mL 0    metoprolol tartrate (LOPRESSOR) 25 MG tablet 1 tablet Taking 1 tablet daily 60 tablet     TRELEGY ELLIPTA 100-62.5-25 MCG/ACT AEPB inhaler inhale 1 puff by mouth and INTO THE LUNGS once daily 1 each 5    ASPIRIN LOW DOSE 81 MG chewable tablet chew and swallow 1 tablet by mouth once daily 30 tablet 3    amLODIPine (NORVASC) 10 MG tablet Take 1 tablet by mouth in the morning. 30 tablet 5    atorvastatin (LIPITOR) 40 MG tablet Take 1 tablet by mouth nightly 30 tablet 3    dicyclomine (BENTYL) 10 MG capsule Take 1 capsule by mouth 4 times daily as needed (abdominal cramping) 40 capsule 0    acetaZOLAMIDE (DIAMOX) 250 MG tablet Take 2 tablets by mouth 2 times daily 50 tablet 0    latanoprost (XALATAN) 0.005 % ophthalmic solution Place 1 drop into the left eye nightly 2.5 mL 0    brimonidine (ALPHAGAN) 0.2 % ophthalmic solution Place 1 drop into the left eye 3 times daily 5 mL 0    brompheniramine-pseudoephedrine-DM 2-30-10 MG/5ML syrup Take 2.5 mLs by mouth 4 times daily as needed for Cough 118 mL 0    buPROPion (WELLBUTRIN XL) 150 MG extended release tablet Take 1 tablet by mouth every morning 30 tablet 3    nystatin (MYCOSTATIN) 149365 UNIT/GM cream Apply topically 2 times daily. 30 g 1    lidocaine (XYLOCAINE) 5 % ointment Apply topically as needed.  240 g 5    Respiratory Therapy Supplies (NEBULIZER/TUBING/MOUTHPIECE) KIT 1 kit by Does not apply route daily as needed (as needed for wheezing / shortness of breath) 1 kit 2    dorzolamide (TRUSOPT) 2 % ophthalmic solution instill 1 drop into left eye twice a day      Blood Pressure KIT 1 kit by Does not apply route daily 1 kit 0 Spacer/Aero-Holding Anum Nicolette LACEY 1 Device by Does not apply route daily 1 Device 0     No current facility-administered medications for this visit. Review of Systems: Negative other than specified above. Objective       Vitals:  BP: (!) 168/98, Heart Rate: (!) 106, Resp: 18, Temp: (!) 96.4 °F (35.8 °C),  , SpO2: 95 %, Height: 5' 4\" (162.6 cm), Weight: 249 lb 9.6 oz (113.2 kg)    BMI body mass index is 42.84 kg/m². Ideal Body Weight: Ideal body weight: 54.7 kg (120 lb 9.5 oz)  Adjusted ideal body weight: 78.1 kg (172 lb 3.1 oz)  ---------------  Physical Exam:    General: Alert and oriented x 3. No acute distress. Eyes:  Vision - grossly normal, PERRLA  HEENT:  Head is atraumatic and normocephalic. Neck is supple, no jugular venous distention. Respiratory:  Lungs are clear to auscultation, expiratory wheeze noted to posterior right upper lobe,  respirations are nonlabored, breath sounds are equal.  Cardiovascular:  S1, S2 normal, regular rate and rhythm, no murmur, no pedal edema  Gastrointestinal:  Soft, nontender, nondistended. Normal bowel sounds. No organomegaly  Neurologic:  Awake and alert, cranial nerves 2-12 grossly intact, no focal motor or sensory deficits.     Labs     CBC with Differential:    Lab Results   Component Value Date/Time    WBC 5.1 02/15/2023 08:11 AM    RBC 5.43 02/15/2023 08:11 AM    HGB 13.5 02/15/2023 08:11 AM    HCT 43.1 02/15/2023 08:11 AM     02/15/2023 08:11 AM    MCV 79.4 02/15/2023 08:11 AM    MCH 24.9 02/15/2023 08:11 AM    MCHC 31.3 02/15/2023 08:11 AM    RDW 15.4 02/15/2023 08:11 AM    METASPCT 0.9 03/29/2022 07:24 PM    LYMPHOPCT 43.5 02/15/2023 08:11 AM    MONOPCT 6.9 02/15/2023 08:11 AM    MYELOPCT 0.9 03/29/2022 07:24 PM    BASOPCT 1.0 02/15/2023 08:11 AM    MONOSABS 0.35 02/15/2023 08:11 AM    LYMPHSABS 2.21 02/15/2023 08:11 AM    EOSABS 0.71 02/15/2023 08:11 AM    BASOSABS 0.05 02/15/2023 08:11 AM     CMP:    Lab Results   Component Value Date/Time  02/15/2023 08:11 AM    K 3.7 02/15/2023 08:11 AM     02/15/2023 08:11 AM    CO2 25 02/15/2023 08:11 AM    BUN 7 02/15/2023 08:11 AM    CREATININE 0.8 02/15/2023 08:11 AM    GFRAA >60 09/08/2022 06:32 PM    LABGLOM >60 02/15/2023 08:11 AM    GLUCOSE 139 02/15/2023 08:11 AM    PROT 7.5 02/15/2023 08:11 AM    LABALBU 3.9 02/15/2023 08:11 AM    CALCIUM 9.2 02/15/2023 08:11 AM    BILITOT 0.3 02/15/2023 08:11 AM    ALKPHOS 70 02/15/2023 08:11 AM    AST 17 02/15/2023 08:11 AM    ALT 15 02/15/2023 08:11 AM     Magnesium:    Lab Results   Component Value Date/Time    MG 1.8 09/08/2022 06:32 PM     Phosphorus:  No results found for: PHOS    I hope this updates you on my evaluation and clinical thinking. Thank you for allowing me to participate in the care of [address] [facility].       Sincerely,    Electronically signed by MATT Bowden CNP on 3/2/2023 at 2:37 PM

## 2023-03-10 DIAGNOSIS — J44.9 STAGE 3 SEVERE COPD BY GOLD CLASSIFICATION (HCC): ICD-10-CM

## 2023-03-10 RX ORDER — IPRATROPIUM BROMIDE AND ALBUTEROL SULFATE 2.5; .5 MG/3ML; MG/3ML
3 SOLUTION RESPIRATORY (INHALATION) EVERY 4 HOURS PRN
Qty: 360 ML | Refills: 1 | Status: SHIPPED | OUTPATIENT
Start: 2023-03-10

## 2023-03-10 NOTE — TELEPHONE ENCOUNTER
----- Message from [facility] sent at 3/10/2023  3:42 PM EST -----  Subject: Refill Request    QUESTIONS  Name of Medication? ipratropium-albuterol (DUONEB) 0.5-2.5 (3) MG/3ML SOLN   nebulizer solution  Patient-reported dosage and instructions? 0.5 mg 3 mg/3ml every 4 hours  How many days do you have left? 2  Preferred Pharmacy? [address]  phone number (if available)? [phone]  Additional Information for Provider? Pt would like the prescription faxed   to [phone]  ---------------------------------------------------------------------------  --------------  CALL BACK INFO  What is the best way for the office to contact you? OK to leave message on   voicemail  Preferred Call Back Phone Number? [phone]  ---------------------------------------------------------------------------  --------------  SCRIPT ANSWERS  Relationship to Patient?  Self

## 2023-03-10 NOTE — TELEPHONE ENCOUNTER
Last Appointment:  2/13/2023  Future Appointments   Date Time Provider Ruben De La Vegai   3/13/2023 10:00 PM [facility] SLEEP LAB BEDROOM 1 [facility] SLEEP [facility] HOD   3/16/2023  1:00 PM [facility] PULMONARY REHAB ROOM 1 [facility] PULM [facility]   4/5/2023  1:00 PM [facility] PFT STRESS LAB ROOM [facility] PFT [facility]   5/2/2023  1:00 PM MATT Lin - CNP [facility] Pulm [facility]   5/15/2023  1:15 PM Thong Fuentes  [address]

## 2023-03-13 ENCOUNTER — HOSPITAL ENCOUNTER (OUTPATIENT)
Dept: SLEEP CENTER | Age: 63
Discharge: HOME | End: 2023-03-13
Payer: MEDICAID

## 2023-03-13 DIAGNOSIS — G47.33 OSA (OBSTRUCTIVE SLEEP APNEA): ICD-10-CM

## 2023-03-13 PROCEDURE — 95810 POLYSOM 6/> YRS 4/> PARAM: CPT

## 2023-03-14 VITALS
WEIGHT: 230 LBS | HEIGHT: 65 IN | HEART RATE: 91 BPM | DIASTOLIC BLOOD PRESSURE: 96 MMHG | SYSTOLIC BLOOD PRESSURE: 164 MMHG | BODY MASS INDEX: 38.32 KG/M2 | OXYGEN SATURATION: 97 %

## 2023-03-14 ASSESSMENT — SLEEP AND FATIGUE QUESTIONNAIRES
HOW LIKELY ARE YOU TO NOD OFF OR FALL ASLEEP WHILE SITTING QUIETLY AFTER LUNCH WITHOUT ALCOHOL: 0
ESS TOTAL SCORE: 8
HOW LIKELY ARE YOU TO NOD OFF OR FALL ASLEEP WHILE WATCHING TV: 3
HOW LIKELY ARE YOU TO NOD OFF OR FALL ASLEEP IN A CAR, WHILE STOPPED FOR A FEW MINUTES IN TRAFFIC: 0
HOW LIKELY ARE YOU TO NOD OFF OR FALL ASLEEP WHILE SITTING AND READING: 2
HOW LIKELY ARE YOU TO NOD OFF OR FALL ASLEEP WHILE SITTING AND TALKING TO SOMEONE: 1
HOW LIKELY ARE YOU TO NOD OFF OR FALL ASLEEP WHILE LYING DOWN TO REST IN THE AFTERNOON WHEN CIRCUMSTANCES PERMIT: 1
HOW LIKELY ARE YOU TO NOD OFF OR FALL ASLEEP WHEN YOU ARE A PASSENGER IN A CAR FOR AN HOUR WITHOUT A BREAK: 1
HOW LIKELY ARE YOU TO NOD OFF OR FALL ASLEEP WHILE SITTING INACTIVE IN A PUBLIC PLACE: 0

## 2023-03-16 ENCOUNTER — HOSPITAL ENCOUNTER (OUTPATIENT)
Dept: PULMONOLOGY | Age: 63
Setting detail: THERAPIES SERIES
Discharge: HOME | End: 2023-03-16

## 2023-03-16 ASSESSMENT — PULMONARY FUNCTION TESTS
FEV1/FVC: 46
FEV1 (%PREDICTED): 39
FVC (LITERS): 1.79

## 2023-03-16 ASSESSMENT — LIFESTYLE VARIABLES: SMOKELESS_TOBACCO: NO

## 2023-03-21 ENCOUNTER — HOSPITAL ENCOUNTER (OUTPATIENT)
Dept: PULMONOLOGY | Age: 63
Setting detail: THERAPIES SERIES
Discharge: HOME | End: 2023-03-21
Payer: MEDICAID

## 2023-03-21 VITALS — OXYGEN SATURATION: 94 %

## 2023-03-21 PROCEDURE — 94626 PHY/QHP OP PULM RHB W/MNTR: CPT

## 2023-03-21 ASSESSMENT — EXERCISE STRESS TEST
PEAK_BP: 164/88
PEAK_METS: 1.8
PEAK_BP: 164/88
PEAK_HR: 118
PEAK_RPE: 0.5

## 2023-03-21 ASSESSMENT — PATIENT HEALTH QUESTIONNAIRE - PHQ9
5. POOR APPETITE OR OVEREATING: 0
2. FEELING DOWN, DEPRESSED OR HOPELESS: 0
3. TROUBLE FALLING OR STAYING ASLEEP: 0
8. MOVING OR SPEAKING SO SLOWLY THAT OTHER PEOPLE COULD HAVE NOTICED. OR THE OPPOSITE, BEING SO FIGETY OR RESTLESS THAT YOU HAVE BEEN MOVING AROUND A LOT MORE THAN USUAL: 0
9. THOUGHTS THAT YOU WOULD BE BETTER OFF DEAD, OR OF HURTING YOURSELF: 0
6. FEELING BAD ABOUT YOURSELF - OR THAT YOU ARE A FAILURE OR HAVE LET YOURSELF OR YOUR FAMILY DOWN: 0
1. LITTLE INTEREST OR PLEASURE IN DOING THINGS: 0
7. TROUBLE CONCENTRATING ON THINGS, SUCH AS READING THE NEWSPAPER OR WATCHING TELEVISION: 0
SUM OF ALL RESPONSES TO PHQ QUESTIONS 1-9: 0
10. IF YOU CHECKED OFF ANY PROBLEMS, HOW DIFFICULT HAVE THESE PROBLEMS MADE IT FOR YOU TO DO YOUR WORK, TAKE CARE OF THINGS AT HOME, OR GET ALONG WITH OTHER PEOPLE: 0
SUM OF ALL RESPONSES TO PHQ QUESTIONS 1-9: 0
SUM OF ALL RESPONSES TO PHQ9 QUESTIONS 1 & 2: 0
SUM OF ALL RESPONSES TO PHQ QUESTIONS 1-9: 0
SUM OF ALL RESPONSES TO PHQ QUESTIONS 1-9: 0
4. FEELING TIRED OR HAVING LITTLE ENERGY: 0

## 2023-03-21 ASSESSMENT — PULMONARY FUNCTION TESTS
FVC (LITERS): 1.79
FEV1 (%PREDICTED): 39
FEV1/FVC: 46

## 2023-03-21 NOTE — PROGRESS NOTES
03/21/23 1000   Treatment Diagnosis   Treatment Diagnosis 1 COPD   Referral Date 03/02/23   Oxygen Saturation / Titration    Stages of Change  Action   Oxygen Intervention   Oxygen Use No   Nurse/Patient Discussion  maintain SpO2 > 88%   Individual Treatment Plan   ITP Visit Type Initial assessment   1st Date of Exercise 03/21/23   Visit #/Total Visits 2-36   FVC (Liters) 1.79 liters   FEV1 (%PRED) 39   FEV1/FVC 46   DLCO (mL/mmHg sec) 32 ml/mmHg sec   Risk Stratification Low   Exercise   Distance Walked in  mi   Peak    Peak /88   Peak RPE 0.5   Peak Mets 1.8   O2 Saturation 94   Stops 0   SPO2 Range 93-94   Distance Walked (miles) 0.1   Distance Walked in Feet (Calculated) 528 ft   Stages of Change Action   Exercise Test Six minute walk test;Other (comment)  (done on treadmill and predicted 41% of normal)   Exercise Prescription   Mode Treadmill;Bike;Ergometer; Other (comment)  (nu step)   Frequency per week 2   Duration Per Session 30-60   Intensity METS       2-4   O2 Device Room air   Comments Increase stamina and endurance while decreasing RPD   Symptoms with Exercise Shortness of breath   SpO2 94 %   Exercise Blood Pressures   Resting /76   Is BP WDL Yes   Peak /88   Exercise Intervention   Comments no home exercise at this time   Exercise Education   Education Equipment orientation; Exercise safety;Home exercise plan;Purse lip/abdominal breathing;RPE/RPD scale;Signs/symptoms to report; Warm up/cool down   Exercise Target Group   Target Goal(s) SaO2>89%; Home activity   Psychosocial   Stages of Change Maintenance   Family Support Yes   Psychosocial Intervention   Interventions No intervention indicated   Currently Taking Psychotropic Meds No   Nutrition   Diabetes No   Weight Management   Weight  252 lb (114.3 kg)   Height  5' 5\" (1.651 m)   BMI 42.02   Nutrition Intervention   Dietitian Consult No   Nutrition Goals weight loss   Nutrition Target Goals   Target Goals Weight loss of

## 2023-03-23 ENCOUNTER — HOSPITAL ENCOUNTER (OUTPATIENT)
Dept: PULMONOLOGY | Age: 63
Setting detail: THERAPIES SERIES
Discharge: HOME | End: 2023-03-23
Payer: MEDICAID

## 2023-03-23 PROCEDURE — 94626 PHY/QHP OP PULM RHB W/MNTR: CPT

## 2023-03-24 ENCOUNTER — HOSPITAL ENCOUNTER (OUTPATIENT)
Dept: NUCLEAR MEDICINE | Age: 63
Discharge: HOME | End: 2023-03-26
Payer: MEDICAID

## 2023-03-24 VITALS — BODY MASS INDEX: 41.6 KG/M2 | WEIGHT: 250 LBS

## 2023-03-24 DIAGNOSIS — R10.84 ABDOMINAL PAIN, GENERALIZED: ICD-10-CM

## 2023-03-24 DIAGNOSIS — R11.2 NAUSEA AND VOMITING, UNSPECIFIED VOMITING TYPE: ICD-10-CM

## 2023-03-24 PROCEDURE — 2580000003 HC RX 258: Performed by: INTERNAL MEDICINE

## 2023-03-24 PROCEDURE — 78227 HEPATOBIL SYST IMAGE W/DRUG: CPT

## 2023-03-24 PROCEDURE — 3430000000 HC RX DIAGNOSTIC RADIOPHARMACEUTICAL: Performed by: RADIOLOGY

## 2023-03-24 PROCEDURE — A9537 TC99M MEBROFENIN: HCPCS | Performed by: RADIOLOGY

## 2023-03-24 PROCEDURE — 6360000002 HC RX W HCPCS: Performed by: INTERNAL MEDICINE

## 2023-03-24 RX ADMIN — SODIUM CHLORIDE 2.27 MCG: 9 INJECTION, SOLUTION INTRAVENOUS at 11:20

## 2023-03-24 RX ADMIN — Medication 7.7 MILLICURIE: at 10:04

## 2023-03-28 ENCOUNTER — HOSPITAL ENCOUNTER (OUTPATIENT)
Dept: PULMONOLOGY | Age: 63
Setting detail: THERAPIES SERIES
Discharge: HOME | End: 2023-03-28
Payer: MEDICAID

## 2023-03-28 PROCEDURE — 94626 PHY/QHP OP PULM RHB W/MNTR: CPT

## 2023-03-30 ENCOUNTER — HOSPITAL ENCOUNTER (OUTPATIENT)
Dept: PULMONOLOGY | Age: 63
Setting detail: THERAPIES SERIES
Discharge: HOME | End: 2023-03-30
Payer: MEDICAID

## 2023-03-30 PROCEDURE — 94626 PHY/QHP OP PULM RHB W/MNTR: CPT

## 2023-04-03 NOTE — TELEPHONE ENCOUNTER
Last Appointment:  2/13/2023  Future Appointments   Date Time Provider Ruben Jonisti   4/4/2023  9:00 AM [facility] PULMONARY REHAB ROOM 1 [address] [facility]   4/6/2023  9:00 AM [facility] PULMONARY REHAB ROOM 1 [facility] [facility]   4/11/2023  9:00 AM [facility] PULMONARY REHAB ROOM 1 [facility]   4/13/2023  9:00 AM [facility] PULMONARY REHAB ROOM 1 [facility]   4/18/2023  9:00 AM [facility] PULMONARY REHAB ROOM 1 [facility] [facility]   4/19/2023  1:00 PM [facility] STRESS LAB ROOM [facility]   4/20/2023  9:00 AM [facility] PULMONARY REHAB ROOM 1 [facility]   4/25/2023  9:00 AM [facility] PULMONARY REHAB ROOM 1 [facility]   4/27/2023  9:00 AM [facility] PULMONARY REHAB ROOM 1 [facility]   5/2/2023  9:00 AM [facility] PULMONARY REHAB ROOM 1 [facility]   5/2/2023  1:00 PM MATT Post CNP [facility]   5/4/2023  9:00 AM [facility] PULMONARY REHAB ROOM 1 [facility]   5/9/2023  9:00 AM [facility] PULMONARY REHAB ROOM 1 [facility]   5/11/2023  9:00 AM [facility] PULMONARY REHAB ROOM 1 [facility]   5/15/2023  1:15 PM DO ERVIN Marques [facility]   5/16/2023  9:00 AM [facility] PULMONARY REHAB ROOM 1 [facility]   5/18/2023  9:00 AM [facility] PULMONARY REHAB ROOM 1 [facility]   5/23/2023  9:00 AM [facility] PULMONARY REHAB ROOM 1 [facility]   5/25/2023  9:00 AM [facility] PULMONARY REHAB ROOM 1 [facility]   5/30/2023  9:00 AM [facility] PULMONARY REHAB ROOM 1 [facility]   6/1/2023  9:00 AM [facility] PULMONARY REHAB ROOM 1 [facility]   6/6/2023  9:00 AM [facility] PULMONARY REHAB ROOM 1 [facility]   6/8/2023  9:00 AM [facility] PULMONARY REHAB ROOM 1 [facility]   6/13/2023  9:00 AM [facility] PULMONARY REHAB ROOM 1 [facility]   6/15/2023  9:00 AM [facility] PULMONARY REHAB ROOM 1 [facility]   6/20/2023  9:00 AM [facility] PULMONARY REHAB ROOM 1 [facility] [facility]   6/22/2023  9:00

## 2023-04-04 ENCOUNTER — HOSPITAL ENCOUNTER (OUTPATIENT)
Dept: PULMONOLOGY | Age: 63
Setting detail: THERAPIES SERIES
Discharge: HOME | End: 2023-04-04
Payer: MEDICAID

## 2023-04-04 PROCEDURE — 94626 PHY/QHP OP PULM RHB W/MNTR: CPT

## 2023-04-04 RX ORDER — BROMPHENIRAMINE MALEATE, PSEUDOEPHEDRINE HYDROCHLORIDE, AND DEXTROMETHORPHAN HYDROBROMIDE 2; 30; 10 MG/5ML; MG/5ML; MG/5ML
2.5 SYRUP ORAL 4 TIMES DAILY PRN
Qty: 118 ML | Refills: 0 | Status: SHIPPED | OUTPATIENT
Start: 2023-04-04

## 2023-04-06 ENCOUNTER — HOSPITAL ENCOUNTER (OUTPATIENT)
Dept: PULMONOLOGY | Age: 63
Setting detail: THERAPIES SERIES
Discharge: HOME | End: 2023-04-06
Payer: MEDICAID

## 2023-04-06 PROCEDURE — 94626 PHY/QHP OP PULM RHB W/MNTR: CPT

## 2023-04-17 ENCOUNTER — APPOINTMENT (OUTPATIENT)
Dept: GENERAL RADIOLOGY | Age: 63
End: 2023-04-17
Payer: MEDICAID

## 2023-04-17 LAB
ANION GAP SERPL CALCULATED.3IONS-SCNC: 10 MMOL/L (ref 7–16)
BASOPHILS # BLD: 0.07 E9/L (ref 0–0.2)
BASOPHILS NFR BLD: 1.1 % (ref 0–2)
BNP BLD-MCNC: 22 PG/ML (ref 0–125)
BUN SERPL-MCNC: 12 MG/DL (ref 6–23)
CALCIUM SERPL-MCNC: 9.2 MG/DL (ref 8.6–10.2)
CHLORIDE SERPL-SCNC: 105 MMOL/L (ref 98–107)
CO2 SERPL-SCNC: 24 MMOL/L (ref 22–29)
CREAT SERPL-MCNC: 0.8 MG/DL (ref 0.5–1)
EOSINOPHIL # BLD: 0.88 E9/L (ref 0.05–0.5)
EOSINOPHIL NFR BLD: 13.4 % (ref 0–6)
ERYTHROCYTE [DISTWIDTH] IN BLOOD BY AUTOMATED COUNT: 15.7 FL (ref 11.5–15)
GLUCOSE SERPL-MCNC: 110 MG/DL (ref 74–99)
HCT VFR BLD AUTO: 46.3 % (ref 34–48)
HGB BLD-MCNC: 13.9 G/DL (ref 11.5–15.5)
IMM GRANULOCYTES # BLD: 0.02 E9/L
IMM GRANULOCYTES NFR BLD: 0.3 % (ref 0–5)
INFLUENZA A BY PCR: NOT DETECTED
INFLUENZA B BY PCR: NOT DETECTED
LYMPHOCYTES # BLD: 2 E9/L (ref 1.5–4)
LYMPHOCYTES NFR BLD: 30.5 % (ref 20–42)
MCH RBC QN AUTO: 23.9 PG (ref 26–35)
MCHC RBC AUTO-ENTMCNC: 30 % (ref 32–34.5)
MCV RBC AUTO: 79.7 FL (ref 80–99.9)
MONOCYTES # BLD: 0.58 E9/L (ref 0.1–0.95)
MONOCYTES NFR BLD: 8.8 % (ref 2–12)
NEUTROPHILS # BLD: 3.01 E9/L (ref 1.8–7.3)
NEUTS SEG NFR BLD: 45.9 % (ref 43–80)
PLATELET # BLD AUTO: 347 E9/L (ref 130–450)
PMV BLD AUTO: 10.2 FL (ref 7–12)
POTASSIUM SERPL-SCNC: 4.1 MMOL/L (ref 3.5–5)
RBC # BLD AUTO: 5.81 E12/L (ref 3.5–5.5)
SARS-COV-2 RDRP RESP QL NAA+PROBE: NOT DETECTED
SODIUM SERPL-SCNC: 139 MMOL/L (ref 132–146)
TROPONIN, HIGH SENSITIVITY: 6 NG/L (ref 0–9)
TROPONIN, HIGH SENSITIVITY: 9 NG/L (ref 0–9)
WBC # BLD: 6.6 E9/L (ref 4.5–11.5)

## 2023-04-17 PROCEDURE — 96374 THER/PROPH/DIAG INJ IV PUSH: CPT

## 2023-04-17 PROCEDURE — 99285 EMERGENCY DEPT VISIT HI MDM: CPT

## 2023-04-17 PROCEDURE — 71046 X-RAY EXAM CHEST 2 VIEWS: CPT

## 2023-04-17 PROCEDURE — 84484 ASSAY OF TROPONIN QUANT: CPT

## 2023-04-17 PROCEDURE — 83880 ASSAY OF NATRIURETIC PEPTIDE: CPT

## 2023-04-17 PROCEDURE — 87635 SARS-COV-2 COVID-19 AMP PRB: CPT

## 2023-04-17 PROCEDURE — 87502 INFLUENZA DNA AMP PROBE: CPT

## 2023-04-17 PROCEDURE — 80048 BASIC METABOLIC PNL TOTAL CA: CPT

## 2023-04-17 PROCEDURE — 85025 COMPLETE CBC W/AUTO DIFF WBC: CPT

## 2023-04-17 PROCEDURE — 93005 ELECTROCARDIOGRAM TRACING: CPT | Performed by: PHYSICIAN ASSISTANT

## 2023-04-17 NOTE — ED NOTES
FIRST PROVIDER CONTACT ASSESSMENT NOTE       Department of Emergency Medicine                 First Provider Note            23  5:06 PM EDT    Date of Encounter: No admission date for patient encounter. Patient Name: Blane Peraza  : 1960  MRN: [id number]    Chief Complaint: Chest Pain (CP Started few days ago +SOB +non productive cough )      History of Present Illness:   Blane Peraza is a 58 y.o. female who presents to the ED for chest pain, SOB x 2 days. +Cough. Denies leg swelling. Focused Physical Exam:  VS:    ED Triage Vitals [23 1656]   BP Temp Temp Source Heart Rate Resp SpO2 Height Weight   -- 97.4 °F (36.3 °C) Temporal (!) 111 -- 95 % -- --        Physical Ex: Constitutional: Alert and non-toxic. Medical History:  has a past medical history of COPD (chronic obstructive pulmonary disease) ([facility] NSTEMI (non-ST elevated myocardial infarction) ([facility] [address] Primary hypertension, Primary open angle glaucoma (POAG) of left eye, severe stage, and Urinary incontinence. Surgical History:  has a past surgical history that includes  section; Tonsillectomy; and Cystoscopy (N/A, 2022). Social History:  reports that she quit smoking about 12 months ago. Her smoking use included cigarettes. She has a 6.50 pack-year smoking history. She has never used smokeless tobacco. She reports that she does not drink alcohol and does not use drugs. Family History: family history is not on file. Allergies: Patient has no known allergies.      Initial Plan of Care: Initiate Treatment-Testing, Proceed toTreatment Area When Bed Available for ED Attending/MLP to Continue Care      ---END OF FIRST PROVIDER CONTACT ASSESSMENT NOTE---  Electronically signed by FELECIA Wahl   DD: 23       FELECIA Wahl  23 0374

## 2023-04-18 ENCOUNTER — APPOINTMENT (OUTPATIENT)
Dept: CT IMAGING | Age: 63
End: 2023-04-18
Payer: MEDICAID

## 2023-04-18 ENCOUNTER — HOSPITAL ENCOUNTER (OUTPATIENT)
Dept: PULMONOLOGY | Age: 63
Setting detail: THERAPIES SERIES
End: 2023-04-18
Payer: MEDICAID

## 2023-04-18 ENCOUNTER — HOSPITAL ENCOUNTER (OUTPATIENT)
Age: 63
Setting detail: OBSERVATION
Discharge: HOME | End: 2023-04-21
Attending: EMERGENCY MEDICINE | Admitting: FAMILY MEDICINE
Payer: MEDICAID

## 2023-04-18 DIAGNOSIS — R07.9 CHEST PAIN, UNSPECIFIED TYPE: Primary | ICD-10-CM

## 2023-04-18 DIAGNOSIS — J44.1 COPD EXACERBATION (HCC): ICD-10-CM

## 2023-04-18 PROBLEM — J44.9 COPD (CHRONIC OBSTRUCTIVE PULMONARY DISEASE) (HCC): Status: ACTIVE | Noted: 2023-04-18

## 2023-04-18 LAB
B PARAP IS1001 DNA NPH QL NAA+NON-PROBE: NOT DETECTED
B PERT.PT PRMT NPH QL NAA+NON-PROBE: NOT DETECTED
C PNEUM DNA NPH QL NAA+NON-PROBE: NOT DETECTED
D DIMER: <200 NG/ML DDU
EKG ATRIAL RATE: 104 BPM
EKG P AXIS: 69 DEGREES
EKG P-R INTERVAL: 156 MS
EKG Q-T INTERVAL: 344 MS
EKG QRS DURATION: 70 MS
EKG QTC CALCULATION (BAZETT): 452 MS
EKG R AXIS: -8 DEGREES
EKG T AXIS: 4 DEGREES
EKG VENTRICULAR RATE: 104 BPM
FLUAV RNA NPH QL NAA+NON-PROBE: NOT DETECTED
FLUBV RNA NPH QL NAA+NON-PROBE: NOT DETECTED
HADV DNA NPH QL NAA+NON-PROBE: NOT DETECTED
HCOV 229E RNA NPH QL NAA+NON-PROBE: NOT DETECTED
HCOV HKU1 RNA NPH QL NAA+NON-PROBE: NOT DETECTED
HCOV NL63 RNA NPH QL NAA+NON-PROBE: NOT DETECTED
HCOV OC43 RNA NPH QL NAA+NON-PROBE: NOT DETECTED
HMPV RNA NPH QL NAA+NON-PROBE: NOT DETECTED
HPIV1 RNA NPH QL NAA+NON-PROBE: NOT DETECTED
HPIV2 RNA NPH QL NAA+NON-PROBE: NOT DETECTED
HPIV3 RNA NPH QL NAA+NON-PROBE: NOT DETECTED
HPIV4 RNA NPH QL NAA+NON-PROBE: NOT DETECTED
M PNEUMO DNA NPH QL NAA+NON-PROBE: NOT DETECTED
RSV RNA NPH QL NAA+NON-PROBE: NOT DETECTED
RV+EV RNA NPH QL NAA+NON-PROBE: NOT DETECTED
SARS-COV-2 RNA NPH QL NAA+NON-PROBE: NOT DETECTED

## 2023-04-18 PROCEDURE — 6360000002 HC RX W HCPCS: Performed by: EMERGENCY MEDICINE

## 2023-04-18 PROCEDURE — 6370000000 HC RX 637 (ALT 250 FOR IP): Performed by: EMERGENCY MEDICINE

## 2023-04-18 PROCEDURE — 6360000004 HC RX CONTRAST MEDICATION: Performed by: RADIOLOGY

## 2023-04-18 PROCEDURE — 93010 ELECTROCARDIOGRAM REPORT: CPT | Performed by: INTERNAL MEDICINE

## 2023-04-18 PROCEDURE — 94640 AIRWAY INHALATION TREATMENT: CPT

## 2023-04-18 PROCEDURE — 96365 THER/PROPH/DIAG IV INF INIT: CPT

## 2023-04-18 PROCEDURE — 99222 1ST HOSP IP/OBS MODERATE 55: CPT | Performed by: INTERNAL MEDICINE

## 2023-04-18 PROCEDURE — 36415 COLL VENOUS BLD VENIPUNCTURE: CPT

## 2023-04-18 PROCEDURE — 71275 CT ANGIOGRAPHY CHEST: CPT

## 2023-04-18 PROCEDURE — 6370000000 HC RX 637 (ALT 250 FOR IP): Performed by: FAMILY MEDICINE

## 2023-04-18 PROCEDURE — 0202U NFCT DS 22 TRGT SARS-COV-2: CPT

## 2023-04-18 PROCEDURE — 85378 FIBRIN DEGRADE SEMIQUANT: CPT

## 2023-04-18 PROCEDURE — G0378 HOSPITAL OBSERVATION PER HR: HCPCS

## 2023-04-18 PROCEDURE — 6360000002 HC RX W HCPCS: Performed by: STUDENT IN AN ORGANIZED HEALTH CARE EDUCATION/TRAINING PROGRAM

## 2023-04-18 PROCEDURE — 6360000002 HC RX W HCPCS: Performed by: INTERNAL MEDICINE

## 2023-04-18 PROCEDURE — 94664 DEMO&/EVAL PT USE INHALER: CPT

## 2023-04-18 PROCEDURE — 2580000003 HC RX 258: Performed by: FAMILY MEDICINE

## 2023-04-18 PROCEDURE — 96375 TX/PRO/DX INJ NEW DRUG ADDON: CPT

## 2023-04-18 PROCEDURE — 2140000000 HC CCU INTERMEDIATE R&B

## 2023-04-18 PROCEDURE — 2500000003 HC RX 250 WO HCPCS: Performed by: FAMILY MEDICINE

## 2023-04-18 RX ORDER — DOXYCYCLINE HYCLATE 100 MG
100 TABLET ORAL 2 TIMES DAILY
Qty: 20 TABLET | Refills: 0 | Status: SHIPPED | OUTPATIENT
Start: 2023-04-18 | End: 2023-04-28

## 2023-04-18 RX ORDER — IPRATROPIUM BROMIDE AND ALBUTEROL SULFATE 2.5; .5 MG/3ML; MG/3ML
1 SOLUTION RESPIRATORY (INHALATION) EVERY 4 HOURS PRN
COMMUNITY
End: 2023-04-24

## 2023-04-18 RX ORDER — BUDESONIDE 0.5 MG/2ML
0.5 INHALANT ORAL 2 TIMES DAILY
Status: DISCONTINUED | OUTPATIENT
Start: 2023-04-18 | End: 2023-04-21 | Stop reason: HOSPADM

## 2023-04-18 RX ORDER — IPRATROPIUM BROMIDE AND ALBUTEROL SULFATE 2.5; .5 MG/3ML; MG/3ML
3 SOLUTION RESPIRATORY (INHALATION) ONCE
Status: COMPLETED | OUTPATIENT
Start: 2023-04-18 | End: 2023-04-18

## 2023-04-18 RX ORDER — FLUTICASONE FUROATE, UMECLIDINIUM BROMIDE AND VILANTEROL TRIFENATATE 100; 62.5; 25 UG/1; UG/1; UG/1
1 POWDER RESPIRATORY (INHALATION) DAILY
COMMUNITY

## 2023-04-18 RX ORDER — ASPIRIN 81 MG/1
81 TABLET, CHEWABLE ORAL DAILY
COMMUNITY

## 2023-04-18 RX ORDER — ALBUTEROL SULFATE 2.5 MG/3ML
2.5 SOLUTION RESPIRATORY (INHALATION) ONCE
Status: COMPLETED | OUTPATIENT
Start: 2023-04-18 | End: 2023-04-18

## 2023-04-18 RX ORDER — METRONIDAZOLE 500 MG/1
500 TABLET ORAL 2 TIMES DAILY
Status: ON HOLD | COMMUNITY
Start: 2023-04-13 | End: 2023-04-21 | Stop reason: HOSPADM

## 2023-04-18 RX ORDER — METHYLPREDNISOLONE SODIUM SUCCINATE 125 MG/2ML
125 INJECTION, POWDER, LYOPHILIZED, FOR SOLUTION INTRAMUSCULAR; INTRAVENOUS ONCE
Status: COMPLETED | OUTPATIENT
Start: 2023-04-18 | End: 2023-04-18

## 2023-04-18 RX ORDER — SODIUM CHLORIDE 0.9 % (FLUSH) 0.9 %
10 SYRINGE (ML) INJECTION
Status: ACTIVE | OUTPATIENT
Start: 2023-04-18 | End: 2023-04-19

## 2023-04-18 RX ORDER — IPRATROPIUM BROMIDE AND ALBUTEROL SULFATE 2.5; .5 MG/3ML; MG/3ML
1 SOLUTION RESPIRATORY (INHALATION) EVERY 4 HOURS PRN
Status: DISCONTINUED | OUTPATIENT
Start: 2023-04-18 | End: 2023-04-21 | Stop reason: HOSPADM

## 2023-04-18 RX ORDER — ARFORMOTEROL TARTRATE 15 UG/2ML
15 SOLUTION RESPIRATORY (INHALATION) 2 TIMES DAILY
Status: DISCONTINUED | OUTPATIENT
Start: 2023-04-18 | End: 2023-04-21 | Stop reason: HOSPADM

## 2023-04-18 RX ORDER — PREDNISONE 50 MG/1
50 TABLET ORAL DAILY
Qty: 5 TABLET | Refills: 0 | Status: SHIPPED | OUTPATIENT
Start: 2023-04-18 | End: 2023-04-23

## 2023-04-18 RX ADMIN — BUDESONIDE 500 MCG: 0.5 INHALANT RESPIRATORY (INHALATION) at 19:22

## 2023-04-18 RX ADMIN — DOXYCYCLINE 100 MG: 100 INJECTION, POWDER, LYOPHILIZED, FOR SOLUTION INTRAVENOUS at 12:24

## 2023-04-18 RX ADMIN — METHYLPREDNISOLONE SODIUM SUCCINATE 125 MG: 125 INJECTION INTRAMUSCULAR; INTRAVENOUS at 09:04

## 2023-04-18 RX ADMIN — IPRATROPIUM BROMIDE AND ALBUTEROL SULFATE 3 AMPULE: .5; 2.5 SOLUTION RESPIRATORY (INHALATION) at 08:43

## 2023-04-18 RX ADMIN — ALBUTEROL SULFATE 2.5 MG: 2.5 SOLUTION RESPIRATORY (INHALATION) at 11:19

## 2023-04-18 RX ADMIN — ARFORMOTEROL TARTRATE 15 MCG: 15 SOLUTION RESPIRATORY (INHALATION) at 19:22

## 2023-04-18 RX ADMIN — IPRATROPIUM BROMIDE AND ALBUTEROL SULFATE 1 AMPULE: 2.5; .5 SOLUTION RESPIRATORY (INHALATION) at 19:22

## 2023-04-18 RX ADMIN — IOPAMIDOL 75 ML: 755 INJECTION, SOLUTION INTRAVENOUS at 10:10

## 2023-04-18 NOTE — ED PROVIDER NOTES
any disagreements were addressed in the HPI. REVIEW OF EXTERNAL NOTE :       3/11/23 PCP office note    Chart Review/External Note Review    Last Echo reviewed by Me:  Lab Results   Component Value Date    LVEF 77 2021             Controlled Substance Monitoring:    Acute and Chronic Pain Monitoring:   No flowsheet data found. REVIEW OF SYSTEMS :      Positives and Pertinent negatives as per HPI. SURGICAL HISTORY     Past Surgical History:   Procedure Laterality Date     SECTION      x5    CYSTOSCOPY N/A 2022    CYSTOSCOPY BOTOX INJECTION 100 UNITS   (PHARMACY NOTIFIED) performed by Belia Osman DO at [facility]       Previous Medications    ACETAZOLAMIDE (DIAMOX) 250 MG TABLET    Take 2 tablets by mouth 2 times daily    ALBUTEROL SULFATE HFA (PROVENTIL HFA) 108 (90 BASE) MCG/ACT INHALER    Inhale 2 puffs into the lungs every 4 hours as needed for Wheezing    AMLODIPINE (NORVASC) 10 MG TABLET    Take 1 tablet by mouth in the morning.     ASPIRIN LOW DOSE 81 MG CHEWABLE TABLET    chew and swallow 1 tablet by mouth once daily    ATORVASTATIN (LIPITOR) 40 MG TABLET    Take 1 tablet by mouth nightly    BLOOD PRESSURE KIT    1 kit by Does not apply route daily    BRIMONIDINE (ALPHAGAN) 0.2 % OPHTHALMIC SOLUTION    Place 1 drop into the left eye 3 times daily    BROMPHENIRAMINE-PSEUDOEPHEDRINE-DM 2-30-10 MG/5ML SYRUP    Take 2.5 mLs by mouth 4 times daily as needed for Cough    BUPROPION (WELLBUTRIN XL) 150 MG EXTENDED RELEASE TABLET    Take 1 tablet by mouth every morning    DICYCLOMINE (BENTYL) 10 MG CAPSULE    Take 1 capsule by mouth 4 times daily as needed (abdominal cramping)    DORZOLAMIDE (TRUSOPT) 2 % OPHTHALMIC SOLUTION    instill 1 drop into left eye twice a day    IPRATROPIUM-ALBUTEROL (DUONEB) 0.5-2.5 (3) MG/3ML SOLN NEBULIZER SOLUTION    Inhale 3 mLs into the lungs every 4 hours as needed for Shortness of Breath    LATANOPROST
Detected   Parainfluenza Virus 4 by PCR Not Detected   Respiratory Syncytial Virus by PCR Not Detected [CD]   1121 D-Dimer, Quantitative:    D-Dimer, Quant <200 [CD]   1121 Troponin:    Troponin, High Sensitivity 9 [CD]   1121 RAPID INFLUENZA A/B ANTIGENS:    Influenza A by PCR Not Detected   Influenza B by PCR Not Detected [CD]   1121 COVID-19, Rapid:    SARS-CoV-2, NAAT Not Detected [CD]   1121 Brain Natriuretic Peptide:    Pro-BNP 22 [CD]   5228 Basic Metabolic Panel(!):    Sodium 139   Potassium 4.1   Chloride 105   CO2 24   Anion Gap 10   Glucose, Random 110(!)   BUN,BUNPL 12   Creatinine 0.8   Est, Glom Filt Rate >60   CALCIUM, SERUM, 474929 9.2 [CD]   1121 CBC with Auto Differential(!):    WBC 6.6   RBC 5.81(!)   Hemoglobin Quant 13.9   Hematocrit 46.3   MCV 79.7(!)   MCH 23.9(!)   MCHC 30.0(!)   RDW 15.7(!)   Platelet Count 847   MPV 10.2   Neutrophils % 45.9   Immature Granulocytes % 0.3   Lymphocyte % 30.5   Monocytes % 8.8   Eosinophils % 13.4(!)   Basophils % 1.1   Neutrophils Absolute 3.01   Immature Granulocytes # 0.02   Lymphocytes Absolute 2.00   Monocytes Absolute 0.58   Eosinophils Absolute 0.88(!)   Basophils Absolute 0.07 [CD]   1121 EKG Interpretation  Interpreted by emergency department physician, Dr. Comfort Fregoso     4/18/23  Time: 8656    Rhythm: sinus tachycardia  Rate: tachycardia  Axis: normal  Conduction: normal  ST Segments: no acute change  T Waves: no acute change    Clinical Impression: sinus tachycardia, no acute ischemic changes  Comparison to Old EKG  change from prior     [CD]   1122 The following tests were interpreted by me: CXR - no focal consolidation or pneumothorax     [CD]   1122 Sound will admit patient. [JG]   63 58-year-old female presenting emerged part for chest pain, shortness of breath, wheezing, cough. Considered PE, CT pulmonary was not consistent with this. On my interpretation. Show evidence of pneumonia.   Considered viral infection, RFA was negative, considered

## 2023-04-18 NOTE — CONSULTS
better    Baseline Exercise tolerance/MMRC score( Bold )     MMRC Dyspnea Scale  Grade Description of Breathlessness   0 I only get breathless with strenuous exercise. 1 I get short of breath when hurrying on level ground or walking up a slight hill.   2 On level ground, I walk slower than people of the same age because of breathlessness, or have to stop for breath when walking at my own pace. 3 I stop for breath after walking about 100 yards or after a few minutes on level ground. 4 I am too breathless to leave the house or I am breathless when dressing. Smoking history-former smoker with over 40-pack-year history of smoking. Quit about a year ago    Occupational exposure/ Pet/bird -  No history of exposure to any occupational Pneumotoxins. Age appropriate Cancer screening-   Patient is up to date on age appropriate cancer screening and immunizations.       Past Medical History   Past Medical History:   Diagnosis Date    Chronic obstructive pulmonary disease with acute exacerbation ([facility] [address] 2023    COPD (chronic obstructive pulmonary disease) (HCC)     NSTEMI (non-ST elevated myocardial infarction) ([facility] [address] 3/30/2022    Primary hypertension     Primary open angle glaucoma (POAG) of left eye, severe stage     Urinary incontinence     For or 22       Past Surgical History  Past Surgical History:   Procedure Laterality Date     SECTION      x5    CYSTOSCOPY N/A 2022    CYSTOSCOPY BOTOX INJECTION 100 UNITS   (PHARMACY NOTIFIED) performed by Katherine Osman DO at [facility] OR    TONSILLECTOMY         Allergies  No Known Allergies    Medications  Current Facility-Administered Medications   Medication Dose Route Frequency Provider Last Rate Last Admin    sodium chloride flush 0.9 % injection 10 mL  10 mL IntraVENous Once PRN Meliza Johnston MD        doxycycline (VIBRAMYCIN) 100 mg in sodium chloride 0.9 % 100 mL IVPB ([id number])  100 mg IntraVENous Q12H Yash Guzman  mL/hr at 23 0025

## 2023-04-18 NOTE — H&P
administered intravenously over 30-60 mins. Images were obtained in the GLADYS projection and regions of interest were drawn around the gallbladder and ejection fraction was calculated. COMPARISON: No prior for comparison. HISTORY: ORDERING SYSTEM PROVIDED HISTORY: Abdominal pain, generalized TECHNOLOGIST PROVIDED HISTORY: What reading provider will be dictating this exam?->CRC FINDINGS: Prompt, homogenous uptake by the liver is noted with normal appearance of radiotracer excretion into the biliary system. Clearance of bloodpool activity appears appropriate. Gallbladder and small bowel is visualized in appropriate sequence and time. Gallbladder ejection fraction measured 58%. Normal value is >38% for CCK protocol and >33% for Ensure protocol. Note, Ensure normal range is based on a limited study. No acute cholecystitis. Gallbladder ejection fraction is approximately 58%.      Recent Results (from the past 24 hour(s))   CBC with Auto Differential    Collection Time: 04/17/23  5:11 PM   Result Value Ref Range    WBC 6.6 4.5 - 11.5 E9/L    RBC 5.81 (H) 3.50 - 5.50 E12/L    Hemoglobin 13.9 11.5 - 15.5 g/dL    Hematocrit 46.3 34.0 - 48.0 %    MCV 79.7 (L) 80.0 - 99.9 fL    MCH 23.9 (L) 26.0 - 35.0 pg    MCHC 30.0 (L) 32.0 - 34.5 %    RDW 15.7 (H) 11.5 - 15.0 fL    Platelets 663 641 - 637 E9/L    MPV 10.2 7.0 - 12.0 fL    Neutrophils % 45.9 43.0 - 80.0 %    Immature Granulocytes % 0.3 0.0 - 5.0 %    Lymphocytes % 30.5 20.0 - 42.0 %    Monocytes % 8.8 2.0 - 12.0 %    Eosinophils % 13.4 (H) 0.0 - 6.0 %    Basophils % 1.1 0.0 - 2.0 %    Neutrophils Absolute 3.01 1.80 - 7.30 E9/L    Immature Granulocytes # 0.02 E9/L    Lymphocytes Absolute 2.00 1.50 - 4.00 E9/L    Monocytes Absolute 0.58 0.10 - 0.95 E9/L    Eosinophils Absolute 0.88 (H) 0.05 - 0.50 E9/L    Basophils Absolute 0.07 0.00 - 0.20 K9/F   Basic Metabolic Panel    Collection Time: 04/17/23  5:11 PM   Result Value Ref Range    Sodium 139 132 - 146 mmol/L

## 2023-04-19 ENCOUNTER — HOSPITAL ENCOUNTER (OUTPATIENT)
Dept: PULMONOLOGY | Age: 63
Discharge: HOME | End: 2023-04-19

## 2023-04-19 LAB
LV EF: 63 %
LVEF MODALITY: NORMAL
PROCALCITONIN: 0.04 NG/ML (ref 0–0.08)

## 2023-04-19 PROCEDURE — 2580000003 HC RX 258: Performed by: FAMILY MEDICINE

## 2023-04-19 PROCEDURE — G0378 HOSPITAL OBSERVATION PER HR: HCPCS

## 2023-04-19 PROCEDURE — 6370000000 HC RX 637 (ALT 250 FOR IP): Performed by: FAMILY MEDICINE

## 2023-04-19 PROCEDURE — A4216 STERILE WATER/SALINE, 10 ML: HCPCS | Performed by: INTERNAL MEDICINE

## 2023-04-19 PROCEDURE — 2140000000 HC CCU INTERMEDIATE R&B

## 2023-04-19 PROCEDURE — 6360000002 HC RX W HCPCS: Performed by: FAMILY MEDICINE

## 2023-04-19 PROCEDURE — 87449 NOS EACH ORGANISM AG IA: CPT

## 2023-04-19 PROCEDURE — 6370000000 HC RX 637 (ALT 250 FOR IP): Performed by: INTERNAL MEDICINE

## 2023-04-19 PROCEDURE — 96375 TX/PRO/DX INJ NEW DRUG ADDON: CPT

## 2023-04-19 PROCEDURE — 2500000003 HC RX 250 WO HCPCS: Performed by: FAMILY MEDICINE

## 2023-04-19 PROCEDURE — 84145 PROCALCITONIN (PCT): CPT

## 2023-04-19 PROCEDURE — 6360000002 HC RX W HCPCS: Performed by: INTERNAL MEDICINE

## 2023-04-19 PROCEDURE — 96366 THER/PROPH/DIAG IV INF ADDON: CPT

## 2023-04-19 PROCEDURE — 96376 TX/PRO/DX INJ SAME DRUG ADON: CPT

## 2023-04-19 PROCEDURE — 99233 SBSQ HOSP IP/OBS HIGH 50: CPT | Performed by: INTERNAL MEDICINE

## 2023-04-19 PROCEDURE — 94640 AIRWAY INHALATION TREATMENT: CPT

## 2023-04-19 PROCEDURE — C9113 INJ PANTOPRAZOLE SODIUM, VIA: HCPCS | Performed by: INTERNAL MEDICINE

## 2023-04-19 PROCEDURE — 2580000003 HC RX 258: Performed by: INTERNAL MEDICINE

## 2023-04-19 PROCEDURE — 93306 TTE W/DOPPLER COMPLETE: CPT

## 2023-04-19 PROCEDURE — 36415 COLL VENOUS BLD VENIPUNCTURE: CPT

## 2023-04-19 PROCEDURE — 6360000004 HC RX CONTRAST MEDICATION: Performed by: INTERNAL MEDICINE

## 2023-04-19 RX ORDER — ONDANSETRON 2 MG/ML
4 INJECTION INTRAMUSCULAR; INTRAVENOUS EVERY 6 HOURS PRN
Status: DISCONTINUED | OUTPATIENT
Start: 2023-04-19 | End: 2023-04-21 | Stop reason: HOSPADM

## 2023-04-19 RX ORDER — ENOXAPARIN SODIUM 100 MG/ML
40 INJECTION SUBCUTANEOUS DAILY
Status: DISCONTINUED | OUTPATIENT
Start: 2023-04-19 | End: 2023-04-19

## 2023-04-19 RX ORDER — ACETAMINOPHEN 650 MG/1
650 SUPPOSITORY RECTAL EVERY 6 HOURS PRN
Status: DISCONTINUED | OUTPATIENT
Start: 2023-04-19 | End: 2023-04-20 | Stop reason: SDUPTHER

## 2023-04-19 RX ORDER — DOXYCYCLINE HYCLATE 100 MG/1
100 CAPSULE ORAL EVERY 12 HOURS SCHEDULED
Status: DISCONTINUED | OUTPATIENT
Start: 2023-04-19 | End: 2023-04-20

## 2023-04-19 RX ORDER — METHYLPREDNISOLONE SODIUM SUCCINATE 125 MG/2ML
60 INJECTION, POWDER, LYOPHILIZED, FOR SOLUTION INTRAMUSCULAR; INTRAVENOUS EVERY 8 HOURS
Status: DISCONTINUED | OUTPATIENT
Start: 2023-04-19 | End: 2023-04-20

## 2023-04-19 RX ORDER — ENOXAPARIN SODIUM 100 MG/ML
30 INJECTION SUBCUTANEOUS 2 TIMES DAILY
Status: DISCONTINUED | OUTPATIENT
Start: 2023-04-19 | End: 2023-04-21 | Stop reason: HOSPADM

## 2023-04-19 RX ORDER — CLONIDINE HYDROCHLORIDE 0.1 MG/1
0.1 TABLET ORAL ONCE
Status: COMPLETED | OUTPATIENT
Start: 2023-04-19 | End: 2023-04-19

## 2023-04-19 RX ORDER — SODIUM CHLORIDE 0.9 % (FLUSH) 0.9 %
5-40 SYRINGE (ML) INJECTION EVERY 12 HOURS SCHEDULED
Status: DISCONTINUED | OUTPATIENT
Start: 2023-04-19 | End: 2023-04-21 | Stop reason: HOSPADM

## 2023-04-19 RX ORDER — SODIUM CHLORIDE 0.9 % (FLUSH) 0.9 %
5-40 SYRINGE (ML) INJECTION PRN
Status: DISCONTINUED | OUTPATIENT
Start: 2023-04-19 | End: 2023-04-21 | Stop reason: HOSPADM

## 2023-04-19 RX ORDER — SODIUM CHLORIDE 9 MG/ML
25 INJECTION, SOLUTION INTRAVENOUS PRN
Status: DISCONTINUED | OUTPATIENT
Start: 2023-04-19 | End: 2023-04-21 | Stop reason: HOSPADM

## 2023-04-19 RX ORDER — ACETAMINOPHEN 325 MG/1
650 TABLET ORAL EVERY 6 HOURS PRN
Status: DISCONTINUED | OUTPATIENT
Start: 2023-04-19 | End: 2023-04-20 | Stop reason: SDUPTHER

## 2023-04-19 RX ORDER — POLYETHYLENE GLYCOL 3350 17 G/17G
17 POWDER, FOR SOLUTION ORAL DAILY PRN
Status: DISCONTINUED | OUTPATIENT
Start: 2023-04-19 | End: 2023-04-21 | Stop reason: HOSPADM

## 2023-04-19 RX ORDER — ONDANSETRON 4 MG/1
4 TABLET, ORALLY DISINTEGRATING ORAL EVERY 8 HOURS PRN
Status: DISCONTINUED | OUTPATIENT
Start: 2023-04-19 | End: 2023-04-21 | Stop reason: HOSPADM

## 2023-04-19 RX ORDER — HYDROCHLOROTHIAZIDE 25 MG/1
25 TABLET ORAL DAILY
Status: DISCONTINUED | OUTPATIENT
Start: 2023-04-19 | End: 2023-04-21 | Stop reason: HOSPADM

## 2023-04-19 RX ADMIN — BUDESONIDE 500 MCG: 0.5 INHALANT RESPIRATORY (INHALATION) at 06:34

## 2023-04-19 RX ADMIN — IPRATROPIUM BROMIDE AND ALBUTEROL SULFATE 1 AMPULE: 2.5; .5 SOLUTION RESPIRATORY (INHALATION) at 20:16

## 2023-04-19 RX ADMIN — METHYLPREDNISOLONE SODIUM SUCCINATE 60 MG: 125 INJECTION INTRAMUSCULAR; INTRAVENOUS at 21:15

## 2023-04-19 RX ADMIN — BUDESONIDE 500 MCG: 0.5 INHALANT RESPIRATORY (INHALATION) at 20:16

## 2023-04-19 RX ADMIN — SODIUM CHLORIDE, PRESERVATIVE FREE 10 ML: 5 INJECTION INTRAVENOUS at 22:06

## 2023-04-19 RX ADMIN — ARFORMOTEROL TARTRATE 15 MCG: 15 SOLUTION RESPIRATORY (INHALATION) at 06:35

## 2023-04-19 RX ADMIN — IPRATROPIUM BROMIDE AND ALBUTEROL SULFATE 1 AMPULE: 2.5; .5 SOLUTION RESPIRATORY (INHALATION) at 11:15

## 2023-04-19 RX ADMIN — HYDROCHLOROTHIAZIDE 25 MG: 25 TABLET ORAL at 15:38

## 2023-04-19 RX ADMIN — ARFORMOTEROL TARTRATE 15 MCG: 15 SOLUTION RESPIRATORY (INHALATION) at 20:16

## 2023-04-19 RX ADMIN — CLONIDINE HYDROCHLORIDE 0.1 MG: 0.1 TABLET ORAL at 22:04

## 2023-04-19 RX ADMIN — SODIUM CHLORIDE 40 MG: 9 INJECTION INTRAMUSCULAR; INTRAVENOUS; SUBCUTANEOUS at 00:46

## 2023-04-19 RX ADMIN — METHYLPREDNISOLONE SODIUM SUCCINATE 60 MG: 125 INJECTION INTRAMUSCULAR; INTRAVENOUS at 06:18

## 2023-04-19 RX ADMIN — DOXYCYCLINE 100 MG: 100 INJECTION, POWDER, LYOPHILIZED, FOR SOLUTION INTRAVENOUS at 11:09

## 2023-04-19 RX ADMIN — DOXYCYCLINE 100 MG: 100 INJECTION, POWDER, LYOPHILIZED, FOR SOLUTION INTRAVENOUS at 00:54

## 2023-04-19 RX ADMIN — SODIUM CHLORIDE, PRESERVATIVE FREE 10 ML: 5 INJECTION INTRAVENOUS at 11:05

## 2023-04-19 RX ADMIN — DOXYCYCLINE HYCLATE 100 MG: 100 CAPSULE ORAL at 22:04

## 2023-04-19 RX ADMIN — METHYLPREDNISOLONE SODIUM SUCCINATE 60 MG: 125 INJECTION INTRAMUSCULAR; INTRAVENOUS at 11:05

## 2023-04-19 RX ADMIN — SODIUM CHLORIDE 40 MG: 9 INJECTION INTRAMUSCULAR; INTRAVENOUS; SUBCUTANEOUS at 12:36

## 2023-04-19 RX ADMIN — PERFLUTREN 1.5 ML: 6.52 INJECTION, SUSPENSION INTRAVENOUS at 09:00

## 2023-04-19 NOTE — PROGRESS NOTES
Patient called to PFT lab about scheduled out-patient PFT, I explained to her that the test would need to be rescheduled as she is admitted on a SOB admission.

## 2023-04-19 NOTE — CARE COORDINATION
Patient presented to the ED due to shortness of breath and non-productive cough; admitted for COPD. Met with patient at bedside for transition of care planning. Patient lives alone in a home, 3 steps to enter, is independent without a device; has a nebulizer. Uses [facility] pharmacy [facility]), PCP is Felicia Cordon and patient goes to pulmonary rehab 2x per week. Patient reports having assist from her children when needed. Plan is for the patient to return home, no needs reported and her son will transport. Per nursing rounds, patient for pulmonary function test today.     Brooklynn Landeros, MSW, LSW [phone]

## 2023-04-20 ENCOUNTER — HOSPITAL ENCOUNTER (OUTPATIENT)
Dept: PULMONOLOGY | Age: 63
Setting detail: THERAPIES SERIES
End: 2023-04-20
Payer: MEDICAID

## 2023-04-20 LAB
ANION GAP SERPL CALCULATED.3IONS-SCNC: 10 MMOL/L (ref 7–16)
BUN SERPL-MCNC: 18 MG/DL (ref 6–23)
CALCIUM SERPL-MCNC: 9.2 MG/DL (ref 8.6–10.2)
CHLORIDE SERPL-SCNC: 104 MMOL/L (ref 98–107)
CO2 SERPL-SCNC: 25 MMOL/L (ref 22–29)
CREAT SERPL-MCNC: 0.9 MG/DL (ref 0.5–1)
ERYTHROCYTE [DISTWIDTH] IN BLOOD BY AUTOMATED COUNT: 16.1 FL (ref 11.5–15)
GLUCOSE SERPL-MCNC: 293 MG/DL (ref 74–99)
HCT VFR BLD AUTO: 43.2 % (ref 34–48)
HGB BLD-MCNC: 12.6 G/DL (ref 11.5–15.5)
LEGIONELLA AG UR QL: NORMAL
MCH RBC QN AUTO: 23.6 PG (ref 26–35)
MCHC RBC AUTO-ENTMCNC: 29.2 % (ref 32–34.5)
MCV RBC AUTO: 81.1 FL (ref 80–99.9)
METER GLUCOSE: 256 MG/DL (ref 74–99)
METER GLUCOSE: 260 MG/DL (ref 74–99)
METER GLUCOSE: 260 MG/DL (ref 74–99)
METER GLUCOSE: 263 MG/DL (ref 74–99)
PLATELET # BLD AUTO: 357 E9/L (ref 130–450)
PMV BLD AUTO: 11 FL (ref 7–12)
POTASSIUM SERPL-SCNC: 4.4 MMOL/L (ref 3.5–5)
RBC # BLD AUTO: 5.33 E12/L (ref 3.5–5.5)
SODIUM SERPL-SCNC: 139 MMOL/L (ref 132–146)
WBC # BLD: 12.5 E9/L (ref 4.5–11.5)

## 2023-04-20 PROCEDURE — 85027 COMPLETE CBC AUTOMATED: CPT

## 2023-04-20 PROCEDURE — G0378 HOSPITAL OBSERVATION PER HR: HCPCS

## 2023-04-20 PROCEDURE — 82962 GLUCOSE BLOOD TEST: CPT

## 2023-04-20 PROCEDURE — 80048 BASIC METABOLIC PNL TOTAL CA: CPT

## 2023-04-20 PROCEDURE — 6360000002 HC RX W HCPCS: Performed by: FAMILY MEDICINE

## 2023-04-20 PROCEDURE — 6370000000 HC RX 637 (ALT 250 FOR IP): Performed by: FAMILY MEDICINE

## 2023-04-20 PROCEDURE — 99233 SBSQ HOSP IP/OBS HIGH 50: CPT | Performed by: INTERNAL MEDICINE

## 2023-04-20 PROCEDURE — 36415 COLL VENOUS BLD VENIPUNCTURE: CPT

## 2023-04-20 PROCEDURE — 6360000002 HC RX W HCPCS: Performed by: INTERNAL MEDICINE

## 2023-04-20 PROCEDURE — 94640 AIRWAY INHALATION TREATMENT: CPT

## 2023-04-20 PROCEDURE — 96375 TX/PRO/DX INJ NEW DRUG ADDON: CPT

## 2023-04-20 PROCEDURE — 2580000003 HC RX 258: Performed by: FAMILY MEDICINE

## 2023-04-20 RX ORDER — LATANOPROST 50 UG/ML
1 SOLUTION/ DROPS OPHTHALMIC DAILY
Status: DISCONTINUED | OUTPATIENT
Start: 2023-04-20 | End: 2023-04-20

## 2023-04-20 RX ORDER — METHYLPREDNISOLONE SODIUM SUCCINATE 40 MG/ML
40 INJECTION, POWDER, LYOPHILIZED, FOR SOLUTION INTRAMUSCULAR; INTRAVENOUS EVERY 12 HOURS
Status: DISCONTINUED | OUTPATIENT
Start: 2023-04-20 | End: 2023-04-21 | Stop reason: HOSPADM

## 2023-04-20 RX ORDER — LATANOPROST 50 UG/ML
1 SOLUTION/ DROPS OPHTHALMIC NIGHTLY
Status: DISCONTINUED | OUTPATIENT
Start: 2023-04-20 | End: 2023-04-21 | Stop reason: HOSPADM

## 2023-04-20 RX ORDER — INSULIN LISPRO 100 [IU]/ML
0-4 INJECTION, SOLUTION INTRAVENOUS; SUBCUTANEOUS
Status: DISCONTINUED | OUTPATIENT
Start: 2023-04-20 | End: 2023-04-21 | Stop reason: HOSPADM

## 2023-04-20 RX ORDER — FUROSEMIDE 10 MG/ML
40 INJECTION INTRAMUSCULAR; INTRAVENOUS ONCE
Status: COMPLETED | OUTPATIENT
Start: 2023-04-20 | End: 2023-04-20

## 2023-04-20 RX ORDER — FUROSEMIDE 40 MG/1
40 TABLET ORAL DAILY
Status: DISCONTINUED | OUTPATIENT
Start: 2023-04-21 | End: 2023-04-21 | Stop reason: HOSPADM

## 2023-04-20 RX ORDER — DEXTROSE MONOHYDRATE 100 MG/ML
INJECTION, SOLUTION INTRAVENOUS CONTINUOUS PRN
Status: DISCONTINUED | OUTPATIENT
Start: 2023-04-20 | End: 2023-04-21 | Stop reason: HOSPADM

## 2023-04-20 RX ORDER — BRIMONIDINE TARTRATE 2 MG/ML
1 SOLUTION/ DROPS OPHTHALMIC 3 TIMES DAILY
Status: DISCONTINUED | OUTPATIENT
Start: 2023-04-20 | End: 2023-04-21 | Stop reason: HOSPADM

## 2023-04-20 RX ORDER — ACETAMINOPHEN 325 MG/1
650 TABLET ORAL EVERY 6 HOURS PRN
Status: DISCONTINUED | OUTPATIENT
Start: 2023-04-20 | End: 2023-04-21 | Stop reason: HOSPADM

## 2023-04-20 RX ORDER — INSULIN LISPRO 100 [IU]/ML
0-4 INJECTION, SOLUTION INTRAVENOUS; SUBCUTANEOUS NIGHTLY
Status: DISCONTINUED | OUTPATIENT
Start: 2023-04-20 | End: 2023-04-21 | Stop reason: HOSPADM

## 2023-04-20 RX ORDER — ACETAMINOPHEN 650 MG/1
650 SUPPOSITORY RECTAL EVERY 6 HOURS PRN
Status: DISCONTINUED | OUTPATIENT
Start: 2023-04-20 | End: 2023-04-21 | Stop reason: HOSPADM

## 2023-04-20 RX ORDER — DORZOLAMIDE HCL 20 MG/ML
1 SOLUTION/ DROPS OPHTHALMIC 2 TIMES DAILY
Status: DISCONTINUED | OUTPATIENT
Start: 2023-04-20 | End: 2023-04-21 | Stop reason: HOSPADM

## 2023-04-20 RX ADMIN — ARFORMOTEROL TARTRATE 15 MCG: 15 SOLUTION RESPIRATORY (INHALATION) at 22:26

## 2023-04-20 RX ADMIN — SODIUM CHLORIDE, PRESERVATIVE FREE 10 ML: 5 INJECTION INTRAVENOUS at 09:57

## 2023-04-20 RX ADMIN — INSULIN LISPRO 2 UNITS: 100 INJECTION, SOLUTION INTRAVENOUS; SUBCUTANEOUS at 12:57

## 2023-04-20 RX ADMIN — ARFORMOTEROL TARTRATE 15 MCG: 15 SOLUTION RESPIRATORY (INHALATION) at 09:03

## 2023-04-20 RX ADMIN — IPRATROPIUM BROMIDE AND ALBUTEROL SULFATE 1 AMPULE: 2.5; .5 SOLUTION RESPIRATORY (INHALATION) at 09:03

## 2023-04-20 RX ADMIN — BUDESONIDE 500 MCG: 0.5 INHALANT RESPIRATORY (INHALATION) at 09:03

## 2023-04-20 RX ADMIN — BUDESONIDE 500 MCG: 0.5 INHALANT RESPIRATORY (INHALATION) at 22:27

## 2023-04-20 RX ADMIN — ACETAMINOPHEN 650 MG: 325 TABLET ORAL at 11:38

## 2023-04-20 RX ADMIN — HYDROCHLOROTHIAZIDE 25 MG: 25 TABLET ORAL at 09:56

## 2023-04-20 RX ADMIN — FUROSEMIDE 40 MG: 10 INJECTION, SOLUTION INTRAMUSCULAR; INTRAVENOUS at 16:15

## 2023-04-20 RX ADMIN — BRIMONIDINE TARTRATE 1 DROP: 2 SOLUTION/ DROPS OPHTHALMIC at 16:14

## 2023-04-20 RX ADMIN — SODIUM CHLORIDE, PRESERVATIVE FREE 10 ML: 5 INJECTION INTRAVENOUS at 23:00

## 2023-04-20 RX ADMIN — BRIMONIDINE TARTRATE 1 DROP: 2 SOLUTION/ DROPS OPHTHALMIC at 23:00

## 2023-04-20 RX ADMIN — DOXYCYCLINE HYCLATE 100 MG: 100 CAPSULE ORAL at 09:56

## 2023-04-20 RX ADMIN — DORZOLAMIDE HYDROCHLORIDE 1 DROP: 20 SOLUTION OPHTHALMIC at 12:57

## 2023-04-20 RX ADMIN — LATANOPROST 1 DROP: 50 SOLUTION OPHTHALMIC at 22:59

## 2023-04-20 RX ADMIN — METHYLPREDNISOLONE SODIUM SUCCINATE 40 MG: 40 INJECTION, POWDER, FOR SOLUTION INTRAMUSCULAR; INTRAVENOUS at 16:14

## 2023-04-20 RX ADMIN — DORZOLAMIDE HYDROCHLORIDE 1 DROP: 20 SOLUTION OPHTHALMIC at 22:59

## 2023-04-20 RX ADMIN — INSULIN LISPRO 2 UNITS: 100 INJECTION, SOLUTION INTRAVENOUS; SUBCUTANEOUS at 18:56

## 2023-04-20 RX ADMIN — INSULIN LISPRO 2 UNITS: 100 INJECTION, SOLUTION INTRAVENOUS; SUBCUTANEOUS at 10:08

## 2023-04-20 ASSESSMENT — PAIN DESCRIPTION - ORIENTATION: ORIENTATION: LEFT

## 2023-04-20 ASSESSMENT — PAIN SCALES - GENERAL: PAINLEVEL_OUTOF10: 7

## 2023-04-20 ASSESSMENT — PAIN DESCRIPTION - DESCRIPTORS: DESCRIPTORS: ACHING;DISCOMFORT;PRESSURE

## 2023-04-20 ASSESSMENT — PAIN DESCRIPTION - LOCATION: LOCATION: HEAD

## 2023-04-21 VITALS
OXYGEN SATURATION: 97 % | HEIGHT: 65 IN | SYSTOLIC BLOOD PRESSURE: 174 MMHG | DIASTOLIC BLOOD PRESSURE: 93 MMHG | RESPIRATION RATE: 18 BRPM | TEMPERATURE: 97.9 F | BODY MASS INDEX: 41.65 KG/M2 | WEIGHT: 250 LBS | HEART RATE: 96 BPM

## 2023-04-21 LAB
ANION GAP SERPL CALCULATED.3IONS-SCNC: 12 MMOL/L (ref 7–16)
BUN SERPL-MCNC: 22 MG/DL (ref 6–23)
CALCIUM SERPL-MCNC: 9.2 MG/DL (ref 8.6–10.2)
CHLORIDE SERPL-SCNC: 103 MMOL/L (ref 98–107)
CO2 SERPL-SCNC: 28 MMOL/L (ref 22–29)
CREAT SERPL-MCNC: 0.8 MG/DL (ref 0.5–1)
ERYTHROCYTE [DISTWIDTH] IN BLOOD BY AUTOMATED COUNT: 15.7 FL (ref 11.5–15)
GLUCOSE SERPL-MCNC: 196 MG/DL (ref 74–99)
HCT VFR BLD AUTO: 44.3 % (ref 34–48)
HGB BLD-MCNC: 13.6 G/DL (ref 11.5–15.5)
MCH RBC QN AUTO: 23.9 PG (ref 26–35)
MCHC RBC AUTO-ENTMCNC: 30.7 % (ref 32–34.5)
MCV RBC AUTO: 77.7 FL (ref 80–99.9)
METER GLUCOSE: 172 MG/DL (ref 74–99)
METER GLUCOSE: 323 MG/DL (ref 74–99)
PLATELET # BLD AUTO: 356 E9/L (ref 130–450)
PMV BLD AUTO: 10.7 FL (ref 7–12)
POTASSIUM SERPL-SCNC: 4.1 MMOL/L (ref 3.5–5)
RBC # BLD AUTO: 5.7 E12/L (ref 3.5–5.5)
SODIUM SERPL-SCNC: 143 MMOL/L (ref 132–146)
WBC # BLD: 12.1 E9/L (ref 4.5–11.5)

## 2023-04-21 PROCEDURE — 94640 AIRWAY INHALATION TREATMENT: CPT

## 2023-04-21 PROCEDURE — 6360000002 HC RX W HCPCS: Performed by: FAMILY MEDICINE

## 2023-04-21 PROCEDURE — 2580000003 HC RX 258: Performed by: FAMILY MEDICINE

## 2023-04-21 PROCEDURE — 36415 COLL VENOUS BLD VENIPUNCTURE: CPT

## 2023-04-21 PROCEDURE — 82962 GLUCOSE BLOOD TEST: CPT

## 2023-04-21 PROCEDURE — 99233 SBSQ HOSP IP/OBS HIGH 50: CPT | Performed by: INTERNAL MEDICINE

## 2023-04-21 PROCEDURE — G0378 HOSPITAL OBSERVATION PER HR: HCPCS

## 2023-04-21 PROCEDURE — 80048 BASIC METABOLIC PNL TOTAL CA: CPT

## 2023-04-21 PROCEDURE — 6370000000 HC RX 637 (ALT 250 FOR IP): Performed by: INTERNAL MEDICINE

## 2023-04-21 PROCEDURE — 6370000000 HC RX 637 (ALT 250 FOR IP): Performed by: FAMILY MEDICINE

## 2023-04-21 PROCEDURE — 96376 TX/PRO/DX INJ SAME DRUG ADON: CPT

## 2023-04-21 PROCEDURE — 85027 COMPLETE CBC AUTOMATED: CPT

## 2023-04-21 RX ADMIN — METHYLPREDNISOLONE SODIUM SUCCINATE 40 MG: 40 INJECTION, POWDER, FOR SOLUTION INTRAMUSCULAR; INTRAVENOUS at 04:31

## 2023-04-21 RX ADMIN — FUROSEMIDE 40 MG: 40 TABLET ORAL at 09:07

## 2023-04-21 RX ADMIN — BRIMONIDINE TARTRATE 1 DROP: 2 SOLUTION/ DROPS OPHTHALMIC at 13:35

## 2023-04-21 RX ADMIN — LATANOPROST 1 DROP: 50 SOLUTION OPHTHALMIC at 09:06

## 2023-04-21 RX ADMIN — DORZOLAMIDE HYDROCHLORIDE 1 DROP: 20 SOLUTION OPHTHALMIC at 09:06

## 2023-04-21 RX ADMIN — HYDROCHLOROTHIAZIDE 25 MG: 25 TABLET ORAL at 09:07

## 2023-04-21 RX ADMIN — BRIMONIDINE TARTRATE 1 DROP: 2 SOLUTION/ DROPS OPHTHALMIC at 09:06

## 2023-04-21 RX ADMIN — INSULIN LISPRO 3 UNITS: 100 INJECTION, SOLUTION INTRAVENOUS; SUBCUTANEOUS at 13:35

## 2023-04-21 RX ADMIN — ARFORMOTEROL TARTRATE 15 MCG: 15 SOLUTION RESPIRATORY (INHALATION) at 07:59

## 2023-04-21 RX ADMIN — SODIUM CHLORIDE, PRESERVATIVE FREE 10 ML: 5 INJECTION INTRAVENOUS at 09:08

## 2023-04-21 RX ADMIN — BUDESONIDE 500 MCG: 0.5 INHALANT RESPIRATORY (INHALATION) at 07:59

## 2023-04-21 NOTE — PROGRESS NOTES
Josefina serve to Dr. Luis Mccallum about patients elevated BP. Awaiting orders.      Orders placed by Dr. Luis Mccallum
Notification of IV to PO conversion: This patient's order for doxycycline IV has been changed to PO as approved by the Pharmacy & Therapeutics and Medical Executive Committees. If the patient should become strict NPO while on this therapy, contact the prescriber for further orders.
Patient refused 2am IV Protonix and 315am IV Solumedrol due to upset stomach and sick feeling. Patient was given a one time dose of Catapres 0.1mg for BP of 197/92 and believes that caused it.
Pulmonary Critical Care Medicine           PULMONARY  CRITICAL CARE   SERVICE DAILY PROGRESS  NOTE     2023   Hospital LOS:  LOS: 0 days     Impression/ Recommendations:    Acute on chronic chronic respiratory insufficiency likely secondary to acute exacerbation of underlying severe COPD likely from a viral illness- gradually improving      Probable overlap syndrome (COPD plus obstructive sleep apnea)     Obesity hypoventilation syndrome/pickwickian physiology    HFpEF with Pulmonary vascular congestion      - Add lasix 40 daily PO , one dose of IV 40 mg today   - Respiratory viral Panel with COVID reviewed- negative   -Sputum cultures - negative   - D/c doxy   -Continue Duonebs  Scheduled   - Continue Pulmicort and perforomist  nebulizations   -Continue systemic steroids IV , change to PO tomorrow    - Change IV PPI to PO .  -Echocardiogram - reviewed , no evidence of cor pulmonale , has HFpEF     Interval History/Event Changes:  Feels better   Up and walking around the room with no O2   No GALEANA with ADLs     Allergies  No Known Allergies    Review of Systems    A pertinent review of systems was performed and was otherwise non-contributory.     Vitals-     BP (!) 156/83   Pulse 90   Temp 97.9 °F (36.6 °C) (Oral)   Resp 18   Ht 5' 5\" (1.651 m)   Wt 250 lb (113.4 kg)   SpO2 93%   BMI 41.60 kg/m²    Tmax: Temp (24hrs), Av.9 °F (36.6 °C), Min:97.3 °F (36.3 °C), Max:98.4 °F (36.9 °C)      Hemodynamics:  Cuff:   Systolic (55JGQ), HHJ:142 , Min:156 , FVB:994   /Diastolic (24OWS), CUO:30, Min:81, Max:94    Cuff MAP:MAP (mmHg)  Av.2  Min: 100  Max: 135  P:   Pulse  Av.7  Min: 89  Max: 110      Airway:     Observed RR: Resp  Av.7  Min: 18  Max: 22   Observed O2 sats: SpO2  Av.3 %  Min: 93 %  Max: 98 %      Intake/Output Summary (Last 24 hours) at 2023 1511  Last data filed at 2023 1332  Gross per 24 hour   Intake 600 ml   Output --   Net 600 ml         Physical exam-  General
Pulmonary Critical Care Medicine           PULMONARY  CRITICAL CARE   SERVICE DAILY PROGRESS  NOTE     2023   Hospital LOS:  LOS: 1 day     Impression/ Recommendations:    Acute on chronic chronic respiratory insufficiency likely secondary to acute exacerbation of underlying severe COPD likely from a viral illness- gradually improving      Probable overlap syndrome (COPD plus obstructive sleep apnea)     Obesity hypoventilation syndrome/pickwickian physiology     - Respiratory viral Panel with COVID reviewed- negative   -Sputum cultures - negative   -Obtain urine Antigens   -Continue Duonebs  Scheduled   - Continue Pulmicort and perforomist  nebulizations   -Get procalcitonin, currently on doxycycline  -Continue systemic steroids IV    -Continue IV pantoprazole for couple of days followed by p.o.  -Echocardiogram to rule out cor pulmonale- pending     Interval History/Event Changes:  Feels better this am   Off oxygen   Taking shower independently     Allergies  No Known Allergies    Review of Systems    A pertinent review of systems was performed and was otherwise non-contributory.     Vitals-     BP (!) 156/82   Pulse 100   Temp 97.9 °F (36.6 °C) (Temporal)   Resp 18   Ht 5' 5\" (1.651 m)   Wt 250 lb (113.4 kg)   SpO2 98%   BMI 41.60 kg/m²    Tmax: Temp (24hrs), Av.8 °F (36.6 °C), Min:97.6 °F (36.4 °C), Max:97.9 °F (36.6 °C)      Hemodynamics:  Cuff:   Systolic (43HUF), OOK:839 , Min:155 , UUL:644   /Diastolic (85MEO), VST:35, Min:76, Max:94    Cuff MAP:MAP (mmHg)  Av.3  Min: 100  Max: 135  P:   Pulse  Av.5  Min: 79  Max: 113      Airway:     Observed RR: Resp  Av.5  Min: 16  Max: 18   Observed O2 sats: SpO2  Av.4 %  Min: 94 %  Max: 98 %      Intake/Output Summary (Last 24 hours) at 2023 1253  Last data filed at 2023 1147  Gross per 24 hour   Intake 440 ml   Output --   Net 440 ml         Physical exam-  General appearance: Not ill appearing, alert, obese , no
Secure message to Pulmonary to see if patient okay to discharge. Per Dr. Dior Almeida, patient to be switched to PO steroids and okay for discharge.
ill appearing, alert, obese , no converstional dyspnea  Head: Normocephalic, without obvious abnormality, atraumatic   Eyes:Pupils bilateral equal and reactive, EOM intact, conjunctiva - no icterus , no injection   Throat: Clear, no lesions, Mallampti =IV, no tonsillar eythema or edema  Neck: Supple, symmetrical, trachea midline, no lymphadenopathy, no JVD, no carotid bruits, no thyromegaly   Lungs: Bilateral  Air movement overall diminished , rhonchi resolved , no crackles  , normal femitus, resonant to percussion  Heart: RRR, S1, S2 normal, no murmur, click, rub or gallop   Abdomen: soft, non-tender, nondistended. Bowel sounds normal, no hepatomeagly  Extremities: extremities normal, atraumatic, no cyanosis, edema  Musculoskeletal - No deformities   Skin: Skin color, texture, turgor normal. No rashes or lesions   Neurological: No focal deficits, cranial nerves grossly intact, sensations intact   Psychiatric : Mood and effect normal , alert and oriented times 4        Routine labs:    Recent Labs     04/20/23  0443 04/21/23  0436   WBC 12.5* 12.1*   HGB 12.6 13.6   HCT 43.2 44.3    356     Recent Labs     04/20/23  0444 04/21/23  0436    143   K 4.4 4.1    103   CO2 25 28   BUN 18 22   CREATININE 0.9 0.8         Other Laboratory - Imaging Studies:     Reviewed and as per electronic record. CxR/CT images reviewed by me when available.       Kristina Ortiz MD  04/21/23
joint tenderness, deformity or swelling   Extremities - peripheral pulses normal, no pedal edema, no clubbing or cyanosis times 4          Review of Labs and Diagnostic Testing:         XR CHEST (2 VW)    Result Date: 4/17/2023  EXAMINATION: TWO XRAY VIEWS OF THE CHEST 4/17/2023 5:29 pm COMPARISON: 02/24/2023. HISTORY: ORDERING SYSTEM PROVIDED HISTORY: cough TECHNOLOGIST PROVIDED HISTORY: Reason for exam:->cough What reading provider will be dictating this exam?->CRC FINDINGS: The cardiomediastinal silhouette is without acute process. The lungs are without acute focal process. There is no effusion or pneumothorax. The osseous structures are without acute process. No evidence of acute cardiopulmonary disease is seen. CTA PULMONARY W CONTRAST    Result Date: 4/18/2023  EXAMINATION: CTA OF THE CHEST 4/18/2023 9:59 am TECHNIQUE: CTA of the chest was performed after the administration of intravenous contrast.  Multiplanar reformatted images are provided for review. 3D and MIP images are provided for review. Automated exposure control, iterative reconstruction, and/or weight based adjustment of the mA/kV was utilized to reduce the radiation dose to as low as reasonably achievable. COMPARISON: CTA chest dated 02/15/2023 HISTORY: ORDERING SYSTEM PROVIDED HISTORY: SOB, R/O PE TECHNOLOGIST PROVIDED HISTORY: Reason for exam:->SOB, R/O PE Decision Support Exception - unselect if not a suspected or confirmed emergency medical condition->Emergency Medical Condition (MA) What reading provider will be dictating this exam?->CRC FINDINGS: Pulmonary Arteries: Pulmonary arteries are adequately opacified for evaluation. No evidence of intraluminal filling defect to suggest pulmonary embolism. Main pulmonary artery is normal in caliber. Mediastinum: No evidence of mediastinal lymphadenopathy. The heart and pericardium demonstrate no acute abnormality. There is no acute abnormality of the thoracic aorta. Lungs/pleura:  The
PRN  perflutren lipid microspheres (DEFINITY) injection 1.5 mL, 1.5 mL, IntraVENous, ONCE PRN  pantoprazole (PROTONIX) 40 mg in sodium chloride (PF) 0.9 % 10 mL injection, 40 mg, IntraVENous, Q12H  budesonide (PULMICORT) nebulizer suspension 500 mcg, 0.5 mg, Nebulization, BID  arformoterol tartrate (BROVANA) nebulizer solution 15 mcg, 15 mcg, Nebulization, BID   Hospital Problems             Last Modified POA    * (Principal) COPD [facility]ch[facility] obstructive [facility]) [facility] 4/18/2023 Yes    Chronic obstructive pulmonary disease with acute exacerbation ([facility] [address] 4/18/2023 Yes                 Electronically signed by Heidi Aquino MD on 4/19/2023 at 7:25 AM
tablet 40 mg, 40 mg, Oral, Daily  sodium chloride flush 0.9 % injection 5-40 mL, 5-40 mL, IntraVENous, 2 times per day  sodium chloride flush 0.9 % injection 5-40 mL, 5-40 mL, IntraVENous, PRN  0.9 % sodium chloride infusion, 25 mL, IntraVENous, PRN  ondansetron (ZOFRAN-ODT) disintegrating tablet 4 mg, 4 mg, Oral, Q8H PRN **OR** ondansetron (ZOFRAN) injection 4 mg, 4 mg, IntraVENous, Q6H PRN  polyethylene glycol (GLYCOLAX) packet 17 g, 17 g, Oral, Daily PRN  enoxaparin Sodium (LOVENOX) injection 30 mg, 30 mg, SubCUTAneous, BID  hydroCHLOROthiazide (HYDRODIURIL) tablet 25 mg, 25 mg, Oral, Daily  ipratropium-albuterol (DUONEB) nebulizer solution 1 ampule, 1 ampule, Inhalation, Q4H PRN  budesonide (PULMICORT) nebulizer suspension 500 mcg, 0.5 mg, Nebulization, BID  arformoterol tartrate (BROVANA) nebulizer solution 15 mcg, 15 mcg, Nebulization, BID   Hospital Problems             Last Modified POA    * (Principal) COPD (chronic obstructive pulmonary disease) ([facility] 4/18/2023 Yes    Chronic obstructive pulmonary disease with acute exacerbation ([facility] 4/18/2023 Yes                 Electronically signed by Maurice De La Paz MD on 4/21/2023 at 7:23 AM

## 2023-04-21 NOTE — DISCHARGE SUMMARY
g/dL    Hematocrit 44.3 34.0 - 48.0 %    MCV 77.7 (L) 80.0 - 99.9 fL    MCH 23.9 (L) 26.0 - 35.0 pg    MCHC 30.7 (L) 32.0 - 34.5 %    RDW 15.7 (H) 11.5 - 15.0 fL    Platelets 563 496 - 957 E9/L    MPV 10.7 7.0 - 12.0 fL   POCT Glucose    Collection Time: 04/21/23  5:56 AM   Result Value Ref Range    Meter Glucose 172 (H) 74 - 99 mg/dL   POCT Glucose    Collection Time: 04/21/23 12:32 PM   Result Value Ref Range    Meter Glucose 323 (H) 74 - 99 mg/dL   ]         PHYSICAL EXAM:  BP (!) 168/88   Pulse 82   Temp 97.8 °F (36.6 °C) (Oral)   Resp 20   Ht 5' 5\" (1.651 m)   Wt 250 lb (113.4 kg)   SpO2 96%   BMI 41.60 kg/m²                Hospital Course: clinical course has been stable,      Disposition: home    Condition: Stable    Time Spent:   33  minutes         Timothy Oliva MD, MD  Discharging Hospitalist

## 2023-04-21 NOTE — CARE COORDINATION
4/21:  Update CM Note:  Pt presented to the ED due to shortness of breath & non-productive cough. Pt is a possible dc home today. CM spoke with pt to dc home & family can transport. Sw/CATRACHITO will continue to follow for dc planning.   Electronically signed by Maggie Choi RN on 4/21/2023 at 2:31 PM

## 2023-04-24 RX ORDER — IPRATROPIUM BROMIDE AND ALBUTEROL SULFATE 2.5; .5 MG/3ML; MG/3ML
SOLUTION RESPIRATORY (INHALATION)
Qty: 360 ML | Refills: 10 | Status: SHIPPED | OUTPATIENT
Start: 2023-04-24

## 2023-04-27 ENCOUNTER — APPOINTMENT (OUTPATIENT)
Dept: PULMONOLOGY | Age: 63
End: 2023-04-27
Payer: MEDICAID

## 2023-04-28 ENCOUNTER — OFFICE VISIT (OUTPATIENT)
Dept: FAMILY MEDICINE CLINIC | Age: 63
End: 2023-04-28
Payer: MEDICAID

## 2023-04-28 VITALS
OXYGEN SATURATION: 95 % | HEIGHT: 65 IN | SYSTOLIC BLOOD PRESSURE: 136 MMHG | HEART RATE: 97 BPM | BODY MASS INDEX: 41.65 KG/M2 | RESPIRATION RATE: 16 BRPM | DIASTOLIC BLOOD PRESSURE: 80 MMHG | TEMPERATURE: 97.6 F | WEIGHT: 250 LBS

## 2023-04-28 DIAGNOSIS — N76.0 ACUTE VAGINITIS: Primary | ICD-10-CM

## 2023-04-28 PROCEDURE — 99213 OFFICE O/P EST LOW 20 MIN: CPT

## 2023-04-28 PROCEDURE — 3075F SYST BP GE 130 - 139MM HG: CPT

## 2023-04-28 PROCEDURE — 1036F TOBACCO NON-USER: CPT

## 2023-04-28 PROCEDURE — 3079F DIAST BP 80-89 MM HG: CPT

## 2023-04-28 PROCEDURE — 3017F COLORECTAL CA SCREEN DOC REV: CPT

## 2023-04-28 PROCEDURE — G8417 CALC BMI ABV UP PARAM F/U: HCPCS

## 2023-04-28 PROCEDURE — G8427 DOCREV CUR MEDS BY ELIG CLIN: HCPCS

## 2023-04-28 RX ORDER — FLUCONAZOLE 150 MG/1
TABLET ORAL
Qty: 2 TABLET | Refills: 0 | Status: SHIPPED | OUTPATIENT
Start: 2023-04-28

## 2023-04-28 RX ORDER — FLUCONAZOLE 150 MG/1
TABLET ORAL
Qty: 2 TABLET | Refills: 0 | Status: SHIPPED
Start: 2023-04-28 | End: 2023-04-28 | Stop reason: CLARIF

## 2023-04-28 NOTE — PROGRESS NOTES
Chief Complaint       Vaginal Itching (She had a spot on her leg that she put neosporin on and she thinks she is having irritation from her getting it in her vagina. )      History of Present Illness   Source of history provided by:  patient. Fang Luu is a 58 y.o. old female presenting to the walk in clinic for evaluation of vaginal irritation and increased discharge which began 5 days ago. Pt reports using neosporin and Desitin which she was using for a scab on her leg however she somehow got some inside her vagina and symptoms started shortly after. Has had his issue in the past. She is also on antibiotics and concerned this too would increase yeast growth. Denies possible STD exposure. Denies any fever, chills, pain with intercourse, N/V/D, back pain, possibility of pregnancy, or lethargy. No LMP recorded. Patient is perimenopausal.    ROS    Unless otherwise stated in this report or unable to obtain because of the patient's clinical or mental status as evidenced by the medical record, this patients's positive and negative responses for Review of Systems, constitutional, psych, eyes, ENT, cardiovascular, respiratory, gastrointestinal, neurological, genitourinary, musculoskeletal, integument systems and systems related to the presenting problem are either stated in the preceding or were not pertinent or were negative for the symptoms and/or complaints related to the medical problem. Physical Exam         VS:  /80   Pulse 97   Temp 97.6 °F (36.4 °C) (Temporal)   Resp 16   Ht 5' 5\" (1.651 m)   Wt 250 lb (113.4 kg)   SpO2 95%   BMI 41.60 kg/m²    Oxygen Saturation Interpretation: Normal.    Constitutional:  A&Ox3, development consistent with age, NAD. CV: Heart RRR, no murmurs, rubs, or gallops. Lungs: CTAB without wheezing, rales, or rhonchi  Abdomen:  General Appearance: No rashes, bruising, or abrasions noted. Bowel sounds: BS+x4. Distension:  None.           Tenderness:

## 2023-05-02 ENCOUNTER — OFFICE VISIT (OUTPATIENT)
Dept: PULMONOLOGY | Age: 63
End: 2023-05-02
Payer: MEDICAID

## 2023-05-02 ENCOUNTER — HOSPITAL ENCOUNTER (OUTPATIENT)
Dept: PULMONOLOGY | Age: 63
Setting detail: THERAPIES SERIES
Discharge: HOME | End: 2023-05-02
Payer: MEDICAID

## 2023-05-02 VITALS
HEART RATE: 105 BPM | DIASTOLIC BLOOD PRESSURE: 83 MMHG | HEIGHT: 65 IN | RESPIRATION RATE: 18 BRPM | WEIGHT: 250 LBS | SYSTOLIC BLOOD PRESSURE: 166 MMHG | BODY MASS INDEX: 41.65 KG/M2 | OXYGEN SATURATION: 95 %

## 2023-05-02 DIAGNOSIS — R25.2 CRAMPS OF LOWER EXTREMITY: ICD-10-CM

## 2023-05-02 DIAGNOSIS — J44.9 CHRONIC OBSTRUCTIVE PULMONARY DISEASE, UNSPECIFIED COPD TYPE (HCC): Primary | ICD-10-CM

## 2023-05-02 PROCEDURE — G8427 DOCREV CUR MEDS BY ELIG CLIN: HCPCS | Performed by: NURSE PRACTITIONER

## 2023-05-02 PROCEDURE — 99214 OFFICE O/P EST MOD 30 MIN: CPT | Performed by: NURSE PRACTITIONER

## 2023-05-02 PROCEDURE — 94626 PHY/QHP OP PULM RHB W/MNTR: CPT

## 2023-05-02 PROCEDURE — 1036F TOBACCO NON-USER: CPT | Performed by: NURSE PRACTITIONER

## 2023-05-02 PROCEDURE — 3079F DIAST BP 80-89 MM HG: CPT | Performed by: NURSE PRACTITIONER

## 2023-05-02 PROCEDURE — 3017F COLORECTAL CA SCREEN DOC REV: CPT | Performed by: NURSE PRACTITIONER

## 2023-05-02 PROCEDURE — 3023F SPIROM DOC REV: CPT | Performed by: NURSE PRACTITIONER

## 2023-05-02 PROCEDURE — 3077F SYST BP >= 140 MM HG: CPT | Performed by: NURSE PRACTITIONER

## 2023-05-02 PROCEDURE — 99213 OFFICE O/P EST LOW 20 MIN: CPT | Performed by: NURSE PRACTITIONER

## 2023-05-02 PROCEDURE — G8417 CALC BMI ABV UP PARAM F/U: HCPCS | Performed by: NURSE PRACTITIONER

## 2023-05-02 RX ORDER — PREDNISONE 20 MG/1
20 TABLET ORAL DAILY
Qty: 5 TABLET | Refills: 0 | Status: SHIPPED | OUTPATIENT
Start: 2023-05-02 | End: 2023-05-07

## 2023-05-02 NOTE — PROGRESS NOTES
[place]  Department of Pulmonary, Critical Care and Sleep Medicine  Dr. Mansoor Miller, Dr. Melissa Perez, MATT Reyes    Pulmonary & Critical Care Office Note - Follow up      Assessment/Plan       Problem List Items Addressed This Visit          Respiratory    COPD (chronic obstructive pulmonary disease) ([address] - Primary    Relevant Medications    predniSONE (DELTASONE) 20 MG tablet     Other Visit Diagnoses       Cramps of lower extremity        Relevant Orders    Basic Metabolic Panel              Patient ID: Gardenia Khan is a 58 y.o. female    Chief Complaint: Shortness of breath    HPI: Gardenia Khan is a pleasant 58 y.o. female with a PMH of severe COPD, history of tobacco use, HTN, HLD and open angle glaucoma. Ramon Davison presents to the office today to establish care and for evaluation and management of her COPD and exertional dyspnea. Maria Elena smoked from age 25- 25 and then started smoking again at age 52 about 1 ppd until she quit last year (2022). Spirometry conducted last year showed severe obstruction and revealed evidence of possible restriction, she will need full PFT to evaluate. She states she has been short of breath for years, and has gained about 50 lbs. Ramon Davison does not sleep well and recently purchased and adjustable bed so she could sleep sitting up. She would benefit from baseline diagnostic sleep study to rule out sleep apnea. UPDATED HPI May 2, 2023: Ms. Garrison Wooten was seen today in the clinic for hospital follow up. Ramon Davison was admitted to [facility] April 18, 2023 for COPD exacerbation where she was treated with IV steroids and nebulized bronchodilators. She had CTA chest that was negative for PE and any other acute pulmonary disorder. Ramon Davison also had echocardiogram to evaluate for pulmonary hypertension, EF 60-65% no evidence of pulmonary hypertension.  Since she has been discharged her breathing has been better, she can take the stairs and she is

## 2023-05-02 NOTE — PROGRESS NOTES
Hospital follow up; 2 mos appt already scheduled prior to hospitalization. Pt doing well since hospital and will get BMP completed tomorrow am(having some leg cramps) and then follow up in office in 4 mos; appt given. Pt to reschedule PFT and have done prior to follow up appt. Trelegy inhaler continued.

## 2023-05-04 ENCOUNTER — HOSPITAL ENCOUNTER (OUTPATIENT)
Dept: PULMONOLOGY | Age: 63
Setting detail: THERAPIES SERIES
Discharge: HOME | End: 2023-05-04
Payer: MEDICAID

## 2023-05-04 PROCEDURE — 94626 PHY/QHP OP PULM RHB W/MNTR: CPT

## 2023-05-09 ENCOUNTER — HOSPITAL ENCOUNTER (OUTPATIENT)
Dept: PULMONOLOGY | Age: 63
Setting detail: THERAPIES SERIES
Discharge: HOME | End: 2023-05-09
Payer: MEDICAID

## 2023-05-09 PROCEDURE — 94626 PHY/QHP OP PULM RHB W/MNTR: CPT

## 2023-05-11 LAB — MAMMOGRAPHY, EXTERNAL: NORMAL

## 2023-05-16 ENCOUNTER — HOSPITAL ENCOUNTER (OUTPATIENT)
Dept: PULMONOLOGY | Age: 63
Setting detail: THERAPIES SERIES
Discharge: HOME | End: 2023-05-16
Payer: MEDICAID

## 2023-05-16 PROCEDURE — 94626 PHY/QHP OP PULM RHB W/MNTR: CPT

## 2023-05-18 ENCOUNTER — HOSPITAL ENCOUNTER (OUTPATIENT)
Dept: PULMONOLOGY | Age: 63
Setting detail: THERAPIES SERIES
Discharge: HOME | End: 2023-05-18
Payer: MEDICAID

## 2023-05-18 PROCEDURE — 94626 PHY/QHP OP PULM RHB W/MNTR: CPT

## 2023-05-23 ENCOUNTER — HOSPITAL ENCOUNTER (OUTPATIENT)
Dept: PULMONOLOGY | Age: 63
Setting detail: THERAPIES SERIES
Discharge: HOME | End: 2023-05-23
Payer: MEDICAID

## 2023-05-23 PROCEDURE — 94626 PHY/QHP OP PULM RHB W/MNTR: CPT

## 2023-05-25 ENCOUNTER — HOSPITAL ENCOUNTER (OUTPATIENT)
Dept: PULMONOLOGY | Age: 63
Setting detail: THERAPIES SERIES
Discharge: HOME | End: 2023-05-25
Payer: MEDICAID

## 2023-05-25 PROCEDURE — 94626 PHY/QHP OP PULM RHB W/MNTR: CPT

## 2023-05-30 ENCOUNTER — HOSPITAL ENCOUNTER (OUTPATIENT)
Dept: PULMONOLOGY | Age: 63
Setting detail: THERAPIES SERIES
Discharge: HOME | End: 2023-05-30
Payer: MEDICAID

## 2023-05-30 PROCEDURE — 94626 PHY/QHP OP PULM RHB W/MNTR: CPT

## 2023-06-01 ENCOUNTER — APPOINTMENT (OUTPATIENT)
Dept: PULMONOLOGY | Age: 63
End: 2023-06-01
Payer: MEDICAID

## 2023-06-01 ENCOUNTER — OFFICE VISIT (OUTPATIENT)
Dept: PULMONOLOGY | Age: 63
End: 2023-06-01
Payer: MEDICAID

## 2023-06-01 ENCOUNTER — TELEPHONE (OUTPATIENT)
Dept: PULMONOLOGY | Age: 63
End: 2023-06-01

## 2023-06-01 VITALS
BODY MASS INDEX: 42.58 KG/M2 | OXYGEN SATURATION: 96 % | WEIGHT: 249.4 LBS | DIASTOLIC BLOOD PRESSURE: 90 MMHG | RESPIRATION RATE: 16 BRPM | TEMPERATURE: 97.6 F | SYSTOLIC BLOOD PRESSURE: 148 MMHG | HEIGHT: 64 IN | HEART RATE: 98 BPM

## 2023-06-01 DIAGNOSIS — J44.9 CHRONIC OBSTRUCTIVE PULMONARY DISEASE, UNSPECIFIED COPD TYPE (HCC): Primary | ICD-10-CM

## 2023-06-01 DIAGNOSIS — J44.1 ACUTE EXACERBATION OF CHRONIC OBSTRUCTIVE PULMONARY DISEASE (COPD) (HCC): Primary | ICD-10-CM

## 2023-06-01 PROCEDURE — 99213 OFFICE O/P EST LOW 20 MIN: CPT | Performed by: NURSE PRACTITIONER

## 2023-06-01 PROCEDURE — 3023F SPIROM DOC REV: CPT | Performed by: NURSE PRACTITIONER

## 2023-06-01 PROCEDURE — 3077F SYST BP >= 140 MM HG: CPT | Performed by: NURSE PRACTITIONER

## 2023-06-01 PROCEDURE — 3080F DIAST BP >= 90 MM HG: CPT | Performed by: NURSE PRACTITIONER

## 2023-06-01 PROCEDURE — 1036F TOBACCO NON-USER: CPT | Performed by: NURSE PRACTITIONER

## 2023-06-01 PROCEDURE — 3017F COLORECTAL CA SCREEN DOC REV: CPT | Performed by: NURSE PRACTITIONER

## 2023-06-01 PROCEDURE — G8417 CALC BMI ABV UP PARAM F/U: HCPCS | Performed by: NURSE PRACTITIONER

## 2023-06-01 PROCEDURE — G8427 DOCREV CUR MEDS BY ELIG CLIN: HCPCS | Performed by: NURSE PRACTITIONER

## 2023-06-01 RX ORDER — BUDESONIDE 0.5 MG/2ML
0.5 INHALANT ORAL
COMMUNITY
Start: 2023-04-18

## 2023-06-01 RX ORDER — FLUTICASONE FUROATE, UMECLIDINIUM BROMIDE AND VILANTEROL TRIFENATATE 100; 62.5; 25 UG/1; UG/1; UG/1
1 POWDER RESPIRATORY (INHALATION) DAILY
Qty: 1 EACH | Refills: 12 | Status: SHIPPED | OUTPATIENT
Start: 2023-06-01

## 2023-06-01 RX ORDER — AZITHROMYCIN 250 MG/1
250 TABLET, FILM COATED ORAL SEE ADMIN INSTRUCTIONS
Qty: 6 TABLET | Refills: 0 | Status: SHIPPED | OUTPATIENT
Start: 2023-06-01 | End: 2023-06-06

## 2023-06-01 RX ORDER — ARFORMOTEROL TARTRATE 15 UG/2ML
15 SOLUTION RESPIRATORY (INHALATION)
COMMUNITY
Start: 2023-04-18

## 2023-06-01 RX ORDER — METHYLPREDNISOLONE 4 MG/1
4 TABLET ORAL SEE ADMIN INSTRUCTIONS
Qty: 1 KIT | Refills: 0 | Status: SHIPPED | OUTPATIENT
Start: 2023-06-01 | End: 2023-06-07

## 2023-06-01 NOTE — PROGRESS NOTES
[place]  Department of Pulmonary, Critical Care and Sleep Medicine  Dr. Genna Mccartney, Dr. Hammad Gomez, Dr. Mario Dotson, MATT    Pulmonary & Critical Care Office Note - Follow up      Assessment/Plan       Problem List Items Addressed This Visit    None  Visit Diagnoses       Acute exacerbation of chronic obstructive pulmonary disease (COPD) ([facility] [address]    -  Primary    Relevant Medications    arformoterol tartrate (BROVANA) 15 MCG/2ML NEBU    budesonide (PULMICORT) 0.5 MG/2ML nebulizer suspension                Patient ID: Samantha Ramos is a 58 y.o. female    Chief Complaint: Shortness of breath    HPI: Samantha Ramos is a pleasant 58 y.o. female with a PMH of severe COPD, history of tobacco use, HTN, HLD and open angle glaucoma. Carlton Guevara presents to the office today to establish care and for evaluation and management of her COPD and exertional dyspnea. Maria Elena smoked from age 25- 25 and then started smoking again at age 52 about 1 ppd until she quit last year (2022). Spirometry conducted last year showed severe obstruction and revealed evidence of possible restriction, she will need full PFT to evaluate. She states she has been short of breath for years, and has gained about 50 lbs. Carlton Guevara does not sleep well and recently purchased and adjustable bed so she could sleep sitting up. She would benefit from baseline diagnostic sleep study to rule out sleep apnea. UPDATED HPI May 2, 2023: Ms. Lesley Malone was seen today in the clinic for hospital follow up. Carlton Guevara was admitted to [facility] April 18, 2023 for COPD exacerbation where she was treated with IV steroids and nebulized bronchodilators. She had CTA chest that was negative for PE and any other acute pulmonary disorder. Carlton Guevara also had echocardiogram to evaluate for pulmonary hypertension, EF 60-65% no evidence of pulmonary hypertension.  Since she has been discharged her breathing has been better, she can take the stairs and she is

## 2023-06-01 NOTE — TELEPHONE ENCOUNTER
Pt called into office to report she is having cough with shortness of breath. Pt states she \"needs more of the medicine Wilberto Faulkner gave her\". Reviewed meds and determined that she was referring to Prednisone. Discussed use of the nebulizer meds and need to use them as ordered. Pt reports using DuoNeb and they aerosol \"helping open her up\". Pt denies fever. Advised pt that Prednisone was only for 5 days and RN will need to discuss with NP what she would like pt to do and if any additional orders. Advised pt RN will call her back.

## 2023-06-06 ENCOUNTER — HOSPITAL ENCOUNTER (OUTPATIENT)
Dept: PULMONOLOGY | Age: 63
Setting detail: THERAPIES SERIES
Discharge: HOME | End: 2023-06-06
Payer: MEDICAID

## 2023-06-06 PROCEDURE — 94626 PHY/QHP OP PULM RHB W/MNTR: CPT

## 2023-06-08 ENCOUNTER — APPOINTMENT (OUTPATIENT)
Dept: PULMONOLOGY | Age: 63
End: 2023-06-08
Payer: MEDICAID

## 2023-06-13 ENCOUNTER — APPOINTMENT (OUTPATIENT)
Dept: PULMONOLOGY | Age: 63
End: 2023-06-13
Payer: MEDICAID

## 2023-06-15 ENCOUNTER — APPOINTMENT (OUTPATIENT)
Dept: PULMONOLOGY | Age: 63
End: 2023-06-15
Payer: MEDICAID

## 2023-06-20 ENCOUNTER — APPOINTMENT (OUTPATIENT)
Dept: PULMONOLOGY | Age: 63
End: 2023-06-20
Payer: MEDICAID

## 2023-06-20 PROCEDURE — 94626 PHY/QHP OP PULM RHB W/MNTR: CPT

## 2023-06-22 ENCOUNTER — APPOINTMENT (OUTPATIENT)
Dept: PULMONOLOGY | Age: 63
End: 2023-06-22
Payer: MEDICAID

## 2023-06-22 PROCEDURE — 94626 PHY/QHP OP PULM RHB W/MNTR: CPT

## 2023-06-27 ENCOUNTER — APPOINTMENT (OUTPATIENT)
Dept: PULMONOLOGY | Age: 63
End: 2023-06-27
Payer: MEDICAID

## 2023-06-29 ENCOUNTER — APPOINTMENT (OUTPATIENT)
Dept: PULMONOLOGY | Age: 63
End: 2023-06-29
Payer: MEDICAID

## 2023-07-04 ENCOUNTER — HOSPITAL ENCOUNTER (OUTPATIENT)
Dept: PULMONOLOGY | Age: 63
Setting detail: THERAPIES SERIES
End: 2023-07-04
Payer: MEDICAID

## 2023-07-11 ENCOUNTER — OFFICE VISIT (OUTPATIENT)
Dept: FAMILY MEDICINE CLINIC | Age: 63
End: 2023-07-11
Payer: MEDICAID

## 2023-07-11 ENCOUNTER — HOSPITAL ENCOUNTER (OUTPATIENT)
Dept: PULMONOLOGY | Age: 63
Setting detail: THERAPIES SERIES
Discharge: HOME | End: 2023-07-11
Payer: MEDICAID

## 2023-07-11 VITALS
SYSTOLIC BLOOD PRESSURE: 136 MMHG | RESPIRATION RATE: 18 BRPM | BODY MASS INDEX: 42.72 KG/M2 | DIASTOLIC BLOOD PRESSURE: 82 MMHG | HEIGHT: 64 IN | WEIGHT: 250.2 LBS | HEART RATE: 90 BPM | OXYGEN SATURATION: 96 % | TEMPERATURE: 97.7 F

## 2023-07-11 DIAGNOSIS — R06.09 DOE (DYSPNEA ON EXERTION): ICD-10-CM

## 2023-07-11 DIAGNOSIS — R05.1 ACUTE COUGH: ICD-10-CM

## 2023-07-11 DIAGNOSIS — J44.1 COPD EXACERBATION (HCC): Primary | ICD-10-CM

## 2023-07-11 PROCEDURE — 99214 OFFICE O/P EST MOD 30 MIN: CPT | Performed by: NURSE PRACTITIONER

## 2023-07-11 PROCEDURE — 3079F DIAST BP 80-89 MM HG: CPT | Performed by: NURSE PRACTITIONER

## 2023-07-11 PROCEDURE — 94626 PHY/QHP OP PULM RHB W/MNTR: CPT

## 2023-07-11 PROCEDURE — 3075F SYST BP GE 130 - 139MM HG: CPT | Performed by: NURSE PRACTITIONER

## 2023-07-11 PROCEDURE — G8417 CALC BMI ABV UP PARAM F/U: HCPCS | Performed by: NURSE PRACTITIONER

## 2023-07-11 PROCEDURE — 1036F TOBACCO NON-USER: CPT | Performed by: NURSE PRACTITIONER

## 2023-07-11 PROCEDURE — 3017F COLORECTAL CA SCREEN DOC REV: CPT | Performed by: NURSE PRACTITIONER

## 2023-07-11 PROCEDURE — G8427 DOCREV CUR MEDS BY ELIG CLIN: HCPCS | Performed by: NURSE PRACTITIONER

## 2023-07-11 PROCEDURE — 3023F SPIROM DOC REV: CPT | Performed by: NURSE PRACTITIONER

## 2023-07-11 RX ORDER — DEXTROMETHORPHAN HYDROBROMIDE, GUAIFENESIN 20; 400 MG/20ML; MG/20ML
SOLUTION ORAL
Qty: 240 ML | Refills: 0 | Status: SHIPPED | OUTPATIENT
Start: 2023-07-11

## 2023-07-11 RX ORDER — PREDNISONE 20 MG/1
20 TABLET ORAL 3 TIMES DAILY
Qty: 15 TABLET | Refills: 0 | Status: SHIPPED | OUTPATIENT
Start: 2023-07-11 | End: 2023-07-16

## 2023-07-11 RX ORDER — AZITHROMYCIN 250 MG/1
TABLET, FILM COATED ORAL
Qty: 6 TABLET | Refills: 0 | Status: SHIPPED | OUTPATIENT
Start: 2023-07-11

## 2023-07-13 ENCOUNTER — HOSPITAL ENCOUNTER (OUTPATIENT)
Dept: PULMONOLOGY | Age: 63
Setting detail: THERAPIES SERIES
Discharge: HOME | End: 2023-07-13
Payer: MEDICAID

## 2023-07-13 PROCEDURE — 94626 PHY/QHP OP PULM RHB W/MNTR: CPT

## 2023-07-18 ENCOUNTER — HOSPITAL ENCOUNTER (OUTPATIENT)
Dept: PULMONOLOGY | Age: 63
Setting detail: THERAPIES SERIES
Discharge: HOME | End: 2023-07-18
Payer: MEDICAID

## 2023-07-18 PROCEDURE — 94626 PHY/QHP OP PULM RHB W/MNTR: CPT

## 2023-07-20 ENCOUNTER — HOSPITAL ENCOUNTER (OUTPATIENT)
Dept: PULMONOLOGY | Age: 63
Setting detail: THERAPIES SERIES
Discharge: HOME | End: 2023-07-20
Payer: MEDICAID

## 2023-07-20 PROCEDURE — 94626 PHY/QHP OP PULM RHB W/MNTR: CPT

## 2023-07-25 ENCOUNTER — HOSPITAL ENCOUNTER (OUTPATIENT)
Dept: PULMONOLOGY | Age: 63
Setting detail: THERAPIES SERIES
Discharge: HOME | End: 2023-07-25
Payer: MEDICAID

## 2023-07-25 PROCEDURE — 94626 PHY/QHP OP PULM RHB W/MNTR: CPT

## 2023-07-27 ENCOUNTER — HOSPITAL ENCOUNTER (OUTPATIENT)
Dept: PULMONOLOGY | Age: 63
Setting detail: THERAPIES SERIES
Discharge: HOME | End: 2023-07-27
Payer: MEDICAID

## 2023-07-27 PROCEDURE — 94626 PHY/QHP OP PULM RHB W/MNTR: CPT

## 2023-08-01 ENCOUNTER — HOSPITAL ENCOUNTER (OUTPATIENT)
Dept: PULMONOLOGY | Age: 63
Setting detail: THERAPIES SERIES
Discharge: HOME | End: 2023-08-01
Payer: MEDICAID

## 2023-08-01 PROCEDURE — 94626 PHY/QHP OP PULM RHB W/MNTR: CPT

## 2023-08-08 ENCOUNTER — HOSPITAL ENCOUNTER (OUTPATIENT)
Dept: PULMONOLOGY | Age: 63
Setting detail: THERAPIES SERIES
Discharge: HOME | End: 2023-08-08
Payer: MEDICAID

## 2023-08-08 PROCEDURE — 94626 PHY/QHP OP PULM RHB W/MNTR: CPT

## 2023-08-10 ENCOUNTER — HOSPITAL ENCOUNTER (OUTPATIENT)
Dept: PULMONOLOGY | Age: 63
Setting detail: THERAPIES SERIES
Discharge: HOME | End: 2023-08-10
Payer: MEDICAID

## 2023-08-10 PROCEDURE — 94626 PHY/QHP OP PULM RHB W/MNTR: CPT

## 2023-08-15 ENCOUNTER — HOSPITAL ENCOUNTER (OUTPATIENT)
Dept: PULMONOLOGY | Age: 63
Setting detail: THERAPIES SERIES
Discharge: HOME | End: 2023-08-15
Payer: MEDICAID

## 2023-08-15 PROCEDURE — 94626 PHY/QHP OP PULM RHB W/MNTR: CPT

## 2023-08-17 ENCOUNTER — HOSPITAL ENCOUNTER (OUTPATIENT)
Dept: PULMONOLOGY | Age: 63
Setting detail: THERAPIES SERIES
Discharge: HOME | End: 2023-08-17
Payer: MEDICAID

## 2023-08-17 PROCEDURE — 94626 PHY/QHP OP PULM RHB W/MNTR: CPT

## 2023-08-22 ENCOUNTER — HOSPITAL ENCOUNTER (OUTPATIENT)
Dept: PULMONOLOGY | Age: 63
Setting detail: THERAPIES SERIES
Discharge: HOME | End: 2023-08-22
Payer: MEDICAID

## 2023-08-22 PROCEDURE — 94626 PHY/QHP OP PULM RHB W/MNTR: CPT

## 2023-08-24 ENCOUNTER — HOSPITAL ENCOUNTER (OUTPATIENT)
Dept: PULMONOLOGY | Age: 63
Setting detail: THERAPIES SERIES
Discharge: HOME | End: 2023-08-24
Payer: MEDICAID

## 2023-08-24 PROCEDURE — 94626 PHY/QHP OP PULM RHB W/MNTR: CPT

## 2023-08-31 ENCOUNTER — APPOINTMENT (OUTPATIENT)
Dept: PULMONOLOGY | Age: 63
End: 2023-08-31
Payer: MEDICAID

## 2023-09-05 ENCOUNTER — HOSPITAL ENCOUNTER (OUTPATIENT)
Dept: PULMONOLOGY | Age: 63
Setting detail: THERAPIES SERIES
Discharge: HOME | End: 2023-09-05
Payer: MEDICAID

## 2023-09-05 PROCEDURE — 94626 PHY/QHP OP PULM RHB W/MNTR: CPT

## 2023-09-07 ENCOUNTER — HOSPITAL ENCOUNTER (OUTPATIENT)
Dept: PULMONOLOGY | Age: 63
Setting detail: THERAPIES SERIES
Discharge: HOME | End: 2023-09-07
Payer: MEDICAID

## 2023-09-07 NOTE — PROGRESS NOTES
Pt. Arrived at [facility] rehab to exercise. Upon check in patients blood pressure was taken manually and showed 190/94. Pt. Stated she did not take her blood pressure medication and went out to  her car to take it. She came back into [facility] rehab and educated on the importance of med compliance to maintain appropriate BP. Pt rested for approx 5 mins for mannual BP recheck: 208/94. Pt. States that it is always high on our machines. She stated that her pressure in her eye can also cause her blood pressure to be high. I then took her blood pressure on the automatic machine and it was 228/124. Advised patient not to exercise at this time d/t BP readings being above 200/100 and pt not med compliant.

## 2023-09-10 DIAGNOSIS — I10 PRIMARY HYPERTENSION: ICD-10-CM

## 2023-09-11 RX ORDER — AMLODIPINE BESYLATE 10 MG/1
10 TABLET ORAL EVERY MORNING
Qty: 30 TABLET | Refills: 1 | Status: SHIPPED
Start: 2023-09-11 | End: 2023-11-01 | Stop reason: SDUPTHER

## 2023-09-11 RX ORDER — ASPIRIN 81 MG
81 TABLET,CHEWABLE ORAL DAILY
Qty: 30 TABLET | Refills: 1 | Status: SHIPPED | OUTPATIENT
Start: 2023-09-11

## 2023-09-11 NOTE — TELEPHONE ENCOUNTER
Last Appointment:  2/13/2023  Future Appointments   Date Time Provider [address]   9/12/2023  1:30 PM [facility] PULMONARY REHAB ROOM [address] [facility]   9/14/2023  1:30 PM [facility] PULMONARY REHAB ROOM 1 [address] [facility]   9/19/2023  1:30 PM [facility] PULMONARY REHAB ROOM 1 [address] [facility]   9/21/2023  1:30 PM [facility] PULMONARY REHAB ROOM 1 [address] [facility]   9/25/2023  2:00 PM Klaudia Rojas APRN - CNP [facility] Pulm [facility]   9/26/2023  1:30 PM [facility] PULMONARY REHAB ROOM 1 [address] [facility] [facility]   9/28/2023  1:30 PM [facility] PULMONARY REHAB ROOM 1 [address] [facility] [facility]   10/3/2023  1:30 PM [facility] PULMONARY REHAB ROOM 1 [facility] [facility]   10/5/2023  1:30 PM [facility] PULMONARY REHAB ROOM 1 [address] [facility] [facility]   10/10/2023  1:30 PM [facility] PULMONARY REHAB ROOM 1 [facility] [facility] [facility]   10/12/2023  1:30 PM [facility] PULMONARY REHAB ROOM 1 [facility] [facility]   10/17/2023  1:30 PM [facility] PULMONARY REHAB ROOM 1 [address] [facility] [facility]   10/19/2023  1:30 PM [facility] PULMONARY REHAB ROOM 1 [facility] [facility] [facility]   10/24/2023  1:30 PM [facility] PULMONARY REHAB ROOM 1 [address] [facility] [facility]   10/26/2023  1:30 PM [facility] PULMONARY REHAB ROOM 1 [facility] [facility]   10/31/2023  1:30 PM [facility] PULMONARY REHAB ROOM 1 [facility] [facility]   11/2/2023  1:30 PM [facility] PULMONARY REHAB ROOM 1 [facility] [facility]   11/7/2023  1:30 PM [facility] PULMONARY REHAB ROOM 1 [facility] [facility] [facility]   11/9/2023  1:30 PM [facility] PULMONARY REHAB ROOM 1 [facility]   11/14/2023  1:30 PM [facility] PULMONARY REHAB ROOM 1 SEYZ PULM [facility]   11/16/2023  1:30 PM [facility] PULMONARY REHAB ROOM 1 SEYZ PULM [facility]   11/21/2023  1:30 PM [facility] PULMONARY REHAB ROOM 1 SEYZ PULM Roberto Rouse

## 2023-09-12 ENCOUNTER — TELEPHONE (OUTPATIENT)
Dept: FAMILY MEDICINE CLINIC | Age: 63
End: 2023-09-12

## 2023-09-12 NOTE — TELEPHONE ENCOUNTER
Pt is in need of tubing for nebulizer machine from [address] fax number is [phone].   Pt will need a new script sent to fax number

## 2023-09-14 NOTE — TELEPHONE ENCOUNTER
I spoke with Daly at [employer]. She said the order needs to be for nebulizer supplies but also needs to have her nebulizer medications listed on it.

## 2023-09-25 ENCOUNTER — OFFICE VISIT (OUTPATIENT)
Dept: PULMONOLOGY | Age: 63
End: 2023-09-25
Payer: MEDICAID

## 2023-09-25 VITALS
HEIGHT: 65 IN | DIASTOLIC BLOOD PRESSURE: 94 MMHG | HEART RATE: 97 BPM | OXYGEN SATURATION: 95 % | TEMPERATURE: 97.5 F | RESPIRATION RATE: 16 BRPM | WEIGHT: 242 LBS | BODY MASS INDEX: 40.32 KG/M2 | SYSTOLIC BLOOD PRESSURE: 168 MMHG

## 2023-09-25 DIAGNOSIS — E66.01 MORBID OBESITY (HCC): ICD-10-CM

## 2023-09-25 DIAGNOSIS — R06.09 DYSPNEA ON EXERTION: ICD-10-CM

## 2023-09-25 DIAGNOSIS — J44.9 CHRONIC OBSTRUCTIVE PULMONARY DISEASE, UNSPECIFIED COPD TYPE (HCC): Primary | ICD-10-CM

## 2023-09-25 DIAGNOSIS — Z91.148 HISTORY OF MEDICATION NONCOMPLIANCE: ICD-10-CM

## 2023-09-25 PROCEDURE — G8417 CALC BMI ABV UP PARAM F/U: HCPCS | Performed by: NURSE PRACTITIONER

## 2023-09-25 PROCEDURE — 3077F SYST BP >= 140 MM HG: CPT | Performed by: NURSE PRACTITIONER

## 2023-09-25 PROCEDURE — 3080F DIAST BP >= 90 MM HG: CPT | Performed by: NURSE PRACTITIONER

## 2023-09-25 PROCEDURE — G8427 DOCREV CUR MEDS BY ELIG CLIN: HCPCS | Performed by: NURSE PRACTITIONER

## 2023-09-25 PROCEDURE — 3017F COLORECTAL CA SCREEN DOC REV: CPT | Performed by: NURSE PRACTITIONER

## 2023-09-25 PROCEDURE — 99214 OFFICE O/P EST MOD 30 MIN: CPT | Performed by: NURSE PRACTITIONER

## 2023-09-25 PROCEDURE — 1036F TOBACCO NON-USER: CPT | Performed by: NURSE PRACTITIONER

## 2023-09-25 PROCEDURE — 3023F SPIROM DOC REV: CPT | Performed by: NURSE PRACTITIONER

## 2023-09-25 RX ORDER — SENNOSIDES A AND B 8.6 MG/1
2 TABLET, FILM COATED ORAL NIGHTLY
COMMUNITY
Start: 2023-03-29

## 2023-09-25 RX ORDER — ERYTHROMYCIN 5 MG/G
OINTMENT OPHTHALMIC
COMMUNITY
Start: 2023-09-10

## 2023-09-25 RX ORDER — PANTOPRAZOLE SODIUM 40 MG/1
40 TABLET, DELAYED RELEASE ORAL 2 TIMES DAILY
COMMUNITY
Start: 2023-09-10

## 2023-09-25 RX ORDER — PREDNISONE 20 MG/1
20 TABLET ORAL DAILY
Qty: 5 TABLET | Refills: 0 | Status: SHIPPED | OUTPATIENT
Start: 2023-09-25 | End: 2023-09-30

## 2023-09-25 NOTE — PROGRESS NOTES
4 mos follow up in office today. Using inhalers per orders. Pt B/P 168/94 and pt reports last B/P med was taken yesterday; she has not had today's med yet but has it with her and some water and will take it now. Reviewed meds and importance of taking inhalers to mange symptoms. Plan for follow up in office in 3 mos; appt card given.

## 2023-09-25 NOTE — PROGRESS NOTES
[address] [facility]  Department of Pulmonary, Critical Care and Sleep Medicine  Dr. Mumtaz Song, Dr. Michael Lockhart, Dr. Mirian Shelton, MATT    Pulmonary & Critical Care Office Note - Follow up      Assessment/Plan       Problem List Items Addressed This Visit          Respiratory    COPD (chronic obstructive pulmonary disease) ([address]) - Primary     Other Visit Diagnoses       History of medication noncompliance        Dyspnea on exertion        Morbid obesity ([address])                  Patient ID: Natalia Sims is a 61 y.o. female    Chief Complaint: Shortness of breath    HPI: Natalia Sims is a pleasant 61 y.o. female with a PMH of severe COPD, history of tobacco use, HTN, HLD and open angle glaucoma. Jenna presents to the office today to establish care and for evaluation and management of her COPD and exertional dyspnea. Maria Elena smoked from age 25- 25 and then started smoking again at age 52 about 1 ppd until she quit last year (2022). Spirometry conducted last year showed severe obstruction and revealed evidence of possible restriction, she will need full PFT to evaluate. She states she has been short of breath for years, and has gained about 50 lbs. Jenna does not sleep well and recently purchased and adjustable bed so she could sleep sitting up. She would benefit from baseline diagnostic sleep study to rule out sleep apnea. UPDATED HPI May 2, 2023: Ms. Twyla Schaffer was seen today in the clinic for hospital follow up. Jenna was admitted to [facility] April 18, 2023 for COPD exacerbation where she was treated with IV steroids and nebulized bronchodilators. She had CTA chest that was negative for PE and any other acute pulmonary disorder. Jenna also had echocardiogram to evaluate for pulmonary hypertension, EF 60-65% no evidence of pulmonary hypertension.  Since she has been discharged her breathing has been better, she can take the stairs and she is not using her albuterol or nebulizer as

## 2023-09-26 ENCOUNTER — HOSPITAL ENCOUNTER (OUTPATIENT)
Dept: PULMONOLOGY | Age: 63
Setting detail: THERAPIES SERIES
End: 2023-09-26
Payer: MEDICAID

## 2023-09-28 ENCOUNTER — APPOINTMENT (OUTPATIENT)
Dept: PULMONOLOGY | Age: 63
End: 2023-09-28
Payer: MEDICAID

## 2023-10-03 ENCOUNTER — HOSPITAL ENCOUNTER (OUTPATIENT)
Dept: PULMONOLOGY | Age: 63
Setting detail: THERAPIES SERIES
Discharge: HOME | End: 2023-10-03
Payer: MEDICAID

## 2023-10-03 PROCEDURE — 94626 PHY/QHP OP PULM RHB W/MNTR: CPT

## 2023-10-05 ENCOUNTER — HOSPITAL ENCOUNTER (OUTPATIENT)
Dept: PULMONOLOGY | Age: 63
Setting detail: THERAPIES SERIES
Discharge: HOME | End: 2023-10-05
Payer: MEDICAID

## 2023-10-05 PROCEDURE — 94626 PHY/QHP OP PULM RHB W/MNTR: CPT

## 2023-10-07 ENCOUNTER — APPOINTMENT (OUTPATIENT)
Dept: GENERAL RADIOLOGY | Age: 63
End: 2023-10-07
Payer: MEDICAID

## 2023-10-07 ENCOUNTER — HOSPITAL ENCOUNTER (EMERGENCY)
Age: 63
Discharge: LEFT BEFORE DISCHARGE | End: 2023-10-07
Attending: STUDENT IN AN ORGANIZED HEALTH CARE EDUCATION/TRAINING PROGRAM
Payer: MEDICAID

## 2023-10-07 VITALS
DIASTOLIC BLOOD PRESSURE: 104 MMHG | RESPIRATION RATE: 16 BRPM | OXYGEN SATURATION: 94 % | TEMPERATURE: 97.7 F | SYSTOLIC BLOOD PRESSURE: 167 MMHG | HEART RATE: 90 BPM

## 2023-10-07 DIAGNOSIS — M79.644 PAIN OF FINGER OF RIGHT HAND: ICD-10-CM

## 2023-10-07 DIAGNOSIS — R06.89 DYSPNEA AND RESPIRATORY ABNORMALITIES: ICD-10-CM

## 2023-10-07 DIAGNOSIS — R06.00 DYSPNEA AND RESPIRATORY ABNORMALITIES: ICD-10-CM

## 2023-10-07 DIAGNOSIS — N39.0 URINARY TRACT INFECTION WITHOUT HEMATURIA, SITE UNSPECIFIED: Primary | ICD-10-CM

## 2023-10-07 LAB
BACTERIA URNS QL MICRO: ABNORMAL
BILIRUB UR QL STRIP: NEGATIVE
CLARITY UR: ABNORMAL
COLOR UR: YELLOW
GLUCOSE UR STRIP-MCNC: NEGATIVE MG/DL
HGB UR QL STRIP.AUTO: NEGATIVE
KETONES UR STRIP-MCNC: NEGATIVE MG/DL
LEUKOCYTE ESTERASE UR QL STRIP: ABNORMAL
NITRITE UR QL STRIP: POSITIVE
PH UR STRIP: 6 [PH] (ref 5–9)
PROT UR STRIP-MCNC: 30 MG/DL
RBC #/AREA URNS HPF: ABNORMAL /HPF
SARS-COV-2 RDRP RESP QL NAA+PROBE: NOT DETECTED
SP GR UR STRIP: 1.02 (ref 1–1.03)
SPECIMEN DESCRIPTION: NORMAL
UROBILINOGEN UR STRIP-ACNC: 0.2 EU/DL (ref 0–1)
WBC #/AREA URNS HPF: ABNORMAL /HPF

## 2023-10-07 PROCEDURE — 87635 SARS-COV-2 COVID-19 AMP PRB: CPT

## 2023-10-07 PROCEDURE — 99284 EMERGENCY DEPT VISIT MOD MDM: CPT

## 2023-10-07 PROCEDURE — 81001 URINALYSIS AUTO W/SCOPE: CPT

## 2023-10-07 PROCEDURE — 6370000000 HC RX 637 (ALT 250 FOR IP): Performed by: STUDENT IN AN ORGANIZED HEALTH CARE EDUCATION/TRAINING PROGRAM

## 2023-10-07 PROCEDURE — 73130 X-RAY EXAM OF HAND: CPT

## 2023-10-07 RX ORDER — CEFDINIR 300 MG/1
300 CAPSULE ORAL ONCE
Status: COMPLETED | OUTPATIENT
Start: 2023-10-07 | End: 2023-10-07

## 2023-10-07 RX ORDER — CEFDINIR 300 MG/1
300 CAPSULE ORAL 2 TIMES DAILY
Qty: 20 CAPSULE | Refills: 0 | Status: SHIPPED | OUTPATIENT
Start: 2023-10-07 | End: 2023-10-17

## 2023-10-07 RX ORDER — PHENAZOPYRIDINE HYDROCHLORIDE 100 MG/1
100 TABLET, FILM COATED ORAL EVERY 8 HOURS PRN
Qty: 9 TABLET | Refills: 0 | Status: SHIPPED | OUTPATIENT
Start: 2023-10-07 | End: 2023-10-10

## 2023-10-07 RX ORDER — IPRATROPIUM BROMIDE AND ALBUTEROL SULFATE 2.5; .5 MG/3ML; MG/3ML
1 SOLUTION RESPIRATORY (INHALATION) ONCE
Status: DISCONTINUED | OUTPATIENT
Start: 2023-10-07 | End: 2023-10-08 | Stop reason: HOSPADM

## 2023-10-07 RX ADMIN — CEFDINIR 300 MG: 300 CAPSULE ORAL at 21:03

## 2023-10-07 NOTE — ED NOTES
Patient presents to ED today with shortness of breath, while this nurse was attempting to obtain EKG patient refused \"I don't need that, I just need a breathing treatment\".       Geno Connor, MARCUS  10/07/23 1935

## 2023-10-08 ASSESSMENT — ENCOUNTER SYMPTOMS
NAUSEA: 0
ABDOMINAL PAIN: 0
DIARRHEA: 0
COLOR CHANGE: 0
SHORTNESS OF BREATH: 1
VOMITING: 0
COUGH: 0

## 2023-10-08 NOTE — ED NOTES
Patient refusing breathing treatment from respiratory.  Patient states \"I will do my own treatments at home\"         Olive Caldwell, [address]  10/07/23 9706

## 2023-10-08 NOTE — ED NOTES
Patient refusing EKG, bloodwork, IV. States \"I'm only here for the breathing, I only want my treatment. \" Patient agrees to urinalysis and covid test.      Sonya Galvan RN  10/07/23 2020

## 2023-10-08 NOTE — ED NOTES
Patient asks RN multiple times to get the doctor because she wants to leave. RN notifies DO, who verbalizes AMA due to patient refusing testing. Patient is aggravated, verbally aggressive with RN, raising voice and waving hands at RN in anger. Patient frustrated with wait.       Kailey Villasenor RN  10/07/23 [facility]  10/07/23 2752

## 2023-10-08 NOTE — ED PROVIDER NOTES
[facility]        Pt Name: Chiquita Serrano  MRN: [id number]  [address] [place] 1960  Date of evaluation: 10/7/2023  Provider: Eddie Gerard DO  PCP: Rui Cisneros DO  Note Started: 8:09 PM EDT 10/7/23    CHIEF COMPLAINT       Chief Complaint   Patient presents with    Shortness of Breath     Started a few days ago. -CP     Dysuria     Pain when urinating     finger pain     R pointer finger pain. Denies any injury       HISTORY OF PRESENT ILLNESS: 1 or more Elements     Limitations to history : None    Liddie Sauger A Victory Colton is a 61 y.o. female with PMHx of COPD, HTN, HLD, prior NSTEMI in 2022, who presents for evaluation of multiple complaints; states that she is concerned she has a UTI, reports dysuria for the past 2 or 3 days. Denies any flank or abdominal pain. Denies any difficulty with urination or urinary incontinence. She has not had any fevers, nausea vomiting or diarrhea. She reports right pointer finger pain, denies any injury; states that she thinks she might have a splinter because that is what it feels like. Denies any swelling numbness or abnormal range of motion to the finger. She also reports shortness of breath ongoing for the past few days; patient reported this to the nurse but then declined it to me, when I further inquired she states that she did state that she is feeling short of breath but feels like maybe she just needs a breathing treatment. She states that she initially told the RN that she was short of breath because she thought she lost her inhalers but it turns out she just left them in her friend's car so she states she is not concerned about the shortness of breath complaint anymore? She is also concerned because her initial blood pressure when she arrived was very high. She denies any recent medication changes and endorses medication compliance with her antihypertensives.   She denies

## 2023-10-08 NOTE — DISCHARGE INSTRUCTIONS
Please return to the ER for any new or worsening symptoms including but not limited to Fever, Chest pain or difficulty breathing, or difficulty urinating/decreased urination  If prescribed, please be sure to  your prescriptions from the pharmacy  Please follow-up with Primary care provider as instructed  You are encouraged to return to the ER at any point time for reevaluation.

## 2023-10-10 ENCOUNTER — HOSPITAL ENCOUNTER (OUTPATIENT)
Dept: PULMONOLOGY | Age: 63
Setting detail: THERAPIES SERIES
Discharge: HOME | End: 2023-10-10
Payer: MEDICAID

## 2023-10-10 PROCEDURE — 94626 PHY/QHP OP PULM RHB W/MNTR: CPT

## 2023-10-17 ENCOUNTER — HOSPITAL ENCOUNTER (OUTPATIENT)
Dept: PULMONOLOGY | Age: 63
Setting detail: THERAPIES SERIES
Discharge: HOME | End: 2023-10-17
Payer: MEDICAID

## 2023-10-17 PROCEDURE — 94626 PHY/QHP OP PULM RHB W/MNTR: CPT

## 2023-10-19 ENCOUNTER — HOSPITAL ENCOUNTER (OUTPATIENT)
Dept: PULMONOLOGY | Age: 63
Setting detail: THERAPIES SERIES
Discharge: HOME | End: 2023-10-19
Payer: MEDICAID

## 2023-10-19 PROCEDURE — 94626 PHY/QHP OP PULM RHB W/MNTR: CPT

## 2023-10-31 ENCOUNTER — HOSPITAL ENCOUNTER (OUTPATIENT)
Dept: PULMONOLOGY | Age: 63
Setting detail: THERAPIES SERIES
Discharge: HOME | End: 2023-10-31
Payer: MEDICAID

## 2023-10-31 PROCEDURE — 94626 PHY/QHP OP PULM RHB W/MNTR: CPT

## 2023-11-01 ENCOUNTER — OFFICE VISIT (OUTPATIENT)
Dept: FAMILY MEDICINE CLINIC | Age: 63
End: 2023-11-01
Payer: MEDICAID

## 2023-11-01 VITALS
BODY MASS INDEX: 41.32 KG/M2 | WEIGHT: 248 LBS | OXYGEN SATURATION: 94 % | DIASTOLIC BLOOD PRESSURE: 84 MMHG | HEIGHT: 65 IN | HEART RATE: 86 BPM | TEMPERATURE: 97.4 F | SYSTOLIC BLOOD PRESSURE: 138 MMHG | RESPIRATION RATE: 18 BRPM

## 2023-11-01 DIAGNOSIS — K21.9 GASTROESOPHAGEAL REFLUX DISEASE, UNSPECIFIED WHETHER ESOPHAGITIS PRESENT: ICD-10-CM

## 2023-11-01 DIAGNOSIS — E66.01 CLASS 3 SEVERE OBESITY DUE TO EXCESS CALORIES WITHOUT SERIOUS COMORBIDITY WITH BODY MASS INDEX (BMI) OF 40.0 TO 44.9 IN ADULT (HCC): ICD-10-CM

## 2023-11-01 DIAGNOSIS — J44.9 COPD, SEVERE (HCC): ICD-10-CM

## 2023-11-01 DIAGNOSIS — I10 PRIMARY HYPERTENSION: Primary | ICD-10-CM

## 2023-11-01 DIAGNOSIS — J44.1 CHRONIC OBSTRUCTIVE PULMONARY DISEASE WITH ACUTE EXACERBATION (HCC): ICD-10-CM

## 2023-11-01 PROCEDURE — 1036F TOBACCO NON-USER: CPT | Performed by: INTERNAL MEDICINE

## 2023-11-01 PROCEDURE — 3023F SPIROM DOC REV: CPT | Performed by: INTERNAL MEDICINE

## 2023-11-01 PROCEDURE — 3079F DIAST BP 80-89 MM HG: CPT | Performed by: INTERNAL MEDICINE

## 2023-11-01 PROCEDURE — G8427 DOCREV CUR MEDS BY ELIG CLIN: HCPCS | Performed by: INTERNAL MEDICINE

## 2023-11-01 PROCEDURE — 3075F SYST BP GE 130 - 139MM HG: CPT | Performed by: INTERNAL MEDICINE

## 2023-11-01 PROCEDURE — G8417 CALC BMI ABV UP PARAM F/U: HCPCS | Performed by: INTERNAL MEDICINE

## 2023-11-01 PROCEDURE — G8484 FLU IMMUNIZE NO ADMIN: HCPCS | Performed by: INTERNAL MEDICINE

## 2023-11-01 PROCEDURE — 3017F COLORECTAL CA SCREEN DOC REV: CPT | Performed by: INTERNAL MEDICINE

## 2023-11-01 PROCEDURE — 99214 OFFICE O/P EST MOD 30 MIN: CPT | Performed by: INTERNAL MEDICINE

## 2023-11-01 RX ORDER — AMLODIPINE BESYLATE 10 MG/1
10 TABLET ORAL EVERY MORNING
Qty: 90 TABLET | Refills: 3 | Status: SHIPPED | OUTPATIENT
Start: 2023-11-01

## 2023-11-01 RX ORDER — DEXTROMETHORPHAN HYDROBROMIDE, GUAIFENESIN 20; 400 MG/20ML; MG/20ML
SOLUTION ORAL
Qty: 240 ML | Refills: 0 | Status: SHIPPED | OUTPATIENT
Start: 2023-11-01

## 2023-11-01 RX ORDER — PREDNISONE 10 MG/1
10 TABLET ORAL 2 TIMES DAILY
Qty: 14 TABLET | Refills: 0 | Status: SHIPPED | OUTPATIENT
Start: 2023-11-01 | End: 2023-11-08

## 2023-11-01 RX ORDER — VALACYCLOVIR HYDROCHLORIDE 1 G/1
TABLET, FILM COATED ORAL
COMMUNITY
Start: 2023-10-11

## 2023-11-01 NOTE — PROGRESS NOTES
Component Value Date    LABA1C 6.3 (H) 03/30/2022     No results found for: \"INR\", \"PROTIME\"   *All recent labs were reviewed. Please see electronic chart for a more comprehensive set of labs    Radiology:  VISUAL FIELD 24-2 OD (RIGHT EYE)    Result Date: 10/23/2023  Date of Procedure 10/23/2023. Technician Information Imaging Technician: shaneka. Start time: 2:37 PM. Stop time: 2:41 PM. I did ask if patient is allergic to adhesive on Bandages. Patient Said:NO . Reliability Good. Interpretation Non-specific defect. Interval Change Stable. XR HAND RIGHT (MIN 3 VIEWS)    Result Date: 10/7/2023  EXAMINATION: THREE XRAY VIEWS OF THE RIGHT HAND 10/7/2023 8:37 pm COMPARISON: None. HISTORY: ORDERING SYSTEM PROVIDED HISTORY: ?splinter at palmar aspect distal pointer finger TECHNOLOGIST PROVIDED HISTORY: Reason for exam:->?splinter at palmar aspect distal pointer finger What reading provider will be dictating this exam?->CRC FINDINGS: No evidence of radiopaque foreign body. No fracture or dislocation. No evidence of fracture or foreign body. Assessment and Plan     Patient is a 61 y.o. female who presented to the office for follow up. 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 Diagnoses and all orders for this visit:    Primary hypertension  -     metoprolol tartrate (LOPRESSOR) 25 MG tablet;  Take 1 tablet by mouth

## 2023-11-07 ENCOUNTER — TELEPHONE (OUTPATIENT)
Dept: PULMONOLOGY | Age: 63
End: 2023-11-07

## 2023-11-07 NOTE — TELEPHONE ENCOUNTER
Mailed a letter to patient informing her that her PFT is scheduled for 12-11-23 at 9:30 am at the [facility]. She must arrive by 9:00 am. She is to have no caffeine for 24 hours prior to test and no resp meds for at least 4 hours prior to test

## 2023-11-16 DIAGNOSIS — J44.1 CHRONIC OBSTRUCTIVE PULMONARY DISEASE WITH ACUTE EXACERBATION (HCC): ICD-10-CM

## 2023-11-16 NOTE — TELEPHONE ENCOUNTER
Last Appointment:  11/1/2023  Future Appointments   Date Time Provider [address]   11/16/2023  1:30 PM [facility] PULMONARY REHAB ROOM 1 SEYZ PULM [facility]   11/20/2023  4:00 PM Rui Range, DO [facility]   11/21/2023  1:30 PM [facility] PULMONARY REHAB ROOM 1 SEYZ PULM [facility]   12/11/2023  9:30 AM SEYZ PFT STRESS LAB ROOM SEYZ PFT [facility]   12/20/2023 10:30 AM [facility]   1/23/2024 10:20 AM SCHEDULE, [facility]   2/1/2024  2:15 PM Rui Range, DO [facility]

## 2023-11-17 ENCOUNTER — OFFICE VISIT (OUTPATIENT)
Dept: FAMILY MEDICINE CLINIC | Age: 63
End: 2023-11-17
Payer: MEDICAID

## 2023-11-17 VITALS
HEIGHT: 65 IN | SYSTOLIC BLOOD PRESSURE: 162 MMHG | WEIGHT: 248 LBS | DIASTOLIC BLOOD PRESSURE: 84 MMHG | OXYGEN SATURATION: 95 % | BODY MASS INDEX: 41.32 KG/M2 | HEART RATE: 99 BPM | TEMPERATURE: 98.2 F

## 2023-11-17 DIAGNOSIS — R05.9 COUGH, UNSPECIFIED TYPE: Primary | ICD-10-CM

## 2023-11-17 PROCEDURE — 3079F DIAST BP 80-89 MM HG: CPT

## 2023-11-17 PROCEDURE — 1036F TOBACCO NON-USER: CPT

## 2023-11-17 PROCEDURE — G8417 CALC BMI ABV UP PARAM F/U: HCPCS

## 2023-11-17 PROCEDURE — 3017F COLORECTAL CA SCREEN DOC REV: CPT

## 2023-11-17 PROCEDURE — 99213 OFFICE O/P EST LOW 20 MIN: CPT

## 2023-11-17 PROCEDURE — 3077F SYST BP >= 140 MM HG: CPT

## 2023-11-17 PROCEDURE — G8427 DOCREV CUR MEDS BY ELIG CLIN: HCPCS

## 2023-11-17 PROCEDURE — G8484 FLU IMMUNIZE NO ADMIN: HCPCS

## 2023-11-17 RX ORDER — DEXTROMETHORPHAN HYDROBROMIDE, GUAIFENESIN 20; 400 MG/20ML; MG/20ML
SOLUTION ORAL
Qty: 240 ML | Refills: 0 | Status: SHIPPED
Start: 2023-11-17 | End: 2023-11-17 | Stop reason: SDUPTHER

## 2023-11-17 RX ORDER — DEXTROMETHORPHAN HYDROBROMIDE, GUAIFENESIN 20; 400 MG/20ML; MG/20ML
SOLUTION ORAL
Qty: 240 ML | Refills: 0 | Status: SHIPPED | OUTPATIENT
Start: 2023-11-17

## 2023-11-30 ENCOUNTER — APPOINTMENT (OUTPATIENT)
Dept: GENERAL RADIOLOGY | Age: 63
DRG: 140 | End: 2023-11-30
Payer: MEDICAID

## 2023-11-30 ENCOUNTER — HOSPITAL ENCOUNTER (INPATIENT)
Age: 63
LOS: 6 days | Discharge: HOME | DRG: 140 | End: 2023-12-06
Attending: EMERGENCY MEDICINE | Admitting: INTERNAL MEDICINE
Payer: MEDICAID

## 2023-11-30 ENCOUNTER — APPOINTMENT (OUTPATIENT)
Dept: CT IMAGING | Age: 63
DRG: 140 | End: 2023-11-30
Payer: MEDICAID

## 2023-11-30 DIAGNOSIS — I16.0 HYPERTENSIVE URGENCY: ICD-10-CM

## 2023-11-30 DIAGNOSIS — J44.1 COPD EXACERBATION (HCC): Primary | ICD-10-CM

## 2023-11-30 DIAGNOSIS — E87.5 HYPERKALEMIA: ICD-10-CM

## 2023-11-30 DIAGNOSIS — E11.9 NEW ONSET TYPE 2 DIABETES MELLITUS (HCC): ICD-10-CM

## 2023-11-30 PROBLEM — J96.21 ACUTE ON CHRONIC RESPIRATORY FAILURE WITH HYPOXIA (HCC): Status: ACTIVE | Noted: 2023-11-30

## 2023-11-30 LAB
ALBUMIN SERPL-MCNC: 4 G/DL (ref 3.5–5.2)
ALP SERPL-CCNC: 87 U/L (ref 35–104)
ALT SERPL-CCNC: 19 U/L (ref 0–32)
ANION GAP SERPL CALCULATED.3IONS-SCNC: 12 MMOL/L (ref 7–16)
AST SERPL-CCNC: 17 U/L (ref 0–31)
B PARAP IS1001 DNA NPH QL NAA+NON-PROBE: NOT DETECTED
B PERT DNA SPEC QL NAA+PROBE: NOT DETECTED
B.E.: 0.9 MMOL/L (ref -3–3)
BASOPHILS # BLD: 0.07 K/UL (ref 0–0.2)
BASOPHILS NFR BLD: 1 % (ref 0–2)
BILIRUB DIRECT SERPL-MCNC: <0.2 MG/DL (ref 0–0.3)
BILIRUB INDIRECT SERPL-MCNC: NORMAL MG/DL (ref 0–1)
BILIRUB SERPL-MCNC: 0.3 MG/DL (ref 0–1.2)
BNP SERPL-MCNC: <36 PG/ML (ref 0–125)
BUN SERPL-MCNC: 11 MG/DL (ref 6–23)
C PNEUM DNA NPH QL NAA+NON-PROBE: NOT DETECTED
CALCIUM SERPL-MCNC: 8.9 MG/DL (ref 8.6–10.2)
CHLORIDE SERPL-SCNC: 105 MMOL/L (ref 98–107)
CO2 SERPL-SCNC: 25 MMOL/L (ref 22–29)
COHB: 0.9 % (ref 0–1.5)
CREAT SERPL-MCNC: 0.7 MG/DL (ref 0.5–1)
CRITICAL: NORMAL
DATE ANALYZED: NORMAL
DATE OF COLLECTION: NORMAL
EOSINOPHIL # BLD: 0.86 K/UL (ref 0.05–0.5)
EOSINOPHILS RELATIVE PERCENT: 10 % (ref 0–6)
ERYTHROCYTE [DISTWIDTH] IN BLOOD BY AUTOMATED COUNT: 15.6 % (ref 11.5–15)
FLUAV RNA NPH QL NAA+NON-PROBE: NOT DETECTED
FLUBV RNA NPH QL NAA+NON-PROBE: NOT DETECTED
GFR SERPL CREATININE-BSD FRML MDRD: >60 ML/MIN/1.73M2
GLUCOSE SERPL-MCNC: 176 MG/DL (ref 74–99)
HADV DNA NPH QL NAA+NON-PROBE: NOT DETECTED
HCO3: 24.6 MMOL/L (ref 22–26)
HCOV 229E RNA NPH QL NAA+NON-PROBE: NOT DETECTED
HCOV HKU1 RNA NPH QL NAA+NON-PROBE: NOT DETECTED
HCOV NL63 RNA NPH QL NAA+NON-PROBE: NOT DETECTED
HCOV OC43 RNA NPH QL NAA+NON-PROBE: NOT DETECTED
HCT VFR BLD AUTO: 45.5 % (ref 34–48)
HGB BLD-MCNC: 14.5 G/DL (ref 11.5–15.5)
HHB: 4.3 % (ref 0–5)
HMPV RNA NPH QL NAA+NON-PROBE: NOT DETECTED
HPIV1 RNA NPH QL NAA+NON-PROBE: NOT DETECTED
HPIV2 RNA NPH QL NAA+NON-PROBE: NOT DETECTED
HPIV3 RNA NPH QL NAA+NON-PROBE: NOT DETECTED
HPIV4 RNA NPH QL NAA+NON-PROBE: NOT DETECTED
IMM GRANULOCYTES # BLD AUTO: 0.03 K/UL (ref 0–0.58)
IMM GRANULOCYTES NFR BLD: 0 % (ref 0–5)
LAB: NORMAL
LACTATE BLDV-SCNC: 1.6 MMOL/L (ref 0.5–2.2)
LIPASE SERPL-CCNC: 20 U/L (ref 13–60)
LYMPHOCYTES NFR BLD: 2.25 K/UL (ref 1.5–4)
LYMPHOCYTES RELATIVE PERCENT: 26 % (ref 20–42)
Lab: 2330
M PNEUMO DNA NPH QL NAA+NON-PROBE: NOT DETECTED
MCH RBC QN AUTO: 26.4 PG (ref 26–35)
MCHC RBC AUTO-ENTMCNC: 31.9 G/DL (ref 32–34.5)
MCV RBC AUTO: 82.7 FL (ref 80–99.9)
METHB: 0.4 % (ref 0–1.5)
MODE: NORMAL
MONOCYTES NFR BLD: 0.55 K/UL (ref 0.1–0.95)
MONOCYTES NFR BLD: 6 % (ref 2–12)
NEUTROPHILS NFR BLD: 56 % (ref 43–80)
NEUTS SEG NFR BLD: 4.86 K/UL (ref 1.8–7.3)
O2 CONTENT: 20.7 ML/DL
O2 SATURATION: 95.6 % (ref 92–98.5)
O2HB: 94.4 % (ref 94–97)
OPERATOR ID: 2593
PATIENT TEMP: 37 C
PCO2: 36.6 MMHG (ref 35–45)
PH BLOOD GAS: 7.45 (ref 7.35–7.45)
PLATELET # BLD AUTO: 332 K/UL (ref 130–450)
PMV BLD AUTO: 10.3 FL (ref 7–12)
PO2: 76.1 MMHG (ref 75–100)
POTASSIUM SERPL-SCNC: 4 MMOL/L (ref 3.5–5)
PROT SERPL-MCNC: 7.4 G/DL (ref 6.4–8.3)
RBC # BLD AUTO: 5.5 M/UL (ref 3.5–5.5)
RSV RNA NPH QL NAA+NON-PROBE: NOT DETECTED
RV+EV RNA NPH QL NAA+NON-PROBE: NOT DETECTED
SARS-COV-2 RNA NPH QL NAA+NON-PROBE: NOT DETECTED
SODIUM SERPL-SCNC: 142 MMOL/L (ref 132–146)
SOURCE, BLOOD GAS: NORMAL
SPECIMEN DESCRIPTION: NORMAL
THB: 15.6 G/DL (ref 11.5–16.5)
TIME ANALYZED: 2340
TROPONIN I SERPL HS-MCNC: 7 NG/L (ref 0–9)
TROPONIN I SERPL HS-MCNC: 8 NG/L (ref 0–9)
WBC OTHER # BLD: 8.6 K/UL (ref 4.5–11.5)

## 2023-11-30 PROCEDURE — 6360000002 HC RX W HCPCS: Performed by: STUDENT IN AN ORGANIZED HEALTH CARE EDUCATION/TRAINING PROGRAM

## 2023-11-30 PROCEDURE — 99285 EMERGENCY DEPT VISIT HI MDM: CPT

## 2023-11-30 PROCEDURE — 94640 AIRWAY INHALATION TREATMENT: CPT

## 2023-11-30 PROCEDURE — 99222 1ST HOSP IP/OBS MODERATE 55: CPT | Performed by: INTERNAL MEDICINE

## 2023-11-30 PROCEDURE — 94664 DEMO&/EVAL PT USE INHALER: CPT

## 2023-11-30 PROCEDURE — 93005 ELECTROCARDIOGRAM TRACING: CPT | Performed by: STUDENT IN AN ORGANIZED HEALTH CARE EDUCATION/TRAINING PROGRAM

## 2023-11-30 PROCEDURE — 6360000004 HC RX CONTRAST MEDICATION: Performed by: RADIOLOGY

## 2023-11-30 PROCEDURE — 83605 ASSAY OF LACTIC ACID: CPT

## 2023-11-30 PROCEDURE — 84484 ASSAY OF TROPONIN QUANT: CPT

## 2023-11-30 PROCEDURE — 71046 X-RAY EXAM CHEST 2 VIEWS: CPT

## 2023-11-30 PROCEDURE — 82248 BILIRUBIN DIRECT: CPT

## 2023-11-30 PROCEDURE — 82805 BLOOD GASES W/O2 SATURATION: CPT

## 2023-11-30 PROCEDURE — 6370000000 HC RX 637 (ALT 250 FOR IP): Performed by: STUDENT IN AN ORGANIZED HEALTH CARE EDUCATION/TRAINING PROGRAM

## 2023-11-30 PROCEDURE — 80053 COMPREHEN METABOLIC PANEL: CPT

## 2023-11-30 PROCEDURE — 96374 THER/PROPH/DIAG INJ IV PUSH: CPT

## 2023-11-30 PROCEDURE — 2060000000 HC ICU INTERMEDIATE R&B

## 2023-11-30 PROCEDURE — 73562 X-RAY EXAM OF KNEE 3: CPT

## 2023-11-30 PROCEDURE — 83690 ASSAY OF LIPASE: CPT

## 2023-11-30 PROCEDURE — 96375 TX/PRO/DX INJ NEW DRUG ADDON: CPT

## 2023-11-30 PROCEDURE — 2580000003 HC RX 258: Performed by: STUDENT IN AN ORGANIZED HEALTH CARE EDUCATION/TRAINING PROGRAM

## 2023-11-30 PROCEDURE — 83880 ASSAY OF NATRIURETIC PEPTIDE: CPT

## 2023-11-30 PROCEDURE — 0202U NFCT DS 22 TRGT SARS-COV-2: CPT

## 2023-11-30 PROCEDURE — 74177 CT ABD & PELVIS W/CONTRAST: CPT

## 2023-11-30 PROCEDURE — 85025 COMPLETE CBC W/AUTO DIFF WBC: CPT

## 2023-11-30 RX ORDER — IPRATROPIUM BROMIDE AND ALBUTEROL SULFATE 2.5; .5 MG/3ML; MG/3ML
3 SOLUTION RESPIRATORY (INHALATION) ONCE
Status: COMPLETED | OUTPATIENT
Start: 2023-11-30 | End: 2023-11-30

## 2023-11-30 RX ORDER — LABETALOL HYDROCHLORIDE 5 MG/ML
10 INJECTION, SOLUTION INTRAVENOUS ONCE
Status: COMPLETED | OUTPATIENT
Start: 2023-12-01 | End: 2023-12-01

## 2023-11-30 RX ORDER — HYDRALAZINE HYDROCHLORIDE 20 MG/ML
10 INJECTION INTRAMUSCULAR; INTRAVENOUS ONCE
Status: COMPLETED | OUTPATIENT
Start: 2023-11-30 | End: 2023-11-30

## 2023-11-30 RX ADMIN — HYDRALAZINE HYDROCHLORIDE 10 MG: 20 INJECTION, SOLUTION INTRAMUSCULAR; INTRAVENOUS at 23:27

## 2023-11-30 RX ADMIN — IOPAMIDOL 80 ML: 755 INJECTION, SOLUTION INTRAVENOUS at 20:11

## 2023-11-30 RX ADMIN — IPRATROPIUM BROMIDE AND ALBUTEROL SULFATE 3 DOSE: 2.5; .5 SOLUTION RESPIRATORY (INHALATION) at 18:01

## 2023-11-30 RX ADMIN — WATER 125 MG: 1 INJECTION INTRAMUSCULAR; INTRAVENOUS; SUBCUTANEOUS at 18:31

## 2023-11-30 ASSESSMENT — LIFESTYLE VARIABLES
HOW OFTEN DO YOU HAVE A DRINK CONTAINING ALCOHOL: NEVER
HOW MANY STANDARD DRINKS CONTAINING ALCOHOL DO YOU HAVE ON A TYPICAL DAY: PATIENT DOES NOT DRINK

## 2023-11-30 NOTE — ED PROVIDER NOTES
ordered to evaluate for, but without limitation to, ureterolithiasis, nephrolithiasis, constipation, hollow organ perforation, small bowel obstruction, bowel ischemia, pneumoperitoneum, diverticulitis, cholecystitis, appendicitis, perforation. X-ray of patient's right knee was also ordered to evaluate for possible fracture or dislocation. Patient was initially given breathing treatments and Solu-Medrol. CBC and BMP were markedly benign and within normal limits. Hepatic function panel was normal.  Lactic acid and lipase were normal as well. Troponin was 8 with a repeat of 7. BNP was less than 36. ABG did not reveal any respiratory metabolic abnormality. Respiratory panel was negative. 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 Chest x-ray revealed no acute process. Patient was told of her benign workup and had some increased work of breathing on reevaluation. She was also markedly hypertensive with a blood pressure of 280/129. She was given hydralazine and labetalol in the ED and placed on BiPAP for respiratory effort. She will be admitted to the hospital for further workup and treatment of her hypertensive urgency and COPD exacerbation by Dr. Tatyana Brown at this time. Disposition Considerations (Tests not ordered but considered, Shared Decision Making, Pt Expectation of Test or Tx.):     FINAL IMPRESSION      1. COPD exacerbation ([address])    2. Hypertensive urgency          DISPOSITION/PLAN     DISPOSITION Admitted 11/30/2023 11:50:40 PM    PATIENT REFERRED TO:  No follow-up provider specified.     DISCHARGE MEDICATIONS:  New Prescriptions    No medications on file       DISCONTINUED MEDICATIONS:  Discontinued Medications    No medications on file            (Please note that portions of this note were completed with a voice recognition program.  Efforts were made to edit the dictations but occasionally words are

## 2023-12-01 PROBLEM — J44.1 COPD EXACERBATION (HCC): Status: ACTIVE | Noted: 2023-04-18

## 2023-12-01 LAB
AADO2: 101.3 MMHG
ALBUMIN SERPL-MCNC: 4 G/DL (ref 3.5–5.2)
ALP SERPL-CCNC: 99 U/L (ref 35–104)
ALT SERPL-CCNC: 22 U/L (ref 0–32)
ANION GAP SERPL CALCULATED.3IONS-SCNC: 13 MMOL/L (ref 7–16)
AST SERPL-CCNC: 22 U/L (ref 0–31)
B.E.: 0.7 MMOL/L (ref -3–3)
BASOPHILS # BLD: 0.03 K/UL (ref 0–0.2)
BASOPHILS NFR BLD: 0 % (ref 0–2)
BILIRUB SERPL-MCNC: 0.3 MG/DL (ref 0–1.2)
BUN SERPL-MCNC: 13 MG/DL (ref 6–23)
CALCIUM SERPL-MCNC: 9.4 MG/DL (ref 8.6–10.2)
CHLORIDE SERPL-SCNC: 103 MMOL/L (ref 98–107)
CO2 SERPL-SCNC: 21 MMOL/L (ref 22–29)
COHB: 0.8 % (ref 0–1.5)
CREAT SERPL-MCNC: 0.7 MG/DL (ref 0.5–1)
CRITICAL: ABNORMAL
DATE ANALYZED: ABNORMAL
DATE OF COLLECTION: ABNORMAL
EKG ATRIAL RATE: 109 BPM
EKG P AXIS: 81 DEGREES
EKG P-R INTERVAL: 142 MS
EKG Q-T INTERVAL: 336 MS
EKG QRS DURATION: 66 MS
EKG QTC CALCULATION (BAZETT): 452 MS
EKG R AXIS: 4 DEGREES
EKG T AXIS: 66 DEGREES
EKG VENTRICULAR RATE: 109 BPM
EOSINOPHIL # BLD: 0 K/UL (ref 0.05–0.5)
EOSINOPHILS RELATIVE PERCENT: 0 % (ref 0–6)
ERYTHROCYTE [DISTWIDTH] IN BLOOD BY AUTOMATED COUNT: 15.7 % (ref 11.5–15)
FIO2: 50 %
GFR SERPL CREATININE-BSD FRML MDRD: >60 ML/MIN/1.73M2
GLUCOSE SERPL-MCNC: 294 MG/DL (ref 74–99)
HCO3: 26.5 MMOL/L (ref 22–26)
HCT VFR BLD AUTO: 47.9 % (ref 34–48)
HGB BLD-MCNC: 15 G/DL (ref 11.5–15.5)
HHB: 0.6 % (ref 0–5)
IMM GRANULOCYTES # BLD AUTO: 0.08 K/UL (ref 0–0.58)
IMM GRANULOCYTES NFR BLD: 1 % (ref 0–5)
LAB: ABNORMAL
LYMPHOCYTES NFR BLD: 0.85 K/UL (ref 1.5–4)
LYMPHOCYTES RELATIVE PERCENT: 7 % (ref 20–42)
Lab: 106
MCH RBC QN AUTO: 26.2 PG (ref 26–35)
MCHC RBC AUTO-ENTMCNC: 31.3 G/DL (ref 32–34.5)
MCV RBC AUTO: 83.6 FL (ref 80–99.9)
METHB: 0.4 % (ref 0–1.5)
MODE: ABNORMAL
MONOCYTES NFR BLD: 0.07 K/UL (ref 0.1–0.95)
MONOCYTES NFR BLD: 1 % (ref 2–12)
NEUTROPHILS NFR BLD: 92 % (ref 43–80)
NEUTS SEG NFR BLD: 11.72 K/UL (ref 1.8–7.3)
O2 CONTENT: 21.9 ML/DL
O2 SATURATION: 99.4 % (ref 92–98.5)
O2HB: 98.2 % (ref 94–97)
OPERATOR ID: ABNORMAL
PATIENT TEMP: 37 C
PCO2: 46.5 MMHG (ref 35–45)
PEEP/CPAP: 5 CMH2O
PFO2: 3.81 MMHG/%
PH BLOOD GAS: 7.37 (ref 7.35–7.45)
PIP: 10 CMH2O
PLATELET # BLD AUTO: 337 K/UL (ref 130–450)
PMV BLD AUTO: 10.5 FL (ref 7–12)
PO2: 190.4 MMHG (ref 75–100)
POTASSIUM SERPL-SCNC: 5.1 MMOL/L (ref 3.5–5)
PROCALCITONIN SERPL-MCNC: <0.02 NG/ML (ref 0–0.08)
PROT SERPL-MCNC: 8 G/DL (ref 6.4–8.3)
RBC # BLD AUTO: 5.73 M/UL (ref 3.5–5.5)
RI(T): 0.53
SODIUM SERPL-SCNC: 137 MMOL/L (ref 132–146)
SOURCE, BLOOD GAS: ABNORMAL
THB: 15.6 G/DL (ref 11.5–16.5)
TIME ANALYZED: 118
WBC OTHER # BLD: 12.8 K/UL (ref 4.5–11.5)

## 2023-12-01 PROCEDURE — 80053 COMPREHEN METABOLIC PANEL: CPT

## 2023-12-01 PROCEDURE — 85025 COMPLETE CBC W/AUTO DIFF WBC: CPT

## 2023-12-01 PROCEDURE — 6370000000 HC RX 637 (ALT 250 FOR IP): Performed by: INTERNAL MEDICINE

## 2023-12-01 PROCEDURE — 6360000002 HC RX W HCPCS: Performed by: INTERNAL MEDICINE

## 2023-12-01 PROCEDURE — 94640 AIRWAY INHALATION TREATMENT: CPT

## 2023-12-01 PROCEDURE — 6360000002 HC RX W HCPCS: Performed by: STUDENT IN AN ORGANIZED HEALTH CARE EDUCATION/TRAINING PROGRAM

## 2023-12-01 PROCEDURE — 84145 PROCALCITONIN (PCT): CPT

## 2023-12-01 PROCEDURE — 99233 SBSQ HOSP IP/OBS HIGH 50: CPT | Performed by: INTERNAL MEDICINE

## 2023-12-01 PROCEDURE — 99254 IP/OBS CNSLTJ NEW/EST MOD 60: CPT | Performed by: INTERNAL MEDICINE

## 2023-12-01 PROCEDURE — 82805 BLOOD GASES W/O2 SATURATION: CPT

## 2023-12-01 PROCEDURE — 94660 CPAP INITIATION&MGMT: CPT

## 2023-12-01 PROCEDURE — 2060000000 HC ICU INTERMEDIATE R&B

## 2023-12-01 PROCEDURE — 5A09457 ASSISTANCE WITH RESPIRATORY VENTILATION, 24-96 CONSECUTIVE HOURS, CONTINUOUS POSITIVE AIRWAY PRESSURE: ICD-10-PCS | Performed by: FAMILY MEDICINE

## 2023-12-01 PROCEDURE — 93010 ELECTROCARDIOGRAM REPORT: CPT | Performed by: INTERNAL MEDICINE

## 2023-12-01 RX ORDER — NAPROXEN SODIUM 220 MG
220 TABLET ORAL DAILY PRN
COMMUNITY

## 2023-12-01 RX ORDER — DICYCLOMINE HYDROCHLORIDE 10 MG/1
10 CAPSULE ORAL 4 TIMES DAILY PRN
Status: DISCONTINUED | OUTPATIENT
Start: 2023-12-01 | End: 2023-12-06 | Stop reason: HOSPADM

## 2023-12-01 RX ORDER — GUAIFENESIN/DEXTROMETHORPHAN 100-10MG/5
5 SYRUP ORAL EVERY 4 HOURS PRN
Status: DISCONTINUED | OUTPATIENT
Start: 2023-12-01 | End: 2023-12-06 | Stop reason: HOSPADM

## 2023-12-01 RX ORDER — ASPIRIN 81 MG/1
81 TABLET, CHEWABLE ORAL DAILY
Status: DISCONTINUED | OUTPATIENT
Start: 2023-12-01 | End: 2023-12-06 | Stop reason: HOSPADM

## 2023-12-01 RX ORDER — AMLODIPINE BESYLATE 10 MG/1
10 TABLET ORAL EVERY MORNING
Status: DISCONTINUED | OUTPATIENT
Start: 2023-12-01 | End: 2023-12-06 | Stop reason: HOSPADM

## 2023-12-01 RX ORDER — GUAIFENESIN/DEXTROMETHORPHAN 100-10MG/5
10 SYRUP ORAL EVERY 4 HOURS PRN
COMMUNITY

## 2023-12-01 RX ORDER — DORZOLAMIDE HCL 20 MG/ML
1 SOLUTION/ DROPS OPHTHALMIC 2 TIMES DAILY
Status: DISCONTINUED | OUTPATIENT
Start: 2023-12-01 | End: 2023-12-06 | Stop reason: HOSPADM

## 2023-12-01 RX ORDER — IPRATROPIUM BROMIDE AND ALBUTEROL SULFATE 2.5; .5 MG/3ML; MG/3ML
1 SOLUTION RESPIRATORY (INHALATION) EVERY 4 HOURS PRN
COMMUNITY

## 2023-12-01 RX ORDER — ARFORMOTEROL TARTRATE 15 UG/2ML
15 SOLUTION RESPIRATORY (INHALATION)
Status: DISCONTINUED | OUTPATIENT
Start: 2023-12-01 | End: 2023-12-06 | Stop reason: HOSPADM

## 2023-12-01 RX ORDER — IPRATROPIUM BROMIDE AND ALBUTEROL SULFATE 2.5; .5 MG/3ML; MG/3ML
1 SOLUTION RESPIRATORY (INHALATION)
Status: DISCONTINUED | OUTPATIENT
Start: 2023-12-01 | End: 2023-12-01 | Stop reason: SDUPTHER

## 2023-12-01 RX ORDER — SENNOSIDES A AND B 8.6 MG/1
2 TABLET, FILM COATED ORAL NIGHTLY
Status: DISCONTINUED | OUTPATIENT
Start: 2023-12-01 | End: 2023-12-06 | Stop reason: HOSPADM

## 2023-12-01 RX ORDER — LATANOPROST 50 UG/ML
1 SOLUTION/ DROPS OPHTHALMIC NIGHTLY
Status: DISCONTINUED | OUTPATIENT
Start: 2023-12-01 | End: 2023-12-06 | Stop reason: HOSPADM

## 2023-12-01 RX ORDER — PANTOPRAZOLE SODIUM 40 MG/1
40 TABLET, DELAYED RELEASE ORAL 2 TIMES DAILY
Status: DISCONTINUED | OUTPATIENT
Start: 2023-12-01 | End: 2023-12-06 | Stop reason: HOSPADM

## 2023-12-01 RX ORDER — ENOXAPARIN SODIUM 100 MG/ML
40 INJECTION SUBCUTANEOUS DAILY
Status: DISCONTINUED | OUTPATIENT
Start: 2023-12-02 | End: 2023-12-02 | Stop reason: DRUGHIGH

## 2023-12-01 RX ORDER — ERYTHROMYCIN 5 MG/G
OINTMENT OPHTHALMIC 2 TIMES DAILY
Status: DISCONTINUED | OUTPATIENT
Start: 2023-12-01 | End: 2023-12-06 | Stop reason: HOSPADM

## 2023-12-01 RX ORDER — ATORVASTATIN CALCIUM 40 MG/1
40 TABLET, FILM COATED ORAL NIGHTLY
Status: DISCONTINUED | OUTPATIENT
Start: 2023-12-01 | End: 2023-12-06 | Stop reason: HOSPADM

## 2023-12-01 RX ORDER — MAGNESIUM SULFATE IN WATER 40 MG/ML
2000 INJECTION, SOLUTION INTRAVENOUS ONCE
Status: COMPLETED | OUTPATIENT
Start: 2023-12-01 | End: 2023-12-01

## 2023-12-01 RX ORDER — IPRATROPIUM BROMIDE AND ALBUTEROL SULFATE 2.5; .5 MG/3ML; MG/3ML
1 SOLUTION RESPIRATORY (INHALATION)
Status: DISCONTINUED | OUTPATIENT
Start: 2023-12-01 | End: 2023-12-06 | Stop reason: HOSPADM

## 2023-12-01 RX ORDER — BUDESONIDE 0.5 MG/2ML
0.5 INHALANT ORAL
Status: DISCONTINUED | OUTPATIENT
Start: 2023-12-01 | End: 2023-12-01 | Stop reason: SDUPTHER

## 2023-12-01 RX ORDER — BUDESONIDE 0.25 MG/2ML
0.25 INHALANT ORAL
Status: DISCONTINUED | OUTPATIENT
Start: 2023-12-01 | End: 2023-12-06 | Stop reason: HOSPADM

## 2023-12-01 RX ORDER — ASPIRIN 81 MG/1
81 TABLET, CHEWABLE ORAL DAILY
COMMUNITY

## 2023-12-01 RX ORDER — HYDRALAZINE HYDROCHLORIDE 20 MG/ML
10 INJECTION INTRAMUSCULAR; INTRAVENOUS EVERY 4 HOURS PRN
Status: DISCONTINUED | OUTPATIENT
Start: 2023-12-01 | End: 2023-12-05

## 2023-12-01 RX ORDER — AZITHROMYCIN 250 MG/1
250 TABLET, FILM COATED ORAL DAILY
Status: DISCONTINUED | OUTPATIENT
Start: 2023-12-01 | End: 2023-12-04

## 2023-12-01 RX ADMIN — IPRATROPIUM BROMIDE AND ALBUTEROL SULFATE 1 DOSE: .5; 2.5 SOLUTION RESPIRATORY (INHALATION) at 19:28

## 2023-12-01 RX ADMIN — IPRATROPIUM BROMIDE 0.5 MG: 0.5 SOLUTION RESPIRATORY (INHALATION) at 06:14

## 2023-12-01 RX ADMIN — IPRATROPIUM BROMIDE 0.5 MG: 0.5 SOLUTION RESPIRATORY (INHALATION) at 12:37

## 2023-12-01 RX ADMIN — HYDRALAZINE HYDROCHLORIDE 10 MG: 20 INJECTION, SOLUTION INTRAMUSCULAR; INTRAVENOUS at 15:08

## 2023-12-01 RX ADMIN — METHYLPREDNISOLONE SODIUM SUCCINATE 40 MG: 40 INJECTION, POWDER, FOR SOLUTION INTRAMUSCULAR; INTRAVENOUS at 20:29

## 2023-12-01 RX ADMIN — MAGNESIUM SULFATE HEPTAHYDRATE 2000 MG: 40 INJECTION, SOLUTION INTRAVENOUS at 08:39

## 2023-12-01 RX ADMIN — IPRATROPIUM BROMIDE 0.5 MG: 0.5 SOLUTION RESPIRATORY (INHALATION) at 15:25

## 2023-12-01 RX ADMIN — ARFORMOTEROL TARTRATE 15 MCG: 15 SOLUTION RESPIRATORY (INHALATION) at 06:14

## 2023-12-01 RX ADMIN — PANTOPRAZOLE SODIUM 40 MG: 40 TABLET, DELAYED RELEASE ORAL at 20:29

## 2023-12-01 RX ADMIN — METOPROLOL TARTRATE 25 MG: 25 TABLET, FILM COATED ORAL at 20:29

## 2023-12-01 RX ADMIN — LABETALOL HYDROCHLORIDE 10 MG: 5 INJECTION INTRAVENOUS at 00:40

## 2023-12-01 RX ADMIN — BUDESONIDE 250 MCG: 0.25 SUSPENSION RESPIRATORY (INHALATION) at 19:28

## 2023-12-01 RX ADMIN — ARFORMOTEROL TARTRATE 15 MCG: 15 SOLUTION RESPIRATORY (INHALATION) at 19:28

## 2023-12-01 RX ADMIN — METOPROLOL TARTRATE 25 MG: 25 TABLET, FILM COATED ORAL at 13:00

## 2023-12-01 RX ADMIN — STANDARDIZED SENNA CONCENTRATE 17.2 MG: 8.6 TABLET ORAL at 20:29

## 2023-12-01 RX ADMIN — GUAIFENESIN SYRUP AND DEXTROMETHORPHAN 5 ML: 100; 10 SYRUP ORAL at 15:12

## 2023-12-01 RX ADMIN — METHYLPREDNISOLONE SODIUM SUCCINATE 40 MG: 40 INJECTION, POWDER, FOR SOLUTION INTRAMUSCULAR; INTRAVENOUS at 13:00

## 2023-12-01 RX ADMIN — AMLODIPINE BESYLATE 10 MG: 10 TABLET ORAL at 13:01

## 2023-12-01 RX ADMIN — AZITHROMYCIN DIHYDRATE 250 MG: 250 TABLET ORAL at 15:08

## 2023-12-01 RX ADMIN — ATORVASTATIN CALCIUM 40 MG: 40 TABLET, FILM COATED ORAL at 20:29

## 2023-12-01 RX ADMIN — METHYLPREDNISOLONE SODIUM SUCCINATE 40 MG: 40 INJECTION, POWDER, FOR SOLUTION INTRAMUSCULAR; INTRAVENOUS at 04:15

## 2023-12-01 RX ADMIN — BUDESONIDE 250 MCG: 0.25 SUSPENSION RESPIRATORY (INHALATION) at 06:14

## 2023-12-01 ASSESSMENT — PAIN SCALES - GENERAL
PAINLEVEL_OUTOF10: 0

## 2023-12-01 NOTE — ED NOTES
Respiratory at bedside.  Pt tolerating off bipap and is sat 97% on 3L NC     Dominic Randall RN  12/01/23 0105

## 2023-12-01 NOTE — H&P
[facility] History and Physical        Chief Complaint:  sob  History of Present Illness   The patient is a 61 y.o. female    No home o2, inc bmi 41-0 no cpap at home  Reports acute on chronic chf, wheezing  Persistent sometimes productive cough  Over past 4-5 days getting more SOB< worse orthopnia,   She reports using \"her puffer more than she should:\" bc of difficulty breathing- but they haven't been as helpful as priro  +loud wheezing now  No recent fevers  Inc orhtopneia, ? LE edema-- in ER BP initially 280/129 - she reports her BP meds sometimes bother her eyes. Also noted sp R knee contusion /bump some ongiong pain  Some abd pain with nausea noted. She was seen walk in clinic on 11/17- to get refil dextromethorphan/guaifenisin for her cough. She saw pulmonary docon 11/2- per notes:  She was going to pulmonary rehab 2x/week    NOTED on PFTs- DLCO disporportionately decreased more than dec ventilation predicts? ??  SPIROMETRY March 31, 2022:  FVC is 1.79 liters which is 67% of predicted. FEV1 is 0.83  liters which is 39% of predicted. FEV1 to FVC ratio is 46. MVV is 38  which is 42% of predicted. LUNG VOLUMES:  FVC is 1.88 which is 70% of predicted. ERV is 0.03 which is 5% of predicted. - inc habitus? ?     DIFFUSION:  Diffusion is 7.81 which is 32% of predicted, corrected for  alveolar ventilation is 75% of predicted. FLOW VOLUME LOOP:  Flow volume loop is consistent with both restriction  and obstruction. OVERALL INTERPRETATION:  Pulmonary function tests are consistent with  severe obstruction. There was no bronchodilator given. MVV is  significantly reduced which may be secondary to deconditioning versus  neuromuscular weakness versus difficulty with maneuver. Lung volumes  and flow volume loops are also consistent with the restriction.   Based  on the ERV of only 5% of predicted and the patient's weight of 220  pounds and height of 64 inches, this may be

## 2023-12-02 ENCOUNTER — APPOINTMENT (OUTPATIENT)
Dept: NUCLEAR MEDICINE | Age: 63
DRG: 140 | End: 2023-12-02
Payer: MEDICAID

## 2023-12-02 PROCEDURE — 94640 AIRWAY INHALATION TREATMENT: CPT

## 2023-12-02 PROCEDURE — 6370000000 HC RX 637 (ALT 250 FOR IP): Performed by: INTERNAL MEDICINE

## 2023-12-02 PROCEDURE — 2060000000 HC ICU INTERMEDIATE R&B

## 2023-12-02 PROCEDURE — 2700000000 HC OXYGEN THERAPY PER DAY

## 2023-12-02 PROCEDURE — A9537 TC99M MEBROFENIN: HCPCS | Performed by: RADIOLOGY

## 2023-12-02 PROCEDURE — 94660 CPAP INITIATION&MGMT: CPT

## 2023-12-02 PROCEDURE — 78226 HEPATOBILIARY SYSTEM IMAGING: CPT

## 2023-12-02 PROCEDURE — 99232 SBSQ HOSP IP/OBS MODERATE 35: CPT | Performed by: INTERNAL MEDICINE

## 2023-12-02 PROCEDURE — 6360000002 HC RX W HCPCS: Performed by: INTERNAL MEDICINE

## 2023-12-02 PROCEDURE — 3430000000 HC RX DIAGNOSTIC RADIOPHARMACEUTICAL: Performed by: RADIOLOGY

## 2023-12-02 PROCEDURE — 87449 NOS EACH ORGANISM AG IA: CPT

## 2023-12-02 PROCEDURE — 87899 AGENT NOS ASSAY W/OPTIC: CPT

## 2023-12-02 RX ORDER — ENOXAPARIN SODIUM 100 MG/ML
30 INJECTION SUBCUTANEOUS 2 TIMES DAILY
Status: DISCONTINUED | OUTPATIENT
Start: 2023-12-02 | End: 2023-12-06 | Stop reason: HOSPADM

## 2023-12-02 RX ADMIN — METHYLPREDNISOLONE SODIUM SUCCINATE 40 MG: 40 INJECTION, POWDER, FOR SOLUTION INTRAMUSCULAR; INTRAVENOUS at 13:24

## 2023-12-02 RX ADMIN — METOPROLOL TARTRATE 25 MG: 25 TABLET, FILM COATED ORAL at 20:16

## 2023-12-02 RX ADMIN — PANTOPRAZOLE SODIUM 40 MG: 40 TABLET, DELAYED RELEASE ORAL at 20:16

## 2023-12-02 RX ADMIN — PANTOPRAZOLE SODIUM 40 MG: 40 TABLET, DELAYED RELEASE ORAL at 10:30

## 2023-12-02 RX ADMIN — IPRATROPIUM BROMIDE AND ALBUTEROL SULFATE 1 DOSE: .5; 2.5 SOLUTION RESPIRATORY (INHALATION) at 15:51

## 2023-12-02 RX ADMIN — Medication 8.3 MILLICURIE: at 09:07

## 2023-12-02 RX ADMIN — HYDRALAZINE HYDROCHLORIDE 10 MG: 20 INJECTION, SOLUTION INTRAMUSCULAR; INTRAVENOUS at 20:17

## 2023-12-02 RX ADMIN — STANDARDIZED SENNA CONCENTRATE 17.2 MG: 8.6 TABLET ORAL at 20:17

## 2023-12-02 RX ADMIN — ARFORMOTEROL TARTRATE 15 MCG: 15 SOLUTION RESPIRATORY (INHALATION) at 07:42

## 2023-12-02 RX ADMIN — AZITHROMYCIN DIHYDRATE 250 MG: 250 TABLET ORAL at 10:30

## 2023-12-02 RX ADMIN — IPRATROPIUM BROMIDE AND ALBUTEROL SULFATE 1 DOSE: .5; 2.5 SOLUTION RESPIRATORY (INHALATION) at 20:51

## 2023-12-02 RX ADMIN — HYDRALAZINE HYDROCHLORIDE 10 MG: 20 INJECTION, SOLUTION INTRAMUSCULAR; INTRAVENOUS at 12:26

## 2023-12-02 RX ADMIN — GUAIFENESIN SYRUP AND DEXTROMETHORPHAN 5 ML: 100; 10 SYRUP ORAL at 17:05

## 2023-12-02 RX ADMIN — AMLODIPINE BESYLATE 10 MG: 10 TABLET ORAL at 10:30

## 2023-12-02 RX ADMIN — METOPROLOL TARTRATE 25 MG: 25 TABLET, FILM COATED ORAL at 10:30

## 2023-12-02 RX ADMIN — BUDESONIDE 250 MCG: 0.25 SUSPENSION RESPIRATORY (INHALATION) at 07:42

## 2023-12-02 RX ADMIN — IPRATROPIUM BROMIDE AND ALBUTEROL SULFATE 1 DOSE: .5; 2.5 SOLUTION RESPIRATORY (INHALATION) at 12:45

## 2023-12-02 RX ADMIN — HYDRALAZINE HYDROCHLORIDE 10 MG: 20 INJECTION, SOLUTION INTRAMUSCULAR; INTRAVENOUS at 17:05

## 2023-12-02 RX ADMIN — ATORVASTATIN CALCIUM 40 MG: 40 TABLET, FILM COATED ORAL at 20:16

## 2023-12-02 RX ADMIN — IPRATROPIUM BROMIDE AND ALBUTEROL SULFATE 1 DOSE: .5; 2.5 SOLUTION RESPIRATORY (INHALATION) at 07:42

## 2023-12-02 RX ADMIN — ARFORMOTEROL TARTRATE 15 MCG: 15 SOLUTION RESPIRATORY (INHALATION) at 20:51

## 2023-12-02 RX ADMIN — ASPIRIN 81 MG CHEWABLE TABLET 81 MG: 81 TABLET CHEWABLE at 10:30

## 2023-12-02 RX ADMIN — BUDESONIDE 250 MCG: 0.25 SUSPENSION RESPIRATORY (INHALATION) at 20:50

## 2023-12-02 RX ADMIN — ENOXAPARIN SODIUM 30 MG: 100 INJECTION SUBCUTANEOUS at 20:16

## 2023-12-02 ASSESSMENT — PAIN SCALES - GENERAL
PAINLEVEL_OUTOF10: 0

## 2023-12-03 LAB
L PNEUMO1 AG UR QL IA.RAPID: NEGATIVE
S PNEUM AG SPEC QL: NEGATIVE
SPECIMEN SOURCE: NORMAL

## 2023-12-03 PROCEDURE — 1200000000 HC SEMI PRIVATE

## 2023-12-03 PROCEDURE — 6370000000 HC RX 637 (ALT 250 FOR IP): Performed by: INTERNAL MEDICINE

## 2023-12-03 PROCEDURE — 99232 SBSQ HOSP IP/OBS MODERATE 35: CPT | Performed by: INTERNAL MEDICINE

## 2023-12-03 PROCEDURE — 6360000002 HC RX W HCPCS: Performed by: INTERNAL MEDICINE

## 2023-12-03 PROCEDURE — 94660 CPAP INITIATION&MGMT: CPT

## 2023-12-03 PROCEDURE — 94640 AIRWAY INHALATION TREATMENT: CPT

## 2023-12-03 RX ORDER — LABETALOL HYDROCHLORIDE 5 MG/ML
10 INJECTION, SOLUTION INTRAVENOUS EVERY 4 HOURS PRN
Status: DISCONTINUED | OUTPATIENT
Start: 2023-12-03 | End: 2023-12-05

## 2023-12-03 RX ORDER — TOBRAMYCIN AND DEXAMETHASONE 3; 1 MG/ML; MG/ML
3 SUSPENSION/ DROPS OPHTHALMIC 2 TIMES DAILY
Status: DISCONTINUED | OUTPATIENT
Start: 2023-12-03 | End: 2023-12-06 | Stop reason: HOSPADM

## 2023-12-03 RX ADMIN — LABETALOL HYDROCHLORIDE 10 MG: 5 INJECTION INTRAVENOUS at 15:30

## 2023-12-03 RX ADMIN — HYDRALAZINE HYDROCHLORIDE 10 MG: 20 INJECTION, SOLUTION INTRAMUSCULAR; INTRAVENOUS at 05:25

## 2023-12-03 RX ADMIN — ARFORMOTEROL TARTRATE 15 MCG: 15 SOLUTION RESPIRATORY (INHALATION) at 08:55

## 2023-12-03 RX ADMIN — BUDESONIDE 250 MCG: 0.25 SUSPENSION RESPIRATORY (INHALATION) at 21:46

## 2023-12-03 RX ADMIN — AZITHROMYCIN DIHYDRATE 250 MG: 250 TABLET ORAL at 09:11

## 2023-12-03 RX ADMIN — HYDRALAZINE HYDROCHLORIDE 10 MG: 20 INJECTION, SOLUTION INTRAMUSCULAR; INTRAVENOUS at 20:43

## 2023-12-03 RX ADMIN — IPRATROPIUM BROMIDE AND ALBUTEROL SULFATE 1 DOSE: .5; 2.5 SOLUTION RESPIRATORY (INHALATION) at 08:54

## 2023-12-03 RX ADMIN — ATORVASTATIN CALCIUM 40 MG: 40 TABLET, FILM COATED ORAL at 20:44

## 2023-12-03 RX ADMIN — ARFORMOTEROL TARTRATE 15 MCG: 15 SOLUTION RESPIRATORY (INHALATION) at 21:46

## 2023-12-03 RX ADMIN — PANTOPRAZOLE SODIUM 40 MG: 40 TABLET, DELAYED RELEASE ORAL at 20:43

## 2023-12-03 RX ADMIN — ASPIRIN 81 MG CHEWABLE TABLET 81 MG: 81 TABLET CHEWABLE at 09:11

## 2023-12-03 RX ADMIN — TOBRAMYCIN AND DEXAMETHASONE 3 DROP: 1; 3 SUSPENSION/ DROPS OPHTHALMIC at 20:42

## 2023-12-03 RX ADMIN — ENOXAPARIN SODIUM 30 MG: 100 INJECTION SUBCUTANEOUS at 20:44

## 2023-12-03 RX ADMIN — METOPROLOL TARTRATE 25 MG: 25 TABLET, FILM COATED ORAL at 09:11

## 2023-12-03 RX ADMIN — IPRATROPIUM BROMIDE AND ALBUTEROL SULFATE 1 DOSE: .5; 2.5 SOLUTION RESPIRATORY (INHALATION) at 11:28

## 2023-12-03 RX ADMIN — AMLODIPINE BESYLATE 10 MG: 10 TABLET ORAL at 09:11

## 2023-12-03 RX ADMIN — Medication 1 SPRAY: at 15:05

## 2023-12-03 RX ADMIN — METOPROLOL TARTRATE 25 MG: 25 TABLET, FILM COATED ORAL at 20:44

## 2023-12-03 RX ADMIN — LATANOPROST 1 DROP: 50 SOLUTION OPHTHALMIC at 15:09

## 2023-12-03 RX ADMIN — ENOXAPARIN SODIUM 30 MG: 100 INJECTION SUBCUTANEOUS at 09:11

## 2023-12-03 RX ADMIN — ERYTHROMYCIN: 5 OINTMENT OPHTHALMIC at 15:09

## 2023-12-03 RX ADMIN — BUDESONIDE 250 MCG: 0.25 SUSPENSION RESPIRATORY (INHALATION) at 08:54

## 2023-12-03 RX ADMIN — IPRATROPIUM BROMIDE AND ALBUTEROL SULFATE 1 DOSE: .5; 2.5 SOLUTION RESPIRATORY (INHALATION) at 15:27

## 2023-12-03 RX ADMIN — HYDRALAZINE HYDROCHLORIDE 10 MG: 20 INJECTION, SOLUTION INTRAMUSCULAR; INTRAVENOUS at 12:29

## 2023-12-03 RX ADMIN — GUAIFENESIN SYRUP AND DEXTROMETHORPHAN 5 ML: 100; 10 SYRUP ORAL at 12:29

## 2023-12-03 RX ADMIN — PANTOPRAZOLE SODIUM 40 MG: 40 TABLET, DELAYED RELEASE ORAL at 09:11

## 2023-12-03 RX ADMIN — LATANOPROST 1 DROP: 50 SOLUTION OPHTHALMIC at 20:58

## 2023-12-03 RX ADMIN — IPRATROPIUM BROMIDE AND ALBUTEROL SULFATE 1 DOSE: .5; 2.5 SOLUTION RESPIRATORY (INHALATION) at 21:46

## 2023-12-03 RX ADMIN — METHYLPREDNISOLONE SODIUM SUCCINATE 40 MG: 40 INJECTION, POWDER, FOR SOLUTION INTRAMUSCULAR; INTRAVENOUS at 05:25

## 2023-12-03 RX ADMIN — TOBRAMYCIN AND DEXAMETHASONE 3 DROP: 1; 3 SUSPENSION/ DROPS OPHTHALMIC at 12:29

## 2023-12-03 RX ADMIN — DORZOLAMIDE HYDROCHLORIDE 1 DROP: 20 SOLUTION OPHTHALMIC at 15:09

## 2023-12-03 ASSESSMENT — PAIN SCALES - GENERAL
PAINLEVEL_OUTOF10: 0
PAINLEVEL_OUTOF10: 10
PAINLEVEL_OUTOF10: 8
PAINLEVEL_OUTOF10: 0

## 2023-12-03 ASSESSMENT — PAIN DESCRIPTION - LOCATION: LOCATION: THROAT

## 2023-12-03 ASSESSMENT — PAIN DESCRIPTION - DESCRIPTORS: DESCRIPTORS: ACHING;DISCOMFORT

## 2023-12-03 ASSESSMENT — PAIN - FUNCTIONAL ASSESSMENT: PAIN_FUNCTIONAL_ASSESSMENT: ACTIVITIES ARE NOT PREVENTED

## 2023-12-03 ASSESSMENT — PAIN DESCRIPTION - ORIENTATION: ORIENTATION: MID

## 2023-12-04 LAB
ANION GAP SERPL CALCULATED.3IONS-SCNC: 14 MMOL/L (ref 7–16)
BASOPHILS # BLD: 0.02 K/UL (ref 0–0.2)
BASOPHILS NFR BLD: 0 % (ref 0–2)
BUN SERPL-MCNC: 23 MG/DL (ref 6–23)
CALCIUM SERPL-MCNC: 9.2 MG/DL (ref 8.6–10.2)
CHLORIDE SERPL-SCNC: 99 MMOL/L (ref 98–107)
CO2 SERPL-SCNC: 23 MMOL/L (ref 22–29)
CREAT SERPL-MCNC: 1.1 MG/DL (ref 0.5–1)
EOSINOPHIL # BLD: 0 K/UL (ref 0.05–0.5)
EOSINOPHILS RELATIVE PERCENT: 0 % (ref 0–6)
ERYTHROCYTE [DISTWIDTH] IN BLOOD BY AUTOMATED COUNT: 15.5 % (ref 11.5–15)
GFR SERPL CREATININE-BSD FRML MDRD: 58 ML/MIN/1.73M2
GLUCOSE BLD-MCNC: 359 MG/DL (ref 74–99)
GLUCOSE SERPL-MCNC: 502 MG/DL (ref 74–99)
HCT VFR BLD AUTO: 47.7 % (ref 34–48)
HGB BLD-MCNC: 14.8 G/DL (ref 11.5–15.5)
IMM GRANULOCYTES # BLD AUTO: 0.12 K/UL (ref 0–0.58)
IMM GRANULOCYTES NFR BLD: 2 % (ref 0–5)
LYMPHOCYTES NFR BLD: 0.74 K/UL (ref 1.5–4)
LYMPHOCYTES RELATIVE PERCENT: 10 % (ref 20–42)
MCH RBC QN AUTO: 26 PG (ref 26–35)
MCHC RBC AUTO-ENTMCNC: 31 G/DL (ref 32–34.5)
MCV RBC AUTO: 83.7 FL (ref 80–99.9)
MONOCYTES NFR BLD: 0.12 K/UL (ref 0.1–0.95)
MONOCYTES NFR BLD: 2 % (ref 2–12)
NEUTROPHILS NFR BLD: 86 % (ref 43–80)
NEUTS SEG NFR BLD: 6.32 K/UL (ref 1.8–7.3)
PLATELET # BLD AUTO: 313 K/UL (ref 130–450)
PMV BLD AUTO: 10.5 FL (ref 7–12)
POTASSIUM SERPL-SCNC: 4.4 MMOL/L (ref 3.5–5)
RBC # BLD AUTO: 5.7 M/UL (ref 3.5–5.5)
SODIUM SERPL-SCNC: 136 MMOL/L (ref 132–146)
WBC OTHER # BLD: 7.3 K/UL (ref 4.5–11.5)

## 2023-12-04 PROCEDURE — 82962 GLUCOSE BLOOD TEST: CPT

## 2023-12-04 PROCEDURE — 94640 AIRWAY INHALATION TREATMENT: CPT

## 2023-12-04 PROCEDURE — 36415 COLL VENOUS BLD VENIPUNCTURE: CPT

## 2023-12-04 PROCEDURE — 99232 SBSQ HOSP IP/OBS MODERATE 35: CPT | Performed by: INTERNAL MEDICINE

## 2023-12-04 PROCEDURE — 6360000002 HC RX W HCPCS: Performed by: INTERNAL MEDICINE

## 2023-12-04 PROCEDURE — 2700000000 HC OXYGEN THERAPY PER DAY

## 2023-12-04 PROCEDURE — 94660 CPAP INITIATION&MGMT: CPT

## 2023-12-04 PROCEDURE — 1200000000 HC SEMI PRIVATE

## 2023-12-04 PROCEDURE — 6370000000 HC RX 637 (ALT 250 FOR IP): Performed by: INTERNAL MEDICINE

## 2023-12-04 PROCEDURE — 80048 BASIC METABOLIC PNL TOTAL CA: CPT

## 2023-12-04 PROCEDURE — 85025 COMPLETE CBC W/AUTO DIFF WBC: CPT

## 2023-12-04 RX ORDER — INSULIN LISPRO 100 [IU]/ML
5 INJECTION, SOLUTION INTRAVENOUS; SUBCUTANEOUS
Status: DISCONTINUED | OUTPATIENT
Start: 2023-12-04 | End: 2023-12-06 | Stop reason: HOSPADM

## 2023-12-04 RX ORDER — INSULIN LISPRO 100 [IU]/ML
0-4 INJECTION, SOLUTION INTRAVENOUS; SUBCUTANEOUS EVERY 4 HOURS
Status: DISCONTINUED | OUTPATIENT
Start: 2023-12-04 | End: 2023-12-06 | Stop reason: HOSPADM

## 2023-12-04 RX ORDER — DEXTROSE MONOHYDRATE 100 MG/ML
INJECTION, SOLUTION INTRAVENOUS CONTINUOUS PRN
Status: DISCONTINUED | OUTPATIENT
Start: 2023-12-04 | End: 2023-12-06 | Stop reason: HOSPADM

## 2023-12-04 RX ORDER — INSULIN GLARGINE 100 [IU]/ML
15 INJECTION, SOLUTION SUBCUTANEOUS NIGHTLY
Status: DISCONTINUED | OUTPATIENT
Start: 2023-12-04 | End: 2023-12-06 | Stop reason: HOSPADM

## 2023-12-04 RX ORDER — PREDNISONE 20 MG/1
40 TABLET ORAL DAILY
Status: DISCONTINUED | OUTPATIENT
Start: 2023-12-05 | End: 2023-12-06 | Stop reason: HOSPADM

## 2023-12-04 RX ADMIN — INSULIN GLARGINE 15 UNITS: 100 INJECTION, SOLUTION SUBCUTANEOUS at 21:08

## 2023-12-04 RX ADMIN — BUDESONIDE 250 MCG: 0.25 SUSPENSION RESPIRATORY (INHALATION) at 20:31

## 2023-12-04 RX ADMIN — IPRATROPIUM BROMIDE AND ALBUTEROL SULFATE 1 DOSE: .5; 2.5 SOLUTION RESPIRATORY (INHALATION) at 20:31

## 2023-12-04 RX ADMIN — PANTOPRAZOLE SODIUM 40 MG: 40 TABLET, DELAYED RELEASE ORAL at 09:01

## 2023-12-04 RX ADMIN — BUDESONIDE 250 MCG: 0.25 SUSPENSION RESPIRATORY (INHALATION) at 10:36

## 2023-12-04 RX ADMIN — METOPROLOL TARTRATE 25 MG: 25 TABLET, FILM COATED ORAL at 09:02

## 2023-12-04 RX ADMIN — ASPIRIN 81 MG CHEWABLE TABLET 81 MG: 81 TABLET CHEWABLE at 09:01

## 2023-12-04 RX ADMIN — ARFORMOTEROL TARTRATE 15 MCG: 15 SOLUTION RESPIRATORY (INHALATION) at 20:31

## 2023-12-04 RX ADMIN — TOBRAMYCIN AND DEXAMETHASONE 3 DROP: 1; 3 SUSPENSION/ DROPS OPHTHALMIC at 09:09

## 2023-12-04 RX ADMIN — METOPROLOL TARTRATE 25 MG: 25 TABLET, FILM COATED ORAL at 21:10

## 2023-12-04 RX ADMIN — PANTOPRAZOLE SODIUM 40 MG: 40 TABLET, DELAYED RELEASE ORAL at 21:10

## 2023-12-04 RX ADMIN — STANDARDIZED SENNA CONCENTRATE 17.2 MG: 8.6 TABLET ORAL at 21:10

## 2023-12-04 RX ADMIN — DORZOLAMIDE HYDROCHLORIDE 1 DROP: 20 SOLUTION OPHTHALMIC at 09:10

## 2023-12-04 RX ADMIN — INSULIN LISPRO 4 UNITS: 100 INJECTION, SOLUTION INTRAVENOUS; SUBCUTANEOUS at 18:54

## 2023-12-04 RX ADMIN — ENOXAPARIN SODIUM 30 MG: 100 INJECTION SUBCUTANEOUS at 21:09

## 2023-12-04 RX ADMIN — ENOXAPARIN SODIUM 30 MG: 100 INJECTION SUBCUTANEOUS at 09:01

## 2023-12-04 RX ADMIN — HYDRALAZINE HYDROCHLORIDE 10 MG: 20 INJECTION, SOLUTION INTRAMUSCULAR; INTRAVENOUS at 19:01

## 2023-12-04 RX ADMIN — ARFORMOTEROL TARTRATE 15 MCG: 15 SOLUTION RESPIRATORY (INHALATION) at 10:36

## 2023-12-04 RX ADMIN — ERYTHROMYCIN: 5 OINTMENT OPHTHALMIC at 09:10

## 2023-12-04 RX ADMIN — AMLODIPINE BESYLATE 10 MG: 10 TABLET ORAL at 09:02

## 2023-12-04 RX ADMIN — IPRATROPIUM BROMIDE AND ALBUTEROL SULFATE 1 DOSE: .5; 2.5 SOLUTION RESPIRATORY (INHALATION) at 10:37

## 2023-12-04 RX ADMIN — AZITHROMYCIN DIHYDRATE 250 MG: 250 TABLET ORAL at 09:01

## 2023-12-04 RX ADMIN — INSULIN LISPRO 5 UNITS: 100 INJECTION, SOLUTION INTRAVENOUS; SUBCUTANEOUS at 18:55

## 2023-12-04 RX ADMIN — ATORVASTATIN CALCIUM 40 MG: 40 TABLET, FILM COATED ORAL at 21:10

## 2023-12-04 RX ADMIN — HYDRALAZINE HYDROCHLORIDE 10 MG: 20 INJECTION, SOLUTION INTRAMUSCULAR; INTRAVENOUS at 05:31

## 2023-12-04 RX ADMIN — METHYLPREDNISOLONE SODIUM SUCCINATE 40 MG: 40 INJECTION, POWDER, FOR SOLUTION INTRAMUSCULAR; INTRAVENOUS at 09:00

## 2023-12-04 RX ADMIN — GUAIFENESIN SYRUP AND DEXTROMETHORPHAN 5 ML: 100; 10 SYRUP ORAL at 21:48

## 2023-12-04 RX ADMIN — IPRATROPIUM BROMIDE AND ALBUTEROL SULFATE 1 DOSE: .5; 2.5 SOLUTION RESPIRATORY (INHALATION) at 14:24

## 2023-12-04 ASSESSMENT — PAIN SCALES - GENERAL: PAINLEVEL_OUTOF10: 0

## 2023-12-04 NOTE — CARE COORDINATION
12/4. Met with the pt at the bedside to discuss transition of care. The pt lives alone, her PCP is Dr Anuj Cueto. She does not use O2 at home. She has been using O2 while in the hospital. Will need pulse ox testing prior to discharge. Will follow.  Holli Reece RN  Pulse ox testing guide on sticky notes

## 2023-12-04 NOTE — PLAN OF CARE
Problem: Discharge Planning  Goal: Discharge to home or other facility with appropriate resources  12/3/2023 2215 by Uday Benavides RN  Outcome: Progressing  12/3/2023 1452 by Jude Pacheco RN  Outcome: Progressing     Problem: Pain  Goal: Verbalizes/displays adequate comfort level or baseline comfort level  12/3/2023 2215 by Uday Benavides RN  Outcome: Progressing  12/3/2023 1452 by Jude Pacheco RN  Outcome: Progressing

## 2023-12-04 NOTE — PLAN OF CARE
Problem: Discharge Planning  Goal: Discharge to home or other facility with appropriate resources  12/4/2023 0958 by Li Galarza RN  Outcome: Progressing     Problem: Pain  Goal: Verbalizes/displays adequate comfort level or baseline comfort level  12/4/2023 0958 by Li Galarza RN  Outcome: Progressing

## 2023-12-05 PROBLEM — E11.9 NEW ONSET TYPE 2 DIABETES MELLITUS (HCC): Status: ACTIVE | Noted: 2023-12-05

## 2023-12-05 LAB
ANION GAP SERPL CALCULATED.3IONS-SCNC: 16 MMOL/L (ref 7–16)
BUN SERPL-MCNC: 22 MG/DL (ref 6–23)
CALCIUM SERPL-MCNC: 9.2 MG/DL (ref 8.6–10.2)
CHLORIDE SERPL-SCNC: 102 MMOL/L (ref 98–107)
CO2 SERPL-SCNC: 25 MMOL/L (ref 22–29)
CREAT SERPL-MCNC: 0.8 MG/DL (ref 0.5–1)
GFR SERPL CREATININE-BSD FRML MDRD: >60 ML/MIN/1.73M2
GLUCOSE BLD-MCNC: 212 MG/DL (ref 74–99)
GLUCOSE BLD-MCNC: 234 MG/DL (ref 74–99)
GLUCOSE BLD-MCNC: 283 MG/DL (ref 74–99)
GLUCOSE BLD-MCNC: 331 MG/DL (ref 74–99)
GLUCOSE SERPL-MCNC: 138 MG/DL (ref 74–99)
HBA1C MFR BLD: 7.1 % (ref 4–5.6)
POTASSIUM SERPL-SCNC: 4 MMOL/L (ref 3.5–5)
SODIUM SERPL-SCNC: 143 MMOL/L (ref 132–146)

## 2023-12-05 PROCEDURE — 1200000000 HC SEMI PRIVATE

## 2023-12-05 PROCEDURE — 80048 BASIC METABOLIC PNL TOTAL CA: CPT

## 2023-12-05 PROCEDURE — 83036 HEMOGLOBIN GLYCOSYLATED A1C: CPT

## 2023-12-05 PROCEDURE — 94640 AIRWAY INHALATION TREATMENT: CPT

## 2023-12-05 PROCEDURE — 6370000000 HC RX 637 (ALT 250 FOR IP): Performed by: INTERNAL MEDICINE

## 2023-12-05 PROCEDURE — 6360000002 HC RX W HCPCS: Performed by: INTERNAL MEDICINE

## 2023-12-05 PROCEDURE — 36415 COLL VENOUS BLD VENIPUNCTURE: CPT

## 2023-12-05 PROCEDURE — 94660 CPAP INITIATION&MGMT: CPT

## 2023-12-05 PROCEDURE — 2700000000 HC OXYGEN THERAPY PER DAY

## 2023-12-05 PROCEDURE — 99232 SBSQ HOSP IP/OBS MODERATE 35: CPT | Performed by: FAMILY MEDICINE

## 2023-12-05 PROCEDURE — 6370000000 HC RX 637 (ALT 250 FOR IP): Performed by: FAMILY MEDICINE

## 2023-12-05 PROCEDURE — 82962 GLUCOSE BLOOD TEST: CPT

## 2023-12-05 RX ORDER — DOCUSATE SODIUM 100 MG/1
100 CAPSULE, LIQUID FILLED ORAL DAILY
Status: DISCONTINUED | OUTPATIENT
Start: 2023-12-05 | End: 2023-12-06 | Stop reason: HOSPADM

## 2023-12-05 RX ORDER — LABETALOL HYDROCHLORIDE 5 MG/ML
10 INJECTION, SOLUTION INTRAVENOUS EVERY 6 HOURS PRN
Status: DISCONTINUED | OUTPATIENT
Start: 2023-12-05 | End: 2023-12-06 | Stop reason: HOSPADM

## 2023-12-05 RX ORDER — CARVEDILOL 25 MG/1
25 TABLET ORAL 2 TIMES DAILY WITH MEALS
Status: DISCONTINUED | OUTPATIENT
Start: 2023-12-05 | End: 2023-12-06 | Stop reason: HOSPADM

## 2023-12-05 RX ORDER — CARVEDILOL 6.25 MG/1
12.5 TABLET ORAL 2 TIMES DAILY WITH MEALS
Status: DISCONTINUED | OUTPATIENT
Start: 2023-12-05 | End: 2023-12-05

## 2023-12-05 RX ORDER — HYDRALAZINE HYDROCHLORIDE 20 MG/ML
10 INJECTION INTRAMUSCULAR; INTRAVENOUS EVERY 6 HOURS PRN
Status: DISCONTINUED | OUTPATIENT
Start: 2023-12-05 | End: 2023-12-06 | Stop reason: HOSPADM

## 2023-12-05 RX ADMIN — IPRATROPIUM BROMIDE AND ALBUTEROL SULFATE 1 DOSE: .5; 2.5 SOLUTION RESPIRATORY (INHALATION) at 20:31

## 2023-12-05 RX ADMIN — LABETALOL HYDROCHLORIDE 10 MG: 5 INJECTION INTRAVENOUS at 19:02

## 2023-12-05 RX ADMIN — ARFORMOTEROL TARTRATE 15 MCG: 15 SOLUTION RESPIRATORY (INHALATION) at 20:31

## 2023-12-05 RX ADMIN — IPRATROPIUM BROMIDE AND ALBUTEROL SULFATE 1 DOSE: .5; 2.5 SOLUTION RESPIRATORY (INHALATION) at 08:49

## 2023-12-05 RX ADMIN — INSULIN LISPRO 2 UNITS: 100 INJECTION, SOLUTION INTRAVENOUS; SUBCUTANEOUS at 18:46

## 2023-12-05 RX ADMIN — INSULIN LISPRO 1 UNITS: 100 INJECTION, SOLUTION INTRAVENOUS; SUBCUTANEOUS at 14:08

## 2023-12-05 RX ADMIN — IPRATROPIUM BROMIDE AND ALBUTEROL SULFATE 1 DOSE: .5; 2.5 SOLUTION RESPIRATORY (INHALATION) at 12:44

## 2023-12-05 RX ADMIN — ATORVASTATIN CALCIUM 40 MG: 40 TABLET, FILM COATED ORAL at 21:26

## 2023-12-05 RX ADMIN — INSULIN LISPRO 1 UNITS: 100 INJECTION, SOLUTION INTRAVENOUS; SUBCUTANEOUS at 09:22

## 2023-12-05 RX ADMIN — INSULIN GLARGINE 15 UNITS: 100 INJECTION, SOLUTION SUBCUTANEOUS at 19:03

## 2023-12-05 RX ADMIN — AMLODIPINE BESYLATE 10 MG: 10 TABLET ORAL at 09:17

## 2023-12-05 RX ADMIN — ENOXAPARIN SODIUM 30 MG: 100 INJECTION SUBCUTANEOUS at 21:25

## 2023-12-05 RX ADMIN — IPRATROPIUM BROMIDE AND ALBUTEROL SULFATE 1 DOSE: .5; 2.5 SOLUTION RESPIRATORY (INHALATION) at 17:05

## 2023-12-05 RX ADMIN — BUDESONIDE 250 MCG: 0.25 SUSPENSION RESPIRATORY (INHALATION) at 20:31

## 2023-12-05 RX ADMIN — BUDESONIDE 250 MCG: 0.25 SUSPENSION RESPIRATORY (INHALATION) at 08:49

## 2023-12-05 RX ADMIN — INSULIN LISPRO 3 UNITS: 100 INJECTION, SOLUTION INTRAVENOUS; SUBCUTANEOUS at 23:00

## 2023-12-05 RX ADMIN — GUAIFENESIN SYRUP AND DEXTROMETHORPHAN 5 ML: 100; 10 SYRUP ORAL at 09:31

## 2023-12-05 RX ADMIN — CARVEDILOL 25 MG: 25 TABLET, FILM COATED ORAL at 19:14

## 2023-12-05 RX ADMIN — TOBRAMYCIN AND DEXAMETHASONE 3 DROP: 1; 3 SUSPENSION/ DROPS OPHTHALMIC at 09:24

## 2023-12-05 RX ADMIN — ASPIRIN 81 MG CHEWABLE TABLET 81 MG: 81 TABLET CHEWABLE at 09:18

## 2023-12-05 RX ADMIN — INSULIN LISPRO 5 UNITS: 100 INJECTION, SOLUTION INTRAVENOUS; SUBCUTANEOUS at 14:07

## 2023-12-05 RX ADMIN — DOCUSATE SODIUM 100 MG: 100 CAPSULE, LIQUID FILLED ORAL at 19:21

## 2023-12-05 RX ADMIN — ARFORMOTEROL TARTRATE 15 MCG: 15 SOLUTION RESPIRATORY (INHALATION) at 08:49

## 2023-12-05 RX ADMIN — PREDNISONE 40 MG: 20 TABLET ORAL at 09:20

## 2023-12-05 RX ADMIN — CARVEDILOL 12.5 MG: 6.25 TABLET, FILM COATED ORAL at 09:20

## 2023-12-05 RX ADMIN — PANTOPRAZOLE SODIUM 40 MG: 40 TABLET, DELAYED RELEASE ORAL at 21:25

## 2023-12-05 RX ADMIN — PANTOPRAZOLE SODIUM 40 MG: 40 TABLET, DELAYED RELEASE ORAL at 09:19

## 2023-12-05 RX ADMIN — INSULIN LISPRO 5 UNITS: 100 INJECTION, SOLUTION INTRAVENOUS; SUBCUTANEOUS at 18:45

## 2023-12-05 RX ADMIN — INSULIN LISPRO 5 UNITS: 100 INJECTION, SOLUTION INTRAVENOUS; SUBCUTANEOUS at 09:22

## 2023-12-05 RX ADMIN — ENOXAPARIN SODIUM 30 MG: 100 INJECTION SUBCUTANEOUS at 09:31

## 2023-12-05 ASSESSMENT — PAIN SCALES - GENERAL: PAINLEVEL_OUTOF10: 0

## 2023-12-05 NOTE — CARE COORDINATION
12/5. IV solumedrol discontinued. On prednisone now. Intermittently using O2 per request. On aerosol tx. Continuing to monitor glucose - 502 12/4. Last documented glucose - 234. Discharge plan is home when medically stable.  Hudson Patten RN

## 2023-12-06 VITALS
BODY MASS INDEX: 41.65 KG/M2 | SYSTOLIC BLOOD PRESSURE: 131 MMHG | TEMPERATURE: 96.9 F | OXYGEN SATURATION: 95 % | WEIGHT: 250 LBS | DIASTOLIC BLOOD PRESSURE: 77 MMHG | HEIGHT: 65 IN | HEART RATE: 74 BPM | RESPIRATION RATE: 18 BRPM

## 2023-12-06 LAB
ANION GAP SERPL CALCULATED.3IONS-SCNC: 8 MMOL/L (ref 7–16)
BUN SERPL-MCNC: 22 MG/DL (ref 6–23)
CALCIUM SERPL-MCNC: 9.3 MG/DL (ref 8.6–10.2)
CHLORIDE SERPL-SCNC: 104 MMOL/L (ref 98–107)
CO2 SERPL-SCNC: 29 MMOL/L (ref 22–29)
CREAT SERPL-MCNC: 0.8 MG/DL (ref 0.5–1)
GFR SERPL CREATININE-BSD FRML MDRD: >60 ML/MIN/1.73M2
GLUCOSE BLD-MCNC: 122 MG/DL (ref 74–99)
GLUCOSE BLD-MCNC: 152 MG/DL (ref 74–99)
GLUCOSE BLD-MCNC: 164 MG/DL (ref 74–99)
GLUCOSE SERPL-MCNC: 126 MG/DL (ref 74–99)
POTASSIUM SERPL-SCNC: 4 MMOL/L (ref 3.5–5)
SODIUM SERPL-SCNC: 141 MMOL/L (ref 132–146)

## 2023-12-06 PROCEDURE — 6360000002 HC RX W HCPCS: Performed by: FAMILY MEDICINE

## 2023-12-06 PROCEDURE — 6370000000 HC RX 637 (ALT 250 FOR IP): Performed by: INTERNAL MEDICINE

## 2023-12-06 PROCEDURE — 99239 HOSP IP/OBS DSCHRG MGMT >30: CPT | Performed by: FAMILY MEDICINE

## 2023-12-06 PROCEDURE — 6360000002 HC RX W HCPCS: Performed by: INTERNAL MEDICINE

## 2023-12-06 PROCEDURE — 36415 COLL VENOUS BLD VENIPUNCTURE: CPT

## 2023-12-06 PROCEDURE — 6370000000 HC RX 637 (ALT 250 FOR IP): Performed by: FAMILY MEDICINE

## 2023-12-06 PROCEDURE — 82962 GLUCOSE BLOOD TEST: CPT

## 2023-12-06 PROCEDURE — 80048 BASIC METABOLIC PNL TOTAL CA: CPT

## 2023-12-06 PROCEDURE — 94660 CPAP INITIATION&MGMT: CPT

## 2023-12-06 PROCEDURE — 94640 AIRWAY INHALATION TREATMENT: CPT

## 2023-12-06 RX ORDER — GLUCOSAMINE HCL/CHONDROITIN SU 500-400 MG
CAPSULE ORAL
Qty: 90 STRIP | Refills: 2 | Status: SHIPPED | OUTPATIENT
Start: 2023-12-06

## 2023-12-06 RX ORDER — PSEUDOEPHEDRINE HCL 30 MG
100 TABLET ORAL 2 TIMES DAILY PRN
Qty: 60 CAPSULE | Refills: 0 | Status: SHIPPED | OUTPATIENT
Start: 2023-12-06 | End: 2024-01-05

## 2023-12-06 RX ORDER — CARVEDILOL 25 MG/1
25 TABLET ORAL 2 TIMES DAILY WITH MEALS
Qty: 60 TABLET | Refills: 3 | Status: SHIPPED | OUTPATIENT
Start: 2023-12-06

## 2023-12-06 RX ORDER — PREDNISONE 20 MG/1
40 TABLET ORAL DAILY
Qty: 6 TABLET | Refills: 0 | Status: SHIPPED | OUTPATIENT
Start: 2023-12-07 | End: 2023-12-10

## 2023-12-06 RX ORDER — DIAPER,BRIEF,INFANT-TODD,DISP
EACH MISCELLANEOUS
Qty: 30 G | Refills: 1 | Status: SHIPPED | OUTPATIENT
Start: 2023-12-06 | End: 2023-12-13

## 2023-12-06 RX ORDER — LANCETS 30 GAUGE
1 EACH MISCELLANEOUS 2 TIMES DAILY
Qty: 300 EACH | Refills: 1 | Status: SHIPPED | OUTPATIENT
Start: 2023-12-06

## 2023-12-06 RX ORDER — VALACYCLOVIR HYDROCHLORIDE 1 G/1
1000 TABLET, FILM COATED ORAL 3 TIMES DAILY
Qty: 30 TABLET | Refills: 0 | Status: SHIPPED | OUTPATIENT
Start: 2023-12-06 | End: 2023-12-16

## 2023-12-06 RX ADMIN — BUDESONIDE 250 MCG: 0.25 SUSPENSION RESPIRATORY (INHALATION) at 08:08

## 2023-12-06 RX ADMIN — IPRATROPIUM BROMIDE AND ALBUTEROL SULFATE 1 DOSE: .5; 2.5 SOLUTION RESPIRATORY (INHALATION) at 12:08

## 2023-12-06 RX ADMIN — INSULIN LISPRO 5 UNITS: 100 INJECTION, SOLUTION INTRAVENOUS; SUBCUTANEOUS at 08:15

## 2023-12-06 RX ADMIN — PREDNISONE 40 MG: 20 TABLET ORAL at 08:17

## 2023-12-06 RX ADMIN — LABETALOL HYDROCHLORIDE 10 MG: 5 INJECTION INTRAVENOUS at 06:18

## 2023-12-06 RX ADMIN — TOBRAMYCIN AND DEXAMETHASONE 3 DROP: 1; 3 SUSPENSION/ DROPS OPHTHALMIC at 08:20

## 2023-12-06 RX ADMIN — PANTOPRAZOLE SODIUM 40 MG: 40 TABLET, DELAYED RELEASE ORAL at 08:16

## 2023-12-06 RX ADMIN — IPRATROPIUM BROMIDE AND ALBUTEROL SULFATE 1 DOSE: .5; 2.5 SOLUTION RESPIRATORY (INHALATION) at 08:08

## 2023-12-06 RX ADMIN — ARFORMOTEROL TARTRATE 15 MCG: 15 SOLUTION RESPIRATORY (INHALATION) at 08:08

## 2023-12-06 RX ADMIN — ERYTHROMYCIN: 5 OINTMENT OPHTHALMIC at 08:20

## 2023-12-06 RX ADMIN — DORZOLAMIDE HYDROCHLORIDE 1 DROP: 20 SOLUTION OPHTHALMIC at 08:20

## 2023-12-06 RX ADMIN — ASPIRIN 81 MG CHEWABLE TABLET 81 MG: 81 TABLET CHEWABLE at 08:16

## 2023-12-06 RX ADMIN — AMLODIPINE BESYLATE 10 MG: 10 TABLET ORAL at 08:15

## 2023-12-06 RX ADMIN — GUAIFENESIN SYRUP AND DEXTROMETHORPHAN 5 ML: 100; 10 SYRUP ORAL at 08:28

## 2023-12-06 RX ADMIN — ENOXAPARIN SODIUM 30 MG: 100 INJECTION SUBCUTANEOUS at 08:15

## 2023-12-06 RX ADMIN — CARVEDILOL 25 MG: 25 TABLET, FILM COATED ORAL at 08:16

## 2023-12-06 RX ADMIN — INSULIN LISPRO 5 UNITS: 100 INJECTION, SOLUTION INTRAVENOUS; SUBCUTANEOUS at 11:31

## 2023-12-06 ASSESSMENT — PAIN SCALES - GENERAL
PAINLEVEL_OUTOF10: 0
PAINLEVEL_OUTOF10: 0

## 2023-12-06 NOTE — PLAN OF CARE
Problem: Discharge Planning  Goal: Discharge to home or other facility with appropriate resources  Outcome: Progressing  Flowsheets (Taken 12/5/2023 2100)  Discharge to home or other facility with appropriate resources: Identify barriers to discharge with patient and caregiver     Problem: Pain  Goal: Verbalizes/displays adequate comfort level or baseline comfort level  Outcome: Progressing     Problem: Chronic Conditions and Co-morbidities  Goal: Patient's chronic conditions and co-morbidity symptoms are monitored and maintained or improved  Outcome: Progressing  Flowsheets (Taken 12/5/2023 2100)  Care Plan - Patient's Chronic Conditions and Co-Morbidity Symptoms are Monitored and Maintained or Improved: Monitor and assess patient's chronic conditions and comorbid symptoms for stability, deterioration, or improvement

## 2023-12-06 NOTE — DISCHARGE SUMMARY
refill. Skin:  red papules dermatomal rash on left flank   Neurologic: awake, alert and following commands       Discharge Diagnoses:  Acute COPD exacerbation  Hypertensive urgency  New onset diabetes A1c 7.1  Left flank rash concerning for shingles    Discharge Instructions / Follow up:    Continued appropriate risk factor modification of blood pressure, diabetes and serum lipids will remain essential to reducing risk of future atherosclerotic development    Activity: activity as tolerated    Significant labs:  CBC:   Recent Labs     12/04/23  1659   WBC 7.3   RBC 5.70*   HGB 14.8   HCT 47.7   MCV 83.7   RDW 15.5*        BMP:   Recent Labs     12/04/23  1659 12/05/23  0914 12/06/23  0524    143 141   K 4.4 4.0 4.0   CL 99 102 104   CO2 23 25 29   BUN 23 22 22   CREATININE 1.1* 0.8 0.8     LFT:  No results for input(s): \"PROT\", \"ALB\", \"ALKPHOS\", \"ALT\", \"AST\", \"BILITOT\", \"AMYLASE\", \"LIPASE\" in the last 72 hours. PT/INR: No results for input(s): \"INR\", \"APTT\" in the last 72 hours. BNP: No results for input(s): \"BNP\" in the last 72 hours. Hgb A1C:   Lab Results   Component Value Date    LABA1C 7.1 (H) 12/05/2023     Folate and B12: No results found for: \"KYHGVRWC01\", No results found for: \"FOLATE\"  Thyroid Studies: No results found for: \"TSH\", \"L7JBEOZ\", \"O4MUWKH\", \"THYROIDAB\"    Urinalysis:    Lab Results   Component Value Date/Time    NITRU POSITIVE 10/07/2023 08:15 PM    WBCUA 21 TO 50 10/07/2023 08:15 PM    BACTERIA 3+ 10/07/2023 08:15 PM    RBCUA 0 TO 2 10/07/2023 08:15 PM    BLOODU trace-intact 02/13/2023 02:56 PM    BLOODU LARGE 11/22/2022 07:44 AM    SPECGRAV 1.025 10/07/2023 08:15 PM    GLUCOSEU NEGATIVE 10/07/2023 08:15 PM       Imaging:  NM HEPATOBILIARY    Result Date: 12/2/2023  EXAMINATION: NUCLEAR MEDICINE HEPATOBILIARY SCINTIGRAPHY (HIDA SCAN). 12/2/2023 9:10 am TECHNIQUE: Approximately 8.3 mCi Tc-99m Mebrofenin (Choletec) was administered IV.  Then, dynamic images of the abdomen were

## 2023-12-07 NOTE — CONSULTS
Talked to patient s- She is being seen at [facility] for Glaucoma. She is using her drops and has been stable. Left eye which is the eye that is irritated is her blind eye. She is using tobradex and it has cleared. She would like to follow up after discharge.   Thanks Sarah Rogers
pathology or significant change noted. XR KNEE RIGHT (3 VIEWS)    Result Date: 11/30/2023  EXAMINATION: THREE XRAY VIEWS OF THE RIGHT KNEE 11/30/2023 8:03 pm COMPARISON: None. HISTORY: ORDERING SYSTEM PROVIDED HISTORY: pain TECHNOLOGIST PROVIDED HISTORY: Reason for exam:->pain What reading provider will be dictating this exam?->CRC FINDINGS: No evidence of acute fracture or dislocation. No focal osseous lesion. No evidence of joint effusion. No focal soft tissue abnormality. No acute abnormality of the knee. XR CHEST (2 VW)    Result Date: 11/30/2023  EXAMINATION: TWO XRAY VIEWS OF THE CHEST 11/30/2023 8:03 pm COMPARISON: 04/17/2023 HISTORY: ORDERING SYSTEM PROVIDED HISTORY: SOB TECHNOLOGIST PROVIDED HISTORY: Reason for exam:->SOB What reading provider will be dictating this exam?->CRC FINDINGS: The lungs are without acute focal process. There is no effusion or pneumothorax. The cardiomediastinal silhouette is without acute process. The osseous structures are without acute process. No acute process.        Assessment:   Acute exacerbation of COPD  Acute hypoxemic respiratory insufficiency  Hypertensive urgency  Morbid obesity  RUQ pain        Plan:   Continue azithromycin, pulmicort, duonebs  Continue Solumedrol  Bipap at HS and PRN  RVP negative  Continue current antihypertensive regimen with prn labetalol and hydralazine    Prasanna Lights, DO    Pulmonary, Critical Care and Sleep Medicine

## 2023-12-08 ENCOUNTER — TELEPHONE (OUTPATIENT)
Dept: FAMILY MEDICINE CLINIC | Age: 63
End: 2023-12-08

## 2023-12-08 NOTE — TELEPHONE ENCOUNTER
Care Transitions Initial Follow Up Call    Outreach made within 2 business days of discharge: Yes    Patient: Dejah Garrett Patient : 1960   MRN: [id number]  Reason for Admission: There are no discharge diagnoses documented for the most recent discharge. Discharge Date: 23       Spoke with: eTena Acuna / patient stated she will call the office,  left message for patient to call the office. Discharge department/facility: [facility] Interactive Patient Contact:  Was patient able to fill all prescriptions:   Was patient instructed to bring all medications to the follow-up visit:   Is patient taking all medications as directed in the discharge summary?    Does patient understand their discharge instructions:   Does patient have questions or concerns that need addressed prior to 7-14 day follow up office visit:     Scheduled appointment with PCP within 7-14 days    Follow Up  Future Appointments   Date Time Provider [address] [place] [address]   2023  9:30 AM SE[facility] PFT STRESS LAB ROOM SEY[facility] PFT  Eve Ling   2023 10:30 AM MATT Adames - CNP [facility]   2024 10:20 AM SCHEDULE, ALMA HENLEY [facility]   2024  2:15 PM Ousmane Calle, [address] [place] [address]

## 2023-12-28 ENCOUNTER — HOSPITAL ENCOUNTER (OUTPATIENT)
Dept: PULMONOLOGY | Age: 63
Discharge: HOME | End: 2023-12-28
Payer: MEDICAID

## 2023-12-28 DIAGNOSIS — J45.909 UNCOMPLICATED ASTHMA, UNSPECIFIED ASTHMA SEVERITY, UNSPECIFIED WHETHER PERSISTENT: ICD-10-CM

## 2023-12-28 PROCEDURE — 94729 DIFFUSING CAPACITY: CPT

## 2023-12-28 PROCEDURE — 94726 PLETHYSMOGRAPHY LUNG VOLUMES: CPT

## 2023-12-28 PROCEDURE — 94060 EVALUATION OF WHEEZING: CPT

## 2024-01-08 ENCOUNTER — TELEPHONE (OUTPATIENT)
Dept: PULMONOLOGY | Age: 64
End: 2024-01-08

## 2024-01-08 NOTE — TELEPHONE ENCOUNTER
Patient is very short of breath, she refuses to go to ER or urgent care.  She said Louisa has called her in [facility] before but 10 mg was not strong enough.   Please call her something in at [facility] on [facility].  She would also like you to call her with her test results, she is sure they are in by now.

## 2024-01-09 DIAGNOSIS — J44.9 COPD, SEVERE (HCC): Primary | ICD-10-CM

## 2024-01-09 RX ORDER — BUDESONIDE, GLYCOPYRROLATE, AND FORMOTEROL FUMARATE 160; 9; 4.8 UG/1; UG/1; UG/1
160 AEROSOL, METERED RESPIRATORY (INHALATION)
Qty: 1 EACH | Refills: 12 | Status: SHIPPED | OUTPATIENT
Start: 2024-01-09

## 2024-01-09 RX ORDER — PREDNISONE 20 MG/1
20 TABLET ORAL DAILY
Qty: 7 TABLET | Refills: 0 | Status: SHIPPED | OUTPATIENT
Start: 2024-01-09 | End: 2024-01-14

## 2024-01-23 ENCOUNTER — OFFICE VISIT (OUTPATIENT)
Dept: PULMONOLOGY | Age: 64
End: 2024-01-23
Payer: MEDICAID

## 2024-01-23 VITALS
TEMPERATURE: 97 F | WEIGHT: 246 LBS | HEART RATE: 101 BPM | RESPIRATION RATE: 18 BRPM | OXYGEN SATURATION: 95 % | SYSTOLIC BLOOD PRESSURE: 199 MMHG | BODY MASS INDEX: 40.98 KG/M2 | HEIGHT: 65 IN | DIASTOLIC BLOOD PRESSURE: 97 MMHG

## 2024-01-23 DIAGNOSIS — J44.89 ASTHMA-COPD OVERLAP SYNDROME: ICD-10-CM

## 2024-01-23 DIAGNOSIS — J82.83 EOSINOPHILIC ASTHMA: ICD-10-CM

## 2024-01-23 DIAGNOSIS — D82.4 HYPER-IGE SYNDROME (HCC): ICD-10-CM

## 2024-01-23 DIAGNOSIS — J44.9 COPD, SEVERE (HCC): Primary | ICD-10-CM

## 2024-01-23 DIAGNOSIS — E66.01 CLASS 3 SEVERE OBESITY WITH SERIOUS COMORBIDITY AND BODY MASS INDEX (BMI) OF 40.0 TO 44.9 IN ADULT, UNSPECIFIED OBESITY TYPE (HCC): ICD-10-CM

## 2024-01-23 DIAGNOSIS — J45.40 MODERATE PERSISTENT ASTHMA, UNSPECIFIED WHETHER COMPLICATED: ICD-10-CM

## 2024-01-23 PROBLEM — J45.909 MODERATE ASTHMA: Status: ACTIVE | Noted: 2023-12-19

## 2024-01-23 PROBLEM — J96.21 ACUTE ON CHRONIC RESPIRATORY FAILURE WITH HYPOXIA (HCC): Status: RESOLVED | Noted: 2023-11-30 | Resolved: 2024-01-23

## 2024-01-23 PROBLEM — J45.909 ASTHMA: Status: ACTIVE | Noted: 2024-01-23

## 2024-01-23 LAB — FENO: 55 PPB

## 2024-01-23 PROCEDURE — 95012 NITRIC OXIDE EXP GAS DETER: CPT | Performed by: NURSE PRACTITIONER

## 2024-01-23 RX ORDER — ALBUTEROL SULFATE 90 UG/1
4 AEROSOL, METERED RESPIRATORY (INHALATION) PRN
OUTPATIENT
Start: 2024-01-23

## 2024-01-23 RX ORDER — ONDANSETRON 2 MG/ML
8 INJECTION INTRAMUSCULAR; INTRAVENOUS
OUTPATIENT
Start: 2024-01-23

## 2024-01-23 RX ORDER — DIPHENHYDRAMINE HYDROCHLORIDE 50 MG/ML
50 INJECTION INTRAMUSCULAR; INTRAVENOUS
OUTPATIENT
Start: 2024-01-23

## 2024-01-23 RX ORDER — EPINEPHRINE 1 MG/ML
0.3 INJECTION, SOLUTION, CONCENTRATE INTRAVENOUS PRN
OUTPATIENT
Start: 2024-01-23

## 2024-01-23 RX ORDER — EPINEPHRINE 0.3 MG/.3ML
0.3 INJECTION SUBCUTANEOUS ONCE
Qty: 0.3 ML | Refills: 0 | Status: SHIPPED | OUTPATIENT
Start: 2024-01-23 | End: 2024-02-01

## 2024-01-23 RX ORDER — SODIUM CHLORIDE 9 MG/ML
INJECTION, SOLUTION INTRAVENOUS CONTINUOUS
OUTPATIENT
Start: 2024-01-23

## 2024-01-23 RX ORDER — ACETAMINOPHEN 325 MG/1
650 TABLET ORAL
OUTPATIENT
Start: 2024-01-23

## 2024-01-23 RX ORDER — MEPERIDINE HYDROCHLORIDE 25 MG/ML
25 INJECTION INTRAMUSCULAR; INTRAVENOUS; SUBCUTANEOUS ONCE
Start: 2024-01-23 | End: 2024-01-23

## 2024-01-23 RX ORDER — ACETAMINOPHEN 325 MG/1
650 TABLET ORAL ONCE
OUTPATIENT
Start: 2024-01-23

## 2024-01-23 ASSESSMENT — PULMONARY FUNCTION TESTS: FENO: 55

## 2024-01-23 NOTE — PROGRESS NOTES
1 month follow up in offic etoday; pt feeling pretty good. Using Breztri and Albuteorl rescue inhlaer and nebulizers prn order. Pt had lab work completed and Respiratroy allergens and eosino[carla count are abnormal. Discuss use of Xolair and trial to see if will improve asthma sym,tpms. Pt agreeable to be started at infusion center and orders faxed. Pt advised on need to bring Epipen to injection appts and follow up in office in 3 mos. Appt card given. Spacer given and instructed opt  on use of device with inhalers.

## 2024-01-23 NOTE — PROGRESS NOTES
[facility]  Department of Pulmonary, Critical Care and Sleep Medicine  Dr. Lopez, Dr. Young, Dr. Walker, MATT Reed    Pulmonary & Critical Care Office Note - Follow up      Assessment/Plan     Problem List Items Addressed This Visit          Respiratory    COPD, severe (HCC) - Primary    Relevant Orders    POCT Nitric Oxide       Patient ID: Maria Elena Dowd is a 63 y.o. female    Chief Complaint: Shortness of breath    HPI: Maria Elena Dowd is a pleasant 63 y.o. female with a PMH of severe COPD, history of tobacco use, HTN, HLD and open angle glaucoma. Maria Elena presents to the office today to establish care and for evaluation and management of her COPD and exertional dyspnea. Maria Elena smoked from age 18- 23 and then started smoking again at age 47 about 1 ppd until she quit last year (2022). Spirometry conducted last year showed severe obstruction and revealed evidence of possible restriction, she will need full PFT to evaluate. She states she has been short of breath for years, and has gained about 50 lbs. Maria Elena does not sleep well and recently purchased and adjustable bed so she could sleep sitting up. She would benefit from baseline diagnostic sleep study to rule out sleep apnea.    UPDATED HPI May 2, 2023: Ms. Maria Elena Dowd was seen today in the clinic for hospital follow up. Maria Elena was admitted to [facility] April 18, 2023 for COPD exacerbation where she was treated with IV steroids and nebulized bronchodilators. She had CTA chest that was negative for PE and any other acute pulmonary disorder. Maria Elena also had echocardiogram to evaluate for pulmonary hypertension, EF 60-65% no evidence of pulmonary hypertension. Since she has been discharged her breathing has been better, she can take the stairs and she is not using her albuterol or nebulizer as frequently. She underwent sleep study that was negative for sleep apnea, we will re-schedule her in lab PFT since she canceled

## 2024-01-23 NOTE — PROGRESS NOTES
[facility]  Department of Pulmonary, Critical Care and Sleep Medicine  Dr. Lopez, Dr. Young, Dr. Walker, MATT Reed    Pulmonary & Critical Care Office Note - Follow up      Assessment/Plan     Problem List Items Addressed This Visit          Respiratory    COPD, severe (HCC) - Primary    Relevant Orders    POCT Nitric Oxide (Completed)    Eosinophilic asthma     Other Visit Diagnoses       Hyper-IgE syndrome (HCC)        Asthma-COPD overlap syndrome        Class 3 severe obesity with serious comorbidity and body mass index (BMI) of 40.0 to 44.9 in adult, unspecified obesity type (HCC)                Patient ID: Maria Elena Dowd is a 63 y.o. female    Chief Complaint: Shortness of breath    HPI: Maria Elena Dowd is a pleasant 63 y.o. female with a PMH of severe COPD, history of tobacco use, HTN, HLD and open angle glaucoma. Maria Elena presents to the office today to establish care and for evaluation and management of her COPD and exertional dyspnea. Maria Elena smoked from age 18- 23 and then started smoking again at age 47 about 1 ppd until she quit last year (2022). Spirometry conducted last year showed severe obstruction and revealed evidence of possible restriction, she will need full PFT to evaluate. She states she has been short of breath for years, and has gained about 50 lbs. Maria Elena does not sleep well and recently purchased and adjustable bed so she could sleep sitting up. She would benefit from baseline diagnostic sleep study to rule out sleep apnea.    UPDATED HPI May 2, 2023: Ms. Maria Elena Dowd was seen today in the clinic for hospital follow up. Maria Elena was admitted to [facility] April 18, 2023 for COPD exacerbation where she was treated with IV steroids and nebulized bronchodilators. She had CTA chest that was negative for PE and any other acute pulmonary disorder. Maria Elena also had echocardiogram to evaluate for pulmonary hypertension, EF 60-65% no evidence of pulmonary

## 2024-02-01 ENCOUNTER — OFFICE VISIT (OUTPATIENT)
Dept: FAMILY MEDICINE CLINIC | Age: 64
End: 2024-02-01
Payer: MEDICAID

## 2024-02-01 VITALS
SYSTOLIC BLOOD PRESSURE: 138 MMHG | WEIGHT: 249.4 LBS | OXYGEN SATURATION: 94 % | HEIGHT: 65 IN | BODY MASS INDEX: 41.55 KG/M2 | RESPIRATION RATE: 18 BRPM | TEMPERATURE: 97.7 F | HEART RATE: 84 BPM | DIASTOLIC BLOOD PRESSURE: 84 MMHG

## 2024-02-01 DIAGNOSIS — I10 PRIMARY HYPERTENSION: ICD-10-CM

## 2024-02-01 DIAGNOSIS — J44.9 COPD, SEVERE (HCC): Primary | ICD-10-CM

## 2024-02-01 DIAGNOSIS — G89.29 CHRONIC PAIN OF RIGHT KNEE: ICD-10-CM

## 2024-02-01 DIAGNOSIS — E11.9 TYPE 2 DIABETES MELLITUS WITHOUT COMPLICATION, WITHOUT LONG-TERM CURRENT USE OF INSULIN (HCC): ICD-10-CM

## 2024-02-01 DIAGNOSIS — M25.561 CHRONIC PAIN OF RIGHT KNEE: ICD-10-CM

## 2024-02-01 DIAGNOSIS — E66.01 CLASS 3 SEVERE OBESITY DUE TO EXCESS CALORIES WITHOUT SERIOUS COMORBIDITY WITH BODY MASS INDEX (BMI) OF 40.0 TO 44.9 IN ADULT (HCC): ICD-10-CM

## 2024-02-01 DIAGNOSIS — G62.9 NEUROPATHY: ICD-10-CM

## 2024-02-01 PROBLEM — F32.4 MAJOR DEPRESSIVE DISORDER WITH SINGLE EPISODE, IN PARTIAL REMISSION (HCC): Status: RESOLVED | Noted: 2022-08-10 | Resolved: 2024-02-01

## 2024-02-01 PROBLEM — J44.1 COPD EXACERBATION (HCC): Status: RESOLVED | Noted: 2023-04-18 | Resolved: 2024-02-01

## 2024-02-01 PROCEDURE — 3078F DIAST BP <80 MM HG: CPT | Performed by: INTERNAL MEDICINE

## 2024-02-01 PROCEDURE — 3017F COLORECTAL CA SCREEN DOC REV: CPT | Performed by: INTERNAL MEDICINE

## 2024-02-01 PROCEDURE — 3075F SYST BP GE 130 - 139MM HG: CPT | Performed by: INTERNAL MEDICINE

## 2024-02-01 PROCEDURE — G8427 DOCREV CUR MEDS BY ELIG CLIN: HCPCS | Performed by: INTERNAL MEDICINE

## 2024-02-01 PROCEDURE — G8484 FLU IMMUNIZE NO ADMIN: HCPCS | Performed by: INTERNAL MEDICINE

## 2024-02-01 PROCEDURE — 3046F HEMOGLOBIN A1C LEVEL >9.0%: CPT | Performed by: INTERNAL MEDICINE

## 2024-02-01 PROCEDURE — G8417 CALC BMI ABV UP PARAM F/U: HCPCS | Performed by: INTERNAL MEDICINE

## 2024-02-01 PROCEDURE — 99215 OFFICE O/P EST HI 40 MIN: CPT | Performed by: INTERNAL MEDICINE

## 2024-02-01 PROCEDURE — 1036F TOBACCO NON-USER: CPT | Performed by: INTERNAL MEDICINE

## 2024-02-01 PROCEDURE — 2022F DILAT RTA XM EVC RTNOPTHY: CPT | Performed by: INTERNAL MEDICINE

## 2024-02-01 PROCEDURE — 3023F SPIROM DOC REV: CPT | Performed by: INTERNAL MEDICINE

## 2024-02-01 RX ORDER — PREDNISONE 20 MG/1
20 TABLET ORAL 2 TIMES DAILY
Qty: 10 TABLET | Refills: 0 | Status: SHIPPED | OUTPATIENT
Start: 2024-02-01 | End: 2024-02-06

## 2024-02-01 RX ORDER — CARVEDILOL 25 MG/1
25 TABLET ORAL 2 TIMES DAILY WITH MEALS
Qty: 180 TABLET | Refills: 3 | Status: SHIPPED | OUTPATIENT
Start: 2024-02-01

## 2024-02-01 RX ORDER — BENZOCAINE/MENTHOL 6 MG-10 MG
LOZENGE MUCOUS MEMBRANE 2 TIMES DAILY
Qty: 30 G | Refills: 3 | Status: SHIPPED | OUTPATIENT
Start: 2024-02-01

## 2024-02-01 RX ORDER — ASPIRIN 81 MG/1
81 TABLET, CHEWABLE ORAL DAILY
Qty: 90 TABLET | Refills: 3 | Status: SHIPPED | OUTPATIENT
Start: 2024-02-01

## 2024-02-01 RX ORDER — GABAPENTIN 100 MG/1
100 CAPSULE ORAL 2 TIMES DAILY
Qty: 180 CAPSULE | Refills: 1 | Status: SHIPPED | OUTPATIENT
Start: 2024-02-01 | End: 2024-07-30

## 2024-02-01 RX ORDER — BENZOCAINE/MENTHOL 6 MG-10 MG
LOZENGE MUCOUS MEMBRANE 2 TIMES DAILY
COMMUNITY
End: 2024-02-01 | Stop reason: SDUPTHER

## 2024-02-01 RX ORDER — IBUPROFEN 800 MG/1
800 TABLET ORAL 2 TIMES DAILY PRN
Qty: 60 TABLET | Refills: 2 | Status: SHIPPED | OUTPATIENT
Start: 2024-02-01

## 2024-02-01 NOTE — PROGRESS NOTES
[facility] [place] [facility] PRIMARY CARE  [address] [address]  Dept: [phone]  Dept Fax: [phone]     NAME: Maria Elena Dowd        :  1960        MRN:  [id number]    Chief Complaint   Patient presents with    Follow-up     Was in hospital on 23 for COPD, middle fingers on left hand numbness,    Wheezing    Knee Pain     Right knee pain having trouble walking, wanting script for Ibuprofen 800mg       Subjective     History of Present Illness  Maria Elena Dowd is a 63 y.o. female who presents today for routine follow up and medication refill.     Saw pulmonology on 2024, office note reviewed. Diagnosed with severe obstructive disorder with bronchodilator response, she was started on xolair although has not yet received an injection as they are waiting on insurance approval.     Hospital stay 23 - 23  63-year-old lady past medical history of COPD, GERD, legally blind left eye, hypertension presented to COPD exacerbation right oxygen 3 L her hospital stay.  She was found to have hypertensive urgency. Also New onset diabetes A1c 7.1.  Initially on IV Solu-Medrol transition to oral steroids.  Given elevated blood pressure BP medication has been adjusted for better blood pressure control.  Patient's metoprolol was switched to Coreg 25 mg twice daily.  Advised to continue amlodipine 10 mg daily.  Weaned off oxygen to room air at discharge.  Concerning left flank dermatomal rash started yesterday per patient with characteristics of papules dermatomal and red concerning for shingles.  She was given a prescription for Valtrex and to take the medication if this rash progresses to blisters.  Concerning the onset diabetes patient was okay with starting metformin.  Discussed blood sugar was high in house likely from underlying steroids.         Review of Systems  Please see HPI above. Comprehensive review of systems negative unless otherwise noted

## 2024-02-06 ENCOUNTER — HOSPITAL ENCOUNTER (OUTPATIENT)
Dept: INFUSION THERAPY | Age: 64
Setting detail: INFUSION SERIES
Discharge: HOME | End: 2024-02-06
Payer: MEDICAID

## 2024-02-06 VITALS
HEART RATE: 76 BPM | SYSTOLIC BLOOD PRESSURE: 144 MMHG | RESPIRATION RATE: 18 BRPM | OXYGEN SATURATION: 98 % | DIASTOLIC BLOOD PRESSURE: 80 MMHG | TEMPERATURE: 98.4 F

## 2024-02-06 DIAGNOSIS — J82.83 EOSINOPHILIC ASTHMA: Primary | ICD-10-CM

## 2024-02-06 PROCEDURE — 96372 THER/PROPH/DIAG INJ SC/IM: CPT

## 2024-02-06 PROCEDURE — 6370000000 HC RX 637 (ALT 250 FOR IP): Performed by: NURSE PRACTITIONER

## 2024-02-06 PROCEDURE — 6360000002 HC RX W HCPCS: Performed by: NURSE PRACTITIONER

## 2024-02-06 RX ORDER — ALBUTEROL SULFATE 90 UG/1
4 AEROSOL, METERED RESPIRATORY (INHALATION) PRN
OUTPATIENT
Start: 2024-02-20

## 2024-02-06 RX ORDER — SODIUM CHLORIDE 9 MG/ML
INJECTION, SOLUTION INTRAVENOUS CONTINUOUS
OUTPATIENT
Start: 2024-02-20

## 2024-02-06 RX ORDER — ACETAMINOPHEN 325 MG/1
650 TABLET ORAL
OUTPATIENT
Start: 2024-02-20

## 2024-02-06 RX ORDER — ACETAMINOPHEN 325 MG/1
650 TABLET ORAL ONCE
OUTPATIENT
Start: 2024-02-20

## 2024-02-06 RX ORDER — MEPERIDINE HYDROCHLORIDE 25 MG/ML
25 INJECTION INTRAMUSCULAR; INTRAVENOUS; SUBCUTANEOUS ONCE
Start: 2024-02-20 | End: 2024-02-20

## 2024-02-06 RX ORDER — TRIAMCINOLONE ACETONIDE 1 MG/G
30 CREAM TOPICAL 2 TIMES DAILY PRN
COMMUNITY

## 2024-02-06 RX ORDER — ACETAMINOPHEN 325 MG/1
650 TABLET ORAL ONCE
Status: COMPLETED | OUTPATIENT
Start: 2024-02-06 | End: 2024-02-06

## 2024-02-06 RX ORDER — EPINEPHRINE 1 MG/ML
0.3 INJECTION, SOLUTION, CONCENTRATE INTRAVENOUS PRN
OUTPATIENT
Start: 2024-02-20

## 2024-02-06 RX ORDER — DIPHENHYDRAMINE HYDROCHLORIDE 50 MG/ML
50 INJECTION INTRAMUSCULAR; INTRAVENOUS
OUTPATIENT
Start: 2024-02-20

## 2024-02-06 RX ORDER — DOCUSATE SODIUM 100 MG/1
100 CAPSULE, LIQUID FILLED ORAL 2 TIMES DAILY PRN
COMMUNITY

## 2024-02-06 RX ORDER — ONDANSETRON 2 MG/ML
8 INJECTION INTRAMUSCULAR; INTRAVENOUS
OUTPATIENT
Start: 2024-02-20

## 2024-02-06 RX ADMIN — OMALIZUMAB 375 MG: 150 INJECTION, SOLUTION SUBCUTANEOUS at 14:04

## 2024-02-06 RX ADMIN — ACETAMINOPHEN 650 MG: 325 TABLET, FILM COATED ORAL at 13:58

## 2024-02-06 ASSESSMENT — PAIN DESCRIPTION - ONSET: ONSET: ON-GOING

## 2024-02-06 ASSESSMENT — PAIN - FUNCTIONAL ASSESSMENT: PAIN_FUNCTIONAL_ASSESSMENT: ACTIVITIES ARE NOT PREVENTED

## 2024-02-06 ASSESSMENT — PAIN DESCRIPTION - FREQUENCY: FREQUENCY: CONTINUOUS

## 2024-02-06 ASSESSMENT — PAIN DESCRIPTION - DESCRIPTORS: DESCRIPTORS: ACHING;DISCOMFORT

## 2024-02-06 ASSESSMENT — PAIN DESCRIPTION - LOCATION: LOCATION: TEETH

## 2024-02-06 ASSESSMENT — PAIN DESCRIPTION - PAIN TYPE: TYPE: ACUTE PAIN

## 2024-02-06 ASSESSMENT — PAIN DESCRIPTION - ORIENTATION: ORIENTATION: RIGHT;UPPER

## 2024-02-06 ASSESSMENT — PAIN SCALES - GENERAL: PAINLEVEL_OUTOF10: 8

## 2024-02-06 NOTE — PROGRESS NOTES
[facility] Xolair Allergy Control Test    Prescribing Physician: MATT Reed CNP    Was patient administered Xolair on appointed administration date? [x] yes [] no    Reason for not administering Xolair :[] Illness [] No show [] Other:    Was there any reaction to mediation?[] yes [x] no    If Yes: Reactions:      1. In the past 4 weeks, how much of the time did your asthma keep you from getting as much done as usual at work, school or at home?    [] 1 [x] 2 [] 3 [] 4 [] 5   Score:__2____    2. During the past 4 weeks, how often have you had shortness of breath?    [] 1 [x] 2 [] 3 [] 4 [] 5   Score:___2___    3. During the past four weeks, how often did your asthma symptoms (wheezing, coughing, shortness of breath, chest tightness, or pain) wake you up at night or earlier than usual in the morning?    [x] 1 [] 2 [] 3 [] 4 [] 5   Score:__1____    4. During the past four weeks, how often have you used your rescue inhaler or nebulizer medication?    [] 1 [x] 2 [] 3 [] 4 [] 5   Score:___2___    5. How would you rate your asthma control during the past 4 weeks?    [] 1 [x] 2 [] 3 [] 4 [] 5   Score:__2____        Total Score:__________9______        To score the Asthma control test: Each response to the 5 ACT questions has a point value from 1-5 as shown on the form. To score the ACT, add up the point value for each response to the 5 questions.

## 2024-02-06 NOTE — PROGRESS NOTES
Patient tolerated xolair injections well. Remained on unit for 1 hour after treatment per order. Patient alert and oriented x3. No distress noted. Vital signs stable. Patient denies any new or worsening pain. Educated patient on possible side effects and treatment of medication.     Patient verbalized understanding. Provided patient education and/or discharge material.Patient denies any needs. All questions answered. D/C in stable condition.

## 2024-02-08 ENCOUNTER — OFFICE VISIT (OUTPATIENT)
Dept: ORTHOPEDIC SURGERY | Age: 64
End: 2024-02-08

## 2024-02-08 VITALS — BODY MASS INDEX: 41.48 KG/M2 | WEIGHT: 249 LBS | HEIGHT: 65 IN

## 2024-02-08 DIAGNOSIS — G89.29 CHRONIC PAIN OF RIGHT KNEE: Primary | ICD-10-CM

## 2024-02-08 DIAGNOSIS — M25.561 CHRONIC PAIN OF RIGHT KNEE: Primary | ICD-10-CM

## 2024-02-08 DIAGNOSIS — M17.11 PRIMARY OSTEOARTHRITIS OF RIGHT KNEE: ICD-10-CM

## 2024-02-08 RX ORDER — LIDOCAINE HYDROCHLORIDE 10 MG/ML
3 INJECTION, SOLUTION INFILTRATION; PERINEURAL ONCE
Status: COMPLETED | OUTPATIENT
Start: 2024-02-08 | End: 2024-02-08

## 2024-02-08 RX ORDER — TRIAMCINOLONE ACETONIDE 40 MG/ML
40 INJECTION, SUSPENSION INTRA-ARTICULAR; INTRAMUSCULAR ONCE
Status: COMPLETED | OUTPATIENT
Start: 2024-02-08 | End: 2024-02-08

## 2024-02-08 RX ADMIN — TRIAMCINOLONE ACETONIDE 40 MG: 40 INJECTION, SUSPENSION INTRA-ARTICULAR; INTRAMUSCULAR at 16:17

## 2024-02-08 RX ADMIN — LIDOCAINE HYDROCHLORIDE 3 ML: 10 INJECTION, SOLUTION INFILTRATION; PERINEURAL at 16:17

## 2024-02-08 NOTE — PROGRESS NOTES
[facility]  PRIMARY CARE SPORTS MEDICINE  DATE OF VISIT : 2024    Patient : Maria Elena Dowd  Age : 63 y.o.   : 1960  MRN : [id number]   ______________________________________________________________________    Chief Complaint :   Chief Complaint   Patient presents with    Knee Pain     Right knee pain.  Patient has tried ibuprofen with no relief.  Pain has been ongoing for 1 months time.         HPI : Maria Elena Dowd is 63 y.o. female who presented to the clinic today for evaluation of right knee pain. Onset of the symptoms was 1 month ago, with no known mechanism of injury.  Current symptoms include pain and swelling.  Patient denies mechanical symptoms.  Pain is aggravated by any weight bearing activity. Evaluation to date: XRs of the right knee which demonstrate no acute fractures or dislocations.  Treatment to date: avoidance of offending activity and OTC analgesics which are not very effective.     Past Medical History :  Past Medical History:   Diagnosis Date    Chronic obstructive pulmonary disease with acute exacerbation (HCC) 2023    COPD (chronic obstructive pulmonary disease) ([facility])     NSTEMI (non-ST elevated myocardial infarction) ([facility]) 3/30/2022    Primary hypertension     Primary open angle glaucoma (POAG) of left eye, severe stage     Urinary incontinence     For or 22     Past Surgical History:   Procedure Laterality Date     SECTION      x5    CYSTOSCOPY N/A 2022    CYSTOSCOPY BOTOX INJECTION 100 UNITS   (PHARMACY NOTIFIED) performed by Cristi Osman DO at [facility] OR    TONSILLECTOMY         Allergies :   No Known Allergies    Medication List :    Current Outpatient Medications   Medication Sig Dispense Refill    docusate sodium (COLACE) 100 MG capsule Take 1 capsule by mouth 2 times daily as needed for Constipation      triamcinolone (KENALOG) 0.1 % cream Apply 30 g topically 2 times daily as needed (break outs) Apply topically 2 times daily.      carvedilol (COREG) 25 MG

## 2024-02-20 ENCOUNTER — HOSPITAL ENCOUNTER (OUTPATIENT)
Dept: INFUSION THERAPY | Age: 64
Setting detail: INFUSION SERIES
Discharge: HOME | End: 2024-02-20
Payer: MEDICAID

## 2024-02-20 VITALS
OXYGEN SATURATION: 96 % | HEART RATE: 73 BPM | DIASTOLIC BLOOD PRESSURE: 73 MMHG | TEMPERATURE: 97.2 F | SYSTOLIC BLOOD PRESSURE: 131 MMHG | RESPIRATION RATE: 16 BRPM

## 2024-02-20 DIAGNOSIS — J82.83 EOSINOPHILIC ASTHMA: Primary | ICD-10-CM

## 2024-02-20 PROCEDURE — 6370000000 HC RX 637 (ALT 250 FOR IP): Performed by: NURSE PRACTITIONER

## 2024-02-20 PROCEDURE — 6360000002 HC RX W HCPCS: Performed by: NURSE PRACTITIONER

## 2024-02-20 PROCEDURE — 96372 THER/PROPH/DIAG INJ SC/IM: CPT

## 2024-02-20 RX ORDER — ACETAMINOPHEN 325 MG/1
650 TABLET ORAL ONCE
OUTPATIENT
Start: 2024-03-05

## 2024-02-20 RX ORDER — EPINEPHRINE 1 MG/ML
0.3 INJECTION, SOLUTION, CONCENTRATE INTRAVENOUS PRN
OUTPATIENT
Start: 2024-03-05

## 2024-02-20 RX ORDER — ACETAMINOPHEN 325 MG/1
650 TABLET ORAL
OUTPATIENT
Start: 2024-03-05

## 2024-02-20 RX ORDER — SODIUM CHLORIDE 9 MG/ML
INJECTION, SOLUTION INTRAVENOUS CONTINUOUS
OUTPATIENT
Start: 2024-03-05

## 2024-02-20 RX ORDER — ONDANSETRON 2 MG/ML
8 INJECTION INTRAMUSCULAR; INTRAVENOUS
OUTPATIENT
Start: 2024-03-05

## 2024-02-20 RX ORDER — ACETAMINOPHEN 325 MG/1
650 TABLET ORAL ONCE
Status: COMPLETED | OUTPATIENT
Start: 2024-02-20 | End: 2024-02-20

## 2024-02-20 RX ORDER — MEPERIDINE HYDROCHLORIDE 25 MG/ML
25 INJECTION INTRAMUSCULAR; INTRAVENOUS; SUBCUTANEOUS ONCE
Start: 2024-03-05 | End: 2024-03-05

## 2024-02-20 RX ORDER — DIPHENHYDRAMINE HYDROCHLORIDE 50 MG/ML
50 INJECTION INTRAMUSCULAR; INTRAVENOUS
OUTPATIENT
Start: 2024-03-05

## 2024-02-20 RX ORDER — ALBUTEROL SULFATE 90 UG/1
4 AEROSOL, METERED RESPIRATORY (INHALATION) PRN
OUTPATIENT
Start: 2024-03-05

## 2024-02-20 RX ADMIN — ACETAMINOPHEN 650 MG: 325 TABLET, FILM COATED ORAL at 13:57

## 2024-02-20 RX ADMIN — OMALIZUMAB 375 MG: 150 INJECTION, SOLUTION SUBCUTANEOUS at 13:59

## 2024-02-20 NOTE — PROGRESS NOTES
Patient tolerated xolair injections well. Remained on unit for 1 hour after treatment per order. Patient alert and oriented x3. No distress noted. Vital signs stable. Patient denies any new or worsening pain. Offered patient education and/or discharge material. Patient declined. Patient denies any needs. All questions answered. D/C in stable condition. Waiting on her son to pick her up and take her home.

## 2024-03-05 ENCOUNTER — HOSPITAL ENCOUNTER (OUTPATIENT)
Dept: INFUSION THERAPY | Age: 64
Setting detail: INFUSION SERIES
Discharge: HOME | End: 2024-03-05
Payer: MEDICAID

## 2024-03-05 VITALS
OXYGEN SATURATION: 97 % | RESPIRATION RATE: 16 BRPM | HEART RATE: 72 BPM | DIASTOLIC BLOOD PRESSURE: 80 MMHG | TEMPERATURE: 97.4 F | SYSTOLIC BLOOD PRESSURE: 147 MMHG

## 2024-03-05 DIAGNOSIS — J82.83 EOSINOPHILIC ASTHMA: Primary | ICD-10-CM

## 2024-03-05 PROCEDURE — 96372 THER/PROPH/DIAG INJ SC/IM: CPT

## 2024-03-05 PROCEDURE — 6370000000 HC RX 637 (ALT 250 FOR IP): Performed by: NURSE PRACTITIONER

## 2024-03-05 PROCEDURE — 6360000002 HC RX W HCPCS: Performed by: NURSE PRACTITIONER

## 2024-03-05 RX ORDER — ACETAMINOPHEN 325 MG/1
650 TABLET ORAL ONCE
Status: COMPLETED | OUTPATIENT
Start: 2024-03-05 | End: 2024-03-05

## 2024-03-05 RX ORDER — ACETAMINOPHEN 325 MG/1
650 TABLET ORAL
OUTPATIENT
Start: 2024-03-19

## 2024-03-05 RX ORDER — EPINEPHRINE 1 MG/ML
0.3 INJECTION, SOLUTION, CONCENTRATE INTRAVENOUS PRN
OUTPATIENT
Start: 2024-03-19

## 2024-03-05 RX ORDER — ALBUTEROL SULFATE 90 UG/1
4 AEROSOL, METERED RESPIRATORY (INHALATION) PRN
OUTPATIENT
Start: 2024-03-19

## 2024-03-05 RX ORDER — SODIUM CHLORIDE 9 MG/ML
INJECTION, SOLUTION INTRAVENOUS CONTINUOUS
OUTPATIENT
Start: 2024-03-19

## 2024-03-05 RX ORDER — ONDANSETRON 2 MG/ML
8 INJECTION INTRAMUSCULAR; INTRAVENOUS
OUTPATIENT
Start: 2024-03-19

## 2024-03-05 RX ORDER — DIPHENHYDRAMINE HYDROCHLORIDE 50 MG/ML
50 INJECTION INTRAMUSCULAR; INTRAVENOUS
OUTPATIENT
Start: 2024-03-19

## 2024-03-05 RX ORDER — MEPERIDINE HYDROCHLORIDE 25 MG/ML
25 INJECTION INTRAMUSCULAR; INTRAVENOUS; SUBCUTANEOUS ONCE
Start: 2024-03-19 | End: 2024-03-19

## 2024-03-05 RX ORDER — ACETAMINOPHEN 325 MG/1
650 TABLET ORAL ONCE
OUTPATIENT
Start: 2024-03-19

## 2024-03-05 RX ADMIN — ACETAMINOPHEN 650 MG: 325 TABLET, FILM COATED ORAL at 13:46

## 2024-03-05 RX ADMIN — OMALIZUMAB 375 MG: 150 INJECTION, SOLUTION SUBCUTANEOUS at 13:56

## 2024-03-05 NOTE — PROGRESS NOTES
[facility]    Prescribing Physician: Louisa Gallego CNP    Was patient administered Xolair on appointed administration date? [x] yes [] no    Reason for not administering Xolair :[] Illness [] No show [] Other:    Was there any reaction to mediation?[] yes [x] no    If Yes: Reactions:      1. In the past 4 weeks, how much of the time did your asthma keep you from getting as much done as usual at work, school or at home?    [] 1 [] 2 [] 3 [] 4 [x] 5   Score:__5____    2. During the past 4 weeks, how often have you had shortness of breath?    [] 1 [x] 2 [] 3 [] 4 [] 5   Score:___2___    3. During the past four weeks, how often did your asthma symptoms (wheezing, coughing, shortness of breath, chest tightness, or pain) wake you up at night or earlier than usual in the morning?    [] 1 [x] 2 [] 3 [] 4 [] 5   Score:___2___    4. During the past four weeks, how often have you used your rescue inhaler or nebulizer medication?    [] 1 [x] 2 [] 3 [] 4 [] 5   Score:___2___    5. How would you rate your asthma control during the past 4 weeks?    [] 1 [] 2 [x] 3 [] 4 [] 5   Score:___3___        Total Score:____14____________        To score the Asthma control test: Each response to the 5 ACT questions has a point value from 1-5 as shown on the form. To score the ACT, add up the point value for each response to the 5 questions.

## 2024-03-05 NOTE — PROGRESS NOTES
Patient tolerated xolair injections well. Remained on unit for 1 hour after treatment per order. Patient alert and oriented x3. No distress noted. Vital signs stable. Patient denies any new or worsening pain. Offered patient education and/or discharge material. Patient declined. Patient denies any needs. All questions answered. D/C in stable condition.

## 2024-03-19 ENCOUNTER — HOSPITAL ENCOUNTER (OUTPATIENT)
Dept: INFUSION THERAPY | Age: 64
Setting detail: INFUSION SERIES
Discharge: HOME | End: 2024-03-19
Payer: MEDICAID

## 2024-03-19 VITALS
TEMPERATURE: 97.2 F | SYSTOLIC BLOOD PRESSURE: 145 MMHG | OXYGEN SATURATION: 96 % | DIASTOLIC BLOOD PRESSURE: 79 MMHG | RESPIRATION RATE: 20 BRPM | HEART RATE: 85 BPM

## 2024-03-19 DIAGNOSIS — J82.83 EOSINOPHILIC ASTHMA: Primary | ICD-10-CM

## 2024-03-19 PROCEDURE — 6370000000 HC RX 637 (ALT 250 FOR IP): Performed by: NURSE PRACTITIONER

## 2024-03-19 PROCEDURE — 6360000002 HC RX W HCPCS: Performed by: NURSE PRACTITIONER

## 2024-03-19 PROCEDURE — 96372 THER/PROPH/DIAG INJ SC/IM: CPT

## 2024-03-19 RX ORDER — MEPERIDINE HYDROCHLORIDE 25 MG/ML
25 INJECTION INTRAMUSCULAR; INTRAVENOUS; SUBCUTANEOUS ONCE
Start: 2024-04-02 | End: 2024-04-02

## 2024-03-19 RX ORDER — ONDANSETRON 2 MG/ML
8 INJECTION INTRAMUSCULAR; INTRAVENOUS
OUTPATIENT
Start: 2024-04-02

## 2024-03-19 RX ORDER — EPINEPHRINE 1 MG/ML
0.3 INJECTION, SOLUTION, CONCENTRATE INTRAVENOUS PRN
OUTPATIENT
Start: 2024-04-02

## 2024-03-19 RX ORDER — ALBUTEROL SULFATE 90 UG/1
4 AEROSOL, METERED RESPIRATORY (INHALATION) PRN
OUTPATIENT
Start: 2024-04-02

## 2024-03-19 RX ORDER — ACETAMINOPHEN 325 MG/1
650 TABLET ORAL ONCE
OUTPATIENT
Start: 2024-04-02

## 2024-03-19 RX ORDER — ACETAMINOPHEN 325 MG/1
650 TABLET ORAL ONCE
Status: COMPLETED | OUTPATIENT
Start: 2024-03-19 | End: 2024-03-19

## 2024-03-19 RX ORDER — DIPHENHYDRAMINE HYDROCHLORIDE 50 MG/ML
50 INJECTION INTRAMUSCULAR; INTRAVENOUS
OUTPATIENT
Start: 2024-04-02

## 2024-03-19 RX ORDER — ACETAMINOPHEN 325 MG/1
650 TABLET ORAL
OUTPATIENT
Start: 2024-04-02

## 2024-03-19 RX ORDER — SODIUM CHLORIDE 9 MG/ML
INJECTION, SOLUTION INTRAVENOUS CONTINUOUS
OUTPATIENT
Start: 2024-04-02

## 2024-03-19 RX ADMIN — ACETAMINOPHEN 650 MG: 325 TABLET, FILM COATED ORAL at 13:52

## 2024-03-19 RX ADMIN — OMALIZUMAB 375 MG: 150 INJECTION, SOLUTION SUBCUTANEOUS at 13:57

## 2024-03-19 NOTE — PROGRESS NOTES
Patient tolerated xolair injections well. Remained on unit for 30min after treatment per order. Patient alert and oriented x3. No distress noted. Vital signs stable. Patient denies any new or worsening pain. Offered patient education and/or discharge material. Patient declined. Patient denies any needs. All questions answered. D/C in stable condition.

## 2024-04-02 ENCOUNTER — HOSPITAL ENCOUNTER (OUTPATIENT)
Dept: INFUSION THERAPY | Age: 64
Setting detail: INFUSION SERIES
Discharge: HOME | End: 2024-04-02
Payer: MEDICAID

## 2024-04-02 VITALS
OXYGEN SATURATION: 96 % | RESPIRATION RATE: 16 BRPM | SYSTOLIC BLOOD PRESSURE: 161 MMHG | DIASTOLIC BLOOD PRESSURE: 97 MMHG | TEMPERATURE: 97.4 F | HEART RATE: 80 BPM

## 2024-04-02 DIAGNOSIS — J82.83 EOSINOPHILIC ASTHMA: Primary | ICD-10-CM

## 2024-04-02 PROCEDURE — 6360000002 HC RX W HCPCS: Performed by: NURSE PRACTITIONER

## 2024-04-02 PROCEDURE — 6370000000 HC RX 637 (ALT 250 FOR IP): Performed by: NURSE PRACTITIONER

## 2024-04-02 PROCEDURE — 96372 THER/PROPH/DIAG INJ SC/IM: CPT

## 2024-04-02 RX ORDER — ONDANSETRON 2 MG/ML
8 INJECTION INTRAMUSCULAR; INTRAVENOUS
OUTPATIENT
Start: 2024-04-16

## 2024-04-02 RX ORDER — ACETAMINOPHEN 325 MG/1
650 TABLET ORAL
OUTPATIENT
Start: 2024-04-16

## 2024-04-02 RX ORDER — EPINEPHRINE 1 MG/ML
0.3 INJECTION, SOLUTION, CONCENTRATE INTRAVENOUS PRN
OUTPATIENT
Start: 2024-04-16

## 2024-04-02 RX ORDER — ALBUTEROL SULFATE 90 UG/1
4 AEROSOL, METERED RESPIRATORY (INHALATION) PRN
OUTPATIENT
Start: 2024-04-16

## 2024-04-02 RX ORDER — ACETAMINOPHEN 325 MG/1
650 TABLET ORAL ONCE
OUTPATIENT
Start: 2024-04-16

## 2024-04-02 RX ORDER — DIPHENHYDRAMINE HYDROCHLORIDE 50 MG/ML
50 INJECTION INTRAMUSCULAR; INTRAVENOUS
OUTPATIENT
Start: 2024-04-16

## 2024-04-02 RX ORDER — ACETAMINOPHEN 325 MG/1
650 TABLET ORAL ONCE
Status: COMPLETED | OUTPATIENT
Start: 2024-04-02 | End: 2024-04-02

## 2024-04-02 RX ORDER — SODIUM CHLORIDE 9 MG/ML
INJECTION, SOLUTION INTRAVENOUS CONTINUOUS
OUTPATIENT
Start: 2024-04-16

## 2024-04-02 RX ORDER — MEPERIDINE HYDROCHLORIDE 25 MG/ML
25 INJECTION INTRAMUSCULAR; INTRAVENOUS; SUBCUTANEOUS ONCE
Start: 2024-04-16 | End: 2024-04-16

## 2024-04-02 RX ADMIN — ACETAMINOPHEN 650 MG: 325 TABLET, FILM COATED ORAL at 14:30

## 2024-04-02 RX ADMIN — OMALIZUMAB 375 MG: 150 INJECTION, SOLUTION SUBCUTANEOUS at 14:35

## 2024-04-02 NOTE — PROGRESS NOTES
[facility] Xolair Allergy Control Test    Prescribing Physician: Louisa Gallego    Was patient administered Xolair on appointed administration date? [x] yes [] no    Reason for not administering Xolair :[] Illness [] No show [] Other:    Was there any reaction to mediation?[] yes [x] no    If Yes: Reactions:      1. In the past 4 weeks, how much of the time did your asthma keep you from getting as much done as usual at work, school or at home?    [] 1 [] 2 [x] 3 [] 4 [] 5   Score:___3___    2. During the past 4 weeks, how often have you had shortness of breath?    [x] 1 [] 2 [] 3 [] 4 [] 5   Score:__1____    3. During the past four weeks, how often did your asthma symptoms (wheezing, coughing, shortness of breath, chest tightness, or pain) wake you up at night or earlier than usual in the morning?    [] 1 [x] 2 [] 3 [] 4 [] 5   Score:__2____    4. During the past four weeks, how often have you used your rescue inhaler or nebulizer medication?    [x] 1 [] 2 [] 3 [] 4 [] 5   Score:__1____    5. How would you rate your asthma control during the past 4 weeks?    [] 1 [] 2 [x] 3 [] 4 [] 5   Score:__3____        Total Score:______10__________        To score the Asthma control test: Each response to the 5 ACT questions has a point value from 1-5 as shown on the form. To score the ACT, add up the point value for each response to the 5 questions.

## 2024-04-02 NOTE — PROGRESS NOTES
Patient tolerated xolair injections well. Remained on unit for 30 minutes after treatment. Patient alert and oriented x3. No distress noted. Vital signs stable. Patient denies any new or worsening pain. Offered patient education and/or discharge material. Patient provided d/c instructions and education handout on medication. Patient denies any needs. All questions answered. D/C in stable condition.

## 2024-04-16 ENCOUNTER — HOSPITAL ENCOUNTER (OUTPATIENT)
Dept: INFUSION THERAPY | Age: 64
Setting detail: INFUSION SERIES
Discharge: HOME | End: 2024-04-16
Payer: MEDICAID

## 2024-04-16 VITALS
DIASTOLIC BLOOD PRESSURE: 86 MMHG | OXYGEN SATURATION: 97 % | SYSTOLIC BLOOD PRESSURE: 172 MMHG | RESPIRATION RATE: 16 BRPM | TEMPERATURE: 96.7 F | HEART RATE: 92 BPM

## 2024-04-16 DIAGNOSIS — J82.83 EOSINOPHILIC ASTHMA: Primary | ICD-10-CM

## 2024-04-16 PROCEDURE — 6370000000 HC RX 637 (ALT 250 FOR IP): Performed by: NURSE PRACTITIONER

## 2024-04-16 PROCEDURE — 6360000002 HC RX W HCPCS: Performed by: NURSE PRACTITIONER

## 2024-04-16 PROCEDURE — 96372 THER/PROPH/DIAG INJ SC/IM: CPT

## 2024-04-16 RX ORDER — EPINEPHRINE 1 MG/ML
0.3 INJECTION, SOLUTION, CONCENTRATE INTRAVENOUS PRN
OUTPATIENT
Start: 2024-04-30

## 2024-04-16 RX ORDER — ONDANSETRON 2 MG/ML
8 INJECTION INTRAMUSCULAR; INTRAVENOUS
OUTPATIENT
Start: 2024-04-30

## 2024-04-16 RX ORDER — DIPHENHYDRAMINE HYDROCHLORIDE 50 MG/ML
50 INJECTION INTRAMUSCULAR; INTRAVENOUS
OUTPATIENT
Start: 2024-04-30

## 2024-04-16 RX ORDER — SODIUM CHLORIDE 9 MG/ML
INJECTION, SOLUTION INTRAVENOUS CONTINUOUS
OUTPATIENT
Start: 2024-04-30

## 2024-04-16 RX ORDER — MEPERIDINE HYDROCHLORIDE 25 MG/ML
25 INJECTION INTRAMUSCULAR; INTRAVENOUS; SUBCUTANEOUS ONCE
Start: 2024-04-30 | End: 2024-04-30

## 2024-04-16 RX ORDER — ACETAMINOPHEN 325 MG/1
650 TABLET ORAL ONCE
OUTPATIENT
Start: 2024-04-30

## 2024-04-16 RX ORDER — ACETAMINOPHEN 325 MG/1
650 TABLET ORAL
OUTPATIENT
Start: 2024-04-30

## 2024-04-16 RX ORDER — ALBUTEROL SULFATE 90 UG/1
4 AEROSOL, METERED RESPIRATORY (INHALATION) PRN
OUTPATIENT
Start: 2024-04-30

## 2024-04-16 RX ORDER — ACETAMINOPHEN 325 MG/1
650 TABLET ORAL ONCE
Status: COMPLETED | OUTPATIENT
Start: 2024-04-16 | End: 2024-04-16

## 2024-04-16 RX ADMIN — OMALIZUMAB 375 MG: 150 INJECTION, SOLUTION SUBCUTANEOUS at 14:21

## 2024-04-16 RX ADMIN — ACETAMINOPHEN 650 MG: 325 TABLET, FILM COATED ORAL at 14:19

## 2024-04-16 NOTE — PROGRESS NOTES
Patient tolerated xolair injections well. Remained on unit for 30 minutes after treatment. Patient alert and oriented x3. No distress noted. Vital signs stable. Patient denies any new or worsening pain.  Offered patient education and/or discharge material. Patient declined. Patient denies any needs. All questions answered. D/C in stable condition.

## 2024-04-30 ENCOUNTER — HOSPITAL ENCOUNTER (OUTPATIENT)
Dept: INFUSION THERAPY | Age: 64
Setting detail: INFUSION SERIES
Discharge: HOME | End: 2024-04-30
Payer: MEDICAID

## 2024-04-30 VITALS
TEMPERATURE: 97 F | OXYGEN SATURATION: 96 % | DIASTOLIC BLOOD PRESSURE: 84 MMHG | RESPIRATION RATE: 16 BRPM | SYSTOLIC BLOOD PRESSURE: 148 MMHG | HEART RATE: 86 BPM

## 2024-04-30 DIAGNOSIS — J82.83 EOSINOPHILIC ASTHMA: Primary | ICD-10-CM

## 2024-04-30 PROCEDURE — 96372 THER/PROPH/DIAG INJ SC/IM: CPT

## 2024-04-30 PROCEDURE — 6360000002 HC RX W HCPCS: Performed by: NURSE PRACTITIONER

## 2024-04-30 PROCEDURE — 6370000000 HC RX 637 (ALT 250 FOR IP): Performed by: NURSE PRACTITIONER

## 2024-04-30 RX ORDER — ACETAMINOPHEN 325 MG/1
650 TABLET ORAL
OUTPATIENT
Start: 2024-05-14

## 2024-04-30 RX ORDER — ACETAMINOPHEN 325 MG/1
650 TABLET ORAL ONCE
OUTPATIENT
Start: 2024-05-14

## 2024-04-30 RX ORDER — ALBUTEROL SULFATE 90 UG/1
4 AEROSOL, METERED RESPIRATORY (INHALATION) PRN
OUTPATIENT
Start: 2024-05-14

## 2024-04-30 RX ORDER — DIPHENHYDRAMINE HYDROCHLORIDE 50 MG/ML
50 INJECTION INTRAMUSCULAR; INTRAVENOUS
OUTPATIENT
Start: 2024-05-14

## 2024-04-30 RX ORDER — ONDANSETRON 2 MG/ML
8 INJECTION INTRAMUSCULAR; INTRAVENOUS
OUTPATIENT
Start: 2024-05-14

## 2024-04-30 RX ORDER — MEPERIDINE HYDROCHLORIDE 25 MG/ML
25 INJECTION INTRAMUSCULAR; INTRAVENOUS; SUBCUTANEOUS ONCE
Start: 2024-05-14 | End: 2024-05-14

## 2024-04-30 RX ORDER — EPINEPHRINE 1 MG/ML
0.3 INJECTION, SOLUTION, CONCENTRATE INTRAVENOUS PRN
OUTPATIENT
Start: 2024-05-14

## 2024-04-30 RX ORDER — ACETAMINOPHEN 325 MG/1
650 TABLET ORAL ONCE
Status: COMPLETED | OUTPATIENT
Start: 2024-04-30 | End: 2024-04-30

## 2024-04-30 RX ORDER — SODIUM CHLORIDE 9 MG/ML
INJECTION, SOLUTION INTRAVENOUS CONTINUOUS
OUTPATIENT
Start: 2024-05-14

## 2024-04-30 RX ADMIN — OMALIZUMAB 375 MG: 150 INJECTION, SOLUTION SUBCUTANEOUS at 14:18

## 2024-04-30 RX ADMIN — ACETAMINOPHEN 650 MG: 325 TABLET, FILM COATED ORAL at 14:14

## 2024-04-30 NOTE — FLOWSHEET NOTE
Patient tolerated injections well. Remained on unit for 20 minutes after treatment. Patient alert and oriented x3. No distress noted. Vital signs stable. Patient denies any new or worsening pain. Educated patient on possible side effects and treatment of medication.     Patient verbalized understanding. Offered patient education and/or discharge material. Patient declined. Patient denies any needs. All questions answered. D/C in stable condition.

## 2024-05-14 ENCOUNTER — HOSPITAL ENCOUNTER (OUTPATIENT)
Dept: INFUSION THERAPY | Age: 64
Setting detail: INFUSION SERIES
Discharge: HOME | End: 2024-05-14
Payer: MEDICAID

## 2024-05-14 VITALS
TEMPERATURE: 97.3 F | SYSTOLIC BLOOD PRESSURE: 168 MMHG | HEART RATE: 82 BPM | DIASTOLIC BLOOD PRESSURE: 87 MMHG | RESPIRATION RATE: 16 BRPM | OXYGEN SATURATION: 96 %

## 2024-05-14 DIAGNOSIS — J82.83 EOSINOPHILIC ASTHMA: Primary | ICD-10-CM

## 2024-05-14 PROCEDURE — 6360000002 HC RX W HCPCS: Performed by: NURSE PRACTITIONER

## 2024-05-14 PROCEDURE — 96372 THER/PROPH/DIAG INJ SC/IM: CPT

## 2024-05-14 PROCEDURE — 6370000000 HC RX 637 (ALT 250 FOR IP): Performed by: NURSE PRACTITIONER

## 2024-05-14 RX ORDER — ACETAMINOPHEN 325 MG/1
650 TABLET ORAL
OUTPATIENT
Start: 2024-05-28

## 2024-05-14 RX ORDER — DIPHENHYDRAMINE HYDROCHLORIDE 50 MG/ML
50 INJECTION INTRAMUSCULAR; INTRAVENOUS
OUTPATIENT
Start: 2024-05-28

## 2024-05-14 RX ORDER — SODIUM CHLORIDE 9 MG/ML
INJECTION, SOLUTION INTRAVENOUS CONTINUOUS
OUTPATIENT
Start: 2024-05-28

## 2024-05-14 RX ORDER — EPINEPHRINE 1 MG/ML
0.3 INJECTION, SOLUTION, CONCENTRATE INTRAVENOUS PRN
OUTPATIENT
Start: 2024-05-28

## 2024-05-14 RX ORDER — ONDANSETRON 2 MG/ML
8 INJECTION INTRAMUSCULAR; INTRAVENOUS
OUTPATIENT
Start: 2024-05-28

## 2024-05-14 RX ORDER — ACETAMINOPHEN 325 MG/1
650 TABLET ORAL ONCE
Status: COMPLETED | OUTPATIENT
Start: 2024-05-14 | End: 2024-05-14

## 2024-05-14 RX ORDER — MEPERIDINE HYDROCHLORIDE 25 MG/ML
25 INJECTION INTRAMUSCULAR; INTRAVENOUS; SUBCUTANEOUS ONCE
Start: 2024-05-28 | End: 2024-05-28

## 2024-05-14 RX ORDER — ALBUTEROL SULFATE 90 UG/1
4 AEROSOL, METERED RESPIRATORY (INHALATION) PRN
OUTPATIENT
Start: 2024-05-28

## 2024-05-14 RX ORDER — ACETAMINOPHEN 325 MG/1
650 TABLET ORAL ONCE
OUTPATIENT
Start: 2024-05-28

## 2024-05-14 RX ADMIN — OMALIZUMAB 375 MG: 150 INJECTION, SOLUTION SUBCUTANEOUS at 14:33

## 2024-05-14 RX ADMIN — ACETAMINOPHEN 650 MG: 325 TABLET, FILM COATED ORAL at 14:29

## 2024-05-14 ASSESSMENT — PAIN SCALES - GENERAL: PAINLEVEL_OUTOF10: 5

## 2024-05-14 ASSESSMENT — PAIN - FUNCTIONAL ASSESSMENT: PAIN_FUNCTIONAL_ASSESSMENT: ACTIVITIES ARE NOT PREVENTED

## 2024-05-14 ASSESSMENT — PAIN DESCRIPTION - FREQUENCY: FREQUENCY: CONTINUOUS

## 2024-05-14 ASSESSMENT — PAIN DESCRIPTION - ORIENTATION: ORIENTATION: LEFT

## 2024-05-14 ASSESSMENT — PAIN DESCRIPTION - ONSET: ONSET: ON-GOING

## 2024-05-14 ASSESSMENT — PAIN DESCRIPTION - LOCATION: LOCATION: EYE

## 2024-05-14 ASSESSMENT — PAIN DESCRIPTION - DIRECTION: RADIATING_TOWARDS: NON RADIATING

## 2024-05-14 ASSESSMENT — PAIN DESCRIPTION - DESCRIPTORS: DESCRIPTORS: DISCOMFORT;ACHING

## 2024-05-14 ASSESSMENT — PAIN DESCRIPTION - PAIN TYPE: TYPE: ACUTE PAIN

## 2024-05-14 NOTE — PROGRESS NOTES
[facility]    Prescribing Physician: Louisa Gallego CNP    Was patient administered Xolair on appointed administration date? [x] yes [] no    Reason for not administering Xolair :[] Illness [] No show [] Other:    Was there any reaction to mediation?[] yes [x] no    If Yes: Reactions:      1. In the past 4 weeks, how much of the time did your asthma keep you from getting as much done as usual at work, school or at home?    [] 1 [] 2 [x] 3 [] 4 [] 5   Score:___3___    2. During the past 4 weeks, how often have you had shortness of breath?    [x] 1 [] 2 [] 3 [] 4 [] 5   Score:__1____    3. During the past four weeks, how often did your asthma symptoms (wheezing, coughing, shortness of breath, chest tightness, or pain) wake you up at night or earlier than usual in the morning?    [] 1 [x] 2 [] 3 [] 4 [] 5   Score:___2___    4. During the past four weeks, how often have you used your rescue inhaler or nebulizer medication?    [x] 1 [] 2 [] 3 [] 4 [] 5   Score:___1___    5. How would you rate your asthma control during the past 4 weeks?    [] 1 [] 2 [x] 3 [] 4 [] 5   Score:___3___        Total Score:_____10___________        To score the Asthma control test: Each response to the 5 ACT questions has a point value from 1-5 as shown on the form. To score the ACT, add up the point value for each response to the 5 questions.

## 2024-05-24 DIAGNOSIS — M25.561 CHRONIC PAIN OF RIGHT KNEE: ICD-10-CM

## 2024-05-24 DIAGNOSIS — I10 PRIMARY HYPERTENSION: ICD-10-CM

## 2024-05-24 DIAGNOSIS — G89.29 CHRONIC PAIN OF RIGHT KNEE: ICD-10-CM

## 2024-05-24 DIAGNOSIS — G62.9 NEUROPATHY: ICD-10-CM

## 2024-05-24 RX ORDER — AMLODIPINE BESYLATE 10 MG/1
10 TABLET ORAL EVERY MORNING
Qty: 90 TABLET | Refills: 1 | Status: SHIPPED | OUTPATIENT
Start: 2024-05-24

## 2024-05-24 RX ORDER — IBUPROFEN 800 MG/1
800 TABLET ORAL 2 TIMES DAILY PRN
Qty: 60 TABLET | Refills: 3 | Status: SHIPPED | OUTPATIENT
Start: 2024-05-24

## 2024-05-24 RX ORDER — GABAPENTIN 100 MG/1
100 CAPSULE ORAL 2 TIMES DAILY
Qty: 180 CAPSULE | Refills: 1
Start: 2024-05-24 | End: 2024-11-20

## 2024-05-24 RX ORDER — ASPIRIN 81 MG/1
81 TABLET, CHEWABLE ORAL DAILY
Qty: 90 TABLET | Refills: 1 | Status: SHIPPED | OUTPATIENT
Start: 2024-05-24

## 2024-05-24 RX ORDER — CARVEDILOL 25 MG/1
25 TABLET ORAL 2 TIMES DAILY WITH MEALS
Qty: 180 TABLET | Refills: 1 | Status: SHIPPED | OUTPATIENT
Start: 2024-05-24

## 2024-05-28 ENCOUNTER — HOSPITAL ENCOUNTER (OUTPATIENT)
Dept: INFUSION THERAPY | Age: 64
Setting detail: INFUSION SERIES
Discharge: HOME | End: 2024-05-28
Payer: MEDICAID

## 2024-05-28 VITALS
RESPIRATION RATE: 18 BRPM | SYSTOLIC BLOOD PRESSURE: 150 MMHG | TEMPERATURE: 97.2 F | DIASTOLIC BLOOD PRESSURE: 91 MMHG | HEART RATE: 82 BPM | OXYGEN SATURATION: 97 %

## 2024-05-28 DIAGNOSIS — J82.83 EOSINOPHILIC ASTHMA: Primary | ICD-10-CM

## 2024-05-28 PROCEDURE — 6360000002 HC RX W HCPCS: Performed by: NURSE PRACTITIONER

## 2024-05-28 PROCEDURE — 6370000000 HC RX 637 (ALT 250 FOR IP): Performed by: NURSE PRACTITIONER

## 2024-05-28 PROCEDURE — 96372 THER/PROPH/DIAG INJ SC/IM: CPT

## 2024-05-28 RX ORDER — DIPHENHYDRAMINE HYDROCHLORIDE 50 MG/ML
50 INJECTION INTRAMUSCULAR; INTRAVENOUS
OUTPATIENT
Start: 2024-06-11

## 2024-05-28 RX ORDER — ALBUTEROL SULFATE 90 UG/1
4 AEROSOL, METERED RESPIRATORY (INHALATION) PRN
OUTPATIENT
Start: 2024-06-11

## 2024-05-28 RX ORDER — SODIUM CHLORIDE 9 MG/ML
INJECTION, SOLUTION INTRAVENOUS CONTINUOUS
OUTPATIENT
Start: 2024-06-11

## 2024-05-28 RX ORDER — ACETAMINOPHEN 325 MG/1
650 TABLET ORAL ONCE
Status: COMPLETED | OUTPATIENT
Start: 2024-05-28 | End: 2024-05-28

## 2024-05-28 RX ORDER — ACETAMINOPHEN 325 MG/1
650 TABLET ORAL
OUTPATIENT
Start: 2024-06-11

## 2024-05-28 RX ORDER — ACETAMINOPHEN 325 MG/1
650 TABLET ORAL ONCE
OUTPATIENT
Start: 2024-06-11

## 2024-05-28 RX ORDER — ONDANSETRON 2 MG/ML
8 INJECTION INTRAMUSCULAR; INTRAVENOUS
OUTPATIENT
Start: 2024-06-11

## 2024-05-28 RX ORDER — EPINEPHRINE 1 MG/ML
0.3 INJECTION, SOLUTION, CONCENTRATE INTRAVENOUS PRN
OUTPATIENT
Start: 2024-06-11

## 2024-05-28 RX ORDER — MEPERIDINE HYDROCHLORIDE 25 MG/ML
25 INJECTION INTRAMUSCULAR; INTRAVENOUS; SUBCUTANEOUS ONCE
Start: 2024-06-11 | End: 2024-06-11

## 2024-05-28 RX ADMIN — ACETAMINOPHEN 650 MG: 325 TABLET, FILM COATED ORAL at 14:13

## 2024-05-28 RX ADMIN — OMALIZUMAB 375 MG: 150 INJECTION, SOLUTION SUBCUTANEOUS at 14:22

## 2024-05-28 NOTE — PROGRESS NOTES
Patient tolerated treatment well without complications or complaints. Alert and oriented x3. Patient aware of potential side effects and denies questions regarding treatment. Pt hypertensive per her baseline, Vital signs otherwise stable. Pain assessed, patient denies any new or worsening pain.

## 2024-05-29 ENCOUNTER — OFFICE VISIT (OUTPATIENT)
Dept: PULMONOLOGY | Age: 64
End: 2024-05-29
Payer: MEDICAID

## 2024-05-29 VITALS
RESPIRATION RATE: 18 BRPM | WEIGHT: 256.6 LBS | HEIGHT: 65 IN | OXYGEN SATURATION: 95 % | HEART RATE: 83 BPM | TEMPERATURE: 97 F | BODY MASS INDEX: 42.75 KG/M2

## 2024-05-29 DIAGNOSIS — D82.4 HYPER-IGE SYNDROME (HCC): ICD-10-CM

## 2024-05-29 DIAGNOSIS — Z87.891 HISTORY OF TOBACCO USE: ICD-10-CM

## 2024-05-29 DIAGNOSIS — J44.9 COPD, SEVERE (HCC): Primary | ICD-10-CM

## 2024-05-29 DIAGNOSIS — J45.40 MODERATE PERSISTENT EXTRINSIC ASTHMA WITHOUT COMPLICATION: ICD-10-CM

## 2024-05-29 LAB — FENO: 22 PPB

## 2024-05-29 PROCEDURE — 95012 NITRIC OXIDE EXP GAS DETER: CPT | Performed by: NURSE PRACTITIONER

## 2024-05-29 PROCEDURE — 3023F SPIROM DOC REV: CPT | Performed by: NURSE PRACTITIONER

## 2024-05-29 PROCEDURE — 99213 OFFICE O/P EST LOW 20 MIN: CPT | Performed by: NURSE PRACTITIONER

## 2024-05-29 PROCEDURE — G8428 CUR MEDS NOT DOCUMENT: HCPCS | Performed by: NURSE PRACTITIONER

## 2024-05-29 PROCEDURE — 1036F TOBACCO NON-USER: CPT | Performed by: NURSE PRACTITIONER

## 2024-05-29 PROCEDURE — G8417 CALC BMI ABV UP PARAM F/U: HCPCS | Performed by: NURSE PRACTITIONER

## 2024-05-29 PROCEDURE — 3017F COLORECTAL CA SCREEN DOC REV: CPT | Performed by: NURSE PRACTITIONER

## 2024-05-29 RX ORDER — ALBUTEROL SULFATE 90 UG/1
2 AEROSOL, METERED RESPIRATORY (INHALATION) EVERY 4 HOURS PRN
Qty: 1 EACH | Refills: 12 | Status: SHIPPED | OUTPATIENT
Start: 2024-05-29 | End: 2025-05-29

## 2024-05-29 RX ORDER — IPRATROPIUM BROMIDE AND ALBUTEROL SULFATE 2.5; .5 MG/3ML; MG/3ML
1 SOLUTION RESPIRATORY (INHALATION) EVERY 4 HOURS PRN
Qty: 180 EACH | Refills: 12 | Status: SHIPPED | OUTPATIENT
Start: 2024-05-29

## 2024-05-29 ASSESSMENT — PULMONARY FUNCTION TESTS: FENO: 22

## 2024-05-29 NOTE — PROGRESS NOTES
Follow up in office; pt doing well with Xolair injections and meds per orders. Pt refills sent to pharmacy. Plan for follow up in office in 3 mos; appt given. Chest CT lung screen to be done . No changes in meds. Pulmonary rehab referral.   
reports she is going to [place] soon to stay with her daughter for a little while and avoid the winter air. Needs full PFT. Due for yearly lung screen in February 2024.    December 19, 2023: Maria Elena Dowd returns to the office following hospitalization for COPD exacerbation where she was treated with antibiotics and steroids. She continues to feel fatigues since her discharge. We discussed that she needs a full PFT, this has been scheduled numerous times and she has been unable to make it. In addition she needs eosiniophil count and IgE level to determine if biologic therapy would benefit her due to frequent need for steroids and hospitalizations. Maria Elena is agreeable. PFT scheduled for December 28, 2023 at noon, written information was given to her. She was given a sample of breztri to try and I provided her with an incentive spirometer. She is to follow up in 1 month.     January 23, 2024: Maria Elena returns to the office following PFT and blood work. PFT reveals severe obstructive disorder with significant bronchodilator response no restriction, evidence of hyperinflation. IgE 1477 and eosinophil count 410. Maria Elena needs to be initiated on Xolair biologic therapy to control her asthma symptoms and in effort to reduce her OCS use. She is agreeable to this plan in hopes of better symptom control, avoid frequent exacerbations and prevent further hospitalizations. She continues to use her pulmonary medications as prescribed. Last dose of oral steroids 1/8/2024. Return to office in 3 months.     May 29, 2024: Maria Elena is seen and examined today regarding her allergic asthma. She is now receinving Xolair injections every 2 weeks and since initiation February 2024 she has seen a major improvement in her asthma symptoms. She has not had any ER or urgent care visits since last office visit. No need for systemic steroids since starting Xolair. She continues to use Breztri inhaler as ordered. No new concerns or complaints. Denies

## 2024-05-30 ENCOUNTER — TELEPHONE (OUTPATIENT)
Dept: PULMONOLOGY | Age: 64
End: 2024-05-30

## 2024-06-06 ENCOUNTER — TELEPHONE (OUTPATIENT)
Dept: CARDIAC REHAB | Age: 64
End: 2024-06-06

## 2024-06-11 ENCOUNTER — TELEPHONE (OUTPATIENT)
Dept: CARDIAC REHAB | Age: 64
End: 2024-06-11

## 2024-06-11 ENCOUNTER — HOSPITAL ENCOUNTER (OUTPATIENT)
Dept: INFUSION THERAPY | Age: 64
Setting detail: INFUSION SERIES
Discharge: HOME | End: 2024-06-11
Payer: MEDICAID

## 2024-06-11 VITALS
TEMPERATURE: 96.8 F | SYSTOLIC BLOOD PRESSURE: 143 MMHG | HEART RATE: 73 BPM | RESPIRATION RATE: 18 BRPM | DIASTOLIC BLOOD PRESSURE: 86 MMHG | OXYGEN SATURATION: 95 %

## 2024-06-11 DIAGNOSIS — J82.83 EOSINOPHILIC ASTHMA: Primary | ICD-10-CM

## 2024-06-11 PROCEDURE — 6370000000 HC RX 637 (ALT 250 FOR IP): Performed by: NURSE PRACTITIONER

## 2024-06-11 PROCEDURE — 96372 THER/PROPH/DIAG INJ SC/IM: CPT

## 2024-06-11 PROCEDURE — 6360000002 HC RX W HCPCS: Performed by: NURSE PRACTITIONER

## 2024-06-11 RX ORDER — MEPERIDINE HYDROCHLORIDE 25 MG/ML
25 INJECTION INTRAMUSCULAR; INTRAVENOUS; SUBCUTANEOUS ONCE
Start: 2024-06-25 | End: 2024-06-25

## 2024-06-11 RX ORDER — ALBUTEROL SULFATE 90 UG/1
4 AEROSOL, METERED RESPIRATORY (INHALATION) PRN
OUTPATIENT
Start: 2024-06-25

## 2024-06-11 RX ORDER — ACETAMINOPHEN 325 MG/1
650 TABLET ORAL ONCE
Status: COMPLETED | OUTPATIENT
Start: 2024-06-11 | End: 2024-06-11

## 2024-06-11 RX ORDER — DIPHENHYDRAMINE HYDROCHLORIDE 50 MG/ML
50 INJECTION INTRAMUSCULAR; INTRAVENOUS
OUTPATIENT
Start: 2024-06-25

## 2024-06-11 RX ORDER — SODIUM CHLORIDE 9 MG/ML
INJECTION, SOLUTION INTRAVENOUS CONTINUOUS
OUTPATIENT
Start: 2024-06-25

## 2024-06-11 RX ORDER — EPINEPHRINE 1 MG/ML
0.3 INJECTION, SOLUTION, CONCENTRATE INTRAVENOUS PRN
OUTPATIENT
Start: 2024-06-25

## 2024-06-11 RX ORDER — ACETAMINOPHEN 325 MG/1
650 TABLET ORAL
OUTPATIENT
Start: 2024-06-25

## 2024-06-11 RX ORDER — ONDANSETRON 2 MG/ML
8 INJECTION INTRAMUSCULAR; INTRAVENOUS
OUTPATIENT
Start: 2024-06-25

## 2024-06-11 RX ORDER — ACETAMINOPHEN 325 MG/1
650 TABLET ORAL ONCE
OUTPATIENT
Start: 2024-06-25

## 2024-06-11 RX ADMIN — ACETAMINOPHEN 650 MG: 325 TABLET, FILM COATED ORAL at 14:48

## 2024-06-11 RX ADMIN — OMALIZUMAB 375 MG: 150 INJECTION, SOLUTION SUBCUTANEOUS at 14:52

## 2024-06-11 NOTE — TELEPHONE ENCOUNTER
2nd attempt to contact pt in regards to scheduling outpatient pulmonary rehab initial exercise session. LVM for pt to return call.

## 2024-06-11 NOTE — PROGRESS NOTES
[facility]U Xolair Allergy Control Test    Prescribing Physician:    Was patient administered Xolair on appointed administration date? [x] yes [] no    Reason for not administering Xolair :[] Illness [] No show [] Other:    Was there any reaction to mediation?[] yes [x] no    If Yes: Reactions:      1. In the past 4 weeks, how much of the time did your asthma keep you from getting as much done as usual at work, school or at home?    [] 1 [] 2 [x] 3 [] 4 [] 5   Score:______    2. During the past 4 weeks, how often have you had shortness of breath?    [x] 1 [] 2 [] 3 [] 4 [] 5   Score:______    3. During the past four weeks, how often did your asthma symptoms (wheezing, coughing, shortness of breath, chest tightness, or pain) wake you up at night or earlier than usual in the morning?    [] 1 [x] 2 [] 3 [] 4 [] 5   Score:______    4. During the past four weeks, how often have you used your rescue inhaler or nebulizer medication?    [x] 1 [] 2 [] 3 [] 4 [] 5   Score:______    5. How would you rate your asthma control during the past 4 weeks?    [] 1 [] 2 [x] 3 [] 4 [] 5   Score:______        Total Score:_______10_________        To score the Asthma control test: Each response to the 5 ACT questions has a point value from 1-5 as shown on the form. To score the ACT, add up the point value for each response to the 5 questions.                      
Patient tolerated xolair injections well. Remained on unit for 15 minutes after treatment. Patient alert and oriented x3. No distress noted. Vital signs stable. Patient denies any new or worsening pain. Offered patient education and/or discharge material. Patient declined. Patient denies any needs. All questions answered. D/C in stable condition.   
Negative

## 2024-06-20 ENCOUNTER — HOSPITAL ENCOUNTER (OUTPATIENT)
Dept: CT IMAGING | Age: 64
Discharge: HOME | End: 2024-06-22
Payer: MEDICAID

## 2024-06-20 DIAGNOSIS — Z87.891 HISTORY OF TOBACCO USE: ICD-10-CM

## 2024-06-20 PROCEDURE — 71271 CT THORAX LUNG CANCER SCR C-: CPT

## 2024-06-25 ENCOUNTER — HOSPITAL ENCOUNTER (OUTPATIENT)
Dept: INFUSION THERAPY | Age: 64
Setting detail: INFUSION SERIES
Discharge: HOME | End: 2024-06-25
Payer: MEDICAID

## 2024-06-25 VITALS
OXYGEN SATURATION: 96 % | TEMPERATURE: 97.1 F | SYSTOLIC BLOOD PRESSURE: 130 MMHG | RESPIRATION RATE: 16 BRPM | HEART RATE: 76 BPM | DIASTOLIC BLOOD PRESSURE: 74 MMHG

## 2024-06-25 DIAGNOSIS — J82.83 EOSINOPHILIC ASTHMA: Primary | ICD-10-CM

## 2024-06-25 PROCEDURE — 6360000002 HC RX W HCPCS: Performed by: NURSE PRACTITIONER

## 2024-06-25 PROCEDURE — 96372 THER/PROPH/DIAG INJ SC/IM: CPT

## 2024-06-25 PROCEDURE — 6370000000 HC RX 637 (ALT 250 FOR IP): Performed by: NURSE PRACTITIONER

## 2024-06-25 RX ORDER — MEPERIDINE HYDROCHLORIDE 25 MG/ML
25 INJECTION INTRAMUSCULAR; INTRAVENOUS; SUBCUTANEOUS ONCE
Start: 2024-07-09 | End: 2024-07-09

## 2024-06-25 RX ORDER — SODIUM CHLORIDE 9 MG/ML
INJECTION, SOLUTION INTRAVENOUS CONTINUOUS
OUTPATIENT
Start: 2024-07-09

## 2024-06-25 RX ORDER — ONDANSETRON 2 MG/ML
8 INJECTION INTRAMUSCULAR; INTRAVENOUS
OUTPATIENT
Start: 2024-07-09

## 2024-06-25 RX ORDER — ALBUTEROL SULFATE 90 UG/1
4 AEROSOL, METERED RESPIRATORY (INHALATION) PRN
OUTPATIENT
Start: 2024-07-09

## 2024-06-25 RX ORDER — DIPHENHYDRAMINE HYDROCHLORIDE 50 MG/ML
50 INJECTION INTRAMUSCULAR; INTRAVENOUS
OUTPATIENT
Start: 2024-07-09

## 2024-06-25 RX ORDER — ACETAMINOPHEN 325 MG/1
650 TABLET ORAL ONCE
OUTPATIENT
Start: 2024-07-09

## 2024-06-25 RX ORDER — ACETAMINOPHEN 325 MG/1
650 TABLET ORAL ONCE
Status: COMPLETED | OUTPATIENT
Start: 2024-06-25 | End: 2024-06-25

## 2024-06-25 RX ORDER — EPINEPHRINE 1 MG/ML
0.3 INJECTION, SOLUTION, CONCENTRATE INTRAVENOUS PRN
OUTPATIENT
Start: 2024-07-09

## 2024-06-25 RX ORDER — ACETAMINOPHEN 325 MG/1
650 TABLET ORAL
OUTPATIENT
Start: 2024-07-09

## 2024-06-25 RX ADMIN — OMALIZUMAB 375 MG: 150 INJECTION, SOLUTION SUBCUTANEOUS at 14:31

## 2024-06-25 RX ADMIN — ACETAMINOPHEN 650 MG: 325 TABLET, FILM COATED ORAL at 14:22

## 2024-07-08 RX ORDER — ACETAMINOPHEN 325 MG/1
650 TABLET ORAL ONCE
OUTPATIENT
Start: 2024-07-09

## 2024-07-08 RX ORDER — SODIUM CHLORIDE 9 MG/ML
INJECTION, SOLUTION INTRAVENOUS CONTINUOUS
OUTPATIENT
Start: 2024-07-09

## 2024-07-08 RX ORDER — ALBUTEROL SULFATE 90 UG/1
4 AEROSOL, METERED RESPIRATORY (INHALATION) PRN
OUTPATIENT
Start: 2024-07-09

## 2024-07-08 RX ORDER — ONDANSETRON 2 MG/ML
8 INJECTION INTRAMUSCULAR; INTRAVENOUS
OUTPATIENT
Start: 2024-07-09

## 2024-07-08 RX ORDER — EPINEPHRINE 1 MG/ML
0.3 INJECTION, SOLUTION, CONCENTRATE INTRAVENOUS PRN
OUTPATIENT
Start: 2024-07-09

## 2024-07-08 RX ORDER — DIPHENHYDRAMINE HYDROCHLORIDE 50 MG/ML
50 INJECTION INTRAMUSCULAR; INTRAVENOUS
OUTPATIENT
Start: 2024-07-09

## 2024-07-08 RX ORDER — MEPERIDINE HYDROCHLORIDE 25 MG/ML
25 INJECTION INTRAMUSCULAR; INTRAVENOUS; SUBCUTANEOUS ONCE
Start: 2024-07-09 | End: 2024-07-09

## 2024-07-08 RX ORDER — ACETAMINOPHEN 325 MG/1
650 TABLET ORAL
OUTPATIENT
Start: 2024-07-09

## 2024-07-09 ENCOUNTER — HOSPITAL ENCOUNTER (OUTPATIENT)
Dept: INFUSION THERAPY | Age: 64
Setting detail: INFUSION SERIES
Discharge: HOME | End: 2024-07-09

## 2024-07-09 DIAGNOSIS — J82.83 EOSINOPHILIC ASTHMA: Primary | ICD-10-CM

## 2024-07-09 RX ORDER — ACETAMINOPHEN 325 MG/1
650 TABLET ORAL ONCE
Status: DISCONTINUED | OUTPATIENT
Start: 2024-07-09 | End: 2024-07-12 | Stop reason: HOSPADM

## (undated) DEVICE — SET CYSTOSCOPE 21FR

## (undated) DEVICE — SET SURG BASIN MAYO REUSABLE

## (undated) DEVICE — GOWN,SIRUS,FABRNF,XL,20/CS: Brand: MEDLINE

## (undated) DEVICE — SOLUTION IV IRRIG WATER 1000ML POUR BRL 2F7114

## (undated) DEVICE — CAMERA STRYKER 1488

## (undated) DEVICE — LENS CYSTOSCOPE 30 DEG

## (undated) DEVICE — GAUZE,SPONGE,4"X4",8PLY,STRL,LF,10/TRAY: Brand: MEDLINE

## (undated) DEVICE — SOLUTION IRRIG 3000ML STRL H2O USP UROMATIC PLAS CONT

## (undated) DEVICE — CUP MEDICINE ST

## (undated) DEVICE — GLOVE ORANGE PI 7 1/2   MSG9075

## (undated) DEVICE — NEEDLE FLTR 18GA L1.5IN MEM THK5UM BLNT DISP

## (undated) DEVICE — Device: Brand: SINGLE USE INJECTOR NM-221C-0427

## (undated) DEVICE — SOLUTION SURG PREP ANTIMICROBIAL 4 OZ SKIN WND EXIDINE

## (undated) DEVICE — 4-PORT MANIFOLD: Brand: NEPTUNE 2

## (undated) DEVICE — CYSTO PACK: Brand: MEDLINE INDUSTRIES, INC.

## (undated) DEVICE — SYRINGE, LUER LOCK, 10ML: Brand: MEDLINE